# Patient Record
Sex: MALE | Race: WHITE | NOT HISPANIC OR LATINO | Employment: OTHER | URBAN - METROPOLITAN AREA
[De-identification: names, ages, dates, MRNs, and addresses within clinical notes are randomized per-mention and may not be internally consistent; named-entity substitution may affect disease eponyms.]

---

## 2017-12-18 ENCOUNTER — TRANSCRIBE ORDERS (OUTPATIENT)
Dept: ADMINISTRATIVE | Facility: HOSPITAL | Age: 37
End: 2017-12-18

## 2017-12-18 DIAGNOSIS — S06.0X9S: ICD-10-CM

## 2017-12-18 DIAGNOSIS — R40.3 PERSISTENT VEGETATIVE STATE (HCC): Primary | ICD-10-CM

## 2017-12-20 ENCOUNTER — HOSPITAL ENCOUNTER (OUTPATIENT)
Dept: CT IMAGING | Facility: HOSPITAL | Age: 37
Discharge: HOME | End: 2017-12-20
Attending: INTERNAL MEDICINE | Admitting: INTERNAL MEDICINE

## 2017-12-20 DIAGNOSIS — R40.3 PERSISTENT VEGETATIVE STATE (HCC): ICD-10-CM

## 2017-12-20 DIAGNOSIS — S06.0X9S: ICD-10-CM

## 2017-12-20 PROCEDURE — 70450 CT HEAD/BRAIN W/O DYE: CPT

## 2018-02-26 ENCOUNTER — OFFICE (OUTPATIENT)
Dept: URBAN - METROPOLITAN AREA CLINIC 71 | Facility: CLINIC | Age: 38
End: 2018-02-26

## 2018-02-26 VITALS — DIASTOLIC BLOOD PRESSURE: 80 MMHG | HEART RATE: 88 BPM | SYSTOLIC BLOOD PRESSURE: 118 MMHG

## 2018-02-26 DIAGNOSIS — K94.20 GASTROSTOMY COMPLICATION, UNSPECIFIED: ICD-10-CM

## 2018-02-26 PROCEDURE — 43760: CPT

## 2018-08-06 ENCOUNTER — APPOINTMENT (OUTPATIENT)
Dept: GENERAL RADIOLOGY | Facility: HOSPITAL | Age: 38
End: 2018-08-06

## 2018-08-06 ENCOUNTER — HOSPITAL ENCOUNTER (INPATIENT)
Facility: HOSPITAL | Age: 38
LOS: 4 days | Discharge: NURSING, REHAB OR LONG-TERM CARE | End: 2018-08-10
Attending: EMERGENCY MEDICINE | Admitting: HOSPITALIST

## 2018-08-06 DIAGNOSIS — J69.0 ASPIRATION PNEUMONITIS (HCC): ICD-10-CM

## 2018-08-06 DIAGNOSIS — R09.02 HYPOXIA: ICD-10-CM

## 2018-08-06 DIAGNOSIS — N39.0 ACUTE UTI: ICD-10-CM

## 2018-08-06 DIAGNOSIS — R56.9 SEIZURES (HCC): ICD-10-CM

## 2018-08-06 DIAGNOSIS — A41.9 SEPSIS, DUE TO UNSPECIFIED ORGANISM: Primary | ICD-10-CM

## 2018-08-06 LAB
ALBUMIN SERPL-MCNC: 4.6 G/DL (ref 3.5–5.2)
ALBUMIN/GLOB SERPL: 1.5 G/DL
ALP SERPL-CCNC: 112 U/L (ref 39–117)
ALT SERPL W P-5'-P-CCNC: 51 U/L (ref 1–41)
AMORPH URATE CRY URNS QL MICRO: ABNORMAL /HPF
ANION GAP SERPL CALCULATED.3IONS-SCNC: 14.5 MMOL/L
ANION GAP SERPL CALCULATED.3IONS-SCNC: 20.5 MMOL/L
ARTERIAL PATENCY WRIST A: POSITIVE
AST SERPL-CCNC: 34 U/L (ref 1–40)
ATMOSPHERIC PRESS: 754 MMHG
BACTERIA UR QL AUTO: ABNORMAL /HPF
BASE EXCESS BLDA CALC-SCNC: 0.3 MMOL/L (ref 0–2)
BASOPHILS # BLD AUTO: 0.04 10*3/MM3 (ref 0–0.2)
BASOPHILS # BLD AUTO: 0.06 10*3/MM3 (ref 0–0.2)
BASOPHILS NFR BLD AUTO: 0.5 % (ref 0–1.5)
BASOPHILS NFR BLD AUTO: 0.6 % (ref 0–1.5)
BDY SITE: NORMAL
BILIRUB SERPL-MCNC: 0.5 MG/DL (ref 0.1–1.2)
BILIRUB UR QL STRIP: NEGATIVE
BUN BLD-MCNC: 14 MG/DL (ref 6–20)
BUN BLD-MCNC: 19 MG/DL (ref 6–20)
BUN/CREAT SERPL: 24.6 (ref 7–25)
BUN/CREAT SERPL: 27.1 (ref 7–25)
CALCIUM SPEC-SCNC: 8.9 MG/DL (ref 8.6–10.5)
CALCIUM SPEC-SCNC: 9.8 MG/DL (ref 8.6–10.5)
CHLORIDE SERPL-SCNC: 106 MMOL/L (ref 98–107)
CHLORIDE SERPL-SCNC: 99 MMOL/L (ref 98–107)
CK SERPL-CCNC: 159 U/L (ref 20–200)
CLARITY UR: ABNORMAL
CO2 SERPL-SCNC: 22.5 MMOL/L (ref 22–29)
CO2 SERPL-SCNC: 24.5 MMOL/L (ref 22–29)
COLOR UR: YELLOW
CREAT BLD-MCNC: 0.57 MG/DL (ref 0.76–1.27)
CREAT BLD-MCNC: 0.7 MG/DL (ref 0.76–1.27)
D-LACTATE SERPL-SCNC: 3 MMOL/L (ref 0.5–2)
D-LACTATE SERPL-SCNC: 6.2 MMOL/L (ref 0.5–2)
DEPRECATED RDW RBC AUTO: 51.5 FL (ref 37–54)
DEPRECATED RDW RBC AUTO: 52.2 FL (ref 37–54)
EOSINOPHIL # BLD AUTO: 0.11 10*3/MM3 (ref 0–0.7)
EOSINOPHIL # BLD AUTO: 0.12 10*3/MM3 (ref 0–0.7)
EOSINOPHIL NFR BLD AUTO: 1.2 % (ref 0.3–6.2)
EOSINOPHIL NFR BLD AUTO: 1.5 % (ref 0.3–6.2)
ERYTHROCYTE [DISTWIDTH] IN BLOOD BY AUTOMATED COUNT: 14.1 % (ref 11.5–14.5)
ERYTHROCYTE [DISTWIDTH] IN BLOOD BY AUTOMATED COUNT: 14.3 % (ref 11.5–14.5)
GAS FLOW AIRWAY: 3 LPM
GFR SERPL CREATININE-BSD FRML MDRD: 126 ML/MIN/1.73
GFR SERPL CREATININE-BSD FRML MDRD: >150 ML/MIN/1.73
GLOBULIN UR ELPH-MCNC: 3.1 GM/DL
GLUCOSE BLD-MCNC: 127 MG/DL (ref 65–99)
GLUCOSE BLD-MCNC: 94 MG/DL (ref 65–99)
GLUCOSE BLDC GLUCOMTR-MCNC: 101 MG/DL (ref 70–130)
GLUCOSE BLDC GLUCOMTR-MCNC: 103 MG/DL (ref 70–130)
GLUCOSE BLDC GLUCOMTR-MCNC: 91 MG/DL (ref 70–130)
GLUCOSE UR STRIP-MCNC: NEGATIVE MG/DL
HCO3 BLDA-SCNC: 26.2 MMOL/L (ref 22–28)
HCT VFR BLD AUTO: 50.2 % (ref 40.4–52.2)
HCT VFR BLD AUTO: 53.8 % (ref 40.4–52.2)
HGB BLD-MCNC: 15.5 G/DL (ref 13.7–17.6)
HGB BLD-MCNC: 17 G/DL (ref 13.7–17.6)
HGB UR QL STRIP.AUTO: ABNORMAL
HOLD SPECIMEN: NORMAL
HYALINE CASTS UR QL AUTO: ABNORMAL /LPF
IMM GRANULOCYTES # BLD: 0.02 10*3/MM3 (ref 0–0.03)
IMM GRANULOCYTES # BLD: 0.03 10*3/MM3 (ref 0–0.03)
IMM GRANULOCYTES NFR BLD: 0.2 % (ref 0–0.5)
IMM GRANULOCYTES NFR BLD: 0.3 % (ref 0–0.5)
KETONES UR QL STRIP: NEGATIVE
LEUKOCYTE ESTERASE UR QL STRIP.AUTO: ABNORMAL
LYMPHOCYTES # BLD AUTO: 1.12 10*3/MM3 (ref 0.9–4.8)
LYMPHOCYTES # BLD AUTO: 2.16 10*3/MM3 (ref 0.9–4.8)
LYMPHOCYTES NFR BLD AUTO: 12 % (ref 19.6–45.3)
LYMPHOCYTES NFR BLD AUTO: 26.2 % (ref 19.6–45.3)
MCH RBC QN AUTO: 31.1 PG (ref 27–32.7)
MCH RBC QN AUTO: 31.7 PG (ref 27–32.7)
MCHC RBC AUTO-ENTMCNC: 30.9 G/DL (ref 32.6–36.4)
MCHC RBC AUTO-ENTMCNC: 31.6 G/DL (ref 32.6–36.4)
MCV RBC AUTO: 100.4 FL (ref 79.8–96.2)
MCV RBC AUTO: 100.6 FL (ref 79.8–96.2)
MODALITY: NORMAL
MONOCYTES # BLD AUTO: 0.89 10*3/MM3 (ref 0.2–1.2)
MONOCYTES # BLD AUTO: 0.91 10*3/MM3 (ref 0.2–1.2)
MONOCYTES NFR BLD AUTO: 11.1 % (ref 5–12)
MONOCYTES NFR BLD AUTO: 9.6 % (ref 5–12)
NEUTROPHILS # BLD AUTO: 4.98 10*3/MM3 (ref 1.9–8.1)
NEUTROPHILS # BLD AUTO: 7.1 10*3/MM3 (ref 1.9–8.1)
NEUTROPHILS NFR BLD AUTO: 60.5 % (ref 42.7–76)
NEUTROPHILS NFR BLD AUTO: 76.3 % (ref 42.7–76)
NITRITE UR QL STRIP: NEGATIVE
PCO2 BLDA: 45 MM HG (ref 35–45)
PH BLDA: 7.37 PH UNITS (ref 7.35–7.45)
PH UR STRIP.AUTO: <=5 [PH] (ref 5–8)
PLATELET # BLD AUTO: 180 10*3/MM3 (ref 140–500)
PLATELET # BLD AUTO: 214 10*3/MM3 (ref 140–500)
PMV BLD AUTO: 11 FL (ref 6–12)
PMV BLD AUTO: 11.7 FL (ref 6–12)
PO2 BLDA: 86.3 MM HG (ref 80–100)
POTASSIUM BLD-SCNC: 4.2 MMOL/L (ref 3.5–5.2)
POTASSIUM BLD-SCNC: 4.4 MMOL/L (ref 3.5–5.2)
PROCALCITONIN SERPL-MCNC: 0.06 NG/ML (ref 0.1–0.25)
PROT SERPL-MCNC: 7.7 G/DL (ref 6–8.5)
PROT UR QL STRIP: ABNORMAL
RBC # BLD AUTO: 4.99 10*6/MM3 (ref 4.6–6)
RBC # BLD AUTO: 5.36 10*6/MM3 (ref 4.6–6)
RBC # UR: ABNORMAL /HPF
REF LAB TEST METHOD: ABNORMAL
SAO2 % BLDCOA: 96.2 % (ref 92–99)
SODIUM BLD-SCNC: 142 MMOL/L (ref 136–145)
SODIUM BLD-SCNC: 145 MMOL/L (ref 136–145)
SP GR UR STRIP: 1.01 (ref 1–1.03)
SQUAMOUS #/AREA URNS HPF: ABNORMAL /HPF
TOTAL RATE: 18 BREATHS/MINUTE
TROPONIN T SERPL-MCNC: 0.02 NG/ML (ref 0–0.03)
UROBILINOGEN UR QL STRIP: ABNORMAL
WBC NRBC COR # BLD: 8.23 10*3/MM3 (ref 4.5–10.7)
WBC NRBC COR # BLD: 9.31 10*3/MM3 (ref 4.5–10.7)
WBC UR QL AUTO: ABNORMAL /HPF

## 2018-08-06 PROCEDURE — 93010 ELECTROCARDIOGRAM REPORT: CPT | Performed by: INTERNAL MEDICINE

## 2018-08-06 PROCEDURE — 25010000002 MORPHINE PER 10 MG: Performed by: EMERGENCY MEDICINE

## 2018-08-06 PROCEDURE — 25010000002 VANCOMYCIN 10 G RECONSTITUTED SOLUTION: Performed by: HOSPITALIST

## 2018-08-06 PROCEDURE — 82550 ASSAY OF CK (CPK): CPT | Performed by: EMERGENCY MEDICINE

## 2018-08-06 PROCEDURE — 85025 COMPLETE CBC W/AUTO DIFF WBC: CPT | Performed by: EMERGENCY MEDICINE

## 2018-08-06 PROCEDURE — 99285 EMERGENCY DEPT VISIT HI MDM: CPT

## 2018-08-06 PROCEDURE — 25010000003 LEVETIRACETAM IN NACL 0.75% 1000 MG/100ML SOLUTION: Performed by: EMERGENCY MEDICINE

## 2018-08-06 PROCEDURE — 71045 X-RAY EXAM CHEST 1 VIEW: CPT

## 2018-08-06 PROCEDURE — 87040 BLOOD CULTURE FOR BACTERIA: CPT | Performed by: EMERGENCY MEDICINE

## 2018-08-06 PROCEDURE — 94799 UNLISTED PULMONARY SVC/PX: CPT

## 2018-08-06 PROCEDURE — 25010000002 PIPERACILLIN SOD-TAZOBACTAM PER 1 G: Performed by: EMERGENCY MEDICINE

## 2018-08-06 PROCEDURE — 25010000002 VANCOMYCIN 10 G RECONSTITUTED SOLUTION: Performed by: EMERGENCY MEDICINE

## 2018-08-06 PROCEDURE — 80053 COMPREHEN METABOLIC PANEL: CPT | Performed by: EMERGENCY MEDICINE

## 2018-08-06 PROCEDURE — 36600 WITHDRAWAL OF ARTERIAL BLOOD: CPT

## 2018-08-06 PROCEDURE — 74018 RADEX ABDOMEN 1 VIEW: CPT

## 2018-08-06 PROCEDURE — 84484 ASSAY OF TROPONIN QUANT: CPT | Performed by: EMERGENCY MEDICINE

## 2018-08-06 PROCEDURE — 25010000002 PIPERACILLIN SOD-TAZOBACTAM PER 1 G: Performed by: HOSPITALIST

## 2018-08-06 PROCEDURE — 82962 GLUCOSE BLOOD TEST: CPT

## 2018-08-06 PROCEDURE — 81001 URINALYSIS AUTO W/SCOPE: CPT | Performed by: EMERGENCY MEDICINE

## 2018-08-06 PROCEDURE — 93005 ELECTROCARDIOGRAM TRACING: CPT | Performed by: EMERGENCY MEDICINE

## 2018-08-06 PROCEDURE — 82803 BLOOD GASES ANY COMBINATION: CPT

## 2018-08-06 PROCEDURE — 87086 URINE CULTURE/COLONY COUNT: CPT | Performed by: EMERGENCY MEDICINE

## 2018-08-06 PROCEDURE — 87077 CULTURE AEROBIC IDENTIFY: CPT | Performed by: EMERGENCY MEDICINE

## 2018-08-06 PROCEDURE — 83605 ASSAY OF LACTIC ACID: CPT | Performed by: EMERGENCY MEDICINE

## 2018-08-06 PROCEDURE — 87186 SC STD MICRODIL/AGAR DIL: CPT | Performed by: EMERGENCY MEDICINE

## 2018-08-06 PROCEDURE — 84145 PROCALCITONIN (PCT): CPT | Performed by: EMERGENCY MEDICINE

## 2018-08-06 PROCEDURE — 85025 COMPLETE CBC W/AUTO DIFF WBC: CPT | Performed by: HOSPITALIST

## 2018-08-06 PROCEDURE — 25010000002 LORAZEPAM PER 2 MG: Performed by: PSYCHIATRY & NEUROLOGY

## 2018-08-06 RX ORDER — SODIUM CHLORIDE 450 MG/100ML
75 INJECTION, SOLUTION INTRAVENOUS CONTINUOUS
Status: DISCONTINUED | OUTPATIENT
Start: 2018-08-06 | End: 2018-08-08

## 2018-08-06 RX ORDER — LEVETIRACETAM 100 MG/ML
1000 SOLUTION ORAL EVERY 12 HOURS SCHEDULED
Status: DISCONTINUED | OUTPATIENT
Start: 2018-08-06 | End: 2018-08-06

## 2018-08-06 RX ORDER — NITROGLYCERIN 0.4 MG/1
0.4 TABLET SUBLINGUAL
Status: DISCONTINUED | OUTPATIENT
Start: 2018-08-06 | End: 2018-08-10

## 2018-08-06 RX ORDER — ACETAMINOPHEN 160 MG/5ML
500 SOLUTION ORAL 3 TIMES DAILY PRN
Status: DISCONTINUED | OUTPATIENT
Start: 2018-08-06 | End: 2018-08-10

## 2018-08-06 RX ORDER — LEVETIRACETAM 100 MG/ML
1500 SOLUTION ORAL EVERY 12 HOURS SCHEDULED
Status: DISCONTINUED | OUTPATIENT
Start: 2018-08-06 | End: 2018-08-10 | Stop reason: HOSPADM

## 2018-08-06 RX ORDER — POLYVINYL ALCOHOL 14 MG/ML
1 SOLUTION/ DROPS OPHTHALMIC 4 TIMES DAILY
COMMUNITY
End: 2020-01-23 | Stop reason: HOSPADM

## 2018-08-06 RX ORDER — MORPHINE SULFATE 2 MG/ML
4 INJECTION, SOLUTION INTRAMUSCULAR; INTRAVENOUS ONCE
Status: DISCONTINUED | OUTPATIENT
Start: 2018-08-06 | End: 2018-08-06

## 2018-08-06 RX ORDER — POLYETHYLENE GLYCOL 3350 17 G/17G
17 POWDER, FOR SOLUTION ORAL DAILY PRN
Status: ON HOLD | COMMUNITY
End: 2020-01-20

## 2018-08-06 RX ORDER — ACETAMINOPHEN 160 MG/5ML
500 SOLUTION ORAL 3 TIMES DAILY PRN
COMMUNITY
End: 2018-08-10 | Stop reason: HOSPADM

## 2018-08-06 RX ORDER — DOCUSATE SODIUM 50 MG/5 ML
100 LIQUID (ML) ORAL DAILY
Status: DISCONTINUED | OUTPATIENT
Start: 2018-08-06 | End: 2018-08-10 | Stop reason: HOSPADM

## 2018-08-06 RX ORDER — HYDROCODONE BITARTRATE AND ACETAMINOPHEN 7.5; 325 MG/1; MG/1
2 TABLET ORAL EVERY 4 HOURS PRN
Status: DISCONTINUED | OUTPATIENT
Start: 2018-08-06 | End: 2018-08-10

## 2018-08-06 RX ORDER — HYDROCODONE BITARTRATE AND ACETAMINOPHEN 7.5; 325 MG/1; MG/1
1 TABLET ORAL EVERY 4 HOURS PRN
Status: DISCONTINUED | OUTPATIENT
Start: 2018-08-06 | End: 2018-08-10

## 2018-08-06 RX ORDER — FLUOCINOLONE ACETONIDE 0.1 MG/ML
1 SOLUTION TOPICAL 3 TIMES DAILY
COMMUNITY
End: 2018-08-10 | Stop reason: HOSPADM

## 2018-08-06 RX ORDER — LEVETIRACETAM 10 MG/ML
1000 INJECTION INTRAVASCULAR ONCE
Status: COMPLETED | OUTPATIENT
Start: 2018-08-06 | End: 2018-08-06

## 2018-08-06 RX ORDER — CETIRIZINE HYDROCHLORIDE 10 MG/1
10 TABLET ORAL DAILY
Status: DISCONTINUED | OUTPATIENT
Start: 2018-08-06 | End: 2018-08-07

## 2018-08-06 RX ORDER — FAMOTIDINE 20 MG/1
20 TABLET, FILM COATED ORAL 2 TIMES DAILY
Status: DISCONTINUED | OUTPATIENT
Start: 2018-08-06 | End: 2018-08-10 | Stop reason: HOSPADM

## 2018-08-06 RX ORDER — LORAZEPAM 2 MG/ML
1 INJECTION INTRAMUSCULAR ONCE
Status: COMPLETED | OUTPATIENT
Start: 2018-08-06 | End: 2018-08-06

## 2018-08-06 RX ORDER — CALAMINE, MENTHOL, WHITE PETROLATUM, ZINC OXIDE .5; .2; 69; 19.5 G/100G; G/100G; G/100G; G/100G
1 PASTE TOPICAL 2 TIMES DAILY
COMMUNITY
End: 2018-08-10 | Stop reason: HOSPADM

## 2018-08-06 RX ORDER — HYDROCODONE BITARTRATE AND ACETAMINOPHEN 7.5; 325 MG/1; MG/1
2 TABLET ORAL EVERY 4 HOURS PRN
COMMUNITY
End: 2018-08-10 | Stop reason: HOSPADM

## 2018-08-06 RX ORDER — BACLOFEN 10 MG/1
10 TABLET ORAL ONCE
Status: COMPLETED | OUTPATIENT
Start: 2018-08-06 | End: 2018-08-06

## 2018-08-06 RX ORDER — LEVETIRACETAM 10 MG/ML
1000 INJECTION INTRAVASCULAR ONCE
Status: DISCONTINUED | OUTPATIENT
Start: 2018-08-06 | End: 2018-08-06

## 2018-08-06 RX ORDER — HYDROCODONE BITARTRATE AND ACETAMINOPHEN 7.5; 325 MG/1; MG/1
1 TABLET ORAL EVERY 4 HOURS PRN
COMMUNITY
End: 2018-08-10 | Stop reason: HOSPADM

## 2018-08-06 RX ORDER — CHLORHEXIDINE GLUCONATE 0.12 MG/ML
15 RINSE ORAL EVERY 12 HOURS SCHEDULED
Status: DISCONTINUED | OUTPATIENT
Start: 2018-08-06 | End: 2018-08-10 | Stop reason: HOSPADM

## 2018-08-06 RX ORDER — BISACODYL 10 MG
10 SUPPOSITORY, RECTAL RECTAL DAILY PRN
Status: DISCONTINUED | OUTPATIENT
Start: 2018-08-06 | End: 2018-08-10

## 2018-08-06 RX ORDER — METRONIDAZOLE 7.5 MG/G
1 GEL TOPICAL DAILY
COMMUNITY
End: 2018-08-10 | Stop reason: HOSPADM

## 2018-08-06 RX ADMIN — SODIUM CHLORIDE 75 ML/HR: 4.5 INJECTION, SOLUTION INTRAVENOUS at 14:14

## 2018-08-06 RX ADMIN — VANCOMYCIN HYDROCHLORIDE 1250 MG: 10 INJECTION, POWDER, LYOPHILIZED, FOR SOLUTION INTRAVENOUS at 17:02

## 2018-08-06 RX ADMIN — VANCOMYCIN HYDROCHLORIDE 1500 MG: 10 INJECTION, POWDER, LYOPHILIZED, FOR SOLUTION INTRAVENOUS at 02:46

## 2018-08-06 RX ADMIN — TAZOBACTAM SODIUM AND PIPERACILLIN SODIUM 3.38 G: 375; 3 INJECTION, SOLUTION INTRAVENOUS at 08:17

## 2018-08-06 RX ADMIN — LORAZEPAM 1 MG: 2 INJECTION INTRAMUSCULAR; INTRAVENOUS at 15:55

## 2018-08-06 RX ADMIN — SODIUM CHLORIDE 1000 ML: 9 INJECTION, SOLUTION INTRAVENOUS at 00:51

## 2018-08-06 RX ADMIN — AMANTADINE HYDROCHLORIDE 100 MG: 50 SOLUTION ORAL at 14:12

## 2018-08-06 RX ADMIN — CHLORHEXIDINE GLUCONATE 15 ML: 1.2 RINSE ORAL at 21:34

## 2018-08-06 RX ADMIN — CETIRIZINE HYDROCHLORIDE 10 MG: 10 TABLET, FILM COATED ORAL at 11:55

## 2018-08-06 RX ADMIN — TAZOBACTAM SODIUM AND PIPERACILLIN SODIUM 3.38 G: 375; 3 INJECTION, SOLUTION INTRAVENOUS at 17:02

## 2018-08-06 RX ADMIN — HYDROCODONE BITARTRATE AND ACETAMINOPHEN 2 TABLET: 7.5; 325 TABLET ORAL at 14:12

## 2018-08-06 RX ADMIN — MUPIROCIN 1 APPLICATION: 20 OINTMENT TOPICAL at 09:02

## 2018-08-06 RX ADMIN — BACLOFEN 10 MG: 10 TABLET ORAL at 15:55

## 2018-08-06 RX ADMIN — DOCUSATE SODIUM 100 MG: 50 LIQUID ORAL at 14:12

## 2018-08-06 RX ADMIN — LEVETIRACETAM 1000 MG: 100 SOLUTION ORAL at 11:53

## 2018-08-06 RX ADMIN — FAMOTIDINE 20 MG: 20 TABLET, FILM COATED ORAL at 11:55

## 2018-08-06 RX ADMIN — LEVETIRACETAM 1500 MG: 100 SOLUTION ORAL at 21:00

## 2018-08-06 RX ADMIN — FAMOTIDINE 20 MG: 20 TABLET, FILM COATED ORAL at 21:00

## 2018-08-06 RX ADMIN — LEVETIRACETAM 1000 MG: 10 INJECTION INTRAVENOUS at 00:51

## 2018-08-06 RX ADMIN — TAZOBACTAM SODIUM AND PIPERACILLIN SODIUM 3.38 G: 375; 3 INJECTION, SOLUTION INTRAVENOUS at 23:59

## 2018-08-06 RX ADMIN — MORPHINE SULFATE 4 MG: 4 INJECTION INTRAVENOUS at 02:49

## 2018-08-06 RX ADMIN — SODIUM CHLORIDE 2415 ML: 9 INJECTION, SOLUTION INTRAVENOUS at 01:35

## 2018-08-06 RX ADMIN — CHLORHEXIDINE GLUCONATE 15 ML: 1.2 RINSE ORAL at 14:12

## 2018-08-06 NOTE — CONSULTS
Adult Nutrition  Assessment/PES    Patient Name:  Adrian Kuo  YOB: 1980  MRN: [id number]  Admit Date:  8/6/2018    Assessment Date:  8/6/2018    Comments:  Consult per nursing.  Patient with Gtube.  History of TBI.  Patient resides at NH.    Per patient's paper chart on unit, patient receives TFs of Diabetisource cyclically overnight and receives boluses during the day.  Recommend resuming TF regimen from NH.  Recommend boluses of Diabetisource 250 mL (1 can) at 11 am, 2 pm, and 5 pm with 240 ml flushes at each bolus.  Also recommend cyclic nocturnal TFs of Diabetisource at 65 ml/hr running for 12 hours from 8 pm - 8 am with 50 ml q hr flushes.    Recommend resuming TFs when okay'ed per MD.    Will make RN aware of goals and add orders if TFs are okay'ed to re-start.    RD to continue to follow.          Reason for Assessment     Row Name 08/06/18 1301          Reason for Assessment    Reason For Assessment nurse/nurse practitioner consult     Diagnosis neurologic conditions;infection/sepsis   TBI, quadriplegia, contracture, seizures; adm with sepsis     Identified At Risk by Screening Criteria tube feeding or parenteral nutrition             Nutrition/Diet History     Row Name 08/06/18 1302          Nutrition/Diet History    Typical Food/Fluid Intake patient with Gtube, gets Diabetisource via Gtube at 65 ml/hr x 12 hours 8pm-8am and boluses 1 can at 11 am, 2 pm, 5 pm             Anthropometrics     Row Name 08/06/18 1303          Admit Weight    Admit Weight 80.9 kg (178 lb 5.6 oz)        Body Mass Index (BMI)    BMI Assessment BMI 18.5-24.9: normal             Labs/Tests/Procedures/Meds     Row Name 08/06/18 1304          Labs/Procedures/Meds    Lab Results Reviewed reviewed, pertinent     Lab Results Comments Creat        Diagnostic Tests/Procedures    Diagnostic Test/Procedure Reviewed reviewed, pertinent        Medications    Pertinent Medications Reviewed reviewed, pertinent      Pertinent Medications Comments colace, pepcid, insulin, vanc, zosyn             Physical Findings     Row Name 08/06/18 1306          Physical Findings    Gastrointestinal feeding tube     Tubes gastrostomy tube     Skin --   B=10, intact             Estimated/Assessed Needs     Row Name 08/06/18 1306          Calculation Measurements    Weight Used For Calculations 80.7 kg (178 lb)        Estimated/Assessed Needs    Additional Documentation KCAL/KG (Group);Protein Requirements (Group);Fluid Requirements (Group)        KCAL/KG    14 Kcal/Kg (kcal) 1130.36     15 Kcal/Kg (kcal) 1211.1     18 Kcal/Kg (kcal) 1453.32     20 Kcal/Kg (kcal) 1614.8     25 Kcal/Kg (kcal) 2018.5     30 Kcal/Kg (kcal) 2422.2     35 Kcal/Kg (kcal) 2825.9     40 Kcal/Kg (kcal) 3229.6     45 Kcal/Kg (kcal) 3633.3     50 Kcal/Kg (kcal) 4037     kcal/kg (Specify) --   3220-8623        Armstrong-St. Jeor Equation    RMR (Armstrong-St. Jeor Equation) 1749.53        Protein Requirements    Est Protein Requirement Amount (gms/kg) 1.2 gm protein   81-97     Estimated Protein Requirements (gms/day) 96.89        Fluid Requirements    Estimated Fluid Requirements (mL/day) 2422     Estimated Fluid Requirement Method RDA Method     RDA Method (mL) 2422     Pablito-Sigifredo Method (over 20 kg) 3114.8             Nutrition Prescription Ordered     Row Name 08/06/18 1311          Nutrition Prescription PO    Current PO Diet NPO             Evaluation of Received Nutrient/Fluid Intake     Row Name 08/06/18 1306          Calculation Measurements    Weight Used For Calculations 80.7 kg (178 lb)             Evaluation of Prescribed Nutrient/Fluid Intake     Row Name 08/06/18 1306          Calculation Measurements    Weight Used For Calculations 80.7 kg (178 lb)           Problem/Interventions:        Problem 1     Row Name 08/06/18 1311          Nutrition Diagnoses Problem 1    Problem 1 Needs Alternate Route     Etiology (related to) Functional Diagnosis;Medical  Diagnosis     Neurological TBI     Functional Diagnosis Dysphagia     Signs/Symptoms (evidenced by) Report/Observation                     Intervention Goal     Row Name 08/06/18 1312          Intervention Goal    General Maintain nutrition;Reduce/improve symptoms;Disease management/therapy;Meet nutritional needs for age/condition;Nutrition support treatment     TF/PN Inititiate TF/PN;Establish TF tolerance;Tolerate TF at goal;Deliver (%) goal     Deliver % of Goal 100 %     Weight Maintain weight             Nutrition Intervention     Row Name 08/06/18 1313          Nutrition Intervention    RD/Tech Action Follow Tx progress;Care plan reviewd;Recommend/ordered     Recommended/Ordered EN             Nutrition Prescription     Row Name 08/06/18 1313          Nutrition Prescription EN    Enteral Prescription Enteral begin/change;Enteral to supply     Enteral Route Gastrostomy     Product Diabetisource     TF Delivery Method Cyclic;Bolus     TF Bolus Goal Volume (mL) 250 mL     TF Bolus Starting Volume (mL) 250 mL     TF Bolus Frequency 3 times a day   1 can at 11 am, 2 pm, 5 pm     Cyclic TF Goal Rate (mL/hr) 65 mL/hr     Cyclic TF Starting Rate (mL/hr) 40 mL/hr     Cyclic TF Cycle Over (hrs)  8 pm - 8 am     Water flush (mL)  50 mL     Water Flush Frequency Other (comment)   50 ml/hr x 12 hours overnight (8p-8a); 240 ml aat each bolus        EN to Supply    Kcal/Day 1836 Kcal/Day     Kcal/Kg 23 Kcal/Kg     Kcal/Kg Weight Method Actual weight     Protein (gm/day) 92 gm/day     Meet Estimated Kcal Need (%) 100 %     Meet Estimated Protein Need (%) 100 %     TF Free H2O (mL) 1248 mL     Total Free H2O (mL/day) 2568 mL/day             Education/Evaluation     Row Name 08/06/18 1316          Education    Education Education not appropriate at this time     Please explain Patient nonverbal        Monitor/Evaluation    Monitor Per protocol;I&O;Pertinent labs;TF delivery/tolerance;Weight;Skin status;Symptoms     Education  Follow-up Reinforce PRN         Electronically signed by:  Donita Ruiz RD  08/06/18 1:16 PM

## 2018-08-06 NOTE — CONSULTS
Encounter Date: 8/6/2018    CHIEF COMPLAINT: Seizures    Patient Active Problem List   Diagnosis   • Sepsis (CMS/HCC)       PRESENT ILLNESS:    Portions of this notes is copied from previous physician encounters, reviewed and edited appropriately.    Background:     Adrian Kuo is a 38 y.o. male with history of traumatic brain injury for last 14 years now admitted with multiple seizures.  Patient was supposed to get his baclofen pump refilled however cannot be done as he was sick.  He started to have seizures since yesterday.  Patient at baseline is lethargic and nonverbal.  Patient's mom is present by the bedside.      Review of systems   Cannot review systems because of altered mental status        Past Medical History:   Diagnosis Date   • Atopic dermatitis    • Constipation    • Contracture, unspecified hand    • H/O gastrostomy, has currently (CMS/HCC)    • Partial small bowel obstruction    • Persistent vegetative state (CMS/HCC)    • Quadriplegia (CMS/HCC)    • Seborrheic keratosis    • Seizures (CMS/HCC)    • Traumatic brain injury (CMS/HCC)        Past Surgical History:   Procedure Laterality Date   • BACLOFEN PUMP IMPLANTATION     • BRAIN SURGERY     • CHOLECYSTECTOMY         Current Facility-Administered Medications   Medication Dose Route Frequency Provider Last Rate Last Dose   • acetaminophen (TYLENOL) 160 MG/5ML solution 500 mg  500 mg Per G Tube TID PRN Marcus Clarke MD       • amantadine (SYMMETREL) solution 100 mg  100 mg Per G Tube Q12H Marcus Clarke MD   100 mg at 08/06/18 1412   • baclofen (LIORESAL) tablet 10 mg  10 mg Oral Once Tia Joyce MD       • bisacodyl (DULCOLAX) suppository 10 mg  10 mg Rectal Daily PRN Marcus Clarke MD       • cetirizine (zyrTEC) tablet 10 mg  10 mg Per G Tube Daily Marcus Clarke MD   10 mg at 08/06/18 1155   • chlorhexidine (PERIDEX) 0.12 % solution 15 mL  15 mL Mouth/Throat Q12H Marcus Clarke MD   15 mL at 08/06/18 1412   • docusate sodium (COLACE)  liquid 100 mg  100 mg Per G Tube Daily Marcus Clarke MD   100 mg at 08/06/18 1412   • famotidine (PEPCID) tablet 20 mg  20 mg Per G Tube BID Marcus Clarke MD   20 mg at 08/06/18 1155   • HYDROcodone-acetaminophen (NORCO) 7.5-325 MG per tablet 1 tablet  1 tablet Per G Tube Q4H PRN Marcus Clarke MD       • HYDROcodone-acetaminophen (NORCO) 7.5-325 MG per tablet 2 tablet  2 tablet Per G Tube Q4H PRN Marcus Clarke MD   2 tablet at 08/06/18 1412   • insulin aspart (novoLOG) injection 0-7 Units  0-7 Units Subcutaneous 4x Daily With Meals & Nightly Marcus Clarke MD       • insulin detemir (LEVEMIR) injection 20 Units  20 Units Subcutaneous QAM Marcus Clarke MD       • levETIRAcetam (KEPPRA) 100 MG/ML solution 1,000 mg  1,000 mg Per G Tube Q12H Mracus Clarke MD   1,000 mg at 08/06/18 1153   • LORazepam (ATIVAN) injection 1 mg  1 mg Intravenous Once Tia Joyce MD       • melatonin tablet 4.5 mg  4.5 mg Per G Tube Nightly PRN Marcus Clakre MD       • mupirocin (BACTROBAN) 2 % ointment   Topical Daily Marcus Clarke MD   1 application at 08/06/18 0902   • nitroglycerin (NITROSTAT) SL tablet 0.4 mg  0.4 mg Sublingual Q5 Min PRN Marcus Clarke MD       • Pharmacy to dose vancomycin   Does not apply BID Marcus Clarke MD       • piperacillin-tazobactam (ZOSYN) 3.375 g in iso-osmotic dextrose 50 ml (premix)  3.375 g Intravenous Q8H Marcus Clarke MD   3.375 g at 08/06/18 0817   • polyethylene glycol 3350 powder (packet)  17 g Per G Tube Daily PRN Marcus Clarke MD       • sodium chloride 0.45 % infusion  75 mL/hr Intravenous Continuous Marcus Clarke MD 75 mL/hr at 08/06/18 1414 75 mL/hr at 08/06/18 1414   • vancomycin 1250 mg/250 mL 0.9% NS IVPB ([facility])  15 mg/kg Intravenous Q12H Marcus Clarke MD           Allergies   Allergen Reactions   • Zofran [Ondansetron Hcl] Rash       Social History     Social History   • Marital status: Single     Spouse name: N/A   • Number of children: N/A   • Years of education: N/A     Occupational  History   • Not on file.     Social History Main Topics   • Smoking status: Former Smoker     Packs/day: 0.50     Types: Cigarettes     Start date: 1/1/1998     Quit date: 1/1/2004   • Smokeless tobacco: Not on file   • Alcohol use No   • Drug use: No   • Sexual activity: Not on file     Other Topics Concern   • Not on file     Social History Narrative   • No narrative on file       History reviewed. No pertinent family history.    No family status information on file.       No current facility-administered medications on file prior to encounter.      Current Outpatient Prescriptions on File Prior to Encounter   Medication Sig   • bisacodyl (BISACODYL LAXATIVE) 10 MG suppository Insert 10 mg into the rectum Daily As Needed.   • levETIRAcetam (KEPPRA) 100 MG/ML solution 1,000 mg by Per G Tube route 2 (Two) Times a Day.   • loratadine (CLARITIN) 5 MG/5ML syrup 10 mg by Per G Tube route Daily.   • melatonin 3 MG tablet 4.5 mg by Per G Tube route Every Night.   • psyllium (METAMUCIL) 58.6 % packet 1 packet by Per G Tube route Daily.   • raNITIdine (ZANTAC) 15 MG/ML syrup 150 mg by Per G Tube route 2 (Two) Times a Day.   • amantadine (SYMMETREL) 50 MG/5ML solution 100 mg by Per G Tube route Every 12 (Twelve) Hours.   • cefuroxime (CEFTIN) 250 MG/5ML suspension 2 teaspoons twice a day through G tube for 7 days   • chlorhexidine (PERIDEX) 0.12 % solution Apply 15 mL to the mouth or throat 2 (Two) Times a Day.   • docusate sodium (COLACE) 150 MG/15ML liquid 100 mg by Per G Tube route Daily.           PHYSICAL EXAMINATION:    Vitals: Patient Vitals for the past 4 hrs:   Temp Temp src Pulse Resp SpO2   08/06/18 1333 98.1 °F (36.7 °C) Oral 100 18 97 %     Temp:  [96.8 °F (36 °C)-98.3 °F (36.8 °C)] 98.1 °F (36.7 °C)  Heart Rate:  [] 100  Resp:  [14-20] 18  BP: (105-138)/(76-96) 131/87    General:  Mild distress.  HEENT: No pallor or icterus. Neck supple. No Carotid bruits.  Heart: S1 and S2 normal with regular rhythm.  No  murmur.       GENERAL NEUROLOGIC EXAM     Mental Status: Lethargic and does not follow commands and does not open eyes to pain or verbal stimulus .  Nonverbal which is his baseline.  Fund of knowledge memory and rest of the head cognitive function couldn't be examined as patient does not participate      Cranial nerves:  Pupils bilaterally equal, no gaze deviation.  Rest of the cranial be examined as patient does not participate    Motor:  increased tone bilaterally with seizure-like activity which is non-rhythmical   Cannot examine strength as patient does not participate      Sensation:  does not withdraw to pain     Reflexes: 2+ bilaterally symmetrical with down going toes.    Coordination:  cannot be examined  Gait:  cannot be examined as patient is bedbound      Labs:     Lab Results   Component Value Date    HGB 15.5 08/06/2018    HCT 50.2 08/06/2018    WBC 8.23 08/06/2018     08/06/2018     Lab Results   Component Value Date    BUN 14 08/06/2018    CALCIUM 8.9 08/06/2018     08/06/2018    K 4.2 08/06/2018     08/06/2018    CO2 24.5 08/06/2018     Lab Results   Component Value Date    ALT 51 (H) 08/06/2018    AST 34 08/06/2018    BILITOT 0.5 08/06/2018     Lab Results   Component Value Date    PHOS 2.9 01/14/2014    MG 2.1 01/14/2014     No components found for: POCGLUC  No components found for: A1C  Lab Results   Component Value Date    HDL 26 (L) 06/05/2015    LDL 73 06/05/2015     No components found for: B12  Lab Results   Component Value Date    TSH 2.60 06/05/2015       Lab Results (last 24 hours)     Procedure Component Value Units Date/Time    Blood Culture - Blood, [[id number]]  (Normal) Collected:  08/06/18 0155    Specimen:  Blood from Arm, Right Updated:  08/06/18 1415     Blood Culture No growth at less than 24 hours    Blood Culture - Blood, [[id number]]  (Normal) Collected:  08/06/18 0046    Specimen:  Blood from Arm, Left Updated:  08/06/18 1300     Blood Culture No growth at  less than 24 hours    POC Glucose Once [[id number]]  (Normal) Collected:  08/06/18 1129    Specimen:  Blood Updated:  08/06/18 1132     Glucose 101 mg/dL     Narrative:       Meter: [id number] : [id number] Malinda MORAES    Basic Metabolic Panel [[id number]]  (Abnormal) Collected:  08/06/18 0813    Specimen:  Blood Updated:  08/06/18 0905     Glucose 94 mg/dL      BUN 14 mg/dL      Creatinine 0.57 (L) mg/dL      Sodium 145 mmol/L      Potassium 4.2 mmol/L      Chloride 106 mmol/L      CO2 24.5 mmol/L      Calcium 8.9 mg/dL      eGFR Non African Amer >150 mL/min/1.73      BUN/Creatinine Ratio 24.6     Anion Gap 14.5 mmol/L     Narrative:       GFR Normal >60  Chronic Kidney Disease <60  Kidney Failure <15    CBC & Differential [[id number]] Collected:  08/06/18 0813    Specimen:  Blood Updated:  08/06/18 0839    Narrative:       The following orders were created for panel order CBC & Differential.  Procedure                               Abnormality         Status                     ---------                               -----------         ------                     CBC Auto Differential[[id number]]        Abnormal            Final result                 Please view results for these tests on the individual orders.    CBC Auto Differential [[id number]]  (Abnormal) Collected:  08/06/18 0813    Specimen:  Blood Updated:  08/06/18 0839     WBC 8.23 10*3/mm3      RBC 4.99 10*6/mm3      Hemoglobin 15.5 g/dL      Hematocrit 50.2 %      .6 (H) fL      MCH 31.1 pg      MCHC 30.9 (L) g/dL      RDW 14.3 %      RDW-SD 52.2 fl      MPV 11.0 fL      Platelets 180 10*3/mm3      Neutrophil % 60.5 %      Lymphocyte % 26.2 %      Monocyte % 11.1 %      Eosinophil % 1.5 %      Basophil % 0.5 %      Immature Grans % 0.2 %      Neutrophils, Absolute 4.98 10*3/mm3      Lymphocytes, Absolute 2.16 10*3/mm3      Monocytes, Absolute 0.91 10*3/mm3      Eosinophils, Absolute 0.12 10*3/mm3      Basophils, Absolute 0.04 10*3/mm3       Immature Grans, Absolute 0.02 10*3/mm3     POC Glucose Once [[id number]]  (Normal) Collected:  08/06/18 0651    Specimen:  Blood Updated:  08/06/18 0653     Glucose 91 mg/dL     Narrative:       Meter: [id number] : [id number] Jesus MORAES    Lactic Acid, Reflex [[id number]]  (Abnormal) Collected:  08/06/18 0430    Specimen:  Blood Updated:  08/06/18 0501     Lactate 3.0 (C) mmol/L     Lactic Acid, Reflex Timer (This will reflex a repeat order 3-3:15 hours after ordered.) [[id number]] Collected:  08/06/18 0046    Specimen:  Blood Updated:  08/06/18 0415     Extra Tube Hold for add-ons.     Comment: Auto resulted.       Urinalysis, Microscopic Only - Urine, Clean Catch [[id number]]  (Abnormal) Collected:  08/06/18 0159    Specimen:  Urine from Urine, Catheter Updated:  08/06/18 0230     RBC, UA 3-5 (A) /HPF      WBC, UA 31-50 (A) /HPF      Bacteria, UA 4+ (A) /HPF      Squamous Epithelial Cells, UA 0-2 /HPF      Hyaline Casts, UA 7-12 /LPF      Amorphous Crystals, UA Small/1+ /HPF      Methodology Manual Light Microscopy    Urine Culture - Urine, [[id number]] Collected:  08/06/18 0159    Specimen:  Urine from Urine, Catheter Updated:  08/06/18 0230    Urinalysis With Culture If Indicated - Urine, Catheter [[id number]]  (Abnormal) Collected:  08/06/18 0159    Specimen:  Urine from Urine, Catheter Updated:  08/06/18 0224     Color, UA Yellow     Appearance, UA Cloudy (A)     pH, UA <=5.0     Specific Gravity, UA 1.015     Glucose, UA Negative     Ketones, UA Negative     Bilirubin, UA Negative     Blood, UA Large (3+) (A)     Protein, UA 30 mg/dL (1+) (A)     Leuk Esterase, UA Large (3+) (A)     Nitrite, UA Negative     Urobilinogen, UA 1.0 E.U./dL    Procalcitonin [[id number]]  (Abnormal) Collected:  08/06/18 0046    Specimen:  Blood Updated:  08/06/18 0131     Procalcitonin 0.06 (L) ng/mL     Narrative:       As a Marker for Sepsis (Non-Neonates):   1. <0.5 ng/mL represents a low risk of severe sepsis and/or septic  "shock.  1. >2 ng/mL represents a high risk of severe sepsis and/or septic shock.    As a Marker for Lower Respiratory Tract Infections that require antibiotic therapy:  PCT on Admission     Antibiotic Therapy             6-12 Hrs later  > 0.5                Strongly Recommended            >0.25 - <0.5         Recommended  0.1 - 0.25           Discouraged                   Remeasure/reassess PCT  <0.1                 Strongly Discouraged          Remeasure/reassess PCT      As 28 day mortality risk marker: \"Change in Procalcitonin Result\" (> 80 % or <=80 %) if Day 0 (or Day 1) and Day 4 values are available. Refer to http://www.Acturispct-calculator.ResearchGate/   Change in PCT <=80 %   A decrease of PCT levels below or equal to 80 % defines a positive change in PCT test result representing a higher risk for 28-day all-cause mortality of patients diagnosed with severe sepsis or septic shock.  Change in PCT > 80 %   A decrease of PCT levels of more than 80 % defines a negative change in PCT result representing a lower risk for 28-day all-cause mortality of patients diagnosed with severe sepsis or septic shock.                Comprehensive Metabolic Panel [[id number]]  (Abnormal) Collected:  08/06/18 0046    Specimen:  Blood Updated:  08/06/18 0129     Glucose 127 (H) mg/dL      BUN 19 mg/dL      Creatinine 0.70 (L) mg/dL      Sodium 142 mmol/L      Potassium 4.4 mmol/L      Comment: Specimen hemolyzed.  Results may be affected.        Chloride 99 mmol/L      CO2 22.5 mmol/L      Calcium 9.8 mg/dL      Total Protein 7.7 g/dL      Albumin 4.60 g/dL      ALT (SGPT) 51 (H) U/L      Comment: Specimen hemolyzed.  Results may be affected.        AST (SGOT) 34 U/L      Comment: Specimen hemolyzed.  Results may be affected.        Alkaline Phosphatase 112 U/L      Total Bilirubin 0.5 mg/dL      eGFR Non African Amer 126 mL/min/1.73      Globulin 3.1 gm/dL      A/G Ratio 1.5 g/dL      BUN/Creatinine Ratio 27.1 (H)     Anion Gap 20.5 " mmol/L     Troponin [[id number]]  (Normal) Collected:  08/06/18 0046    Specimen:  Blood Updated:  08/06/18 0125     Troponin T 0.024 ng/mL     Narrative:       Troponin T Reference Ranges:  Less than 0.03 ng/mL:    Negative for AMI  0.03 to 0.09 ng/mL:      Indeterminant for AMI  Greater than 0.09 ng/mL: Positive for AMI    CK [[id number]]  (Normal) Collected:  08/06/18 0046    Specimen:  Blood Updated:  08/06/18 0125     Creatine Kinase 159 U/L     Lactic Acid, Plasma [[id number]]  (Abnormal) Collected:  08/06/18 0046    Specimen:  Blood Updated:  08/06/18 0111     Lactate 6.2 (C) mmol/L     CBC & Differential [[id number]] Collected:  08/06/18 0046    Specimen:  Blood Updated:  08/06/18 0106    Narrative:       The following orders were created for panel order CBC & Differential.  Procedure                               Abnormality         Status                     ---------                               -----------         ------                     CBC Auto Differential[[id number]]        Abnormal            Final result                 Please view results for these tests on the individual orders.    CBC Auto Differential [[id number]]  (Abnormal) Collected:  08/06/18 0046    Specimen:  Blood Updated:  08/06/18 0106     WBC 9.31 10*3/mm3      RBC 5.36 10*6/mm3      Hemoglobin 17.0 g/dL      Hematocrit 53.8 (H) %      .4 (H) fL      MCH 31.7 pg      MCHC 31.6 (L) g/dL      RDW 14.1 %      RDW-SD 51.5 fl      MPV 11.7 fL      Platelets 214 10*3/mm3      Neutrophil % 76.3 (H) %      Lymphocyte % 12.0 (L) %      Monocyte % 9.6 %      Eosinophil % 1.2 %      Basophil % 0.6 %      Immature Grans % 0.3 %      Neutrophils, Absolute 7.10 10*3/mm3      Lymphocytes, Absolute 1.12 10*3/mm3      Monocytes, Absolute 0.89 10*3/mm3      Eosinophils, Absolute 0.11 10*3/mm3      Basophils, Absolute 0.06 10*3/mm3      Immature Grans, Absolute 0.03 10*3/mm3     Blood Gas, Arterial [[id number]] Collected:  08/06/18 0048    Specimen:   Arterial Blood Updated:  08/06/18 0050     Site Arterial: right radial     Ash's Test Positive     pH, Arterial 7.372 pH units      pCO2, Arterial 45.0 mm Hg      pO2, Arterial 86.3 mm Hg      HCO3, Arterial 26.2 mmol/L      Base Excess, Arterial 0.3 mmol/L      O2 Saturation Calculated 96.2 %      Barometric Pressure for Blood Gas 754.0 mmHg      Modality Cannula     Flow Rate 3 lpm      Rate 18 Breaths/minute     Narrative:       sat 91 Meter: [id number] : [id number] Aroldo Quezada          .  Imaging Results (last 24 hours)     Procedure Component Value Units Date/Time    XR Abdomen KUB [[id number]] Collected:  08/06/18 0053     Updated:  08/06/18 0053    Narrative:       X-RAY ABDOMEN ONE VIEW KUB.     HISTORY:  Vomiting.     COMPARISON:  No prior studies for comparison.     FINDINGS:   Gastrostomy tube is in position, moderate distention of the stomach with  gas. Large amount of stool in the colon. No abnormal calcifications are  seen.     No free air in the abdomen.  shunt tubing is in position, unchanged.       Impression:       No acute findings in the abdomen.                 XR Chest 1 View [[id number]] Collected:  08/06/18 0048     Updated:  08/06/18 0048    Narrative:       X-RAY CHEST 1 VIEW.     HISTORY: Shortness of breath.     COMPARISON: 6/6/2015.     FINDINGS:  Cardiomediastinal silhouette is within normal limits.         There is no consolidation or effusion.  shunt tubing is unchanged.     Moderate gaseous distention of the stomach.          Impression:       No definite acute findings.                 For this problem, the following was ordered:  Orders Placed This Encounter   Procedures   • Blood Culture - Blood,   • Blood Culture - Blood,   • Urine Culture - Urine,   • XR Chest 1 View   • XR Abdomen KUB   • Comprehensive Metabolic Panel   • Urinalysis With Culture If Indicated - Urine, Catheter   • Blood Gas, Arterial   • Troponin   • Lactic Acid, Plasma   • Procalcitonin   •  CBC Auto Differential   • CK   • Blood Gas, Arterial   • Lactic Acid, Reflex Timer (This will reflex a repeat order 3-3:15 hours after ordered.)   • Urinalysis, Microscopic Only - Urine, Clean Catch   • Lactic Acid, Reflex   • Basic Metabolic Panel   • CBC Auto Differential   • Potassium   • Magnesium   • Troponin   • Digoxin Level   • Blood Gas, Arterial   • Vancomycin, Trough   • Comprehensive Metabolic Panel   • BNP   • TSH   • Lipid Panel   • Hemoglobin A1c   • NPO Diet   • Saline Lock   • Continuous Cardiac Monitoring   • Continuous Pulse Oximetry   • May Be Off Telemetry for Tests   • ACLS Protocol For Life Threatening Dysrhythmias (Unless Code Status Indicates Otherwise)   • Notify Provider if ACLS Protocol Activated   • Place Sequential Compression Device   • Fabian and Document Tube Depth (in cm)   • Elevate Head Of Bed 30-45 Degrees   • Administer Tube Feeding Via Feeding Tube Already in Place   • Weigh Patient Every Other Day   • Strict Intake & Output   • Oral Care   • Verify Tube Placement Initially & As Needed   • Write Hang Time on Enteral Feeding Bag   • Notify Provider - Abdominal Discomfort, Pain or Distention, No Bowel Movement in 3 Days   • Flush Feeding Tube With 30-50mL Water As Needed   • Residual Volume Assessment - Gastric Feedings   • Code Status and Medical Interventions:   • LIPPS (on-call MD unless specified)   • Inpatient Nutrition Consult   • Inpatient Infectious Diseases Consult   • Inpatient Neurology Consult   • Inpatient Consult to Nutrition Services   • Other (See Comment); Diabetisource; Tube Feeding Type: Cyclic / Intermittent; Feeding Start Time: 8:00 PM; Feeding End Time: 8:00 AM; Cyclic Feeding Start Rate (mL/hr): 45; To Goal Rate of (mL/hr): 65; Supplemental Bolus Feeding: Yes; Bolus Feedin...   • Oxygen Therapy- Nasal Cannula; Titrate for SPO2: 90%, equal to or greater than   • POC Glucose Once   • POC Glucose Once   • POC Glucose 4x Daily AC & at Bedtime   • ECG 12 Lead   •  ECG 12 Lead   • Inpatient Admission   • CBC & Differential   • CBC & Differential   • CBC & Differential       Problem List Items Addressed This Visit     Sepsis (CMS/HCC) - Primary      Other Visit Diagnoses     Seizures (CMS/HCC)        Hypoxia        Aspiration pneumonitis (CMS/HCC)        Acute UTI              ASSESSMENT/PLAN: This is a 38 y.o. male who has   Past Medical History:   Diagnosis Date   • Atopic dermatitis    • Constipation    • Contracture, unspecified hand    • H/O gastrostomy, has currently (CMS/[facility])    • Partial small bowel obstruction    • Persistent vegetative state (CMS/HCC)    • Quadriplegia (CMS/HCC)    • Seborrheic keratosis    • Seizures (CMS/[facility])    • Traumatic brain injury (CMS/[facility])     presents with Multiple seizures most likely secondary to baclofen withdrawal.    1.  Multiple seizures most likely secondary to baclofen withdrawal and traumatic brain injury:  - Increase Keppra to 1500 twice a day  - Baclofen replacement as soon as possible  - Ativan when necessary  - Seizure precautions    Will follow.    Thank you for allowing us to participate in the care of this patient.    Tia Joyce MD  08/06/18  3:20 PM

## 2018-08-06 NOTE — ED PROVIDER NOTES
EMERGENCY DEPARTMENT ENCOUNTER    CHIEF COMPLAINT  Chief Complaint: Seizures   History given by: EMS and mother  History limited by: Acuity of condition  Room Number: 19/19  PMD: Sav Kaur MD (Tony)      HPI:  Pt is a 38 y.o. male who presents complaining of a previous tramatic brain injury. EMS was called for tachycardia, per ems pt had seizures x5 in route to ED. Pt had a traumatic head injury that left him paraplegic.At 2357 Versed was given for seizures. Seizures have stopped. Pt is letharigic and is typically nonverbal. Per mom, last seen normal last night, and that he has redness around his G-tube.     Duration: since sometime tonight  Onset: sudden  Timing: intermittent   Intensity/Severity: severe  Progression: worsened  Previous Episodes: Pt had a traumatic head injury that left him paraplegic.  Treatment before arrival: Versed at 2357 for seizures.     PAST MEDICAL HISTORY  Active Ambulatory Problems     Diagnosis Date Noted   • No Active Ambulatory Problems     Resolved Ambulatory Problems     Diagnosis Date Noted   • No Resolved Ambulatory Problems     Past Medical History:   Diagnosis Date   • Atopic dermatitis    • Constipation    • Contracture, unspecified hand    • H/O gastrostomy, has currently (CMS/HCC)    • Partial small bowel obstruction    • Persistent vegetative state (CMS/HCC)    • Quadriplegia (CMS/HCC)    • Seborrheic keratosis    • Seizures (CMS/HCC)    • Traumatic brain injury (CMS/HCC)        PAST SURGICAL HISTORY  Past Surgical History:   Procedure Laterality Date   • BACLOFEN PUMP IMPLANTATION     • BRAIN SURGERY     • CHOLECYSTECTOMY         FAMILY HISTORY  History reviewed. No pertinent family history.    SOCIAL HISTORY  Social History     Social History   • Marital status: Single     Spouse name: N/A   • Number of children: N/A   • Years of education: N/A     Occupational History   • Not on file.     Social History Main Topics   • Smoking status: Unknown If Ever Smoked    • Smokeless tobacco: Not on file   • Alcohol use No   • Drug use: No   • Sexual activity: Not on file     Other Topics Concern   • Not on file     Social History Narrative   • No narrative on file       ALLERGIES  Zofran [ondansetron hcl]    REVIEW OF SYSTEMS  Review of Systems   Neurological: Positive for seizures.       PHYSICAL EXAM  ED Triage Vitals [08/06/18 0023]   Temp Heart Rate Resp BP SpO2   -- 110 14 105/81 91 %      Temp src Heart Rate Source Patient Position BP Location FiO2 (%)   -- Monitor Lying Right arm --       Physical Exam   Constitutional: He is oriented to person, place, and time. No distress.   lethargic but arousable     HENT:   Head: Normocephalic and atraumatic.   Old large craniotomy on the left side of scalp.    Eyes: Pupils are equal, round, and reactive to light. EOM are normal.   Dilated 4+ bilaterally    Neck: Normal range of motion. Neck supple.   Cardiovascular: Regular rhythm and normal heart sounds.  Tachycardia present.    Pulmonary/Chest: Effort normal. No respiratory distress. He has rhonchi (bilaterally).   Abdominal: Soft. There is no tenderness. There is no rebound and no guarding.   Excoriation around the G-tube site, ULQ. Hyperactive bowel sound.   Musculoskeletal: Normal range of motion. He exhibits no edema.   Neurological: He is alert and oriented to person, place, and time. He has normal sensation and normal strength.   Bilateral extremity contractures    Skin: Skin is warm and dry.   Psychiatric: Mood and affect normal.   Nursing note and vitals reviewed.      LAB RESULTS  Lab Results (last 24 hours)     Procedure Component Value Units Date/Time    CBC & Differential [[id number]] Collected:  08/06/18 0046    Specimen:  Blood Updated:  08/06/18 0106    Narrative:       The following orders were created for panel order CBC & Differential.  Procedure                               Abnormality         Status                     ---------                                -----------         ------                     CBC Auto Differential[[id number]]        Abnormal            Final result                 Please view results for these tests on the individual orders.    Comprehensive Metabolic Panel [[id number]]  (Abnormal) Collected:  08/06/18 0046    Specimen:  Blood Updated:  08/06/18 0129     Glucose 127 (H) mg/dL      BUN 19 mg/dL      Creatinine 0.70 (L) mg/dL      Sodium 142 mmol/L      Potassium 4.4 mmol/L      Comment: Specimen hemolyzed.  Results may be affected.        Chloride 99 mmol/L      CO2 22.5 mmol/L      Calcium 9.8 mg/dL      Total Protein 7.7 g/dL      Albumin 4.60 g/dL      ALT (SGPT) 51 (H) U/L      Comment: Specimen hemolyzed.  Results may be affected.        AST (SGOT) 34 U/L      Comment: Specimen hemolyzed.  Results may be affected.        Alkaline Phosphatase 112 U/L      Total Bilirubin 0.5 mg/dL      eGFR Non African Amer 126 mL/min/1.73      Globulin 3.1 gm/dL      A/G Ratio 1.5 g/dL      BUN/Creatinine Ratio 27.1 (H)     Anion Gap 20.5 mmol/L     Troponin [[id number]]  (Normal) Collected:  08/06/18 0046    Specimen:  Blood Updated:  08/06/18 0125     Troponin T 0.024 ng/mL     Narrative:       Troponin T Reference Ranges:  Less than 0.03 ng/mL:    Negative for AMI  0.03 to 0.09 ng/mL:      Indeterminant for AMI  Greater than 0.09 ng/mL: Positive for AMI    Blood Culture - Blood, [[id number]] Collected:  08/06/18 0046    Specimen:  Blood from Arm, Left Updated:  08/06/18 0053    Lactic Acid, Plasma [[id number]]  (Abnormal) Collected:  08/06/18 0046    Specimen:  Blood Updated:  08/06/18 0111     Lactate 6.2 (C) mmol/L     Procalcitonin [[id number]]  (Abnormal) Collected:  08/06/18 0046    Specimen:  Blood Updated:  08/06/18 0131     Procalcitonin 0.06 (L) ng/mL     Narrative:       As a Marker for Sepsis (Non-Neonates):   1. <0.5 ng/mL represents a low risk of severe sepsis and/or septic shock.  1. >2 ng/mL represents a high risk of severe sepsis and/or  "septic shock.    As a Marker for Lower Respiratory Tract Infections that require antibiotic therapy:  PCT on Admission     Antibiotic Therapy             6-12 Hrs later  > 0.5                Strongly Recommended            >0.25 - <0.5         Recommended  0.1 - 0.25           Discouraged                   Remeasure/reassess PCT  <0.1                 Strongly Discouraged          Remeasure/reassess PCT      As 28 day mortality risk marker: \"Change in Procalcitonin Result\" (> 80 % or <=80 %) if Day 0 (or Day 1) and Day 4 values are available. Refer to http://www.[email or web address]pct-calculator.com/   Change in PCT <=80 %   A decrease of PCT levels below or equal to 80 % defines a positive change in PCT test result representing a higher risk for 28-day all-cause mortality of patients diagnosed with severe sepsis or septic shock.  Change in PCT > 80 %   A decrease of PCT levels of more than 80 % defines a negative change in PCT result representing a lower risk for 28-day all-cause mortality of patients diagnosed with severe sepsis or septic shock.                CBC Auto Differential [[id number]]  (Abnormal) Collected:  08/06/18 0046    Specimen:  Blood Updated:  08/06/18 0106     WBC 9.31 10*3/mm3      RBC 5.36 10*6/mm3      Hemoglobin 17.0 g/dL      Hematocrit 53.8 (H) %      .4 (H) fL      MCH 31.7 pg      MCHC 31.6 (L) g/dL      RDW 14.1 %      RDW-SD 51.5 fl      MPV 11.7 fL      Platelets 214 10*3/mm3      Neutrophil % 76.3 (H) %      Lymphocyte % 12.0 (L) %      Monocyte % 9.6 %      Eosinophil % 1.2 %      Basophil % 0.6 %      Immature Grans % 0.3 %      Neutrophils, Absolute 7.10 10*3/mm3      Lymphocytes, Absolute 1.12 10*3/mm3      Monocytes, Absolute 0.89 10*3/mm3      Eosinophils, Absolute 0.11 10*3/mm3      Basophils, Absolute 0.06 10*3/mm3      Immature Grans, Absolute 0.03 10*3/mm3     CK [[id number]]  (Normal) Collected:  08/06/18 0046    Specimen:  Blood Updated:  08/06/18 0125     Creatine Kinase 159 U/L "     Lactic Acid, Reflex Timer (This will reflex a repeat order 3-3:15 hours after ordered.) [[id number]] Collected:  08/06/18 0046    Specimen:  Blood Updated:  08/06/18 0111    Blood Gas, Arterial [[id number]] Collected:  08/06/18 0048    Specimen:  Arterial Blood Updated:  08/06/18 0050     Site Arterial: right radial     Ash's Test Positive     pH, Arterial 7.372 pH units      pCO2, Arterial 45.0 mm Hg      pO2, Arterial 86.3 mm Hg      HCO3, Arterial 26.2 mmol/L      Base Excess, Arterial 0.3 mmol/L      O2 Saturation Calculated 96.2 %      Barometric Pressure for Blood Gas 754.0 mmHg      Modality Cannula     Flow Rate 3 lpm      Rate 18 Breaths/minute     Narrative:       sat 91 Meter: [id number] : [id number] Aroldo Quezada    Blood Culture - Blood, [[id number]] Collected:  08/06/18 0155    Specimen:  Blood from Arm, Right Updated:  08/06/18 0208    Urinalysis With Culture If Indicated - Urine, Catheter [[id number]]  (Abnormal) Collected:  08/06/18 0159    Specimen:  Urine from Urine, Catheter Updated:  08/06/18 0224     Color, UA Yellow     Appearance, UA Cloudy (A)     pH, UA <=5.0     Specific Gravity, UA 1.015     Glucose, UA Negative     Ketones, UA Negative     Bilirubin, UA Negative     Blood, UA Large (3+) (A)     Protein, UA 30 mg/dL (1+) (A)     Leuk Esterase, UA Large (3+) (A)     Nitrite, UA Negative     Urobilinogen, UA 1.0 E.U./dL    Urinalysis, Microscopic Only - Urine, Clean Catch [[id number]]  (Abnormal) Collected:  08/06/18 0159    Specimen:  Urine from Urine, Catheter Updated:  08/06/18 0230     RBC, UA 3-5 (A) /HPF      WBC, UA 31-50 (A) /HPF      Bacteria, UA 4+ (A) /HPF      Squamous Epithelial Cells, UA 0-2 /HPF      Hyaline Casts, UA 7-12 /LPF      Amorphous Crystals, UA Small/1+ /HPF      Methodology Manual Light Microscopy    Urine Culture - Urine, [[id number]] Collected:  08/06/18 0159    Specimen:  Urine from Urine, Catheter Updated:  08/06/18 0230          I ordered the above  labs and reviewed the results    RADIOLOGY  XR Abdomen KUB   Preliminary Result   No acute findings in the abdomen.                      XR Chest 1 View   Preliminary Result   No definite acute findings.                   I ordered the above noted radiological studies. Interpreted by radiologist. Reviewed by me in PACS.       PROCEDURES  Procedures  EKG    EKG time: 0047  Rhythm/Rate: sinus tach/ 105   No Acute Ischemia  Non-Specific ST-T changes    unchanged compared to prior on 2014    Interpreted Contemporaneously by me.  Independently viewed by me      PROGRESS AND CONSULTS      0200- Disussed the pt case with Dr. Clarke ([facility]) who agrees to admit to a tele bed    0236-Rechecked pt. Notified mother of lab results that showed UTI and some potential aspiration pneumonia. Pt is moaning in the room and is asking to give him something for pain. Discussed the plan to admit the pt to the hospital. Pt understands and agrees with the plan, all questions answered.    Pt given Keppra and abx and IVfluids.        MEDICAL DECISION MAKING  Results were reviewed/discussed with the patient and they were also made aware of online access. Pt also made aware that some labs, such as cultures, will not be resulted during ER visit and follow up with PMD is necessary.     MDM  Number of Diagnoses or Management Options  Acute UTI:   Aspiration pneumonitis (CMS/HCC):   Hypoxia:   Seizures (CMS/HCC):   Sepsis, due to unspecified organism (CMS/HCC):      Amount and/or Complexity of Data Reviewed  Clinical lab tests: ordered and reviewed (UA- WBC   Glucose-127  Troponin was negative    )  Tests in the radiology section of CPT®: ordered and reviewed (XR abd- nothing acute  CXR- Nothing acute)  Tests in the medicine section of CPT®: ordered and reviewed (See procedure notes for EKG)  Obtain history from someone other than the patient: yes (EMS and mother)  Review and summarize past medical records: yes  Discuss the patient with other  providers: yes (Dr. Clarke ([facility]))  Independent visualization of images, tracings, or specimens: yes           DIAGNOSIS  Final diagnoses:   Sepsis, due to unspecified organism (CMS/HCC)   Seizures (CMS/HCC)   Hypoxia   Aspiration pneumonitis (CMS/HCC)   Acute UTI       DISPOSITION  ADMISSION    Discussed treatment plan and reason for admission with pt/family and admitting physician.  Pt/family voiced understanding of the plan for admission for further testing/treatment as needed.         Latest Documented Vital Signs:  As of 2:37 AM  BP- 111/76 HR- 95 Temp- 96.8 °F (36 °C) (Tympanic) O2 sat- 95%    --  Documentation assistance provided by timo Otoole  for Dr. Freeman.  Information recorded by the scribe was done at my direction and has been verified and validated by me.          Lizandro Otoole  08/06/18 0252       Neville Freeman MD  08/07/18 0021

## 2018-08-06 NOTE — PROGRESS NOTES
Discharge Planning Assessment  [facility]     Patient Name: Adrian Kuo  MRN: [id number]  Today's Date: 8/6/2018    Admit Date: 8/6/2018          Discharge Needs Assessment     Row Name 08/06/18 6798       Living Environment    Lives With facility resident    Current Living Arrangements extended [facility]    Provides Primary Care For no one    Family Caregiver if Needed parent(s)    Family Caregiver Names mother, Lizzeth Kuo    Quality of Family Relationships helpful;involved;supportive    Able to Return to Prior Arrangements yes       Resource/Environmental Concerns    Resource/Environmental Concerns none       Transition Planning    Patient/Family Anticipates Transition to long term care facility    Patient/Family Anticipated Services at Transition skilled nursing;rehabilitation services    Transportation Anticipated health plan transportation       Discharge Needs Assessment    Concerns to be Addressed discharge planning    Equipment Currently Used at Home medication pump;lift device;hospital bed;wheelchair;feeding device    Outpatient/Agency/Support Group Needs skilled nursing facility    Discharge Facility/Level of Care Needs nursing facility, intermediate    Discharge Coordination/Progress [facility]            Discharge Plan     Row Name 08/06/18 6555       Plan    [facility]    Patient/Family in Agreement with Plan yes    Plan Comments IMM letter checked. CCP spoke with pt's mother (Lizzeth Kuo, [phone]) re: d/c planning. Facesheet verified. CCP role explained. Pt admitted from [facility] where he is a long-term care resident and mother states he will return via ambulance when medically stable. Per Krishan, pt is from  level bed with 14 day Medicaid bed hold and can return. CCP to follow to assist should additional d/c needs arise. Sabina Mckeon LCSW        Destination - Selection  Complete     Service Request Status Selected Specialties Address Phone Number Fax Number    ISABELLE RAGLAND Selected Intermediate Care Facility [address] [facility] [address] [phone]      Durable Medical Equipment     No service coordination in this encounter.      Dialysis/Infusion     No service coordination in this encounter.      Home Medical Care     No service coordination in this encounter.      Social Care     No service coordination in this encounter.                Demographic Summary     Row Name 08/06/18 1554       General Information    Admission Type inpatient    Arrived From subacute/long term acute care    Required Notices Provided Important Message from Medicare    Referral Source nursing;physician    Reason for Consult discharge planning    Preferred Language English            Functional Status     Row Name 08/06/18 1554       Functional Status    Usual Activity Tolerance poor    Current Activity Tolerance poor       Functional Status, IADL    Medications completely dependent    Meal Preparation completely dependent    Housekeeping completely dependent    Laundry completely dependent    Shopping completely dependent       Mental Status Summary    Recent Changes in Mental Status/Cognitive Functioning unable to assess            Psychosocial    No documentation.           Abuse/Neglect    No documentation.           Legal    No documentation.           Substance Abuse    No documentation.           Patient Forms    No documentation.         Ana Mckeon LCSW

## 2018-08-06 NOTE — DISCHARGE PLACEMENT REQUEST
"Donovan Kuo P (38 y.o. Male)     Date of Birth Social Security Number Address Home Phone MRN    1980  [id number]  [place] [id number] [phone] [id number]    [employer] Marital Status          Anabaptist Single       Admission Date Admission Type Admitting Provider Attending Provider Department, Room/Bed    8/6/18 Emergency Marcus Clarke MD Ahmed, Aftab, MD [address], [address]    Discharge Date Discharge Disposition Discharge Destination                       Attending Provider:  Marcus Clarke MD    Allergies:  Zofran [Ondansetron Hcl]    Isolation:  None   Infection:  None   Code Status:  No CPR    Ht:  180.3 cm (71\")   Wt:  80.9 kg (178 lb 5.6 oz)    Admission Cmt:  None   Principal Problem:  None                Active Insurance as of 8/6/2018     Primary Coverage     Payor Plan Insurance Group Employer/Plan Group    MEDICARE MEDICARE A & B      Payor Plan Address Payor Plan Phone Number Effective From Effective To    [address] [phone] 8/1/2007     [facility] [id number]       Subscriber Name Subscriber Birth Date Member ID       DONOVAN KUO P 1980 [id number]           Secondary Coverage     Payor Plan Insurance Group Employer/Plan Group    [place]      Payor Plan Address Payor Plan Phone Number Effective From Effective To    [address] [phone] 12/20/2016     [facility] [address]       Subscriber Name Subscriber Birth Date Member ID       DONOVAN KUO P 1980 [id number]                 Emergency Contacts      (Rel.) Home Phone Work Phone Mobile Phone    AyaanLizzeth (Mother) [phone] -- --    AyaanMark (Brother) [phone] -- --    UribeHarika Aunt (Relative) [phone] -- [phone]              "

## 2018-08-06 NOTE — PROGRESS NOTES
[facility] [place]    Patient has been admitted with UTI and possible PNA. Has implanted intrathecal baclofen system due to spasticity from traumatic brain injury. He was supposed to have the pump filled NLT 7/23/2018. Appointment was missed. Also, the pump battery is reaching end of life and the pump needs replacing this month. He has shown signs and symptoms of early baclofen withdrawal.    The skin over the pump, located over the LLQ of the abdominal wall, was cleaned, prepped and draped using sterile technique. Using a non-coring 22G Melvin needle, I accessed the fill port and was unable to withdraw any old medication. Telemetry indicated low volume alarm. The pump was then filled with 20ml PF Baclofen 3000mcg/ml. Infusion was not changed at 523.5mcg/day. New low reservoir alarm is 11/17/2018.

## 2018-08-06 NOTE — ED NOTES
"EMS call ahead on this pt due to seizures in route. Pt was given 5mg of versed. Pt was put on nonrebreather prior to arrival. Once pt arrived, pt was placed on 4L NC and oxygen was noted to be 91%. Pt does respond by flinching to the pain of being stuck for an IV, but is otherwise nonverbal. EMS states that the nursing facility stated that the pt had been \"shaking\" for most of her day shift.     Kel Sharma RN  08/06/18 0043    "

## 2018-08-06 NOTE — H&P
History and physical    Primary care physician      Chief complaint  High fever  Increased heart rate  Questionable seizure-like activity    History of present illness  38-year-old white male with history of traumatic brain injury seizure disorder quadriplegia with contractures status post G-tube placement and baclofen pump placement who is a nursing home resident was brought to the emergency room with fever and tachycardia and possible seizure-like activity.  Patient is nonverbal and unable to give detailed history most of the history obtained from the chart and nursing staff will record and mother who is her POA.    PAST MEDICAL HISTORY    • Atopic dermatitis     • Constipation     • Contracture, unspecified hand     • H/O gastrostomy, has currently (CMS/[facility])     • Partial small bowel obstruction     • Persistent vegetative state (CMS/[facility])     • Quadriplegia (CMS/HCC)     • Seborrheic keratosis     • Seizures (CMS/[facility])     • Traumatic brain injury (CMS/[facility])        PAST SURGICAL HISTORY  Surgical History         Past Surgical History:   Procedure Laterality Date   • BACLOFEN PUMP IMPLANTATION       • BRAIN SURGERY       • CHOLECYSTECTOMY             FAMILY HISTORY  History reviewed. No pertinent family history.     SOCIAL HISTORY  Social History   Social History            Social History   • Marital status: Single       Spouse name: N/A   • Number of children: N/A   • Years of education: N/A          Occupational History   • Not on file.           Social History Main Topics   • Smoking status: Unknown If Ever Smoked   • Smokeless tobacco: Not on file   • Alcohol use No   • Drug use: No   • Sexual activity: Not on file           Other Topics Concern   • Not on file          Social History Narrative   • No narrative on file         ALLERGIES  Zofran [ondansetron hcl]    Nursing home medications reviewed     REVIEW OF SYSTEMS  Review of Systems   Neurological: Positive for seizures.      PHYSICAL EXAM  Blood  "pressure 131/87, pulse 93, temperature 98.3 °F (36.8 °C), temperature source Oral, resp. rate 18, height 180.3 cm (71\"), weight 80.9 kg (178 lb 5.6 oz), SpO2 95 %.    Constitutional: He is oriented to person, place, and time. No distress.   lethargic but arousable     Head: Normocephalic and atraumatic.   Old large craniotomy on the left side of scalp.    Eyes: Pupils are equal, round, and reactive to light. EOM are normal.   Dilated 4+ bilaterally    Neck: Normal range of motion. Neck supple.   Cardiovascular: Regular rhythm and normal heart sounds.  Tachycardia present.    Pulmonary/Chest: Effort normal. No respiratory distress. He has rhonchi (bilaterally).   Abdominal: Soft. There is no tenderness. There is no rebound and no guarding.   Excoriation around the G-tube site, ULQ. Hyperactive bowel sound.   Musculoskeletal: Normal range of motion. He exhibits no edema.   Neurological: He is alert and oriented to person, place, and time. He has normal sensation and normal strength.   Bilateral extremity contractures    Skin: Skin is warm and dry.   Psychiatric: Mood and affect normal.     LAB RESULTS  Lab Results (last 24 hours)     Procedure Component Value Units Date/Time    POC Glucose Once [[id number]]  (Normal) Collected:  08/06/18 1129    Specimen:  Blood Updated:  08/06/18 1132     Glucose 101 mg/dL     Narrative:       Meter: [id number] : [id number] Malinda Phillip ALEISHA    Basic Metabolic Panel [[id number]]  (Abnormal) Collected:  08/06/18 0813    Specimen:  Blood Updated:  08/06/18 0905     Glucose 94 mg/dL      BUN 14 mg/dL      Creatinine 0.57 (L) mg/dL      Sodium 145 mmol/L      Potassium 4.2 mmol/L      Chloride 106 mmol/L      CO2 24.5 mmol/L      Calcium 8.9 mg/dL      eGFR Non African Amer >150 mL/min/1.73      BUN/Creatinine Ratio 24.6     Anion Gap 14.5 mmol/L     Narrative:       GFR Normal >60  Chronic Kidney Disease <60  Kidney Failure <15    CBC & Differential [[id number]] Collected:  08/06/18 0813 "    Specimen:  Blood Updated:  08/06/18 0839    Narrative:       The following orders were created for panel order CBC & Differential.  Procedure                               Abnormality         Status                     ---------                               -----------         ------                     CBC Auto Differential[[id number]]        Abnormal            Final result                 Please view results for these tests on the individual orders.    CBC Auto Differential [[id number]]  (Abnormal) Collected:  08/06/18 0813    Specimen:  Blood Updated:  08/06/18 0839     WBC 8.23 10*3/mm3      RBC 4.99 10*6/mm3      Hemoglobin 15.5 g/dL      Hematocrit 50.2 %      .6 (H) fL      MCH 31.1 pg      MCHC 30.9 (L) g/dL      RDW 14.3 %      RDW-SD 52.2 fl      MPV 11.0 fL      Platelets 180 10*3/mm3      Neutrophil % 60.5 %      Lymphocyte % 26.2 %      Monocyte % 11.1 %      Eosinophil % 1.5 %      Basophil % 0.5 %      Immature Grans % 0.2 %      Neutrophils, Absolute 4.98 10*3/mm3      Lymphocytes, Absolute 2.16 10*3/mm3      Monocytes, Absolute 0.91 10*3/mm3      Eosinophils, Absolute 0.12 10*3/mm3      Basophils, Absolute 0.04 10*3/mm3      Immature Grans, Absolute 0.02 10*3/mm3     POC Glucose Once [[id number]]  (Normal) Collected:  08/06/18 0651    Specimen:  Blood Updated:  08/06/18 0653     Glucose 91 mg/dL     Narrative:       Meter: [id number] : [id number] Jesus MORAES    Lactic Acid, Reflex [[id number]]  (Abnormal) Collected:  08/06/18 0430    Specimen:  Blood Updated:  08/06/18 0501     Lactate 3.0 (C) mmol/L     Lactic Acid, Reflex Timer (This will reflex a repeat order 3-3:15 hours after ordered.) [[id number]] Collected:  08/06/18 0046    Specimen:  Blood Updated:  08/06/18 0415     Extra Tube Hold for add-ons.     Comment: Auto resulted.       Urinalysis, Microscopic Only - Urine, Clean Catch [[id number]]  (Abnormal) Collected:  08/06/18 0159    Specimen:  Urine from Urine, Catheter  "Updated:  08/06/18 0230     RBC, UA 3-5 (A) /HPF      WBC, UA 31-50 (A) /HPF      Bacteria, UA 4+ (A) /HPF      Squamous Epithelial Cells, UA 0-2 /HPF      Hyaline Casts, UA 7-12 /LPF      Amorphous Crystals, UA Small/1+ /HPF      Methodology Manual Light Microscopy    Urine Culture - Urine, [[id number]] Collected:  08/06/18 0159    Specimen:  Urine from Urine, Catheter Updated:  08/06/18 0230    Urinalysis With Culture If Indicated - Urine, Catheter [[id number]]  (Abnormal) Collected:  08/06/18 0159    Specimen:  Urine from Urine, Catheter Updated:  08/06/18 0224     Color, UA Yellow     Appearance, UA Cloudy (A)     pH, UA <=5.0     Specific Gravity, UA 1.015     Glucose, UA Negative     Ketones, UA Negative     Bilirubin, UA Negative     Blood, UA Large (3+) (A)     Protein, UA 30 mg/dL (1+) (A)     Leuk Esterase, UA Large (3+) (A)     Nitrite, UA Negative     Urobilinogen, UA 1.0 E.U./dL    Blood Culture - Blood, [[id number]] Collected:  08/06/18 0155    Specimen:  Blood from Arm, Right Updated:  08/06/18 0208    Procalcitonin [[id number]]  (Abnormal) Collected:  08/06/18 0046    Specimen:  Blood Updated:  08/06/18 0131     Procalcitonin 0.06 (L) ng/mL     Narrative:       As a Marker for Sepsis (Non-Neonates):   1. <0.5 ng/mL represents a low risk of severe sepsis and/or septic shock.  1. >2 ng/mL represents a high risk of severe sepsis and/or septic shock.    As a Marker for Lower Respiratory Tract Infections that require antibiotic therapy:  PCT on Admission     Antibiotic Therapy             6-12 Hrs later  > 0.5                Strongly Recommended            >0.25 - <0.5         Recommended  0.1 - 0.25           Discouraged                   Remeasure/reassess PCT  <0.1                 Strongly Discouraged          Remeasure/reassess PCT      As 28 day mortality risk marker: \"Change in Procalcitonin Result\" (> 80 % or <=80 %) if Day 0 (or Day 1) and Day 4 values are available. Refer to " [email or web address]-pct-[email or web address]/   Change in PCT <=80 %   A decrease of PCT levels below or equal to 80 % defines a positive change in PCT test result representing a higher risk for 28-day all-cause mortality of patients diagnosed with severe sepsis or septic shock.  Change in PCT > 80 %   A decrease of PCT levels of more than 80 % defines a negative change in PCT result representing a lower risk for 28-day all-cause mortality of patients diagnosed with severe sepsis or septic shock.                Comprehensive Metabolic Panel [[id number]]  (Abnormal) Collected:  08/06/18 0046    Specimen:  Blood Updated:  08/06/18 0129     Glucose 127 (H) mg/dL      BUN 19 mg/dL      Creatinine 0.70 (L) mg/dL      Sodium 142 mmol/L      Potassium 4.4 mmol/L      Comment: Specimen hemolyzed.  Results may be affected.        Chloride 99 mmol/L      CO2 22.5 mmol/L      Calcium 9.8 mg/dL      Total Protein 7.7 g/dL      Albumin 4.60 g/dL      ALT (SGPT) 51 (H) U/L      Comment: Specimen hemolyzed.  Results may be affected.        AST (SGOT) 34 U/L      Comment: Specimen hemolyzed.  Results may be affected.        Alkaline Phosphatase 112 U/L      Total Bilirubin 0.5 mg/dL      eGFR Non African Amer 126 mL/min/1.73      Globulin 3.1 gm/dL      A/G Ratio 1.5 g/dL      BUN/Creatinine Ratio 27.1 (H)     Anion Gap 20.5 mmol/L     Troponin [[id number]]  (Normal) Collected:  08/06/18 0046    Specimen:  Blood Updated:  08/06/18 0125     Troponin T 0.024 ng/mL     Narrative:       Troponin T Reference Ranges:  Less than 0.03 ng/mL:    Negative for AMI  0.03 to 0.09 ng/mL:      Indeterminant for AMI  Greater than 0.09 ng/mL: Positive for AMI    CK [[id number]]  (Normal) Collected:  08/06/18 0046    Specimen:  Blood Updated:  08/06/18 0125     Creatine Kinase 159 U/L     Lactic Acid, Plasma [[id number]]  (Abnormal) Collected:  08/06/18 0046    Specimen:  Blood Updated:  08/06/18 0111     Lactate 6.2 (C) mmol/L     CBC & Differential  [[id number]] Collected:  08/06/18 0046    Specimen:  Blood Updated:  08/06/18 0106    Narrative:       The following orders were created for panel order CBC & Differential.  Procedure                               Abnormality         Status                     ---------                               -----------         ------                     CBC Auto Differential[[id number]]        Abnormal            Final result                 Please view results for these tests on the individual orders.    CBC Auto Differential [[id number]]  (Abnormal) Collected:  08/06/18 0046    Specimen:  Blood Updated:  08/06/18 0106     WBC 9.31 10*3/mm3      RBC 5.36 10*6/mm3      Hemoglobin 17.0 g/dL      Hematocrit 53.8 (H) %      .4 (H) fL      MCH 31.7 pg      MCHC 31.6 (L) g/dL      RDW 14.1 %      RDW-SD 51.5 fl      MPV 11.7 fL      Platelets 214 10*3/mm3      Neutrophil % 76.3 (H) %      Lymphocyte % 12.0 (L) %      Monocyte % 9.6 %      Eosinophil % 1.2 %      Basophil % 0.6 %      Immature Grans % 0.3 %      Neutrophils, Absolute 7.10 10*3/mm3      Lymphocytes, Absolute 1.12 10*3/mm3      Monocytes, Absolute 0.89 10*3/mm3      Eosinophils, Absolute 0.11 10*3/mm3      Basophils, Absolute 0.06 10*3/mm3      Immature Grans, Absolute 0.03 10*3/mm3     Blood Culture - Blood, [[id number]] Collected:  08/06/18 0046    Specimen:  Blood from Arm, Left Updated:  08/06/18 0053    Blood Gas, Arterial [[id number]] Collected:  08/06/18 0048    Specimen:  Arterial Blood Updated:  08/06/18 0050     Site Arterial: right radial     Ash's Test Positive     pH, Arterial 7.372 pH units      pCO2, Arterial 45.0 mm Hg      pO2, Arterial 86.3 mm Hg      HCO3, Arterial 26.2 mmol/L      Base Excess, Arterial 0.3 mmol/L      O2 Saturation Calculated 96.2 %      Barometric Pressure for Blood Gas 754.0 mmHg      Modality Cannula     Flow Rate 3 lpm      Rate 18 Breaths/minute     Narrative:       sat 91 Meter: [id number] : [id number] Kendrick  Pilar        Imaging Results (last 24 hours)     Procedure Component Value Units Date/Time    XR Abdomen KUB [[id number]] Collected:  08/06/18 0053     Updated:  08/06/18 0053    Narrative:       X-RAY ABDOMEN ONE VIEW KUB.     HISTORY:  Vomiting.     COMPARISON:  No prior studies for comparison.     FINDINGS:   Gastrostomy tube is in position, moderate distention of the stomach with  gas. Large amount of stool in the colon. No abnormal calcifications are  seen.     No free air in the abdomen.  shunt tubing is in position, unchanged.       Impression:       No acute findings in the abdomen.                 XR Chest 1 View [[id number]] Collected:  08/06/18 0048     Updated:  08/06/18 0048    Narrative:       X-RAY CHEST 1 VIEW.     HISTORY: Shortness of breath.     COMPARISON: 6/6/2015.     FINDINGS:  Cardiomediastinal silhouette is within normal limits.         There is no consolidation or effusion.  shunt tubing is unchanged.     Moderate gaseous distention of the stomach.          Impression:       No definite acute findings.               EKG  Rhythm/Rate: sinus tach/ 105   No Acute Ischemia  Non-Specific ST-T changes  unchanged compared to prior on 2014      Current Facility-Administered Medications:   •  acetaminophen (TYLENOL) 160 MG/5ML solution 500 mg, 500 mg, Per G Tube, TID PRN, Marcus Clarke MD  •  amantadine (SYMMETREL) solution 100 mg, 100 mg, Per G Tube, Q12H, Marcus Clarke MD  •  bisacodyl (DULCOLAX) suppository 10 mg, 10 mg, Rectal, Daily PRN, Marcus Clarke MD  •  cetirizine (zyrTEC) tablet 10 mg, 10 mg, Per G Tube, Daily, Marcus Clarke MD, 10 mg at 08/06/18 1155  •  chlorhexidine (PERIDEX) 0.12 % solution 15 mL, 15 mL, Mouth/Throat, Q12H, Marcus Clarke MD  •  docusate sodium (COLACE) liquid 100 mg, 100 mg, Per G Tube, Daily, Marcus Clarke MD  •  famotidine (PEPCID) tablet 20 mg, 20 mg, Per G Tube, BID, Marcus Clarke MD, 20 mg at 08/06/18 1155  •  HYDROcodone-acetaminophen (NORCO) 7.5-325 MG per  tablet 1 tablet, 1 tablet, Per G Tube, Q4H PRN, Michel Clarke MD  •  HYDROcodone-acetaminophen (NORCO) 7.5-325 MG per tablet 2 tablet, 2 tablet, Per G Tube, Q4H PRN, Michel Clarke MD  •  insulin aspart (novoLOG) injection 0-7 Units, 0-7 Units, Subcutaneous, 4x Daily With Meals & Nightly, Michel Clarke MD  •  insulin detemir (LEVEMIR) injection 20 Units, 20 Units, Subcutaneous, QAM, Michel Clarke MD  •  levETIRAcetam (KEPPRA) 100 MG/ML solution 1,000 mg, 1,000 mg, Per G Tube, Q12H, Michel Clarke MD, 1,000 mg at 08/06/18 1153  •  melatonin tablet 4.5 mg, 4.5 mg, Per G Tube, Nightly PRN, Michel Clarke MD  •  mupirocin (BACTROBAN) 2 % ointment, , Topical, Daily, Michel Clarke MD, 1 application at 08/06/18 0902  •  nitroglycerin (NITROSTAT) SL tablet 0.4 mg, 0.4 mg, Sublingual, Q5 Min PRN, Michel Clarke MD  •  Pharmacy to dose vancomycin, , Does not apply, BID, Michel Clarke MD  •  piperacillin-tazobactam (ZOSYN) 3.375 g in iso-osmotic dextrose 50 ml (premix), 3.375 g, Intravenous, Q8H, Michel Clarke MD, 3.375 g at 08/06/18 0817  •  polyethylene glycol 3350 powder (packet), 17 g, Per G Tube, Daily PRN, Michel Clarke MD  •  vancomycin 1250 mg/250 mL 0.9% NS IVPB ([facility]), 15 mg/kg, Intravenous, Q12H, Michel Clarke MD    ASSESSMENT  Sepsis  Recurrent seizure disorder  Acute hypoxia  Probable aspiration pneumonitis pneumonia  Acute UTI  Diabetes mellitus  Status post G-tube placement  Traumatic brain injury  Quadriplegic with contractures  DNR  Immobilization syndrome    PLAN  Admit  IV fluid  IV antibiotics after obtaining the cultures  Infectious disease consult  Adjust dose of Keppra  Continue nursing home medication  Neurology consult  Supportive care  Stress ulcer DVT prophylaxis  Change baclofen pump per pain management  Follow closely further recommendation chronic hospital course    MICHEL CLARKE MD

## 2018-08-06 NOTE — PROGRESS NOTES
"Vancomycin/Zosyn Pharmacy to Dose - Initial Consult  Day: 1  Per Dr. Clarke  Indication: sepsis  Vanc Goal trough: 15-20 mcg/ml   Current consult stop date: 18    Relevant clinical data and objective history reviewed:  38 y.o. male 180.3 cm (71\") 80.9 kg (178 lb 5.6 oz)    Antimicrobials:  Day 1 - Vancomycin: 1500 mg iv once in ED  Day 1 - Pip/tazo 3.375gm iv once in ED    Renal:    Results from last 7 days  Lab Units 18  0046   CREATININE mg/dL 0.70*     Estimated Creatinine Clearance: 163.7 mL/min (A) (by C-G formula based on SCr of 0.7 mg/dL (L)).  No intake or output data in the 24 hours ending 18 0735      Vitals:  Temp (24hrs), Av.8 °F (36 °C), Min:96.8 °F (36 °C), Max:96.8 °F (36 °C)    Lab Results   Component Value Date    WBC 9.31 2018     Microbiology/Imaging:  Blood Cx: pending  Urine Cx: pending    Plan:  1. Will start vancomycin 1250mg iv q12h today at 1800.  Will start pip/tazo 3.375gm iv q8h now.     2. Will monitor serum creatinine every 24 hours for the first 3 days then at least every 48 hours per dosing recommendations.     3. Encourage hydration as able per MD order to prevent toxic accumulation, and monitor for sign of toxicity such as increased SCr, decreased UOP and ringing in the ears.     4. Pharmacy will continue to follow daily while on vancomycin and adjust as needed.     Arleen Boone, Pharm.D., [facility]  2018 7:35 AM            "

## 2018-08-06 NOTE — PLAN OF CARE
Problem: Patient Care Overview  Goal: Plan of Care Review  Outcome: Ongoing (interventions implemented as appropriate)   08/06/18 0953   Coping/Psychosocial   Plan of Care Reviewed With patient;mother   Plan of Care Review   Progress no change   OTHER   Outcome Summary pt nonverbal, responds to pain. no distress noted, vss, mother at bedside. orders noted. will continue to monitor.       Problem: Fall Risk (Adult)  Goal: Identify Related Risk Factors and Signs and Symptoms  Outcome: Outcome(s) achieved Date Met: 08/06/18 08/06/18 0953   Fall Risk (Adult)   Related Risk Factors (Fall Risk) bladder function altered;confusion/agitation;culprit medication(s);depression/anxiety;fatigue/slow reaction;fear of falling;gait/mobility problems;history of falls;homeostatic imbalance;impaired vision;inadequate lighting;neuro disease/injury;objects hard to reach;polypharmacy;sensory deficits;sleep pattern alteration;slippery/uneven surfaces;environment unfamiliar   Signs and Symptoms (Fall Risk) presence of risk factors     Goal: Absence of Fall  Outcome: Ongoing (interventions implemented as appropriate)   08/06/18 0953   Fall Risk (Adult)   Absence of Fall making progress toward outcome       Problem: Skin Injury Risk (Adult)  Goal: Identify Related Risk Factors and Signs and Symptoms  Outcome: Outcome(s) achieved Date Met: 08/06/18 08/06/18 0953   Skin Injury Risk (Adult)   Related Risk Factors (Skin Injury Risk) body weight extremes;cognitive impairment;fluid intake inadequate;hospitalization prolonged;infection;mechanical forces;medication;mobility impaired;moisture;nutritional deficiencies;skeletal deformities;tissue perfusion altered     Goal: Skin Health and Integrity  Outcome: Ongoing (interventions implemented as appropriate)   08/06/18 0953   Skin Injury Risk (Adult)   Skin Health and Integrity making progress toward outcome       Problem: Sepsis/Septic Shock (Adult)  Goal: Signs and Symptoms of Listed Potential  Problems Will be Absent, Minimized or Managed (Sepsis/Septic Shock)   08/06/18 0916   Goal/Outcome Evaluation   Problems Assessed (Sepsis) all   Problems Present (Sepsis) glycemic control impaired;infection progression;situational response

## 2018-08-06 NOTE — PLAN OF CARE
Problem: Nutrition, Enteral (Adult)  Intervention: Monitor/Manage Nutrition Support   08/06/18 0005   Nutrition Interventions   Nutrition Support Management tube feeding to be initiated once okay'ed by MD; recommend NH regimen of Diabetisource boluses - 1 can (250 ml) at 11 am, 2 pm, and 5 pm with 240 ml flushes at each bolus, also with cyclic TFs from 8 pm- 8 am (12 hours) at 65 ml/hr with 50 ml q hr flushes

## 2018-08-07 LAB
ALBUMIN SERPL-MCNC: 3.4 G/DL (ref 3.5–5.2)
ALBUMIN/GLOB SERPL: 1.4 G/DL
ALP SERPL-CCNC: 102 U/L (ref 39–117)
ALT SERPL W P-5'-P-CCNC: 124 U/L (ref 1–41)
ANION GAP SERPL CALCULATED.3IONS-SCNC: 11.6 MMOL/L
AST SERPL-CCNC: 65 U/L (ref 1–40)
BASOPHILS # BLD AUTO: 0.05 10*3/MM3 (ref 0–0.2)
BASOPHILS NFR BLD AUTO: 0.9 % (ref 0–1.5)
BILIRUB SERPL-MCNC: 0.7 MG/DL (ref 0.1–1.2)
BUN BLD-MCNC: 14 MG/DL (ref 6–20)
BUN/CREAT SERPL: 24.6 (ref 7–25)
CALCIUM SPEC-SCNC: 8.3 MG/DL (ref 8.6–10.5)
CHLORIDE SERPL-SCNC: 103 MMOL/L (ref 98–107)
CHOLEST SERPL-MCNC: 131 MG/DL (ref 0–200)
CO2 SERPL-SCNC: 26.4 MMOL/L (ref 22–29)
CREAT BLD-MCNC: 0.57 MG/DL (ref 0.76–1.27)
DEPRECATED RDW RBC AUTO: 53.4 FL (ref 37–54)
EOSINOPHIL # BLD AUTO: 0.21 10*3/MM3 (ref 0–0.7)
EOSINOPHIL NFR BLD AUTO: 3.7 % (ref 0.3–6.2)
ERYTHROCYTE [DISTWIDTH] IN BLOOD BY AUTOMATED COUNT: 14.6 % (ref 11.5–14.5)
GFR SERPL CREATININE-BSD FRML MDRD: >150 ML/MIN/1.73
GLOBULIN UR ELPH-MCNC: 2.5 GM/DL
GLUCOSE BLD-MCNC: 138 MG/DL (ref 65–99)
GLUCOSE BLDC GLUCOMTR-MCNC: 108 MG/DL (ref 70–130)
GLUCOSE BLDC GLUCOMTR-MCNC: 128 MG/DL (ref 70–130)
GLUCOSE BLDC GLUCOMTR-MCNC: 140 MG/DL (ref 70–130)
GLUCOSE BLDC GLUCOMTR-MCNC: 148 MG/DL (ref 70–130)
HBA1C MFR BLD: 5.3 % (ref 4.8–5.6)
HCT VFR BLD AUTO: 45.1 % (ref 40.4–52.2)
HDLC SERPL-MCNC: 26 MG/DL (ref 40–60)
HGB BLD-MCNC: 14.1 G/DL (ref 13.7–17.6)
IMM GRANULOCYTES # BLD: 0.02 10*3/MM3 (ref 0–0.03)
IMM GRANULOCYTES NFR BLD: 0.3 % (ref 0–0.5)
LDLC SERPL CALC-MCNC: 59 MG/DL (ref 0–100)
LDLC/HDLC SERPL: 2.26 {RATIO}
LYMPHOCYTES # BLD AUTO: 2.21 10*3/MM3 (ref 0.9–4.8)
LYMPHOCYTES NFR BLD AUTO: 38.6 % (ref 19.6–45.3)
MCH RBC QN AUTO: 31.4 PG (ref 27–32.7)
MCHC RBC AUTO-ENTMCNC: 31.3 G/DL (ref 32.6–36.4)
MCV RBC AUTO: 100.4 FL (ref 79.8–96.2)
MONOCYTES # BLD AUTO: 0.58 10*3/MM3 (ref 0.2–1.2)
MONOCYTES NFR BLD AUTO: 10.1 % (ref 5–12)
NEUTROPHILS # BLD AUTO: 2.67 10*3/MM3 (ref 1.9–8.1)
NEUTROPHILS NFR BLD AUTO: 46.7 % (ref 42.7–76)
NT-PROBNP SERPL-MCNC: 248.8 PG/ML (ref 5–450)
PLATELET # BLD AUTO: 195 10*3/MM3 (ref 140–500)
PMV BLD AUTO: 11.4 FL (ref 6–12)
POTASSIUM BLD-SCNC: 3.3 MMOL/L (ref 3.5–5.2)
PROT SERPL-MCNC: 5.9 G/DL (ref 6–8.5)
RBC # BLD AUTO: 4.49 10*6/MM3 (ref 4.6–6)
SODIUM BLD-SCNC: 141 MMOL/L (ref 136–145)
TRIGL SERPL-MCNC: 231 MG/DL (ref 0–150)
TSH SERPL DL<=0.05 MIU/L-ACNC: 2 MIU/ML (ref 0.27–4.2)
VLDLC SERPL-MCNC: 46.2 MG/DL (ref 5–40)
WBC NRBC COR # BLD: 5.72 10*3/MM3 (ref 4.5–10.7)

## 2018-08-07 PROCEDURE — 25010000002 VANCOMYCIN 10 G RECONSTITUTED SOLUTION: Performed by: HOSPITALIST

## 2018-08-07 PROCEDURE — 80053 COMPREHEN METABOLIC PANEL: CPT | Performed by: HOSPITALIST

## 2018-08-07 PROCEDURE — 84443 ASSAY THYROID STIM HORMONE: CPT | Performed by: HOSPITALIST

## 2018-08-07 PROCEDURE — 82962 GLUCOSE BLOOD TEST: CPT

## 2018-08-07 PROCEDURE — 83880 ASSAY OF NATRIURETIC PEPTIDE: CPT | Performed by: HOSPITALIST

## 2018-08-07 PROCEDURE — 80061 LIPID PANEL: CPT | Performed by: HOSPITALIST

## 2018-08-07 PROCEDURE — 63710000001 INSULIN DETEMIR PER 5 UNITS: Performed by: HOSPITALIST

## 2018-08-07 PROCEDURE — 85025 COMPLETE CBC W/AUTO DIFF WBC: CPT | Performed by: HOSPITALIST

## 2018-08-07 PROCEDURE — 25010000002 PIPERACILLIN SOD-TAZOBACTAM PER 1 G: Performed by: HOSPITALIST

## 2018-08-07 PROCEDURE — 94762 N-INVAS EAR/PLS OXIMTRY CONT: CPT

## 2018-08-07 PROCEDURE — 99232 SBSQ HOSP IP/OBS MODERATE 35: CPT | Performed by: NURSE PRACTITIONER

## 2018-08-07 PROCEDURE — 83036 HEMOGLOBIN GLYCOSYLATED A1C: CPT | Performed by: HOSPITALIST

## 2018-08-07 RX ADMIN — FAMOTIDINE 20 MG: 20 TABLET, FILM COATED ORAL at 21:20

## 2018-08-07 RX ADMIN — LEVETIRACETAM 1500 MG: 100 SOLUTION ORAL at 21:20

## 2018-08-07 RX ADMIN — MUPIROCIN: 20 OINTMENT TOPICAL at 08:41

## 2018-08-07 RX ADMIN — VANCOMYCIN HYDROCHLORIDE 1250 MG: 10 INJECTION, POWDER, LYOPHILIZED, FOR SOLUTION INTRAVENOUS at 06:40

## 2018-08-07 RX ADMIN — AMANTADINE HYDROCHLORIDE 100 MG: 50 SOLUTION ORAL at 14:09

## 2018-08-07 RX ADMIN — FAMOTIDINE 20 MG: 20 TABLET, FILM COATED ORAL at 08:42

## 2018-08-07 RX ADMIN — CHLORHEXIDINE GLUCONATE 15 ML: 1.2 RINSE ORAL at 21:29

## 2018-08-07 RX ADMIN — VANCOMYCIN HYDROCHLORIDE 1250 MG: 10 INJECTION, POWDER, LYOPHILIZED, FOR SOLUTION INTRAVENOUS at 21:20

## 2018-08-07 RX ADMIN — LEVETIRACETAM 1500 MG: 100 SOLUTION ORAL at 08:42

## 2018-08-07 RX ADMIN — Medication 20 UNITS: at 06:39

## 2018-08-07 RX ADMIN — SODIUM CHLORIDE 75 ML/HR: 4.5 INJECTION, SOLUTION INTRAVENOUS at 06:40

## 2018-08-07 RX ADMIN — TAZOBACTAM SODIUM AND PIPERACILLIN SODIUM 3.38 G: 375; 3 INJECTION, SOLUTION INTRAVENOUS at 17:19

## 2018-08-07 RX ADMIN — CETIRIZINE HYDROCHLORIDE 10 MG: 10 TABLET, FILM COATED ORAL at 08:42

## 2018-08-07 RX ADMIN — TAZOBACTAM SODIUM AND PIPERACILLIN SODIUM 3.38 G: 375; 3 INJECTION, SOLUTION INTRAVENOUS at 09:53

## 2018-08-07 RX ADMIN — DOCUSATE SODIUM 100 MG: 50 LIQUID ORAL at 08:42

## 2018-08-07 RX ADMIN — CHLORHEXIDINE GLUCONATE 15 ML: 1.2 RINSE ORAL at 08:42

## 2018-08-07 NOTE — PROGRESS NOTES
"Daily progress note    Chief complaint  Doing better  No more seizures    History of present illness  38-year-old white male with history of traumatic brain injury seizure disorder quadriplegia with contractures status post G-tube placement and baclofen pump placement who is a nursing home resident was brought to the emergency room with fever and tachycardia and possible seizure-like activity.  Patient is nonverbal and unable to give detailed history most of the history obtained from the chart and nursing staff will record and mother who is her POA.     REVIEW OF SYSTEMS  Review of Systems   Neurological: Positive for seizures.      PHYSICAL EXAM  Blood pressure 109/68, pulse 89, temperature 98.8 °F (37.1 °C), temperature source Oral, resp. rate 16, height 180.3 cm (71\"), weight 80.9 kg (178 lb 5.6 oz), SpO2 94 %.    Constitutional: He is oriented to person, place, and time. No distress.   lethargic but arousable     Head: Normocephalic and atraumatic.   Old large craniotomy on the left side of scalp.    Eyes: Pupils are equal, round, and reactive to light. EOM are normal.   Dilated 4+ bilaterally    Neck: Normal range of motion. Neck supple.   Cardiovascular: Regular rhythm and normal heart sounds.  Tachycardia present.    Pulmonary/Chest: Effort normal. No respiratory distress. He has rhonchi (bilaterally).   Abdominal: Soft. There is no tenderness. There is no rebound and no guarding.   Excoriation around the G-tube site, ULQ. Hyperactive bowel sound.   Musculoskeletal: Normal range of motion. He exhibits no edema.   Neurological: He is alert and oriented to person, place, and time. He has normal sensation and normal strength.   Bilateral extremity contractures    Skin: Skin is warm and dry.   Psychiatric: Mood and affect normal.     LAB RESULTS  Lab Results (last 24 hours)     Procedure Component Value Units Date/Time    POC Glucose Once [[id number]]  (Normal) Collected:  08/07/18 1135    Specimen:  Blood " Updated:  08/07/18 1138     Glucose 108 mg/dL     Narrative:       Meter: [id number] : [id number] Eric MORAES    Urine Culture - Urine, [[id number]]  (Abnormal) Collected:  08/06/18 0159    Specimen:  Urine from Urine, Catheter Updated:  08/07/18 1017     Urine Culture >100,000 CFU/mL Gram Positive Cocci (A)    POC Glucose Once [[id number]]  (Abnormal) Collected:  08/07/18 0623    Specimen:  Blood Updated:  08/07/18 0632     Glucose 140 (H) mg/dL     Narrative:       Meter: [id number] : [id number] Nnamdi MORAES    BNP [[id number]]  (Normal) Collected:  08/07/18 0434    Specimen:  Blood Updated:  08/07/18 0608     proBNP 248.8 pg/mL     Narrative:       Among patients with dyspnea, NT-proBNP is highly sensitive for the detection of acute congestive heart failure. In addition NT-proBNP of <300 pg/ml effectively rules out acute congestive heart failure with 99% negative predictive value.    TSH [[id number]]  (Normal) Collected:  08/07/18 0434    Specimen:  Blood Updated:  08/07/18 0608     TSH 2.000 mIU/mL     Comprehensive Metabolic Panel [[id number]]  (Abnormal) Collected:  08/07/18 0434    Specimen:  Blood Updated:  08/07/18 0559     Glucose 138 (H) mg/dL      BUN 14 mg/dL      Creatinine 0.57 (L) mg/dL      Sodium 141 mmol/L      Potassium 3.3 (L) mmol/L      Chloride 103 mmol/L      CO2 26.4 mmol/L      Calcium 8.3 (L) mg/dL      Total Protein 5.9 (L) g/dL      Albumin 3.40 (L) g/dL      ALT (SGPT) 124 (H) U/L      AST (SGOT) 65 (H) U/L      Alkaline Phosphatase 102 U/L      Total Bilirubin 0.7 mg/dL      eGFR Non African Amer >150 mL/min/1.73      Globulin 2.5 gm/dL      A/G Ratio 1.4 g/dL      BUN/Creatinine Ratio 24.6     Anion Gap 11.6 mmol/L     Lipid Panel [[id number]]  (Abnormal) Collected:  08/07/18 0434    Specimen:  Blood Updated:  08/07/18 0558     Total Cholesterol 131 mg/dL      Triglycerides 231 (H) mg/dL      HDL Cholesterol 26 (L) mg/dL      LDL Cholesterol  59 mg/dL      VLDL  Cholesterol 46.2 (H) mg/dL      LDL/HDL Ratio 2.26    Narrative:       Cholesterol Reference Ranges  (U.S[place] Department of Health and Human Services ATP III Classifications)    Desirable          <200 mg/dL  Borderline High    200-239 mg/dL  High Risk          >240 mg/dL      Triglyceride Reference Ranges  (U.S. Department of Health and Human Services ATP III Classifications)    Normal           <150 mg/dL  Borderline High  150-199 mg/dL  High             200-499 mg/dL  Very High        >500 mg/dL    HDL Reference Ranges  (U.S. Department of Health and Human Services ATP III Classifcations)    Low     <40 mg/dl (major risk factor for CHD)  High    >60 mg/dl ('negative' risk factor for CHD)        LDL Reference Ranges  (U.S. Department of Health and Human Services ATP III Classifcations)    Optimal          <100 mg/dL  Near Optimal     100-129 mg/dL  Borderline High  130-159 mg/dL  High             160-189 mg/dL  Very High        >189 mg/dL    CBC & Differential [[id number]] Collected:  08/07/18 0434    Specimen:  Blood Updated:  08/07/18 0552    Narrative:       The following orders were created for panel order CBC & Differential.  Procedure                               Abnormality         Status                     ---------                               -----------         ------                     CBC Auto Differential[[id number]]        Abnormal            Final result                 Please view results for these tests on the individual orders.    CBC Auto Differential [[id number]]  (Abnormal) Collected:  08/07/18 0434    Specimen:  Blood Updated:  08/07/18 0552     WBC 5.72 10*3/mm3      RBC 4.49 (L) 10*6/mm3      Hemoglobin 14.1 g/dL      Hematocrit 45.1 %      .4 (H) fL      MCH 31.4 pg      MCHC 31.3 (L) g/dL      RDW 14.6 (H) %      RDW-SD 53.4 fl      MPV 11.4 fL      Platelets 195 10*3/mm3      Neutrophil % 46.7 %      Lymphocyte % 38.6 %      Monocyte % 10.1 %      Eosinophil % 3.7 %      Basophil %  0.9 %      Immature Grans % 0.3 %      Neutrophils, Absolute 2.67 10*3/mm3      Lymphocytes, Absolute 2.21 10*3/mm3      Monocytes, Absolute 0.58 10*3/mm3      Eosinophils, Absolute 0.21 10*3/mm3      Basophils, Absolute 0.05 10*3/mm3      Immature Grans, Absolute 0.02 10*3/mm3     Hemoglobin A1c [[id number]]  (Normal) Collected:  08/07/18 0434    Specimen:  Blood Updated:  08/07/18 0542     Hemoglobin A1C 5.30 %     Narrative:       Hemoglobin A1C Ranges:    Increased Risk for Diabetes  5.7% to 6.4%  Diabetes                     >= 6.5%  Diabetic Goal                < 7.0%    Blood Culture - Blood, [[id number]]  (Normal) Collected:  08/06/18 0155    Specimen:  Blood from Arm, Right Updated:  08/07/18 0208     Blood Culture No growth at 24 hours    Blood Culture - Blood, [[id number]]  (Normal) Collected:  08/06/18 0046    Specimen:  Blood from Arm, Left Updated:  08/07/18 0100     Blood Culture No growth at 24 hours    POC Glucose Once [[id number]]  (Normal) Collected:  08/06/18 1634    Specimen:  Blood Updated:  08/06/18 1639     Glucose 103 mg/dL     Narrative:       Meter: [id number] : [id number] Malinda MORAES        Imaging Results (last 24 hours)     Procedure Component Value Units Date/Time    XR Abdomen KUB [[id number]] Collected:  08/06/18 0053     Updated:  08/07/18 0117    Narrative:       X-RAY ABDOMEN ONE VIEW KUB.     HISTORY:  Vomiting.     COMPARISON:  No prior studies for comparison.     FINDINGS:   Gastrostomy tube is in position, moderate distention of the stomach with  gas. Large amount of stool in the colon. No abnormal calcifications are  seen.     No free air in the abdomen.  shunt tubing is in position, unchanged.       Impression:       No acute findings in the abdomen.         This report was finalized on 8/7/2018 1:14 AM by Dr. Anushka Fuentes M.D.       XR Chest 1 View [[id number]] Collected:  08/06/18 0048     Updated:  08/07/18 0117    Narrative:       X-RAY CHEST 1 VIEW.     HISTORY:  Shortness of breath.     COMPARISON: 6/6/2015.     FINDINGS:  Cardiomediastinal silhouette is within normal limits.         There is no consolidation or effusion.  shunt tubing is unchanged.     Moderate gaseous distention of the stomach.          Impression:       No definite acute findings.         This report was finalized on 8/7/2018 1:14 AM by Dr. Anushka Fuentes M.D.           EKG  Rhythm/Rate: sinus tach/ 105   No Acute Ischemia  Non-Specific ST-T changes  unchanged compared to prior on 2014      Current Facility-Administered Medications:   •  acetaminophen (TYLENOL) 160 MG/5ML solution 500 mg, 500 mg, Per G Tube, TID PRN, Marcus Clarke MD  •  amantadine (SYMMETREL) solution 100 mg, 100 mg, Per G Tube, Q12H, Marcus Clarke MD, 100 mg at 08/07/18 1409  •  bisacodyl (DULCOLAX) suppository 10 mg, 10 mg, Rectal, Daily PRN, Marcus Clarke MD  •  cetirizine (zyrTEC) tablet 10 mg, 10 mg, Per G Tube, Daily, Marcus Clarke MD, 10 mg at 08/07/18 0842  •  chlorhexidine (PERIDEX) 0.12 % solution 15 mL, 15 mL, Mouth/Throat, Q12H, Marcus Clarke MD, 15 mL at 08/07/18 0842  •  docusate sodium (COLACE) liquid 100 mg, 100 mg, Per G Tube, Daily, Marcus Clarke MD, 100 mg at 08/07/18 0842  •  famotidine (PEPCID) tablet 20 mg, 20 mg, Per G Tube, BID, Marcus Clarke MD, 20 mg at 08/07/18 0842  •  HYDROcodone-acetaminophen (NORCO) 7.5-325 MG per tablet 1 tablet, 1 tablet, Per G Tube, Q4H PRN, Marcus Clarke MD  •  HYDROcodone-acetaminophen (NORCO) 7.5-325 MG per tablet 2 tablet, 2 tablet, Per G Tube, Q4H PRN, Marcus Clarke MD, 2 tablet at 08/06/18 1412  •  insulin aspart (novoLOG) injection 0-7 Units, 0-7 Units, Subcutaneous, 4x Daily With Meals & Nightly, Marcus Clarke MD  •  insulin detemir (LEVEMIR) injection 20 Units, 20 Units, Subcutaneous, QAYASHIRA, Marcus Clarke MD, 20 Units at 08/07/18 0639  •  levETIRAcetam (KEPPRA) 100 MG/ML solution 1,500 mg, 1,500 mg, Per G Tube, Q12H, Tia Joyce MD, 1,500 mg at 08/07/18 0842  •   melatonin tablet 4.5 mg, 4.5 mg, Per G Tube, Nightly PRN, Michel Clarke MD  •  mupirocin (BACTROBAN) 2 % ointment, , Topical, Daily, Michel Clarke MD  •  nitroglycerin (NITROSTAT) SL tablet 0.4 mg, 0.4 mg, Sublingual, Q5 Min PRN, Michel Clarke MD  •  Pharmacy to dose vancomycin, , Does not apply, BID, Michel Clarke MD  •  piperacillin-tazobactam (ZOSYN) 3.375 g in iso-osmotic dextrose 50 ml (premix), 3.375 g, Intravenous, Q8H, Michel Clarke MD, 3.375 g at 08/07/18 0953  •  polyethylene glycol 3350 powder (packet), 17 g, Per G Tube, Daily PRN, Michel Clarke MD  •  sodium chloride 0.45 % infusion, 75 mL/hr, Intravenous, Continuous, Michel Clarke MD, Last Rate: 75 mL/hr at 08/07/18 0640, 75 mL/hr at 08/07/18 0640  •  vancomycin 1250 mg/250 mL 0.9% NS IVPB ([facility]), 15 mg/kg, Intravenous, Q12H, Michel Clarke MD, 1,250 mg at 08/07/18 0640    ASSESSMENT  Sepsis  Recurrent seizure disorder  Acute hypoxia  Probable aspiration pneumonitis pneumonia  Acute UTI  Diabetes mellitus  Status post G-tube placement  Traumatic brain injury  Quadriplegic with contractures  DNR  Immobilization syndrome    PLAN  CPM  IV fluid  IV antibiotics   Infectious disease consult appreciated  Kepphenrietta  Continue nursing home medication  Neurology consult appreciated  Supportive care  Stress ulcer DVT prophylaxis  Follow closely further recommendation chronic hospital course    MICHEL CLARKE MD

## 2018-08-07 NOTE — PROGRESS NOTES
"Progress Note      PATIENT Adrian Kuo    1980   MRN [id number]   ADMIT DATE 2018   LENGTH OF STAY 1 days   ATTENDING Marcus Clarke MD       CHIEF COMPLAINT: Rapid Heart Rate and Seizures      DIAGNOSIS: Aspiration pneumonitis (CMS/HCC) [J69.0]  Seizures (CMS/HCC) [R56.9]  Hypoxia [R09.02]  Acute UTI [N39.0]  Sepsis, due to unspecified organism (CMS/HCC) [A41.9]    HISTORY OF PRESENT ILLNESS: No events overnight. No reported seizure-like activity reported by family and/or nursing. Patient unable to verbalize complaints. Baclofen pump refilled yesterday.  FLACC 0       PHYSICAL EXAMINATION:  GENERAL: Reveals a man who appears older than his stated age, in no acute distress.  VITAL SIGNS: BP 91/51 (BP Location: Right arm, Patient Position: Lying)   Pulse 82   Temp 98.5 °F (36.9 °C) (Oral)   Resp 18   Ht 180.3 cm (71\")   Wt 80.9 kg (178 lb 5.6 oz)   SpO2 95%   BMI 24.88 kg/m²   NECK: Carotids are equal without bruits.   HEART: Regular rate and rhythm   EXTREMITIES: Generalized edema. Pulses are intact. There is no rash. There is no hepatosplenomegaly.   NEUROLOGIC: He is Lethargic; does not follow commands and is non-vernal at baseline (per family). Puiples 4mm brisk; no deviation.  No w/d to painful stimuli. Toes are downgoing.      DIAGNOSTIC DATA:   Lab Results   Component Value Date    WBC 5.72 2018    HGB 14.1 2018    HCT 45.1 2018    .4 (H) 2018     2018     Lab Results   Component Value Date    GLUCOSE 138 (H) 2018    BUN 14 2018    CREATININE 0.57 (L) 2018    EGFRIFNONA >150 2018    BCR 24.6 2018    K 3.3 (L) 2018    CO2 26.4 2018    CALCIUM 8.3 (L) 2018    ALBUMIN 3.40 (L) 2018    AST 65 (H) 2018     (H) 2018     Lab Results   Component Value Date    TSH 2.000 2018     No results found for: [id number]  Lab Results   Component Value Date    HGBA1C 5.30 2018 "         Reviewed CXR and Abd. Series; negative acute.     IMPRESSION AND PLAN: This is a 37 yo male who is 14 years s/p TBI who our service was ask to see d/t concern for seizure. Seizure-like activity thought to be secondary withdrawal to Baclofen. Since admission Baclofen pump has been refilled and Keppra dosing increased. No seizure-like activity noted since before mentioned adjustments. We recommend keeping Keppra dosing at 1500mg BID for now. Follow-up w/ neurology and/or PCP in 1 month for dosing titration. No further neurology workup indicated. We will sign off and see again upon request.      Case reviewed with Dr. Joyce and he agrees with plan above.     Beata Foster, JOSHUA  8/7/2018  11:03 AM

## 2018-08-07 NOTE — CONSULTS
CONSULT NOTE    Infectious Diseases - Mayelin Osborn MD  [facility]       Patient Identification:  Name: Adrian Kuo  Age: 38 y.o.  Sex: male  :  1980  MRN: [id number]             Date of Consultation: 2018      Primary Care Physician: Sav Kaur MD (Tony)                               Requesting Physician: Dr. Clarke  Reason for Consultation: Sepsis    Impression: 38-year-old male with traumatic brain injury and associated sequelae including dysphagia with G-tube feeding, contractures and immobility seizures and recurrent urinary tract infection admitted on 2018 when he was sent from nursing home for recurrent seizures.  Workup in the emergency room revealed elevated lactic acid level abnormal urinalysis and normal white blood cell count.  Patient was started on vancomycin and Zosyn for sepsis with seizure precautions and management of seizures.  Patient's sepsis is showing improving trend in terms of resolving lactic acidosis which is down from 6.2-3.  This presentation is consistent with:  1-recurrent breakthrough seizures likely due to  2-underlying urinary tract infection  3-lactic acidosis in the situation could be due to multiple seizure episodes or underlying overwhelming sepsis or combination.  4-traumatic brain injury with persistent vegetative state  5-dysphagia on chronic G-tube feeding with risk for aspiration  6-immobility and seizure disorder.      Recommendations/Discussions: At this juncture I agree with your care plan consisting of IV antibiotics for the next 48 hours until the culture data is available.  Follow his hemodynamics and other parameters of sepsis.  His leukocytosis may not be a good parameter to follow as lack of it could be due to the effect of antiseizure medication rather than lack of infection.  Keep an eye on G-tube site.  Further management as his condition evolves.  Thank you Dr. Glover for letting me be the part of your patient  care please see above impression and recommendation.  We'll follow this patient with you.        History of Present Illness:   38-year-old male with traumatic brain injury and associated sequelae including dysphagia with G-tube feeding, contractures and immobility seizures and recurrent urinary tract infection admitted on 8/6/2018 when he was sent from nursing home for recurrent seizures.  Workup in the emergency room revealed elevated lactic acid level abnormal urinalysis and normal white blood cell count.  Patient was started on vancomycin and Zosyn for sepsis with seizure precautions and management of seizures.  Patient's sepsis is showing improving trend in terms of resolving lactic acidosis which is down from 6.2-3.       Past Medical History:  Past Medical History:   Diagnosis Date   • Atopic dermatitis    • Constipation    • Contracture, unspecified hand    • H/O gastrostomy, has currently (CMS/[facility])    • Partial small bowel obstruction    • Persistent vegetative state (CMS/[facility])    • Quadriplegia (CMS/[facility])    • Seborrheic keratosis    • Seizures (CMS/[facility])    • Traumatic brain injury (CMS/[facility])      Past Surgical History:  Past Surgical History:   Procedure Laterality Date   • BACLOFEN PUMP IMPLANTATION     • BRAIN SURGERY     • CHOLECYSTECTOMY        Home Meds:  Prescriptions Prior to Admission   Medication Sig Dispense Refill Last Dose   • acetaminophen (TYLENOL) 160 MG/5ML solution 500 mg by Per G Tube route 3 (Three) Times a Day As Needed for Mild Pain .      • bisacodyl (BISACODYL LAXATIVE) 10 MG suppository Insert 10 mg into the rectum Daily As Needed.      • coal tar (NEUTROGENA T-GEL) 0.5 % shampoo Apply 1 application topically to the appropriate area as directed 2 (Two) Times a Week. Monday and Thursday      • fluocinolone (SYNALAR) 0.01 % external solution Apply 1 application topically to the appropriate area as directed 3 (Three) Times a Day. To scalp      • HYDROcodone-acetaminophen (NORCO) 7.5-325  MG per tablet 1 tablet by Per G Tube route Every 4 (Four) Hours As Needed for Moderate Pain .      • HYDROcodone-acetaminophen (NORCO) 7.5-325 MG per tablet 2 tablets by Per G Tube route Every 4 (Four) Hours As Needed for Moderate Pain .      • Infant Care Products (JOHNSONS BABY SHAMPOO EX) Apply 1 application topically 2 (Two) Times a Day. To wash eyes      • Insulin Glargine (BASAGLAR KWIKPEN SC) Inject 20 Units under the skin into the appropriate area as directed Daily.      • levETIRAcetam (KEPPRA) 100 MG/ML solution 1,000 mg by Per G Tube route 2 (Two) Times a Day.      • loratadine (CLARITIN) 5 MG/5ML syrup 10 mg by Per G Tube route Daily.      • melatonin 3 MG tablet 4.5 mg by Per G Tube route Every Night.      • metroNIDAZOLE (METROGEL) 0.75 % gel Apply 1 application topically to the appropriate area as directed Daily. To face      • mupirocin (BACTROBAN) 2 % ointment Apply 1 application topically to the appropriate area as directed Daily. Clean around G-tube, apply oint, apply split 4x4 guaze.      • Pollen Extracts (PROSTAT PO) 30 mL by Enteral route 2 (Two) Times a Day.      • polyethylene glycol (MIRALAX) packet 17 g Daily As Needed. Give via GT      • polyvinyl alcohol (LIQUIFILM) 1.4 % ophthalmic solution Administer 1 drop to both eyes 4 (Four) Times a Day.      • psyllium (METAMUCIL) 58.6 % packet 1 packet by Per G Tube route Daily.      • raNITIdine (ZANTAC) 15 MG/ML syrup 150 mg by Per G Tube route 2 (Two) Times a Day.      • Skin Protectants, Misc. (CALAZIME SKIN PROTECTANT) paste Apply 1 each topically 2 (Two) Times a Day. To affected areas      • Sodium Phosphates (ENEMA DISPOSABLE RE) Insert 1 Bag into the rectum Daily As Needed. Soap suds enema      • amantadine (SYMMETREL) 50 MG/5ML solution 100 mg by Per G Tube route Every 12 (Twelve) Hours.      • cefuroxime (CEFTIN) 250 MG/5ML suspension 2 teaspoons twice a day through G tube for 7 days 140 mL 0    • chlorhexidine (PERIDEX) 0.12 % solution  Apply 15 mL to the mouth or throat 2 (Two) Times a Day.      • docusate sodium (COLACE) 150 MG/15ML liquid 100 mg by Per G Tube route Daily.        Current Meds:     Current Facility-Administered Medications:   •  acetaminophen (TYLENOL) 160 MG/5ML solution 500 mg, 500 mg, Per G Tube, TID PRN, Marcus Clarke MD  •  amantadine (SYMMETREL) solution 100 mg, 100 mg, Per G Tube, Q12H, Marcus Clarke MD, 100 mg at 08/06/18 1412  •  bisacodyl (DULCOLAX) suppository 10 mg, 10 mg, Rectal, Daily PRN, Marcus Clarke MD  •  cetirizine (zyrTEC) tablet 10 mg, 10 mg, Per G Tube, Daily, Marcus Clarke MD, 10 mg at 08/06/18 1155  •  chlorhexidine (PERIDEX) 0.12 % solution 15 mL, 15 mL, Mouth/Throat, Q12H, Marcus Clarke MD, 15 mL at 08/06/18 1412  •  docusate sodium (COLACE) liquid 100 mg, 100 mg, Per G Tube, Daily, Marcus Clarke MD, 100 mg at 08/06/18 1412  •  famotidine (PEPCID) tablet 20 mg, 20 mg, Per G Tube, BID, Marcus Clarke MD, 20 mg at 08/06/18 2100  •  HYDROcodone-acetaminophen (NORCO) 7.5-325 MG per tablet 1 tablet, 1 tablet, Per G Tube, Q4H PRN, Marcus Clarke MD  •  HYDROcodone-acetaminophen (NORCO) 7.5-325 MG per tablet 2 tablet, 2 tablet, Per G Tube, Q4H PRN, Marcus Clarke MD, 2 tablet at 08/06/18 1412  •  insulin aspart (novoLOG) injection 0-7 Units, 0-7 Units, Subcutaneous, 4x Daily With Meals & Nightly, Marcus Clarke MD  •  insulin detemir (LEVEMIR) injection 20 Units, 20 Units, Subcutaneous, QA, Marcus Clarke MD  •  levETIRAcetam (KEPPRA) 100 MG/ML solution 1,500 mg, 1,500 mg, Per G Tube, Q12H, Tia Joyce MD, 1,500 mg at 08/06/18 2100  •  melatonin tablet 4.5 mg, 4.5 mg, Per G Tube, Nightly PRN, Marcus Clarke MD  •  mupirocin (BACTROBAN) 2 % ointment, , Topical, Daily, Marcus Clarke MD, 1 application at 08/06/18 0902  •  nitroglycerin (NITROSTAT) SL tablet 0.4 mg, 0.4 mg, Sublingual, Q5 Min PRN, Marcus Clarke MD  •  Pharmacy to dose vancomycin, , Does not apply, BID, Marcus Clarke MD  •   "piperacillin-tazobactam (ZOSYN) 3.375 g in iso-osmotic dextrose 50 ml (premix), 3.375 g, Intravenous, Q8H, Marcus Clarke MD, 3.375 g at 08/06/18 1702  •  polyethylene glycol 3350 powder (packet), 17 g, Per G Tube, Daily PRN, Marcus Clarke MD  •  sodium chloride 0.45 % infusion, 75 mL/hr, Intravenous, Continuous, Marcus Clarke MD, Last Rate: 75 mL/hr at 08/06/18 1414, 75 mL/hr at 08/06/18 1414  •  vancomycin 1250 mg/250 mL 0.9% NS IVPB ([facility]), 15 mg/kg, Intravenous, Q12H, Marcus Clarke MD, 1,250 mg at 08/06/18 1702  Allergies:  Allergies   Allergen Reactions   • Zofran [Ondansetron Hcl] Rash     Social History:   Social History   Substance Use Topics   • Smoking status: Former Smoker     Packs/day: 0.50     Types: Cigarettes     Start date: 1/1/1998     Quit date: 1/1/2004   • Smokeless tobacco: Not on file   • Alcohol use No      Family History:  History reviewed. No pertinent family history.       Review of Systems  See history of present illness and past medical history. Could not be obtained from the patient.      Vitals:   BP 91/58 (BP Location: Right arm, Patient Position: Lying)   Pulse 88   Temp 98 °F (36.7 °C) (Oral)   Resp 18   Ht 180.3 cm (71\")   Wt 80.9 kg (178 lb 5.6 oz)   SpO2 93%   BMI 24.88 kg/m²   I/O:   Intake/Output Summary (Last 24 hours) at 08/06/18 2147  Last data filed at 08/06/18 2039   Gross per 24 hour   Intake              540 ml   Output                0 ml   Net              540 ml     Exam:  General Appearance:    Not interactive contracted does not appear to be in distress.     Head:    Normocephalic, without obvious abnormality, atraumatic   Eyes:    PERRL, conjunctiva/corneas clear, EOM's intact, both eyes   Ears:    Normal external ear canals, both ears   Nose:   Nares normal, septum midline, mucosa normal, no drainage    or sinus tenderness   Throat:   Lips, tongue, gums normal; oral mucosa pink and moist   Neck:   Supple, symmetrical, trachea midline, no adenopathy;     " thyroid:  no enlargement/tenderness/nodules; no carotid    bruit or JVD   Back:     Symmetric, no curvature, ROM normal, no CVA tenderness   Lungs:     Clear to auscultation bilaterally, respirations unlabored   Chest Wall:    No tenderness or deformity    Heart:    Regular rate and rhythm, S1 and S2 normal, no murmur, rub   or gallop   Abdomen:     Soft, PEG in place non-tender, bowel sounds active all four quadrants,     no masses, no hepatomegaly, no splenomegaly   Extremities:   Shows extremities noted    Pulses:   Pulses palpable in all extremities; symmetric all extremities   Skin:   Skin color normal, Skin is warm and dry,  no rashes or palpable lesions   Neurologic:   Quadriplegic with traumatic brain injury and no interactions and contractures       Data Review:    I reviewed the patient's new clinical results.    Results from last 7 days  Lab Units 08/06/18  0813 08/06/18  0046   WBC 10*3/mm3 8.23 9.31   HEMOGLOBIN g/dL 15.5 17.0   PLATELETS 10*3/mm3 180 214       Results from last 7 days  Lab Units 08/06/18  0813 08/06/18  0046   SODIUM mmol/L 145 142   POTASSIUM mmol/L 4.2 4.4   CHLORIDE mmol/L 106 99   CO2 mmol/L 24.5 22.5   BUN mg/dL 14 19   CREATININE mg/dL 0.57* 0.70*   CALCIUM mg/dL 8.9 9.8   GLUCOSE mg/dL 94 127*     Microbiology Results (last 10 days)     Procedure Component Value - Date/Time    Blood Culture - Blood, [[id number]]  (Normal) Collected:  08/06/18 0155    Lab Status:  Preliminary result Specimen:  Blood from Arm, Right Updated:  08/06/18 1415     Blood Culture No growth at less than 24 hours    Blood Culture - Blood, [[id number]]  (Normal) Collected:  08/06/18 0046    Lab Status:  Preliminary result Specimen:  Blood from Arm, Left Updated:  08/06/18 1543     Blood Culture No growth at less than 24 hours        Xr Chest 1 View    Result Date: 8/6/2018  No definite acute findings.         Xr Abdomen Kub    Result Date: 8/6/2018  No acute findings in the abdomen.                Assessment:  Active Hospital Problems    Diagnosis Date Noted   • Sepsis (CMS/[facility]) [A41.9] 08/06/2018      Resolved Hospital Problems    Diagnosis Date Noted Date Resolved   No resolved problems to display.         Plan:  See above  Mayelin Taylor MD   8/6/2018  9:47 PM    Much of this encounter note is an electronic transcription/translation of spoken language to printed text. The electronic translation of spoken language may permit erroneous, or at times, nonsensical words or phrases to be inadvertently transcribed; Although I have reviewed the note for such errors, some may still exist

## 2018-08-07 NOTE — PROGRESS NOTES
"  Infectious Diseases Progress Note    Mayelin Taylor MD     [facility]  Los: 1 day  Patient Identification:  Name: Adrian Kuo  Age: 38 y.o.  Sex: male  :  1980  MRN: [id number]         Primary Care Physician: Sav Kaur MD (Tony)            Subjective: Much calm and comfortable.  Doesn't interact.  Interval History: See consultation note    Objective:    Scheduled Meds:  amantadine 100 mg Per G Tube Q12H   cetirizine 10 mg Per G Tube Daily   chlorhexidine 15 mL Mouth/Throat Q12H   docusate sodium 100 mg Per G Tube Daily   famotidine 20 mg Per G Tube BID   insulin aspart 0-7 Units Subcutaneous 4x Daily With Meals & Nightly   insulin detemir 20 Units Subcutaneous QAM   levETIRAcetam 1,500 mg Per G Tube Q12H   mupirocin  Topical Daily   Pharmacy to dose vancomycin  Does not apply BID   piperacillin-tazobactam 3.375 g Intravenous Q8H   vancomycin 15 mg/kg Intravenous Q12H     Continuous Infusions:  sodium chloride 75 mL/hr Last Rate: 75 mL/hr (18 0640)       Vital signs in last 24 hours:  Temp:  [98 °F (36.7 °C)-98.9 °F (37.2 °C)] 98.5 °F (36.9 °C)  Heart Rate:  [] 82  Resp:  [16-18] 18  BP: (86-92)/(51-58) 91/51    Intake/Output:    Intake/Output Summary (Last 24 hours) at 18 1000  Last data filed at 18   Gross per 24 hour   Intake              540 ml   Output                0 ml   Net              540 ml       Exam:  BP 91/51 (BP Location: Right arm, Patient Position: Lying)   Pulse 82   Temp 98.5 °F (36.9 °C) (Oral)   Resp 18   Ht 180.3 cm (71\")   Wt 80.9 kg (178 lb 5.6 oz)   SpO2 95%   BMI 24.88 kg/m²     General Appearance:    Skin comfortably does not appear to be in any acute distress doesn't interact or drainage.                          Head:    Normocephalic, without obvious abnormality, atraumatic                           Eyes:    PERRL, conjunctiva/corneas clear, EOM's intact, both eyes                         Throat:   Lips, " tongue, gums normal; oral mucosa pink and moist                           Neck:   Supple, symmetrical, trachea midline, no JVD                         Lungs:    Clear to auscultation bilaterally, respirations unlabored                 Chest Wall:    No tenderness or deformity                          Heart:    Regular rate and rhythm, S1 and S2 normal, no murmur,                  Abdomen:     Soft, G-tube site is mild excoriation just above fungal infection.  non-tender, bowel sounds active, no masses,                 Extremities:   Extremities normal, atraumatic, no cyanosis or edema                        Pulses:   Pulses palpable in all extremities                            Skin:   Skin is warm and dry,  no rashes or palpable lesions                       Data Review:    I reviewed the patient's new clinical results.    Results from last 7 days  Lab Units 08/07/18  0434 08/06/18  0813 08/06/18  0046   WBC 10*3/mm3 5.72 8.23 9.31   HEMOGLOBIN g/dL 14.1 15.5 17.0   PLATELETS 10*3/mm3 195 180 214       Results from last 7 days  Lab Units 08/07/18  0434 08/06/18  0813 08/06/18  0046   SODIUM mmol/L 141 145 142   POTASSIUM mmol/L 3.3* 4.2 4.4   CHLORIDE mmol/L 103 106 99   CO2 mmol/L 26.4 24.5 22.5   BUN mg/dL 14 14 19   CREATININE mg/dL 0.57* 0.57* 0.70*   CALCIUM mg/dL 8.3* 8.9 9.8   GLUCOSE mg/dL 138* 94 127*     Xr Chest 1 View    Result Date: 8/7/2018  No definite acute findings.     This report was finalized on 8/7/2018 1:14 AM by Dr. Anushka Fuentes M.D.      Xr Abdomen Kub    Result Date: 8/7/2018  No acute findings in the abdomen.     This report was finalized on 8/7/2018 1:14 AM by Dr. Anushka Fuentes M.D.          Assessment:  Active Problems:    Sepsis (CMS/HCC)  Urinary tract infection  possible aspiration pneumonia  Traumatic brain injury with quadriplegia with associated dysphagia and neurogenic bladder  Immobility.  Plan:  Continue vancomycin and Zosyn while following up on the culture results.  Patient   has overall improved.    Mayelin Taylor MD  8/7/2018  10:00 AM    Much of this encounter note is an electronic transcription/translation of spoken language to printed text. The electronic translation of spoken language may permit erroneous, or at times, nonsensical words or phrases to be inadvertently transcribed; Although I have reviewed the note for such errors, some may still exist

## 2018-08-07 NOTE — PLAN OF CARE
Problem: Patient Care Overview  Goal: Plan of Care Review  Outcome: Ongoing (interventions implemented as appropriate)   08/07/18 0238   Coping/Psychosocial   Plan of Care Reviewed With patient;father   Plan of Care Review   Progress no change   OTHER   Outcome Summary No signs of distress present. Non-verbal. BP low post Baclofen pump refill. No distress noted. Turned every 2 hours. Will continue to monitor closely       Problem: Fall Risk (Adult)  Goal: Absence of Fall  Outcome: Ongoing (interventions implemented as appropriate)      Problem: Skin Injury Risk (Adult)  Goal: Skin Health and Integrity  Outcome: Ongoing (interventions implemented as appropriate)      Problem: Sepsis/Septic Shock (Adult)  Goal: Signs and Symptoms of Listed Potential Problems Will be Absent, Minimized or Managed (Sepsis/Septic Shock)  Outcome: Ongoing (interventions implemented as appropriate)      Problem: Nutrition, Enteral (Adult)  Goal: Signs and Symptoms of Listed Potential Problems Will be Absent, Minimized or Managed (Nutrition, Enteral)  Outcome: Ongoing (interventions implemented as appropriate)

## 2018-08-08 LAB
ANION GAP SERPL CALCULATED.3IONS-SCNC: 10 MMOL/L
BASOPHILS # BLD AUTO: 0.04 10*3/MM3 (ref 0–0.2)
BASOPHILS NFR BLD AUTO: 0.9 % (ref 0–1.5)
BUN BLD-MCNC: 10 MG/DL (ref 6–20)
BUN/CREAT SERPL: 23.8 (ref 7–25)
CALCIUM SPEC-SCNC: 8.9 MG/DL (ref 8.6–10.5)
CHLORIDE SERPL-SCNC: 110 MMOL/L (ref 98–107)
CO2 SERPL-SCNC: 29 MMOL/L (ref 22–29)
CREAT BLD-MCNC: 0.42 MG/DL (ref 0.76–1.27)
DEPRECATED RDW RBC AUTO: 53.6 FL (ref 37–54)
EOSINOPHIL # BLD AUTO: 0.22 10*3/MM3 (ref 0–0.7)
EOSINOPHIL NFR BLD AUTO: 5 % (ref 0.3–6.2)
ERYTHROCYTE [DISTWIDTH] IN BLOOD BY AUTOMATED COUNT: 14.5 % (ref 11.5–14.5)
GFR SERPL CREATININE-BSD FRML MDRD: >150 ML/MIN/1.73
GLUCOSE BLD-MCNC: 127 MG/DL (ref 65–99)
GLUCOSE BLDC GLUCOMTR-MCNC: 109 MG/DL (ref 70–130)
GLUCOSE BLDC GLUCOMTR-MCNC: 120 MG/DL (ref 70–130)
GLUCOSE BLDC GLUCOMTR-MCNC: 122 MG/DL (ref 70–130)
GLUCOSE BLDC GLUCOMTR-MCNC: 134 MG/DL (ref 70–130)
HCT VFR BLD AUTO: 45.8 % (ref 40.4–52.2)
HGB BLD-MCNC: 14.2 G/DL (ref 13.7–17.6)
IMM GRANULOCYTES # BLD: 0.01 10*3/MM3 (ref 0–0.03)
IMM GRANULOCYTES NFR BLD: 0.2 % (ref 0–0.5)
LYMPHOCYTES # BLD AUTO: 1.43 10*3/MM3 (ref 0.9–4.8)
LYMPHOCYTES NFR BLD AUTO: 32.4 % (ref 19.6–45.3)
MCH RBC QN AUTO: 31.3 PG (ref 27–32.7)
MCHC RBC AUTO-ENTMCNC: 31 G/DL (ref 32.6–36.4)
MCV RBC AUTO: 101.1 FL (ref 79.8–96.2)
MONOCYTES # BLD AUTO: 0.53 10*3/MM3 (ref 0.2–1.2)
MONOCYTES NFR BLD AUTO: 12 % (ref 5–12)
NEUTROPHILS # BLD AUTO: 2.2 10*3/MM3 (ref 1.9–8.1)
NEUTROPHILS NFR BLD AUTO: 49.7 % (ref 42.7–76)
PLATELET # BLD AUTO: 173 10*3/MM3 (ref 140–500)
PMV BLD AUTO: 11.7 FL (ref 6–12)
POTASSIUM BLD-SCNC: 3.6 MMOL/L (ref 3.5–5.2)
RBC # BLD AUTO: 4.53 10*6/MM3 (ref 4.6–6)
SODIUM BLD-SCNC: 149 MMOL/L (ref 136–145)
VANCOMYCIN TROUGH SERPL-MCNC: 15 MCG/ML (ref 5–20)
WBC NRBC COR # BLD: 4.42 10*3/MM3 (ref 4.5–10.7)

## 2018-08-08 PROCEDURE — 80048 BASIC METABOLIC PNL TOTAL CA: CPT | Performed by: HOSPITALIST

## 2018-08-08 PROCEDURE — 63710000001 INSULIN DETEMIR PER 5 UNITS: Performed by: HOSPITALIST

## 2018-08-08 PROCEDURE — 80202 ASSAY OF VANCOMYCIN: CPT | Performed by: HOSPITALIST

## 2018-08-08 PROCEDURE — 85025 COMPLETE CBC W/AUTO DIFF WBC: CPT | Performed by: HOSPITALIST

## 2018-08-08 PROCEDURE — 25010000002 VANCOMYCIN 10 G RECONSTITUTED SOLUTION: Performed by: HOSPITALIST

## 2018-08-08 PROCEDURE — 82962 GLUCOSE BLOOD TEST: CPT

## 2018-08-08 PROCEDURE — 25010000002 PIPERACILLIN SOD-TAZOBACTAM PER 1 G: Performed by: HOSPITALIST

## 2018-08-08 RX ORDER — DEXTROSE MONOHYDRATE 50 MG/ML
75 INJECTION, SOLUTION INTRAVENOUS CONTINUOUS
Status: DISCONTINUED | OUTPATIENT
Start: 2018-08-08 | End: 2018-08-09

## 2018-08-08 RX ADMIN — LEVETIRACETAM 1500 MG: 100 SOLUTION ORAL at 21:20

## 2018-08-08 RX ADMIN — TAZOBACTAM SODIUM AND PIPERACILLIN SODIUM 3.38 G: 375; 3 INJECTION, SOLUTION INTRAVENOUS at 19:17

## 2018-08-08 RX ADMIN — CHLORHEXIDINE GLUCONATE 15 ML: 1.2 RINSE ORAL at 21:20

## 2018-08-08 RX ADMIN — AMANTADINE HYDROCHLORIDE 100 MG: 50 SOLUTION ORAL at 15:38

## 2018-08-08 RX ADMIN — VANCOMYCIN HYDROCHLORIDE 1250 MG: 10 INJECTION, POWDER, LYOPHILIZED, FOR SOLUTION INTRAVENOUS at 11:01

## 2018-08-08 RX ADMIN — SODIUM CHLORIDE 75 ML/HR: 4.5 INJECTION, SOLUTION INTRAVENOUS at 12:17

## 2018-08-08 RX ADMIN — TAZOBACTAM SODIUM AND PIPERACILLIN SODIUM 3.38 G: 375; 3 INJECTION, SOLUTION INTRAVENOUS at 11:11

## 2018-08-08 RX ADMIN — Medication 20 UNITS: at 09:03

## 2018-08-08 RX ADMIN — LEVETIRACETAM 1500 MG: 100 SOLUTION ORAL at 08:35

## 2018-08-08 RX ADMIN — TAZOBACTAM SODIUM AND PIPERACILLIN SODIUM 3.38 G: 375; 3 INJECTION, SOLUTION INTRAVENOUS at 23:51

## 2018-08-08 RX ADMIN — VANCOMYCIN HYDROCHLORIDE 1250 MG: 10 INJECTION, POWDER, LYOPHILIZED, FOR SOLUTION INTRAVENOUS at 21:19

## 2018-08-08 RX ADMIN — FAMOTIDINE 20 MG: 20 TABLET, FILM COATED ORAL at 21:20

## 2018-08-08 RX ADMIN — TAZOBACTAM SODIUM AND PIPERACILLIN SODIUM 3.38 G: 375; 3 INJECTION, SOLUTION INTRAVENOUS at 02:30

## 2018-08-08 RX ADMIN — AMANTADINE HYDROCHLORIDE 100 MG: 50 SOLUTION ORAL at 02:30

## 2018-08-08 RX ADMIN — FAMOTIDINE 20 MG: 20 TABLET, FILM COATED ORAL at 08:35

## 2018-08-08 RX ADMIN — DOCUSATE SODIUM 100 MG: 50 LIQUID ORAL at 08:35

## 2018-08-08 RX ADMIN — DEXTROSE MONOHYDRATE 75 ML/HR: 50 INJECTION, SOLUTION INTRAVENOUS at 17:33

## 2018-08-08 RX ADMIN — MUPIROCIN: 20 OINTMENT TOPICAL at 08:35

## 2018-08-08 NOTE — PROGRESS NOTES
"  Infectious Diseases Progress Note    Mayelin Taylor MD     [facility]  Los: 2 days  Patient Identification:  Name: Ardian Kuo  Age: 38 y.o.  Sex: male  :  1980  MRN: [id number]         Primary Care Physician: Sav Kaur MD (Tony)            Subjective: Appears to be not as agitated.  Comfortable.  Interval History: See consultation note    Objective:    Scheduled Meds:    amantadine 100 mg Per G Tube Q12H   chlorhexidine 15 mL Mouth/Throat Q12H   docusate sodium 100 mg Per G Tube Daily   famotidine 20 mg Per G Tube BID   insulin aspart 0-7 Units Subcutaneous 4x Daily With Meals & Nightly   insulin detemir 20 Units Subcutaneous QAM   levETIRAcetam 1,500 mg Per G Tube Q12H   mupirocin  Topical Daily   Pharmacy to dose vancomycin  Does not apply BID   piperacillin-tazobactam 3.375 g Intravenous Q8H   vancomycin 15 mg/kg Intravenous Q12H     Continuous Infusions:    sodium chloride 75 mL/hr Last Rate: 75 mL/hr (18 0640)       Vital signs in last 24 hours:  Temp:  [97.7 °F (36.5 °C)-98.8 °F (37.1 °C)] 97.7 °F (36.5 °C)  Heart Rate:  [73-89] 73  Resp:  [16-18] 18  BP: (101-112)/(63-73) 101/63    Intake/Output:    Intake/Output Summary (Last 24 hours) at 18 0757  Last data filed at 18 1415   Gross per 24 hour   Intake              980 ml   Output                0 ml   Net              980 ml       Exam:  /63 (BP Location: Right arm, Patient Position: Lying)   Pulse 73   Temp 97.7 °F (36.5 °C) (Oral)   Resp 18   Ht 180.3 cm (71\")   Wt 80.9 kg (178 lb 5.6 oz)   SpO2 94%   BMI 24.88 kg/m²     General Appearance:    Does not engage.  Appears comfortable                          Head:    Normocephalic, without obvious abnormality, atraumatic                           Eyes:    PERRL, conjunctiva/corneas clear, EOM's intact, both eyes                         Throat:   Lips, tongue, gums normal; oral mucosa pink and moist                           Neck:   " Supple, symmetrical, trachea midline, no JVD                         Lungs:    Clear to auscultation bilaterally, respirations unlabored                 Chest Wall:    No tenderness or deformity                          Heart:    Regular rate and rhythm, S1 and S2 normal, no murmur,                  Abdomen:     Soft, G-tube site is mild excoriation just above fungal infection.  non-tender, bowel sounds active, no masses,                 Extremities:   Extremities normal, atraumatic, no cyanosis or edema                        Pulses:   Pulses palpable in all extremities                            Skin:   Skin is warm and dry,  no rashes or palpable lesions                       Data Review:    I reviewed the patient's new clinical results.    Results from last 7 days  Lab Units 08/08/18 0558 08/07/18  0434 08/06/18  0813 08/06/18  0046   WBC 10*3/mm3 4.42* 5.72 8.23 9.31   HEMOGLOBIN g/dL 14.2 14.1 15.5 17.0   PLATELETS 10*3/mm3 173 195 180 214       Results from last 7 days  Lab Units 08/08/18 0558 08/07/18  0434 08/06/18 0813 08/06/18  0046   SODIUM mmol/L 149* 141 145 142   POTASSIUM mmol/L 3.6 3.3* 4.2 4.4   CHLORIDE mmol/L 110* 103 106 99   CO2 mmol/L 29.0 26.4 24.5 22.5   BUN mg/dL 10 14 14 19   CREATININE mg/dL 0.42* 0.57* 0.57* 0.70*   CALCIUM mg/dL 8.9 8.3* 8.9 9.8   GLUCOSE mg/dL 127* 138* 94 127*     Xr Chest 1 View    Result Date: 8/7/2018  No definite acute findings.     This report was finalized on 8/7/2018 1:14 AM by Dr. Anushka Fuentes M.D.      Xr Abdomen Kub    Result Date: 8/7/2018  No acute findings in the abdomen.     This report was finalized on 8/7/2018 1:14 AM by Dr. Anushka Fuentes M.D.      .    Assessment:  Active Problems:    Sepsis (CMS/HCC)  Urinary tract infection  possible aspiration pneumonia  Traumatic brain injury with quadriplegia with associated dysphagia and neurogenic bladder  Immobility.  Plan:  Continue vancomycin and Zosyn while following up on the culture results.   Patient  has overall improved.    Mayelin Taylor MD  8/8/2018  7:57 AM    Much of this encounter note is an electronic transcription/translation of spoken language to printed text. The electronic translation of spoken language may permit erroneous, or at times, nonsensical words or phrases to be inadvertently transcribed; Although I have reviewed the note for such errors, some may still exist

## 2018-08-08 NOTE — PLAN OF CARE
Problem: Patient Care Overview  Goal: Plan of Care Review  Outcome: Ongoing (interventions implemented as appropriate)   08/08/18 0401   Coping/Psychosocial   Plan of Care Reviewed With patient;family   Plan of Care Review   Progress no change   OTHER   Outcome Summary Pt appears comfortable. Non verbal. Turned Q2. Incontinent. Tube feeds maintained. IV antibiotics continued.       Problem: Fall Risk (Adult)  Goal: Absence of Fall  Outcome: Ongoing (interventions implemented as appropriate)      Problem: Skin Injury Risk (Adult)  Goal: Skin Health and Integrity  Outcome: Ongoing (interventions implemented as appropriate)      Problem: Sepsis/Septic Shock (Adult)  Goal: Signs and Symptoms of Listed Potential Problems Will be Absent, Minimized or Managed (Sepsis/Septic Shock)  Outcome: Ongoing (interventions implemented as appropriate)      Problem: Nutrition, Enteral (Adult)  Goal: Signs and Symptoms of Listed Potential Problems Will be Absent, Minimized or Managed (Nutrition, Enteral)  Outcome: Ongoing (interventions implemented as appropriate)

## 2018-08-08 NOTE — PROGRESS NOTES
"Pharmacokinetic Consult - Vancomycin Dosing (Follow-up Note)    Adrian Kuo is on day 3 pharmacy to dose vancomycin for sepsis.  Pharmacy dosing vancomycin per Dr Clarke's request.   Goal trough: 15-20 mg/L   Duration: 7 days    Relevant clinical data and objective history reviewed:  38 y.o. male 180.3 cm (71\") 80.9 kg (178 lb 5.6 oz)    Past Medical History:   Diagnosis Date   • Atopic dermatitis    • Constipation    • Contracture, unspecified hand    • H/O gastrostomy, has currently (CMS/[facility])    • Partial small bowel obstruction    • Persistent vegetative state (CMS/[facility])    • Quadriplegia (CMS/[facility])    • Seborrheic keratosis    • Seizures (CMS/[facility])    • Traumatic brain injury (CMS/[facility])      Creatinine   Date Value Ref Range Status   08/08/2018 0.42 (L) 0.76 - 1.27 mg/dL Final   08/07/2018 0.57 (L) 0.76 - 1.27 mg/dL Final   08/06/2018 0.57 (L) 0.76 - 1.27 mg/dL Final     BUN   Date Value Ref Range Status   08/08/2018 10 6 - 20 mg/dL Final     Estimated Creatinine Clearance: 272.9 mL/min (A) (by C-G formula based on SCr of 0.42 mg/dL (L)).    Lab Results   Component Value Date    WBC 4.42 (L) 08/08/2018     Temp Readings from Last 3 Encounters:   08/08/18 97.7 °F (36.5 °C) (Oral)   12/20/16 99.1 °F (37.3 °C) (Rectal)     Baseline culture/source/susceptibility: blood and urine pending.     Anti-Infectives     Ordered     Dose/Rate Route Frequency Start Stop    08/06/18 0744  vancomycin 1250 mg/250 mL 0.9% NS IVPB ([facility])     Ordering Provider:  Marcus Clarke MD    15 mg/kg × 80.9 kg Intravenous Every 12 Hours 08/06/18 1800 08/10/18 0559    08/06/18 0722  Pharmacy to dose vancomycin     Ordering Provider:  Marcus Clarke MD     Does not apply 2 Times Daily 08/06/18 0900 08/13/18 0859    08/06/18 0744  piperacillin-tazobactam (ZOSYN) 3.375 g in iso-osmotic dextrose 50 ml (premix)     Ordering Provider:  Marcus Clarke MD    3.375 g  over 4 Hours Intravenous Every 8 Hours 08/06/18 0800 08/13/18 0829    08/06/18 0112  " vancomycin 1500 mg/500 mL 0.9% NS IVPB ([facility])     Ordering Provider:  Neville Freeman MD    20 mg/kg × 80.5 kg Intravenous Once 08/06/18 0114 08/06/18 0246         Lab Results   Component Value Date    [facility] 15.00 08/08/2018       Assessment/Plan  Dose given prior to level was given a little late so expect actual trough to be slightly lower. Also expect additional accumulation so will continue current dose. Will monitor serum creatinine at least every 48 hours per dosing recommendations. Vancomycin level in several days. Pharmacy will continue to follow daily while on vancomycin and adjust as needed.     [facility]

## 2018-08-08 NOTE — PLAN OF CARE
Problem: Patient Care Overview  Goal: Plan of Care Review  Outcome: Ongoing (interventions implemented as appropriate)   08/08/18 0401 08/08/18 1404 08/08/18 1726   Coping/Psychosocial   Plan of Care Reviewed With --  patient --    Plan of Care Review   Progress no change --  --    OTHER   Outcome Summary --  --  CTM. No change     Goal: Individualization and Mutuality  Outcome: Ongoing (interventions implemented as appropriate)    Goal: Discharge Needs Assessment  Outcome: Ongoing (interventions implemented as appropriate)    Goal: Interprofessional Rounds/Family Conf  Outcome: Ongoing (interventions implemented as appropriate)      Problem: Fall Risk (Adult)  Goal: Absence of Fall  Outcome: Ongoing (interventions implemented as appropriate)      Problem: Skin Injury Risk (Adult)  Goal: Skin Health and Integrity  Outcome: Ongoing (interventions implemented as appropriate)      Problem: Sepsis/Septic Shock (Adult)  Goal: Signs and Symptoms of Listed Potential Problems Will be Absent, Minimized or Managed (Sepsis/Septic Shock)  Outcome: Ongoing (interventions implemented as appropriate)      Problem: Nutrition, Enteral (Adult)  Goal: Signs and Symptoms of Listed Potential Problems Will be Absent, Minimized or Managed (Nutrition, Enteral)  Outcome: Ongoing (interventions implemented as appropriate)

## 2018-08-08 NOTE — PROGRESS NOTES
"Daily progress note    Chief complaint  Doing better  No more seizures    History of present illness  38-year-old white male with history of traumatic brain injury seizure disorder quadriplegia with contractures status post G-tube placement and baclofen pump placement who is a nursing home resident was brought to the emergency room with fever and tachycardia and possible seizure-like activity.  Patient is nonverbal and unable to give detailed history most of the history obtained from the chart and nursing staff will record and mother who is her POA.     REVIEW OF SYSTEMS  Review of Systems   Neurological: Positive for seizures.      PHYSICAL EXAM  Blood pressure 109/71, pulse 85, temperature 98 °F (36.7 °C), temperature source Oral, resp. rate 16, height 180.3 cm (71\"), weight 80.9 kg (178 lb 5.6 oz), SpO2 93 %.    Constitutional: He is oriented to person, place, and time. No distress.   lethargic but arousable     Head: Normocephalic and atraumatic.   Old large craniotomy on the left side of scalp.    Eyes: Pupils are equal, round, and reactive to light. EOM are normal.   Dilated 4+ bilaterally    Neck: Normal range of motion. Neck supple.   Cardiovascular: Regular rhythm and normal heart sounds.  Tachycardia present.    Pulmonary/Chest: Effort normal. No respiratory distress. He has rhonchi (bilaterally).   Abdominal: Soft. There is no tenderness. There is no rebound and no guarding.   Excoriation around the G-tube site, ULQ. Hyperactive bowel sound.   Musculoskeletal: Normal range of motion. He exhibits no edema.   Neurological: He is alert and oriented to person, place, and time. He has normal sensation and normal strength.   Bilateral extremity contractures    Skin: Skin is warm and dry.   Psychiatric: Mood and affect normal.     LAB RESULTS  Lab Results (last 24 hours)     Procedure Component Value Units Date/Time    POC Glucose Once [[id number]]  (Normal) Collected:  08/08/18 1059    Specimen:  Blood Updated:  " 08/08/18 1107     Glucose 109 mg/dL     Narrative:       Meter: [id number] : [id number] Malinda MORAES    Urine Culture - Urine, [[id number]]  (Abnormal) Collected:  08/06/18 0159    Specimen:  Urine from Urine, Catheter Updated:  08/08/18 0838     Urine Culture >100,000 CFU/mL Enterococcus species (A)      >100,000 CFU/mL Gram Positive Cocci (A)    CBC & Differential [[id number]] Collected:  08/08/18 0558    Specimen:  Blood Updated:  08/08/18 0720    Narrative:       The following orders were created for panel order CBC & Differential.  Procedure                               Abnormality         Status                     ---------                               -----------         ------                     CBC Auto Differential[[id number]]        Abnormal            Final result                 Please view results for these tests on the individual orders.    CBC Auto Differential [[id number]]  (Abnormal) Collected:  08/08/18 0558    Specimen:  Blood Updated:  08/08/18 0720     WBC 4.42 (L) 10*3/mm3      RBC 4.53 (L) 10*6/mm3      Hemoglobin 14.2 g/dL      Hematocrit 45.8 %      .1 (H) fL      MCH 31.3 pg      MCHC 31.0 (L) g/dL      RDW 14.5 %      RDW-SD 53.6 fl      MPV 11.7 fL      Platelets 173 10*3/mm3      Neutrophil % 49.7 %      Lymphocyte % 32.4 %      Monocyte % 12.0 %      Eosinophil % 5.0 %      Basophil % 0.9 %      Immature Grans % 0.2 %      Neutrophils, Absolute 2.20 10*3/mm3      Lymphocytes, Absolute 1.43 10*3/mm3      Monocytes, Absolute 0.53 10*3/mm3      Eosinophils, Absolute 0.22 10*3/mm3      Basophils, Absolute 0.04 10*3/mm3      Immature Grans, Absolute 0.01 10*3/mm3     Vancomycin, Trough [[id number]]  (Normal) Collected:  08/08/18 0531    Specimen:  Blood Updated:  08/08/18 0719     Vancomycin Trough 15.00 mcg/mL     Basic Metabolic Panel [[id number]]  (Abnormal) Collected:  08/08/18 0558    Specimen:  Blood Updated:  08/08/18 0717     Glucose 127 (H) mg/dL      BUN 10 mg/dL       Creatinine 0.42 (L) mg/dL      Sodium 149 (H) mmol/L      Potassium 3.6 mmol/L      Chloride 110 (H) mmol/L      CO2 29.0 mmol/L      Calcium 8.9 mg/dL      eGFR Non African Amer >150 mL/min/1.73      BUN/Creatinine Ratio 23.8     Anion Gap 10.0 mmol/L     Narrative:       GFR Normal >60  Chronic Kidney Disease <60  Kidney Failure <15    POC Glucose Once [[id number]]  (Normal) Collected:  08/08/18 0603    Specimen:  Blood Updated:  08/08/18 0606     Glucose 120 mg/dL     Narrative:       RN Notified R and V Meter: [id number] : [id number] Eugene MORAES    Blood Culture - Blood, [[id number]]  (Normal) Collected:  08/06/18 0155    Specimen:  Blood from Arm, Right Updated:  08/08/18 0214     Blood Culture No growth at 2 days    Blood Culture - Blood, [[id number]]  (Normal) Collected:  08/06/18 0046    Specimen:  Blood from Arm, Left Updated:  08/08/18 0101     Blood Culture No growth at 2 days    POC Glucose Once [[id number]]  (Normal) Collected:  08/07/18 2151    Specimen:  Blood Updated:  08/07/18 2157     Glucose 128 mg/dL     Narrative:       Meter: [id number] : [id number] Sebas MORAES    POC Glucose Once [[id number]]  (Abnormal) Collected:  08/07/18 1616    Specimen:  Blood Updated:  08/07/18 1618     Glucose 148 (H) mg/dL     Narrative:       Meter: [id number] : [id number] Malinda MORAES        Imaging Results (last 24 hours)     ** No results found for the last 24 hours. **        EKG  Rhythm/Rate: sinus tach/ 105   No Acute Ischemia  Non-Specific ST-T changes  unchanged compared to prior on 2014      Current Facility-Administered Medications:   •  acetaminophen (TYLENOL) 160 MG/5ML solution 500 mg, 500 mg, Per G Tube, TID PRN, Marcus Clarke MD  •  amantadine (SYMMETREL) solution 100 mg, 100 mg, Per G Tube, Q12H, Marcus Clarke MD, 100 mg at 08/08/18 0230  •  bisacodyl (DULCOLAX) suppository 10 mg, 10 mg, Rectal, Daily PRN, Marcus Clarke MD  •  chlorhexidine (PERIDEX) 0.12 % solution 15 mL, 15  mL, Mouth/Throat, Q12H, Marcus Clarke MD, 15 mL at 08/07/18 2129  •  docusate sodium (COLACE) liquid 100 mg, 100 mg, Per G Tube, Daily, Marcus Clarke MD, 100 mg at 08/08/18 0835  •  famotidine (PEPCID) tablet 20 mg, 20 mg, Per G Tube, BID, Marcus Clarke MD, 20 mg at 08/08/18 0835  •  HYDROcodone-acetaminophen (NORCO) 7.5-325 MG per tablet 1 tablet, 1 tablet, Per G Tube, Q4H PRN, Marcus Clarke MD  •  HYDROcodone-acetaminophen (NORCO) 7.5-325 MG per tablet 2 tablet, 2 tablet, Per G Tube, Q4H PRN, Marcus Clarke MD, 2 tablet at 08/06/18 1412  •  insulin aspart (novoLOG) injection 0-7 Units, 0-7 Units, Subcutaneous, 4x Daily With Meals & Nightly, Marcus Clarke MD  •  insulin detemir (LEVEMIR) injection 20 Units, 20 Units, Subcutaneous, QAM, Marcus Clarke MD, 20 Units at 08/08/18 0903  •  levETIRAcetam (KEPPRA) 100 MG/ML solution 1,500 mg, 1,500 mg, Per G Tube, Q12H, Tia Joyce MD, 1,500 mg at 08/08/18 0835  •  melatonin tablet 4.5 mg, 4.5 mg, Per G Tube, Nightly PRN, Marcus Clarke MD  •  mupirocin (BACTROBAN) 2 % ointment, , Topical, Daily, Marcus Clarke MD  •  nitroglycerin (NITROSTAT) SL tablet 0.4 mg, 0.4 mg, Sublingual, Q5 Min PRN, Marcus Clarke MD  •  Pharmacy to dose vancomycin, , Does not apply, BID, Marcus Clarke MD  •  piperacillin-tazobactam (ZOSYN) 3.375 g in iso-osmotic dextrose 50 ml (premix), 3.375 g, Intravenous, Q8H, Marcus Clarke MD, 3.375 g at 08/08/18 1111  •  polyethylene glycol 3350 powder (packet), 17 g, Per G Tube, Daily PRN, Marcus Clarke MD  •  sodium chloride 0.45 % infusion, 75 mL/hr, Intravenous, Continuous, Marcus Clarke MD, Last Rate: 75 mL/hr at 08/08/18 1217, 75 mL/hr at 08/08/18 1217  •  vancomycin 1250 mg/250 mL 0.9% NS IVPB ([facility]), 15 mg/kg, Intravenous, Q12H, Marcus Clarke MD, 1,250 mg at 08/08/18 1101    ASSESSMENT  Sepsis  Polymicrobial UTI with enterococcus and GPC  Recurrent seizure disorder  Acute hypoxia  Probable aspiration pneumonitis pneumonia  Diabetes  mellitus  Status post G-tube placement  Traumatic brain injury  Quadriplegic with contractures  DNR  Immobilization syndrome    PLAN  CPM  IV fluid  IV antibiotics PER ID  Keppra PO  Continue nursing home medication  Neurology consult appreciated  Supportive care  Stress ulcer DVT prophylaxis  Follow closely further recommendation chronic hospital course    MICHEL KURTZ MD

## 2018-08-09 LAB
ANION GAP SERPL CALCULATED.3IONS-SCNC: 12.3 MMOL/L
BACTERIA SPEC AEROBE CULT: ABNORMAL
BACTERIA SPEC AEROBE CULT: ABNORMAL
BASOPHILS # BLD AUTO: 0.03 10*3/MM3 (ref 0–0.2)
BASOPHILS NFR BLD AUTO: 0.6 % (ref 0–1.5)
BUN BLD-MCNC: 10 MG/DL (ref 6–20)
BUN/CREAT SERPL: 20.4 (ref 7–25)
CALCIUM SPEC-SCNC: 9.1 MG/DL (ref 8.6–10.5)
CHLORIDE SERPL-SCNC: 107 MMOL/L (ref 98–107)
CO2 SERPL-SCNC: 26.7 MMOL/L (ref 22–29)
CREAT BLD-MCNC: 0.49 MG/DL (ref 0.76–1.27)
DEPRECATED RDW RBC AUTO: 53.1 FL (ref 37–54)
EOSINOPHIL # BLD AUTO: 0.21 10*3/MM3 (ref 0–0.7)
EOSINOPHIL NFR BLD AUTO: 4.4 % (ref 0.3–6.2)
ERYTHROCYTE [DISTWIDTH] IN BLOOD BY AUTOMATED COUNT: 14.4 % (ref 11.5–14.5)
GFR SERPL CREATININE-BSD FRML MDRD: >150 ML/MIN/1.73
GLUCOSE BLD-MCNC: 147 MG/DL (ref 65–99)
GLUCOSE BLDC GLUCOMTR-MCNC: 102 MG/DL (ref 70–130)
GLUCOSE BLDC GLUCOMTR-MCNC: 121 MG/DL (ref 70–130)
GLUCOSE BLDC GLUCOMTR-MCNC: 133 MG/DL (ref 70–130)
GLUCOSE BLDC GLUCOMTR-MCNC: 145 MG/DL (ref 70–130)
HCT VFR BLD AUTO: 46.6 % (ref 40.4–52.2)
HGB BLD-MCNC: 14.6 G/DL (ref 13.7–17.6)
IMM GRANULOCYTES # BLD: 0.02 10*3/MM3 (ref 0–0.03)
IMM GRANULOCYTES NFR BLD: 0.4 % (ref 0–0.5)
LYMPHOCYTES # BLD AUTO: 1.67 10*3/MM3 (ref 0.9–4.8)
LYMPHOCYTES NFR BLD AUTO: 35 % (ref 19.6–45.3)
MCH RBC QN AUTO: 31.7 PG (ref 27–32.7)
MCHC RBC AUTO-ENTMCNC: 31.3 G/DL (ref 32.6–36.4)
MCV RBC AUTO: 101.1 FL (ref 79.8–96.2)
MONOCYTES # BLD AUTO: 0.37 10*3/MM3 (ref 0.2–1.2)
MONOCYTES NFR BLD AUTO: 7.8 % (ref 5–12)
NEUTROPHILS # BLD AUTO: 2.49 10*3/MM3 (ref 1.9–8.1)
NEUTROPHILS NFR BLD AUTO: 52.2 % (ref 42.7–76)
PLATELET # BLD AUTO: 186 10*3/MM3 (ref 140–500)
PMV BLD AUTO: 11.6 FL (ref 6–12)
POTASSIUM BLD-SCNC: 3.6 MMOL/L (ref 3.5–5.2)
RBC # BLD AUTO: 4.61 10*6/MM3 (ref 4.6–6)
SODIUM BLD-SCNC: 146 MMOL/L (ref 136–145)
WBC NRBC COR # BLD: 4.77 10*3/MM3 (ref 4.5–10.7)

## 2018-08-09 PROCEDURE — 85025 COMPLETE CBC W/AUTO DIFF WBC: CPT | Performed by: HOSPITALIST

## 2018-08-09 PROCEDURE — 63710000001 INSULIN DETEMIR PER 5 UNITS: Performed by: HOSPITALIST

## 2018-08-09 PROCEDURE — 25010000003 AMPICILLIN-SULBACTAM PER 1.5 G: Performed by: INTERNAL MEDICINE

## 2018-08-09 PROCEDURE — 82962 GLUCOSE BLOOD TEST: CPT

## 2018-08-09 PROCEDURE — 25010000002 VANCOMYCIN 10 G RECONSTITUTED SOLUTION: Performed by: HOSPITALIST

## 2018-08-09 PROCEDURE — 80048 BASIC METABOLIC PNL TOTAL CA: CPT | Performed by: HOSPITALIST

## 2018-08-09 PROCEDURE — 25010000002 PIPERACILLIN SOD-TAZOBACTAM PER 1 G: Performed by: HOSPITALIST

## 2018-08-09 RX ADMIN — LEVETIRACETAM 1500 MG: 100 SOLUTION ORAL at 20:19

## 2018-08-09 RX ADMIN — FAMOTIDINE 20 MG: 20 TABLET, FILM COATED ORAL at 20:18

## 2018-08-09 RX ADMIN — AMPICILLIN SODIUM AND SULBACTAM SODIUM 3 G: 2; 1 INJECTION, POWDER, FOR SOLUTION INTRAMUSCULAR; INTRAVENOUS at 17:38

## 2018-08-09 RX ADMIN — AMANTADINE HYDROCHLORIDE 100 MG: 50 SOLUTION ORAL at 14:41

## 2018-08-09 RX ADMIN — TAZOBACTAM SODIUM AND PIPERACILLIN SODIUM 3.38 G: 375; 3 INJECTION, SOLUTION INTRAVENOUS at 08:41

## 2018-08-09 RX ADMIN — AMANTADINE HYDROCHLORIDE 100 MG: 50 SOLUTION ORAL at 04:28

## 2018-08-09 RX ADMIN — FAMOTIDINE 20 MG: 20 TABLET, FILM COATED ORAL at 08:42

## 2018-08-09 RX ADMIN — Medication 20 UNITS: at 06:03

## 2018-08-09 RX ADMIN — LEVETIRACETAM 1500 MG: 100 SOLUTION ORAL at 08:41

## 2018-08-09 RX ADMIN — VANCOMYCIN HYDROCHLORIDE 1250 MG: 10 INJECTION, POWDER, LYOPHILIZED, FOR SOLUTION INTRAVENOUS at 06:03

## 2018-08-09 RX ADMIN — MUPIROCIN: 20 OINTMENT TOPICAL at 08:42

## 2018-08-09 RX ADMIN — DOCUSATE SODIUM 100 MG: 50 LIQUID ORAL at 08:41

## 2018-08-09 RX ADMIN — CHLORHEXIDINE GLUCONATE 15 ML: 1.2 RINSE ORAL at 20:18

## 2018-08-09 RX ADMIN — Medication: at 20:19

## 2018-08-09 RX ADMIN — HYDROCODONE BITARTRATE AND ACETAMINOPHEN 2 TABLET: 7.5; 325 TABLET ORAL at 20:18

## 2018-08-09 NOTE — PLAN OF CARE
Problem: Patient Care Overview  Goal: Plan of Care Review  Outcome: Ongoing (interventions implemented as appropriate)   08/08/18 0401 08/09/18 1441 08/09/18 1715   Coping/Psychosocial   Plan of Care Reviewed With --  patient;mother --    Plan of Care Review   Progress no change --  --    OTHER   Outcome Summary --  --  Changed abx. No change. q2h turn. TF-tolerating. CTM     Goal: Individualization and Mutuality  Outcome: Ongoing (interventions implemented as appropriate)    Goal: Discharge Needs Assessment  Outcome: Ongoing (interventions implemented as appropriate)    Goal: Interprofessional Rounds/Family Conf  Outcome: Ongoing (interventions implemented as appropriate)      Problem: Fall Risk (Adult)  Goal: Absence of Fall  Outcome: Ongoing (interventions implemented as appropriate)      Problem: Skin Injury Risk (Adult)  Goal: Skin Health and Integrity  Outcome: Ongoing (interventions implemented as appropriate)      Problem: Sepsis/Septic Shock (Adult)  Goal: Signs and Symptoms of Listed Potential Problems Will be Absent, Minimized or Managed (Sepsis/Septic Shock)  Outcome: Ongoing (interventions implemented as appropriate)      Problem: Nutrition, Enteral (Adult)  Goal: Signs and Symptoms of Listed Potential Problems Will be Absent, Minimized or Managed (Nutrition, Enteral)  Outcome: Ongoing (interventions implemented as appropriate)

## 2018-08-09 NOTE — PLAN OF CARE
Problem: Fall Risk (Adult)  Goal: Absence of Fall  Outcome: Ongoing (interventions implemented as appropriate)      Problem: Sepsis/Septic Shock (Adult)  Goal: Signs and Symptoms of Listed Potential Problems Will be Absent, Minimized or Managed (Sepsis/Septic Shock)  Outcome: Ongoing (interventions implemented as appropriate)      Problem: Nutrition, Enteral (Adult)  Goal: Signs and Symptoms of Listed Potential Problems Will be Absent, Minimized or Managed (Nutrition, Enteral)  Outcome: Ongoing (interventions implemented as appropriate)

## 2018-08-09 NOTE — PROGRESS NOTES
"Daily progress note    Chief complaint  Doing better  No more seizures    History of present illness  38-year-old white male with history of traumatic brain injury seizure disorder quadriplegia with contractures status post G-tube placement and baclofen pump placement who is a nursing home resident was brought to the emergency room with fever and tachycardia and possible seizure-like activity.  Patient is nonverbal and unable to give detailed history most of the history obtained from the chart and nursing staff will record and mother who is her POA.     REVIEW OF SYSTEMS  Review of Systems   Neurological: Positive for seizures.      PHYSICAL EXAM  Blood pressure 101/75, pulse 71, temperature 98 °F (36.7 °C), temperature source Oral, resp. rate 20, height 180.3 cm (71\"), weight 80 kg (176 lb 6.4 oz), SpO2 93 %.    Constitutional: He is oriented to person, place, and time. No distress.   lethargic but arousable     Head: Normocephalic and atraumatic.   Old large craniotomy on the left side of scalp.    Eyes: Pupils are equal, round, and reactive to light. EOM are normal.   Dilated 4+ bilaterally    Neck: Normal range of motion. Neck supple.   Cardiovascular: Regular rhythm and normal heart sounds.  Tachycardia present.    Pulmonary/Chest: Effort normal. No respiratory distress. He has rhonchi (bilaterally).   Abdominal: Soft. There is no tenderness. There is no rebound and no guarding.   Excoriation around the G-tube site, ULQ. Hyperactive bowel sound.   Musculoskeletal: Normal range of motion. He exhibits no edema.   Neurological: He is alert and oriented to person, place, and time. He has normal sensation and normal strength.   Bilateral extremity contractures    Skin: Skin is warm and dry.   Psychiatric: Mood and affect normal.     LAB RESULTS  Lab Results (last 24 hours)     Procedure Component Value Units Date/Time    Urine Culture - Urine, [[id number]]  (Abnormal)  (Susceptibility) Collected:  08/06/18 0159    " Specimen:  Urine from Urine, Catheter Updated:  08/09/18 1156     Urine Culture >100,000 CFU/mL Enterococcus faecalis (A)      >100,000 CFU/mL Aerococcus urinae (A)     Comment: Aerococcus urinae are usually susceptible to Penicillin G, Vancomycin, and Tetracycline and Resistant to Trimethoprim sulfamethoxazole and Gentamicin.         Susceptibility      Enterococcus faecalis     VENANCIO     Ampicillin <=2 ug/ml Susceptible     Ciprofloxacin >=8 ug/ml Resistant     Gentamicin High Level Synergy SYN-R  Resistant     Levofloxacin >=8 ug/ml Resistant     Nitrofurantoin <=16 ug/ml Susceptible     Tetracycline >=16 ug/ml Resistant     Vancomycin 1 ug/ml Susceptible                    POC Glucose Once [[id number]]  (Normal) Collected:  08/09/18 1054    Specimen:  Blood Updated:  08/09/18 1056     Glucose 102 mg/dL     Narrative:       Meter: [id number] : [id number] Deep Julian CNA    POC Glucose Once [[id number]]  (Abnormal) Collected:  08/09/18 0631    Specimen:  Blood Updated:  08/09/18 0634     Glucose 145 (H) mg/dL     Narrative:       Meter: [id number] : [id number] Jesus MORAES    CBC & Differential [[id number]] Collected:  08/09/18 0437    Specimen:  Blood Updated:  08/09/18 0610    Narrative:       The following orders were created for panel order CBC & Differential.  Procedure                               Abnormality         Status                     ---------                               -----------         ------                     CBC Auto Differential[[id number]]        Abnormal            Final result                 Please view results for these tests on the individual orders.    CBC Auto Differential [[id number]]  (Abnormal) Collected:  08/09/18 0437    Specimen:  Blood Updated:  08/09/18 0610     WBC 4.77 10*3/mm3      RBC 4.61 10*6/mm3      Hemoglobin 14.6 g/dL      Hematocrit 46.6 %      .1 (H) fL      MCH 31.7 pg      MCHC 31.3 (L) g/dL      RDW 14.4 %      RDW-SD 53.1 fl      MPV  11.6 fL      Platelets 186 10*3/mm3      Neutrophil % 52.2 %      Lymphocyte % 35.0 %      Monocyte % 7.8 %      Eosinophil % 4.4 %      Basophil % 0.6 %      Immature Grans % 0.4 %      Neutrophils, Absolute 2.49 10*3/mm3      Lymphocytes, Absolute 1.67 10*3/mm3      Monocytes, Absolute 0.37 10*3/mm3      Eosinophils, Absolute 0.21 10*3/mm3      Basophils, Absolute 0.03 10*3/mm3      Immature Grans, Absolute 0.02 10*3/mm3     Basic Metabolic Panel [[id number]]  (Abnormal) Collected:  08/09/18 0437    Specimen:  Blood Updated:  08/09/18 0609     Glucose 147 (H) mg/dL      BUN 10 mg/dL      Creatinine 0.49 (L) mg/dL      Sodium 146 (H) mmol/L      Potassium 3.6 mmol/L      Chloride 107 mmol/L      CO2 26.7 mmol/L      Calcium 9.1 mg/dL      eGFR Non African Amer >150 mL/min/1.73      BUN/Creatinine Ratio 20.4     Anion Gap 12.3 mmol/L     Narrative:       GFR Normal >60  Chronic Kidney Disease <60  Kidney Failure <15    Blood Culture - Blood, [[id number]]  (Normal) Collected:  08/06/18 0155    Specimen:  Blood from Arm, Right Updated:  08/09/18 0216     Blood Culture No growth at 3 days    Blood Culture - Blood, [[id number]]  (Normal) Collected:  08/06/18 0046    Specimen:  Blood from Arm, Left Updated:  08/09/18 0101     Blood Culture No growth at 3 days    POC Glucose Once [[id number]]  (Normal) Collected:  08/08/18 2041    Specimen:  Blood Updated:  08/08/18 2058     Glucose 122 mg/dL     Narrative:       Meter: [id number] : [id number] Jesus MORAES    POC Glucose Once [[id number]]  (Abnormal) Collected:  08/08/18 1613    Specimen:  Blood Updated:  08/08/18 1614     Glucose 134 (H) mg/dL     Narrative:       Meter: [id number] : [id number] Jose MORAES        Imaging Results (last 24 hours)     ** No results found for the last 24 hours. **        EKG  Rhythm/Rate: sinus tach/ 105   No Acute Ischemia  Non-Specific ST-T changes  unchanged compared to prior on 2014      Current Facility-Administered  Medications:   •  acetaminophen (TYLENOL) 160 MG/5ML solution 500 mg, 500 mg, Per G Tube, TID PRN, Marcus Clarke MD  •  amantadine (SYMMETREL) solution 100 mg, 100 mg, Per G Tube, Q12H, Marcus Clarke MD, 100 mg at 08/09/18 0428  •  bisacodyl (DULCOLAX) suppository 10 mg, 10 mg, Rectal, Daily PRN, Marcus Clarke MD  •  chlorhexidine (PERIDEX) 0.12 % solution 15 mL, 15 mL, Mouth/Throat, Q12H, Marcus Clarke MD, Stopped at 08/09/18 1113  •  dextrose (D5W) 5 % infusion, 75 mL/hr, Intravenous, Continuous, Marcus Clarke MD, Last Rate: 75 mL/hr at 08/08/18 1733, 75 mL/hr at 08/08/18 1733  •  docusate sodium (COLACE) liquid 100 mg, 100 mg, Per G Tube, Daily, Marcus Clarke MD, 100 mg at 08/09/18 0841  •  famotidine (PEPCID) tablet 20 mg, 20 mg, Per G Tube, BID, Marcus Clarke MD, 20 mg at 08/09/18 0842  •  HYDROcodone-acetaminophen (NORCO) 7.5-325 MG per tablet 1 tablet, 1 tablet, Per G Tube, Q4H PRN, Marcus Clarke MD  •  HYDROcodone-acetaminophen (NORCO) 7.5-325 MG per tablet 2 tablet, 2 tablet, Per G Tube, Q4H PRN, Marcus Clarke MD, 2 tablet at 08/06/18 1412  •  insulin aspart (novoLOG) injection 0-7 Units, 0-7 Units, Subcutaneous, 4x Daily With Meals & Nightly, Marcus Clarke MD  •  insulin detemir (LEVEMIR) injection 20 Units, 20 Units, Subcutaneous, QAM, Marcus Clarke MD, 20 Units at 08/09/18 0603  •  levETIRAcetam (KEPPRA) 100 MG/ML solution 1,500 mg, 1,500 mg, Per G Tube, Q12H, Tia Joyce MD, 1,500 mg at 08/09/18 0841  •  melatonin tablet 4.5 mg, 4.5 mg, Per G Tube, Nightly PRN, Marcus Clarke MD  •  mupirocin (BACTROBAN) 2 % ointment, , Topical, Daily, Marcus Clarke MD  •  nitroglycerin (NITROSTAT) SL tablet 0.4 mg, 0.4 mg, Sublingual, Q5 Min PRN, Marcus Clarke MD  •  Pharmacy to dose vancomycin, , Does not apply, BID, Marcus Clarke MD  •  piperacillin-tazobactam (ZOSYN) 3.375 g in iso-osmotic dextrose 50 ml (premix), 3.375 g, Intravenous, Q8H, Marcus Clarke MD, 3.375 g at 08/09/18 0841  •  polyethylene glycol  3350 powder (packet), 17 g, Per G Tube, Daily PRN, Michel Clarke MD  •  vancomycin 1250 mg/250 mL 0.9% NS IVPB ([facility]), 15 mg/kg, Intravenous, Q12H, Michel Clarke MD, 1,250 mg at 08/09/18 0603    ASSESSMENT  Sepsis  Polymicrobial UTI with enterococcus and Aerococcus  Recurrent seizure disorder  Acute hypoxia  Probable aspiration pneumonitis pneumonia  Diabetes mellitus  Status post G-tube placement  Traumatic brain injury  Quadriplegic with contractures  DNR  Immobilization syndrome    PLAN  CPM  Antibiotics PER ID  Keppra PO  Continue nursing home medication  Supportive care  Stress ulcer DVT prophylaxis  Discharge in a.m. on oral antibiotics If more stable    MICHEL CLARKE MD

## 2018-08-09 NOTE — PROGRESS NOTES
Adult Nutrition  Assessment/PES    Patient Name:  Adrian Kuo  YOB: 1980  MRN: [id number]  Admit Date:  8/6/2018    Assessment Date:  8/9/2018    Comments:  Follow up.  Patient continues on same TF regimen as at NH.  RN reports patient is tolerating TFs well with no residuals.    RD to continue to follow.          Reason for Assessment     Row Name 08/09/18 1428          Reason for Assessment    Reason For Assessment follow-up protocol;TF/PN               Anthropometrics     Row Name 08/09/18 1428          Admit Weight    Admit Weight --   176# 8/9        Body Mass Index (BMI)    BMI Assessment BMI 18.5-24.9: normal             Labs/Tests/Procedures/Meds     Row Name 08/09/18 1428          Labs/Procedures/Meds    Lab Results Reviewed reviewed, pertinent     Lab Results Comments Na+, Creat        Diagnostic Tests/Procedures    Diagnostic Test/Procedure Reviewed reviewed, pertinent        Medications    Pertinent Medications Reviewed reviewed, pertinent     Pertinent Medications Comments colace, pepcid, insulin, vanc, zosyn             Physical Findings     Row Name 08/09/18 1430          Physical Findings    Gastrointestinal feeding tube     Tubes gastrostomy tube     Skin --   B=12, bruised               Nutrition Prescription Ordered     Row Name 08/09/18 1430          Nutrition Prescription PO    Current PO Diet NPO        Nutrition Prescription EN    Enteral Route Gastrostomy     Product Diabetisource     TF Delivery Method Bolus;Cyclic     TF Bolus Goal Volume (mL) 250 mL     TF Bolus Current Volume (mL) 250 mL     TF Bolus Frequency 3 times a day     TF Bolus Cycle Over 60 minutes     Cyclic TF Goal Rate (mL/hr) 65 mL/hr     Cyclic TF Current Rate (mL/hr) 65 mL/hr   at nighttime only     Cyclic TF Cycle Over (hrs)  8 pm-8 am     Water flush (mL)  50 mL     Water Flush Frequency Per hour   during nocturnal feeds; 240 ml flush at each bolus             Evaluation of Received Nutrient/Fluid  Intake     Row Name 08/09/18 1431          Nutrient/Fluid Evaluation    Additional Documentation Calories Evaluation (Group);Protein Evaluation (Group)        Calories Evaluation    Enteral Calories (kcal) 1836     % of Kcal Needs 100        Protein Evaluation    Enteral Protein (gm) 92     % of Protein Needs 100        Intake Assessment    Energy/Calorie Requirement Assessment meeting needs     Protein Requirement Assessment meeting needs        Fluid Intake Evaluation    Enteral (Free Water) Fluid (mL) 2568             Problem/Interventions:                  Intervention Goal     Row Name 08/09/18 1434          Intervention Goal    General Nutrition support treatment;Maintain nutrition;Disease management/therapy;Reduce/improve symptoms     TF/PN Maintain TF/PN     Weight Maintain weight             Nutrition Intervention     Row Name 08/09/18 1434          Nutrition Intervention    RD/Tech Action Follow Tx progress;Care plan reviewd     Recommended/Ordered EN             Nutrition Prescription     Row Name 08/09/18 1434          Nutrition Prescription EN    Enteral Prescription Continue same protocol             Education/Evaluation     Row Name 08/09/18 1434          Monitor/Evaluation    Monitor Per protocol;I&O;Pertinent labs;TF delivery/tolerance;Weight;Skin status;Symptoms         Electronically signed by:  Donita Ruiz RD  08/09/18 2:35 PM

## 2018-08-09 NOTE — PROGRESS NOTES
"  Infectious Diseases Progress Note    Mayelin Taylor MD     [facility]  Los: 3 days  Patient Identification:  Name: Adrian Kuo  Age: 38 y.o.  Sex: male  :  1980  MRN: [id number]         Primary Care Physician: Sav Kaur MD (Tony)            Subjective: Comfortable and does not appear to be in any acute distress nonverbal and unengaged.  Interval History: See consultation note    Objective:    Scheduled Meds:    amantadine 100 mg Per G Tube Q12H   chlorhexidine 15 mL Mouth/Throat Q12H   docusate sodium 100 mg Per G Tube Daily   famotidine 20 mg Per G Tube BID   insulin aspart 0-7 Units Subcutaneous 4x Daily With Meals & Nightly   insulin detemir 20 Units Subcutaneous QAM   levETIRAcetam 1,500 mg Per G Tube Q12H   mupirocin  Topical Daily   Pharmacy to dose vancomycin  Does not apply BID   piperacillin-tazobactam 3.375 g Intravenous Q8H   vancomycin 15 mg/kg Intravenous Q12H     Continuous Infusions:       Vital signs in last 24 hours:  Temp:  [98 °F (36.7 °C)-98.8 °F (37.1 °C)] 98 °F (36.7 °C)  Heart Rate:  [71-76] 71  Resp:  [16-20] 20  BP: ()/(66-75) 101/75    Intake/Output:    Intake/Output Summary (Last 24 hours) at 18 1457  Last data filed at 18 1430   Gross per 24 hour   Intake             5728 ml   Output                0 ml   Net             5728 ml       Exam:  /75 (BP Location: Right arm, Patient Position: Lying)   Pulse 71   Temp 98 °F (36.7 °C) (Oral)   Resp 20   Ht 180.3 cm (71\")   Wt 80 kg (176 lb 6.4 oz)   SpO2 93%   BMI 24.60 kg/m²     General Appearance:    Does not engage.  Appears comfortable                          Head:    Normocephalic, without obvious abnormality, atraumatic                           Eyes:    PERRL, conjunctiva/corneas clear, EOM's intact, both eyes                         Throat:   Lips, tongue, gums normal; oral mucosa pink and moist                           Neck:   Supple, symmetrical, trachea " midline, no JVD                         Lungs:    Clear to auscultation bilaterally, respirations unlabored                 Chest Wall:    No tenderness or deformity                          Heart:    Regular rate and rhythm, S1 and S2 normal, no murmur,                  Abdomen:     Soft, G-tube site is mild excoriation just above fungal infection.  non-tender, bowel sounds active, no masses,                 Extremities:   Extremities normal, atraumatic, no cyanosis or edema                        Pulses:   Pulses palpable in all extremities                            Skin:   Skin is warm and dry,  no rashes or palpable lesions                       Data Review:    I reviewed the patient's new clinical results.    Results from last 7 days  Lab Units 08/09/18  0437 08/08/18  0558 08/07/18  0434 08/06/18  0813 08/06/18  0046   WBC 10*3/mm3 4.77 4.42* 5.72 8.23 9.31   HEMOGLOBIN g/dL 14.6 14.2 14.1 15.5 17.0   PLATELETS 10*3/mm3 186 173 195 180 214       Results from last 7 days  Lab Units 08/09/18 0437 08/08/18 0558 08/07/18  0434 08/06/18  0813 08/06/18  0046   SODIUM mmol/L 146* 149* 141 145 142   POTASSIUM mmol/L 3.6 3.6 3.3* 4.2 4.4   CHLORIDE mmol/L 107 110* 103 106 99   CO2 mmol/L 26.7 29.0 26.4 24.5 22.5   BUN mg/dL 10 10 14 14 19   CREATININE mg/dL 0.49* 0.42* 0.57* 0.57* 0.70*   CALCIUM mg/dL 9.1 8.9 8.3* 8.9 9.8   GLUCOSE mg/dL 147* 127* 138* 94 127*     Xr Chest 1 View    Result Date: 8/7/2018  No definite acute findings.     This report was finalized on 8/7/2018 1:14 AM by Dr. Anushka Fuentes M.D.      Xr Abdomen Kub    Result Date: 8/7/2018  No acute findings in the abdomen.     This report was finalized on 8/7/2018 1:14 AM by Dr. Anushka Fuentes M.D.      Microbiology Results (last 10 days)     Procedure Component Value - Date/Time    Urine Culture - Urine, [[id number]]  (Abnormal)  (Susceptibility) Collected:  08/06/18 0159    Lab Status:  Final result Specimen:  Urine from Urine, Catheter Updated:   08/09/18 1156     Urine Culture >100,000 CFU/mL Enterococcus faecalis (A)      >100,000 CFU/mL Aerococcus urinae (A)     Comment: Aerococcus urinae are usually susceptible to Penicillin G, Vancomycin, and Tetracycline and Resistant to Trimethoprim sulfamethoxazole and Gentamicin.         Susceptibility      Enterococcus faecalis     VENANCIO     Ampicillin <=2 ug/ml Susceptible     Ciprofloxacin >=8 ug/ml Resistant     Gentamicin High Level Synergy SYN-R  Resistant     Levofloxacin >=8 ug/ml Resistant     Nitrofurantoin <=16 ug/ml Susceptible     Tetracycline >=16 ug/ml Resistant     Vancomycin 1 ug/ml Susceptible                    Blood Culture - Blood, [[id number]]  (Normal) Collected:  08/06/18 0155    Lab Status:  Preliminary result Specimen:  Blood from Arm, Right Updated:  08/09/18 0216     Blood Culture No growth at 3 days    Blood Culture - Blood, [[id number]]  (Normal) Collected:  08/06/18 0046    Lab Status:  Preliminary result Specimen:  Blood from Arm, Left Updated:  08/09/18 0101     Blood Culture No growth at 3 days            Assessment:  Active Problems:    Sepsis (CMS/HCC)  Urinary tract infection  possible aspiration pneumonia  Traumatic brain injury with quadriplegia with associated dysphagia and neurogenic bladder  Immobility.  Plan:  Simplify antibiotics to Unasyn and then that he be discharged can be changed to Augmentin for total of 10 days.    Mayelin Taylor MD  8/9/2018  2:57 PM    Much of this encounter note is an electronic transcription/translation of spoken language to printed text. The electronic translation of spoken language may permit erroneous, or at times, nonsensical words or phrases to be inadvertently transcribed; Although I have reviewed the note for such errors, some may still exist

## 2018-08-10 VITALS
BODY MASS INDEX: 24.09 KG/M2 | OXYGEN SATURATION: 92 % | DIASTOLIC BLOOD PRESSURE: 89 MMHG | HEIGHT: 71 IN | TEMPERATURE: 97.7 F | SYSTOLIC BLOOD PRESSURE: 128 MMHG | HEART RATE: 70 BPM | RESPIRATION RATE: 18 BRPM | WEIGHT: 172.1 LBS

## 2018-08-10 LAB
GLUCOSE BLDC GLUCOMTR-MCNC: 123 MG/DL (ref 70–130)
GLUCOSE BLDC GLUCOMTR-MCNC: 95 MG/DL (ref 70–130)

## 2018-08-10 PROCEDURE — 63710000001 INSULIN DETEMIR PER 5 UNITS: Performed by: HOSPITALIST

## 2018-08-10 PROCEDURE — 82962 GLUCOSE BLOOD TEST: CPT

## 2018-08-10 PROCEDURE — 25010000003 AMPICILLIN-SULBACTAM PER 1.5 G: Performed by: INTERNAL MEDICINE

## 2018-08-10 RX ORDER — LEVETIRACETAM 100 MG/ML
1500 SOLUTION ORAL EVERY 12 HOURS SCHEDULED
Qty: 900 ML | Refills: 0 | Status: SHIPPED | OUTPATIENT
Start: 2018-08-10 | End: 2018-09-10 | Stop reason: HOSPADM

## 2018-08-10 RX ORDER — AMOXICILLIN AND CLAVULANATE POTASSIUM 875; 125 MG/1; MG/1
1 TABLET, FILM COATED ORAL EVERY 12 HOURS SCHEDULED
Qty: 20 TABLET | Refills: 0 | Status: SHIPPED | OUTPATIENT
Start: 2018-08-10 | End: 2018-08-20

## 2018-08-10 RX ADMIN — FAMOTIDINE 20 MG: 20 TABLET, FILM COATED ORAL at 08:30

## 2018-08-10 RX ADMIN — CHLORHEXIDINE GLUCONATE 15 ML: 1.2 RINSE ORAL at 08:31

## 2018-08-10 RX ADMIN — HYDROCODONE BITARTRATE AND ACETAMINOPHEN 2 TABLET: 7.5; 325 TABLET ORAL at 02:04

## 2018-08-10 RX ADMIN — AMPICILLIN SODIUM AND SULBACTAM SODIUM 3 G: 2; 1 INJECTION, POWDER, FOR SOLUTION INTRAMUSCULAR; INTRAVENOUS at 08:30

## 2018-08-10 RX ADMIN — AMPICILLIN SODIUM AND SULBACTAM SODIUM 3 G: 2; 1 INJECTION, POWDER, FOR SOLUTION INTRAMUSCULAR; INTRAVENOUS at 00:07

## 2018-08-10 RX ADMIN — AMANTADINE HYDROCHLORIDE 100 MG: 50 SOLUTION ORAL at 13:53

## 2018-08-10 RX ADMIN — AMANTADINE HYDROCHLORIDE 100 MG: 50 SOLUTION ORAL at 02:03

## 2018-08-10 RX ADMIN — DOCUSATE SODIUM 100 MG: 50 LIQUID ORAL at 08:31

## 2018-08-10 RX ADMIN — LEVETIRACETAM 1500 MG: 100 SOLUTION ORAL at 08:30

## 2018-08-10 RX ADMIN — MUPIROCIN: 20 OINTMENT TOPICAL at 08:32

## 2018-08-10 RX ADMIN — Medication 20 UNITS: at 08:31

## 2018-08-10 NOTE — DISCHARGE PLACEMENT REQUEST
"Donovan Kuo P (38 y.o. Male)     Date of Birth Social Security Number Address Home Phone MRN    1980  [address]  [place] [id number] [phone] [id number]    Judaism Marital Status          Yazidi Single       Admission Date Admission Type Admitting Provider Attending Provider Department, Room/Bed    8/6/18 Emergency Marcus Clrake MD Ahmed, Aftab, MD [address], 503/1    Discharge Date Discharge Disposition Discharge Destination         Skilled Nursing Facility (DC - External)              Attending Provider:  Marcus Clarke MD    Allergies:  Zofran [Ondansetron Hcl]    Isolation:  None   Infection:  None   Code Status:  No CPR    Ht:  180.3 cm (71\")   Wt:  78.1 kg (172 lb 1.6 oz)    Admission Cmt:  None   Principal Problem:  None                Active Insurance as of 8/6/2018     Primary Coverage     Payor Plan Insurance Group Employer/Plan Group    MEDICARE MEDICARE A & B      Payor Plan Address Payor Plan Phone Number Effective From Effective To    [address] [phone] 8/1/2007     [facility] [id number]       Subscriber Name Subscriber Birth Date Member ID       DONOVAN KUO P 1980 [id number]           Secondary Coverage     Payor Plan Insurance Group Employer/Plan Group    [place]      Payor Plan Address Payor Plan Phone Number Effective From Effective To    [address] [phone] 12/20/2016     [facility] [id number]       Subscriber Name Subscriber Birth Date Member ID       DONOVAN KUO P 1980 [id number]                 Emergency Contacts      (Rel.) Home Phone Work Phone Mobile Phone    AyaanLizzeth (Mother) [phone] -- --    AyaanMark (Brother) [phone] -- --    Harika Uribe Aunt (Relative) [phone] -- [phone]                 Discharge Summary      Marcus Clarke MD at 8/10/2018 12:40 PM        Discharge summary    Date of admission 8/6/2018  Date of discharge " 8/10/2018    Final diagnosis  Sepsis  Polymicrobial UTI with enterococcus and Aerococcus  Recurrent seizure disorder  Acute hypoxia  Probable aspiration pneumonitis pneumonia  Diabetes mellitus  Status post G-tube placement  Traumatic brain injury  Quadriplegic with contractures  DNR  Immobilization syndrome    Discharge medications    Current Facility-Administered Medications:   •  amantadine (SYMMETREL) solution 100 mg, 100 mg, Per G Tube, Q12H, Marcus Clarke MD, 100 mg at 08/10/18 0203  •  ampicillin-sulbactam 3 g/100 mL 0.9% NS (MBP), 3 g, Intravenous, Q8H, BrandonMayelin MD, 3 g at 08/10/18 0830  •  chlorhexidine (PERIDEX) 0.12 % solution 15 mL, 15 mL, Mouth/Throat, Q12H, Marcus Clarke MD, 15 mL at 08/10/18 0831  •  docusate sodium (COLACE) liquid 100 mg, 100 mg, Per G Tube, Daily, Marcus Clarke MD, 100 mg at 08/10/18 0831  •  famotidine (PEPCID) tablet 20 mg, 20 mg, Per G Tube, BID, Marcus Clarke MD, 20 mg at 08/10/18 0830  •  insulin detemir (LEVEMIR) injection 20 Units, 20 Units, Subcutaneous, [facility], Marcus Clarke MD, 20 Units at 08/10/18 0831  •  levETIRAcetam (KEPPRA) 100 MG/ML solution 1,500 mg, 1,500 mg, Per G Tube, Q12H, Tia Joyce MD, 1,500 mg at 08/10/18 0830  •  mupirocin (BACTROBAN) 2 % ointment, , Topical, Daily, Marcus Clarke MD     Consult obtained  Neurology  Infectious disease  Nutrition    Procedures  None    Hospital course  38-year-old white male with history of traumatic brain injury quadriplegic nonverbal nursing home resident admitted through emergency room with high fever seizure-like activity.  Patient admitted his seizure control by increasing the dose of Keppra and his workup revealed that he is septic polymicrobial UTI secondary to enterococcus and aerococcus.  Patient also have aspiration pneumonia.  Once his seizure control his tube feedings restarted and during daytime he has bolus feeding and nighttime continuous feeding.  Patient remained on IV antibiotics and changed to  oral for 10 more days at the time of discharge.  I have discuss with his mother and she is in agreement with discharge back to nursing home    Discharge diet tube feeding bolus in daytime and kidneys and nighttime    Activity per PT OT    Medication as above    Further care per accepting physician at nursing home    MICHEL KURTZ MD      Electronically signed by Michel Kurtz MD at 8/10/2018 12:45 PM       Discharge Order     Start     Ordered    08/10/18 1240  Discharge patient  Once     Expected Discharge Date:  08/10/18    Discharge Disposition:  Skilled Nursing Facility (DC - External)    Physician of Record for Attribution - Please select from Treatment Team:  MICHEL KURTZ [[id number]]    Review needed by CMO to determine Physician of Record:  No       Question Answer Comment   Physician of Record for Attribution - Please select from Treatment Team MICHEL KURTZ    Review needed by CMO to determine Physician of Record No        08/10/18 1240

## 2018-08-10 NOTE — PROGRESS NOTES
"  Infectious Diseases Progress Note    Mayelin Taylor MD     [facility]  [place]: 4 days  Patient Identification:  Name: Adrian Kuo  Age: 38 y.o.  Sex: male  :  1980  MRN: [id number]         Primary Care Physician: Sav Kaur MD (Tony)            Subjective: Comfortable does not appear to be restless toxic or ill.  Per family he is back to his baseline.  Interval History: See consultation note    Objective:    Scheduled Meds:    amantadine 100 mg Per G Tube Q12H   ampicillin-sulbactam 3 g Intravenous Q8H   chlorhexidine 15 mL Mouth/Throat Q12H   docusate sodium 100 mg Per G Tube Daily   famotidine 20 mg Per G Tube BID   insulin detemir 20 Units Subcutaneous QAM   levETIRAcetam 1,500 mg Per G Tube Q12H   mupirocin  Topical Daily     Continuous Infusions:       Vital signs in last 24 hours:  Temp:  [97.7 °F (36.5 °C)-98.5 °F (36.9 °C)] 97.7 °F (36.5 °C)  Heart Rate:  [65-74] 70  Resp:  [18-20] 18  BP: (101-128)/(75-89) 128/89    Intake/Output:    Intake/Output Summary (Last 24 hours) at 08/10/18 1250  Last data filed at 08/10/18 0847   Gross per 24 hour   Intake             2797 ml   Output                0 ml   Net             2797 ml       Exam:  /89 (BP Location: Right arm, Patient Position: Lying)   Pulse 70   Temp 97.7 °F (36.5 °C) (Oral)   Resp 18   Ht 180.3 cm (71\")   Wt 78.1 kg (172 lb 1.6 oz)   SpO2 92%   BMI 24.00 kg/m²     General Appearance:    Does not engage.  Appears comfortable                          Head:    Normocephalic, without obvious abnormality, atraumatic                           Eyes:    PERRL, conjunctiva/corneas clear, EOM's intact, both eyes                         Throat:   Lips, tongue, gums normal; oral mucosa pink and moist                           Neck:   Supple, symmetrical, trachea midline, no JVD                         Lungs:    Clear to auscultation bilaterally, respirations unlabored                 Chest Wall:    No " tenderness or deformity                          Heart:    Regular rate and rhythm, S1 and S2 normal, no murmur,                  Abdomen:     Soft, G-tube site is mild excoriation just above fungal infection.  non-tender, bowel sounds active, no masses,                 Extremities:   Extremities normal, atraumatic, no cyanosis or edema                        Pulses:   Pulses palpable in all extremities                            Skin:   Skin is warm and dry,  no rashes or palpable lesions                       Data Review:    I reviewed the patient's new clinical results.    Results from last 7 days  Lab Units 08/09/18 0437 08/08/18  0558 08/07/18  0434 08/06/18  0813 08/06/18  0046   WBC 10*3/mm3 4.77 4.42* 5.72 8.23 9.31   HEMOGLOBIN g/dL 14.6 14.2 14.1 15.5 17.0   PLATELETS 10*3/mm3 186 173 195 180 214       Results from last 7 days  Lab Units 08/09/18 0437 08/08/18 0558 08/07/18  0434 08/06/18 0813 08/06/18  0046   SODIUM mmol/L 146* 149* 141 145 142   POTASSIUM mmol/L 3.6 3.6 3.3* 4.2 4.4   CHLORIDE mmol/L 107 110* 103 106 99   CO2 mmol/L 26.7 29.0 26.4 24.5 22.5   BUN mg/dL 10 10 14 14 19   CREATININE mg/dL 0.49* 0.42* 0.57* 0.57* 0.70*   CALCIUM mg/dL 9.1 8.9 8.3* 8.9 9.8   GLUCOSE mg/dL 147* 127* 138* 94 127*     Xr Chest 1 View    Result Date: 8/7/2018  No definite acute findings.     This report was finalized on 8/7/2018 1:14 AM by Dr. Anushka Fuentes M.D.      Xr Abdomen Kub    Result Date: 8/7/2018  No acute findings in the abdomen.     This report was finalized on 8/7/2018 1:14 AM by Dr. Anushka Fuentes M.D.      Microbiology Results (last 10 days)     Procedure Component Value - Date/Time    Urine Culture - Urine, [[id number]]  (Abnormal)  (Susceptibility) Collected:  08/06/18 0159    Lab Status:  Final result Specimen:  Urine from Urine, Catheter Updated:  08/09/18 1156     Urine Culture >100,000 CFU/mL Enterococcus faecalis (A)      >100,000 CFU/mL Aerococcus urinae (A)     Comment: Aerococcus  urinae are usually susceptible to Penicillin G, Vancomycin, and Tetracycline and Resistant to Trimethoprim sulfamethoxazole and Gentamicin.         Susceptibility      Enterococcus faecalis     VENANCIO     Ampicillin <=2 ug/ml Susceptible     Ciprofloxacin >=8 ug/ml Resistant     Gentamicin High Level Synergy SYN-R  Resistant     Levofloxacin >=8 ug/ml Resistant     Nitrofurantoin <=16 ug/ml Susceptible     Tetracycline >=16 ug/ml Resistant     Vancomycin 1 ug/ml Susceptible                    Blood Culture - Blood, [[id number]]  (Normal) Collected:  08/06/18 0155    Lab Status:  Preliminary result Specimen:  Blood from Arm, Right Updated:  08/10/18 0215     Blood Culture No growth at 4 days    Blood Culture - Blood, [[id number]]  (Normal) Collected:  08/06/18 0046    Lab Status:  Preliminary result Specimen:  Blood from Arm, Left Updated:  08/10/18 0100     Blood Culture No growth at 4 days            Assessment:  Active Problems:    Sepsis (CMS/HCC)  Urinary tract infection  possible aspiration pneumonia  Traumatic brain injury with quadriplegia with associated dysphagia and neurogenic bladder  Immobility.  Plan:  Simplify antibiotics to Unasyn and then that he be discharged can be changed to Augmentin for total of 10 days.    Mayelin Taylor MD  8/10/2018  12:50 PM    Much of this encounter note is an electronic transcription/translation of spoken language to printed text. The electronic translation of spoken language may permit erroneous, or at times, nonsensical words or phrases to be inadvertently transcribed; Although I have reviewed the note for such errors, some may still exist

## 2018-08-10 NOTE — DISCHARGE SUMMARY
Discharge summary    Date of admission 8/6/2018  Date of discharge 8/10/2018    Final diagnosis  Sepsis  Polymicrobial UTI with enterococcus and Aerococcus  Recurrent seizure disorder  Acute hypoxia  Probable aspiration pneumonitis pneumonia  Diabetes mellitus  Status post G-tube placement  Traumatic brain injury  Quadriplegic with contractures  DNR  Immobilization syndrome    Discharge medications    Current Facility-Administered Medications:   •  amantadine (SYMMETREL) solution 100 mg, 100 mg, Per G Tube, Q12H, Marcus Clarke MD, 100 mg at 08/10/18 0203  •  ampicillin-sulbactam 3 g/100 mL 0.9% NS (MBP), 3 g, Intravenous, Q8H, BrandonMayelin MD, 3 g at 08/10/18 0830  •  chlorhexidine (PERIDEX) 0.12 % solution 15 mL, 15 mL, Mouth/Throat, Q12H, Marcus Clarke MD, 15 mL at 08/10/18 0831  •  docusate sodium (COLACE) liquid 100 mg, 100 mg, Per G Tube, Daily, Marcus Clarke MD, 100 mg at 08/10/18 0831  •  famotidine (PEPCID) tablet 20 mg, 20 mg, Per G Tube, BID, Marcus Clarke MD, 20 mg at 08/10/18 0830  •  insulin detemir (LEVEMIR) injection 20 Units, 20 Units, Subcutaneous, QA, Marcus Clarke MD, 20 Units at 08/10/18 0831  •  levETIRAcetam (KEPPRA) 100 MG/ML solution 1,500 mg, 1,500 mg, Per G Tube, Q12H, Tia Joyce MD, 1,500 mg at 08/10/18 0830  •  mupirocin (BACTROBAN) 2 % ointment, , Topical, Daily, Marcus Clarke MD     Consult obtained  Neurology  Infectious disease  Nutrition    Procedures  None    Hospital course  38-year-old white male with history of traumatic brain injury quadriplegic nonverbal nursing home resident admitted through emergency room with high fever seizure-like activity.  Patient admitted his seizure control by increasing the dose of Keppra and his workup revealed that he is septic polymicrobial UTI secondary to enterococcus and aerococcus.  Patient also have aspiration pneumonia.  Once his seizure control his tube feedings restarted and during daytime he has bolus feeding and nighttime  continuous feeding.  Patient remained on IV antibiotics and changed to oral for 10 more days at the time of discharge.  I have discuss with his mother and she is in agreement with discharge back to nursing home    Discharge diet tube feeding bolus in daytime and kidneys and nighttime    Activity per PT OT    Medication as above    Further care per accepting physician at nursing home    MICHEL KURTZ MD

## 2018-08-10 NOTE — PLAN OF CARE
Problem: Patient Care Overview  Goal: Plan of Care Review  Outcome: Ongoing (interventions implemented as appropriate)   08/10/18 0029   Coping/Psychosocial   Plan of Care Reviewed With patient   Plan of Care Review   Progress improving   OTHER   Outcome Summary Pt with occasional non-verbal signs of discomfort; PRN analgesic given. Pt with reddened circumfriential area around peg tube with somewhat puss appearing condition; cleansed with 50% H202 and allowed to remain open to air somewhat afterward. Pt recieving IV antibiotics but could be switched to PO; see ID note.       Problem: Fall Risk (Adult)  Goal: Absence of Fall   08/10/18 0029   Fall Risk (Adult)   Absence of Fall achieves outcome       Problem: Skin Injury Risk (Adult)  Goal: Skin Health and Integrity  Outcome: Ongoing (interventions implemented as appropriate)   08/10/18 0029   Skin Injury Risk (Adult)   Skin Health and Integrity achieves outcome       Problem: Sepsis/Septic Shock (Adult)  Goal: Signs and Symptoms of Listed Potential Problems Will be Absent, Minimized or Managed (Sepsis/Septic Shock)  Outcome: Ongoing (interventions implemented as appropriate)   08/10/18 0029   Goal/Outcome Evaluation   Problems Assessed (Sepsis) all   Problems Present (Sepsis) none       Problem: Nutrition, Enteral (Adult)  Goal: Signs and Symptoms of Listed Potential Problems Will be Absent, Minimized or Managed (Nutrition, Enteral)  Outcome: Ongoing (interventions implemented as appropriate)   08/10/18 0029   Goal/Outcome Evaluation   Problems Assessed (Enteral Nutrition) all   Problems Present (Enteral Nutrition) none

## 2018-08-10 NOTE — PROGRESS NOTES
"Daily progress note    Chief complaint  Doing better  No more seizures    History of present illness  38-year-old white male with history of traumatic brain injury seizure disorder quadriplegia with contractures status post G-tube placement and baclofen pump placement who is a nursing home resident was brought to the emergency room with fever and tachycardia and possible seizure-like activity.  Patient is nonverbal and unable to give detailed history most of the history obtained from the chart and nursing staff will record and mother who is her POA.     REVIEW OF SYSTEMS  Review of Systems   Neurological: Positive for seizures.      PHYSICAL EXAM  Blood pressure 128/89, pulse 70, temperature 97.7 °F (36.5 °C), temperature source Oral, resp. rate 18, height 180.3 cm (71\"), weight 78.1 kg (172 lb 1.6 oz), SpO2 92 %.    Constitutional: He is oriented to person, place, and time. No distress.   lethargic but arousable     Head: Normocephalic and atraumatic.   Old large craniotomy on the left side of scalp.    Eyes: Pupils are equal, round, and reactive to light. EOM are normal.   Dilated 4+ bilaterally    Neck: Normal range of motion. Neck supple.   Cardiovascular: Regular rhythm and normal heart sounds.  Tachycardia present.    Pulmonary/Chest: Effort normal. No respiratory distress. He has rhonchi (bilaterally).   Abdominal: Soft. There is no tenderness. There is no rebound and no guarding.   Excoriation around the G-tube site, ULQ. Hyperactive bowel sound.   Musculoskeletal: Normal range of motion. He exhibits no edema.   Neurological: He is alert and oriented to person, place, and time. He has normal sensation and normal strength.   Bilateral extremity contractures    Skin: Skin is warm and dry.   Psychiatric: Mood and affect normal.     LAB RESULTS  Lab Results (last 24 hours)     Procedure Component Value Units Date/Time    POC Glucose Once [[id number]]  (Normal) Collected:  08/10/18 1106    Specimen:  Blood " Updated:  08/10/18 1107     Glucose 95 mg/dL     Narrative:       Meter: [id number] : [id number] Deep Julian CNA    POC Glucose Once [[id number]]  (Normal) Collected:  08/10/18 0621    Specimen:  Blood Updated:  08/10/18 0622     Glucose 123 mg/dL     Narrative:       Meter: [id number] : [id number] Troy Tomlin NA    Blood Culture - Blood, [[id number]]  (Normal) Collected:  08/06/18 0155    Specimen:  Blood from Arm, Right Updated:  08/10/18 0215     Blood Culture No growth at 4 days    Blood Culture - Blood, [[id number]]  (Normal) Collected:  08/06/18 0046    Specimen:  Blood from Arm, Left Updated:  08/10/18 0100     Blood Culture No growth at 4 days    POC Glucose Once [[id number]]  (Normal) Collected:  08/09/18 2114    Specimen:  Blood Updated:  08/09/18 2116     Glucose 121 mg/dL     Narrative:       Meter: [id number] : [id number] Troy Lombardiish NA    POC Glucose Once [[id number]]  (Abnormal) Collected:  08/09/18 1626    Specimen:  Blood Updated:  08/09/18 1628     Glucose 133 (H) mg/dL     Narrative:       Meter: [id number] : [id number] Deep Julian CNA        Imaging Results (last 24 hours)     ** No results found for the last 24 hours. **        EKG  Rhythm/Rate: sinus tach/ 105   No Acute Ischemia  Non-Specific ST-T changes  unchanged compared to prior on 2014      Current Facility-Administered Medications:   •  acetaminophen (TYLENOL) 160 MG/5ML solution 500 mg, 500 mg, Per G Tube, TID PRN, Marcus Clarke MD  •  amantadine (SYMMETREL) solution 100 mg, 100 mg, Per G Tube, Q12H, Marcus Clarke MD, 100 mg at 08/10/18 0203  •  ampicillin-sulbactam 3 g/100 mL 0.9% NS (MBP), 3 g, Intravenous, Q8H, Mayelin Taylor MD, 3 g at 08/10/18 0830  •  bisacodyl (DULCOLAX) suppository 10 mg, 10 mg, Rectal, Daily PRN, Marcus Clarke MD  •  chlorhexidine (PERIDEX) 0.12 % solution 15 mL, 15 mL, Mouth/Throat, Q12H, Marcus Clarke MD, 15 mL at 08/10/18 0831  •  docusate sodium (COLACE) liquid 100 mg, 100  mg, Per G Tube, Daily, Michel Clarke MD, 100 mg at 08/10/18 0831  •  famotidine (PEPCID) tablet 20 mg, 20 mg, Per G Tube, BID, Michel Clarke MD, 20 mg at 08/10/18 0830  •  HYDROcodone-acetaminophen (NORCO) 7.5-325 MG per tablet 1 tablet, 1 tablet, Per G Tube, Q4H PRN, Michel Clarke MD  •  HYDROcodone-acetaminophen (NORCO) 7.5-325 MG per tablet 2 tablet, 2 tablet, Per G Tube, Q4H PRN, Michel Clarke MD, 2 tablet at 08/10/18 0204  •  insulin aspart (novoLOG) injection 0-7 Units, 0-7 Units, Subcutaneous, 4x Daily With Meals & Nightly, Michel Clarke MD  •  insulin detemir (LEVEMIR) injection 20 Units, 20 Units, Subcutaneous, QAM, Michel Clarke MD, 20 Units at 08/10/18 0831  •  levETIRAcetam (KEPPRA) 100 MG/ML solution 1,500 mg, 1,500 mg, Per G Tube, Q12H, Tia Joyce MD, 1,500 mg at 08/10/18 0830  •  melatonin tablet 4.5 mg, 4.5 mg, Per G Tube, Nightly PRN, Michel Clarke MD  •  mupirocin (BACTROBAN) 2 % ointment, , Topical, Daily, Michel Clarke MD  •  nitroglycerin (NITROSTAT) SL tablet 0.4 mg, 0.4 mg, Sublingual, Q5 Min PRN, Michel Clarke MD  •  Pharmacy to dose vancomycin, , Does not apply, BID, Michel Clarke MD  •  polyethylene glycol 3350 powder (packet), 17 g, Per G Tube, Daily PRN, Michel Clarke MD    ASSESSMENT  Sepsis  Polymicrobial UTI with enterococcus and Aerococcus  Recurrent seizure disorder  Acute hypoxia  Probable aspiration pneumonitis pneumonia  Diabetes mellitus  Status post G-tube placement  Traumatic brain injury  Quadriplegic with contractures  DNR  Immobilization syndrome    PLAN  Discharge back to nursing home on oral antibiotics for 10 days  Discharge summary dictated    MICHEL CLARKE MD

## 2018-08-11 LAB
BACTERIA SPEC AEROBE CULT: NORMAL
BACTERIA SPEC AEROBE CULT: NORMAL

## 2018-08-13 NOTE — PROGRESS NOTES
Case Management Discharge Note    Final Note: [facility]; Confirmed with Krishan saavedra arrived IC level of care. Silke RN/CCP    Destination - Selection Complete     Service Request Status Selected Specialties Address Phone Number Fax Number    [facility] [address] Selected Intermediate Care Facility [address] [facility] [place] [address] [phone]      Durable Medical Equipment     No service has been selected for the patient.      Dialysis/Infusion     No service has been selected for the patient.      Home Medical Care     No service has been selected for the patient.      Social Care     No service has been selected for the patient.             Final Discharge Disposition Code: 03 - skilled nursing facility (SNF), 04 - intermediate care facility

## 2018-08-27 ENCOUNTER — APPOINTMENT (OUTPATIENT)
Dept: PREADMISSION TESTING | Facility: HOSPITAL | Age: 38
End: 2018-08-27

## 2018-09-10 ENCOUNTER — ANESTHESIA EVENT (OUTPATIENT)
Dept: PERIOP | Facility: HOSPITAL | Age: 38
End: 2018-09-10

## 2018-09-10 ENCOUNTER — HOSPITAL ENCOUNTER (OUTPATIENT)
Facility: HOSPITAL | Age: 38
Setting detail: HOSPITAL OUTPATIENT SURGERY
Discharge: NURSING, REHAB OR LONG-TERM CARE | End: 2018-09-10
Attending: PAIN MEDICINE | Admitting: PAIN MEDICINE

## 2018-09-10 ENCOUNTER — ANESTHESIA (OUTPATIENT)
Dept: PERIOP | Facility: HOSPITAL | Age: 38
End: 2018-09-10

## 2018-09-10 VITALS
BODY MASS INDEX: 23.8 KG/M2 | WEIGHT: 170 LBS | HEIGHT: 71 IN | TEMPERATURE: 97.9 F | HEART RATE: 66 BPM | SYSTOLIC BLOOD PRESSURE: 112 MMHG | OXYGEN SATURATION: 97 % | RESPIRATION RATE: 16 BRPM | DIASTOLIC BLOOD PRESSURE: 80 MMHG

## 2018-09-10 PROBLEM — Z46.2 END OF BATTERY LIFE OF INTRATHECAL INFUSION PUMP: Status: ACTIVE | Noted: 2018-09-10

## 2018-09-10 LAB — GLUCOSE BLDC GLUCOMTR-MCNC: 115 MG/DL (ref 70–130)

## 2018-09-10 PROCEDURE — 25010000002 PROPOFOL 10 MG/ML EMULSION: Performed by: NURSE ANESTHETIST, CERTIFIED REGISTERED

## 2018-09-10 PROCEDURE — 25010000002 VANCOMYCIN PER 500 MG: Performed by: PAIN MEDICINE

## 2018-09-10 PROCEDURE — C1772 INFUSION PUMP, PROGRAMMABLE: HCPCS | Performed by: PAIN MEDICINE

## 2018-09-10 PROCEDURE — 82962 GLUCOSE BLOOD TEST: CPT

## 2018-09-10 PROCEDURE — 25010000002 FENTANYL CITRATE (PF) 100 MCG/2ML SOLUTION: Performed by: NURSE ANESTHETIST, CERTIFIED REGISTERED

## 2018-09-10 DEVICE — PUMP NEURO PROGRAM SYNCHROMED2 FLTR 20ML: Type: IMPLANTABLE DEVICE | Site: ABDOMEN | Status: FUNCTIONAL

## 2018-09-10 RX ORDER — ALBUTEROL SULFATE 2.5 MG/3ML
2.5 SOLUTION RESPIRATORY (INHALATION) ONCE AS NEEDED
Status: DISCONTINUED | OUTPATIENT
Start: 2018-09-10 | End: 2018-09-11 | Stop reason: HOSPADM

## 2018-09-10 RX ORDER — FLUMAZENIL 0.1 MG/ML
0.2 INJECTION INTRAVENOUS AS NEEDED
Status: DISCONTINUED | OUTPATIENT
Start: 2018-09-10 | End: 2018-09-11 | Stop reason: HOSPADM

## 2018-09-10 RX ORDER — LIDOCAINE HYDROCHLORIDE 10 MG/ML
0.5 INJECTION, SOLUTION EPIDURAL; INFILTRATION; INTRACAUDAL; PERINEURAL ONCE AS NEEDED
Status: DISCONTINUED | OUTPATIENT
Start: 2018-09-10 | End: 2018-09-10 | Stop reason: HOSPADM

## 2018-09-10 RX ORDER — PROMETHAZINE HYDROCHLORIDE 25 MG/ML
12.5 INJECTION, SOLUTION INTRAMUSCULAR; INTRAVENOUS ONCE AS NEEDED
Status: DISCONTINUED | OUTPATIENT
Start: 2018-09-10 | End: 2018-09-11 | Stop reason: HOSPADM

## 2018-09-10 RX ORDER — EPHEDRINE SULFATE 50 MG/ML
INJECTION, SOLUTION INTRAVENOUS AS NEEDED
Status: DISCONTINUED | OUTPATIENT
Start: 2018-09-10 | End: 2018-09-10 | Stop reason: SURG

## 2018-09-10 RX ORDER — BUPIVACAINE HYDROCHLORIDE AND EPINEPHRINE 5; 5 MG/ML; UG/ML
INJECTION, SOLUTION PERINEURAL AS NEEDED
Status: DISCONTINUED | OUTPATIENT
Start: 2018-09-10 | End: 2018-09-10 | Stop reason: HOSPADM

## 2018-09-10 RX ORDER — FAMOTIDINE 10 MG/ML
20 INJECTION, SOLUTION INTRAVENOUS ONCE
Status: COMPLETED | OUTPATIENT
Start: 2018-09-10 | End: 2018-09-10

## 2018-09-10 RX ORDER — SODIUM CHLORIDE, SODIUM LACTATE, POTASSIUM CHLORIDE, CALCIUM CHLORIDE 600; 310; 30; 20 MG/100ML; MG/100ML; MG/100ML; MG/100ML
100 INJECTION, SOLUTION INTRAVENOUS CONTINUOUS
Status: DISCONTINUED | OUTPATIENT
Start: 2018-09-10 | End: 2018-09-11 | Stop reason: HOSPADM

## 2018-09-10 RX ORDER — EPHEDRINE SULFATE 50 MG/ML
5 INJECTION, SOLUTION INTRAVENOUS ONCE AS NEEDED
Status: DISCONTINUED | OUTPATIENT
Start: 2018-09-10 | End: 2018-09-11 | Stop reason: HOSPADM

## 2018-09-10 RX ORDER — FAMOTIDINE 20 MG/1
20 TABLET, FILM COATED ORAL 2 TIMES DAILY
COMMUNITY
End: 2020-01-23 | Stop reason: HOSPADM

## 2018-09-10 RX ORDER — FENTANYL CITRATE 50 UG/ML
50 INJECTION, SOLUTION INTRAMUSCULAR; INTRAVENOUS
Status: DISCONTINUED | OUTPATIENT
Start: 2018-09-10 | End: 2018-09-10 | Stop reason: HOSPADM

## 2018-09-10 RX ORDER — MIDAZOLAM HYDROCHLORIDE 1 MG/ML
2 INJECTION INTRAMUSCULAR; INTRAVENOUS
Status: DISCONTINUED | OUTPATIENT
Start: 2018-09-10 | End: 2018-09-10 | Stop reason: HOSPADM

## 2018-09-10 RX ORDER — FENTANYL CITRATE 50 UG/ML
50 INJECTION, SOLUTION INTRAMUSCULAR; INTRAVENOUS
Status: DISCONTINUED | OUTPATIENT
Start: 2018-09-10 | End: 2018-09-11 | Stop reason: HOSPADM

## 2018-09-10 RX ORDER — SODIUM CHLORIDE 0.9 % (FLUSH) 0.9 %
1-10 SYRINGE (ML) INJECTION AS NEEDED
Status: DISCONTINUED | OUTPATIENT
Start: 2018-09-10 | End: 2018-09-10 | Stop reason: HOSPADM

## 2018-09-10 RX ORDER — HYDROCODONE BITARTRATE AND ACETAMINOPHEN 5; 325 MG/1; MG/1
1 TABLET ORAL ONCE AS NEEDED
Status: DISCONTINUED | OUTPATIENT
Start: 2018-09-10 | End: 2018-09-11 | Stop reason: HOSPADM

## 2018-09-10 RX ORDER — NALOXONE HCL 0.4 MG/ML
0.2 VIAL (ML) INJECTION AS NEEDED
Status: DISCONTINUED | OUTPATIENT
Start: 2018-09-10 | End: 2018-09-11 | Stop reason: HOSPADM

## 2018-09-10 RX ORDER — ROCURONIUM BROMIDE 10 MG/ML
INJECTION, SOLUTION INTRAVENOUS AS NEEDED
Status: DISCONTINUED | OUTPATIENT
Start: 2018-09-10 | End: 2018-09-10 | Stop reason: SURG

## 2018-09-10 RX ORDER — SODIUM CHLORIDE, SODIUM LACTATE, POTASSIUM CHLORIDE, CALCIUM CHLORIDE 600; 310; 30; 20 MG/100ML; MG/100ML; MG/100ML; MG/100ML
9 INJECTION, SOLUTION INTRAVENOUS CONTINUOUS
Status: DISCONTINUED | OUTPATIENT
Start: 2018-09-10 | End: 2018-09-11 | Stop reason: HOSPADM

## 2018-09-10 RX ORDER — LIDOCAINE HYDROCHLORIDE 20 MG/ML
INJECTION, SOLUTION INFILTRATION; PERINEURAL AS NEEDED
Status: DISCONTINUED | OUTPATIENT
Start: 2018-09-10 | End: 2018-09-10 | Stop reason: SURG

## 2018-09-10 RX ORDER — LABETALOL HYDROCHLORIDE 5 MG/ML
5 INJECTION, SOLUTION INTRAVENOUS
Status: DISCONTINUED | OUTPATIENT
Start: 2018-09-10 | End: 2018-09-11 | Stop reason: HOSPADM

## 2018-09-10 RX ORDER — MIDAZOLAM HYDROCHLORIDE 1 MG/ML
1 INJECTION INTRAMUSCULAR; INTRAVENOUS
Status: DISCONTINUED | OUTPATIENT
Start: 2018-09-10 | End: 2018-09-11 | Stop reason: HOSPADM

## 2018-09-10 RX ORDER — ACETAMINOPHEN 160 MG/5ML
500 SUSPENSION ORAL EVERY 8 HOURS PRN
COMMUNITY
End: 2020-01-23 | Stop reason: HOSPADM

## 2018-09-10 RX ORDER — HYDROCODONE BITARTRATE AND ACETAMINOPHEN 5; 325 MG/1; MG/1
1-2 TABLET ORAL EVERY 4 HOURS PRN
Qty: 10 TABLET | Refills: 0 | Status: ON HOLD | OUTPATIENT
Start: 2018-09-10 | End: 2020-01-20

## 2018-09-10 RX ORDER — BISACODYL 10 MG
10 SUPPOSITORY, RECTAL RECTAL NIGHTLY
COMMUNITY
End: 2020-01-23 | Stop reason: HOSPADM

## 2018-09-10 RX ORDER — LORAZEPAM 2 MG/ML
1 INJECTION INTRAMUSCULAR
Status: DISCONTINUED | OUTPATIENT
Start: 2018-09-10 | End: 2018-09-11 | Stop reason: HOSPADM

## 2018-09-10 RX ORDER — MIDAZOLAM HYDROCHLORIDE 1 MG/ML
1 INJECTION INTRAMUSCULAR; INTRAVENOUS
Status: DISCONTINUED | OUTPATIENT
Start: 2018-09-10 | End: 2018-09-10 | Stop reason: HOSPADM

## 2018-09-10 RX ORDER — CLINDAMYCIN HYDROCHLORIDE 300 MG/1
300 CAPSULE ORAL 3 TIMES DAILY
Qty: 15 CAPSULE | Refills: 0 | Status: SHIPPED | OUTPATIENT
Start: 2018-09-10 | End: 2018-09-15

## 2018-09-10 RX ORDER — FLUOCINOLONE ACETONIDE 0.1 MG/ML
1 SOLUTION TOPICAL DAILY
COMMUNITY
End: 2020-01-23 | Stop reason: HOSPADM

## 2018-09-10 RX ORDER — FENTANYL CITRATE 50 UG/ML
INJECTION, SOLUTION INTRAMUSCULAR; INTRAVENOUS AS NEEDED
Status: DISCONTINUED | OUTPATIENT
Start: 2018-09-10 | End: 2018-09-10 | Stop reason: SURG

## 2018-09-10 RX ORDER — PROMETHAZINE HYDROCHLORIDE 25 MG/1
25 TABLET ORAL ONCE AS NEEDED
Status: DISCONTINUED | OUTPATIENT
Start: 2018-09-10 | End: 2018-09-11 | Stop reason: HOSPADM

## 2018-09-10 RX ORDER — PROMETHAZINE HYDROCHLORIDE 25 MG/1
25 SUPPOSITORY RECTAL ONCE AS NEEDED
Status: DISCONTINUED | OUTPATIENT
Start: 2018-09-10 | End: 2018-09-11 | Stop reason: HOSPADM

## 2018-09-10 RX ORDER — DIPHENHYDRAMINE HYDROCHLORIDE 50 MG/ML
12.5 INJECTION INTRAMUSCULAR; INTRAVENOUS
Status: DISCONTINUED | OUTPATIENT
Start: 2018-09-10 | End: 2018-09-11 | Stop reason: HOSPADM

## 2018-09-10 RX ORDER — CLINDAMYCIN PHOSPHATE 600 MG/50ML
600 INJECTION INTRAVENOUS
Status: COMPLETED | OUTPATIENT
Start: 2018-09-10 | End: 2018-09-10

## 2018-09-10 RX ORDER — PROPOFOL 10 MG/ML
VIAL (ML) INTRAVENOUS AS NEEDED
Status: DISCONTINUED | OUTPATIENT
Start: 2018-09-10 | End: 2018-09-10 | Stop reason: SURG

## 2018-09-10 RX ADMIN — SUGAMMADEX 300 MG: 100 INJECTION, SOLUTION INTRAVENOUS at 19:02

## 2018-09-10 RX ADMIN — FENTANYL CITRATE 100 MCG: 50 INJECTION INTRAMUSCULAR; INTRAVENOUS at 18:03

## 2018-09-10 RX ADMIN — ROCURONIUM BROMIDE 40 MG: 10 INJECTION INTRAVENOUS at 18:03

## 2018-09-10 RX ADMIN — FAMOTIDINE 20 MG: 10 INJECTION INTRAVENOUS at 16:11

## 2018-09-10 RX ADMIN — CLINDAMYCIN PHOSPHATE 600 MG: 12 INJECTION, SOLUTION INTRAVENOUS at 17:50

## 2018-09-10 RX ADMIN — EPHEDRINE SULFATE 5 MG: 50 INJECTION INTRAMUSCULAR; INTRAVENOUS; SUBCUTANEOUS at 18:53

## 2018-09-10 RX ADMIN — SODIUM CHLORIDE, POTASSIUM CHLORIDE, SODIUM LACTATE AND CALCIUM CHLORIDE 9 ML/HR: 600; 310; 30; 20 INJECTION, SOLUTION INTRAVENOUS at 16:11

## 2018-09-10 RX ADMIN — HYDROCODONE BITARTRATE AND ACETAMINOPHEN 1 TABLET: 5; 325 TABLET ORAL at 20:37

## 2018-09-10 RX ADMIN — PROPOFOL 200 MG: 10 INJECTION, EMULSION INTRAVENOUS at 18:03

## 2018-09-10 RX ADMIN — LIDOCAINE HYDROCHLORIDE 60 MG: 20 INJECTION, SOLUTION INFILTRATION; PERINEURAL at 18:03

## 2018-09-10 RX ADMIN — SODIUM CHLORIDE, POTASSIUM CHLORIDE, SODIUM LACTATE AND CALCIUM CHLORIDE: 600; 310; 30; 20 INJECTION, SOLUTION INTRAVENOUS at 18:52
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
## 2018-09-10 NOTE — PERIOPERATIVE NURSING NOTE
Mother - Lizzeth Kuo here at Pt. bedside and answered health history questions.  Positioned Pt on [place] mat and turned to left side with heels off loaded.

## 2018-09-10 NOTE — PERIOPERATIVE NURSING NOTE
Pt's medications and NPO status clarified with Alonso Major RN at [facility] ([phone]).

## 2018-09-10 NOTE — OP NOTE
Operative report    Pre-op diagnosis-end of battery life of implanted intrathecal infusion pump    Postoperative diagnosis-same    Procedure-replacement of Medtronic SynchroMed II infusion pump    Gen. endotracheal anesthesia supplemented with 5 cc 0.5% bupivacaine epinephrine    Blood loss-minimal    Competitions-none    Statement of medical necessity-patient is a 38-year-old gentleman with a history of traumatic brain injury and spasticity who has an implanted intrathecal baclofen delivery system to include pump and spinal catheter.  The battery of the implanted pump has reached end of life and he is here for pump replacement.  The procedure was explained in detail to his mother, who accompanies him, and she wishes to proceed.  Risks include, but are not limited to:  1.  Postoperative infection  2.  Postoperative bleeding    The patient was taken to the operating room and placed the supine position on the operating room table.  After induction of anesthesia, the overlying the operative site was trimmed without difficulty.  The skin and then was cleaned prepped and draped overlying the pump site where the pump is located over the left lower quadrant the abdominal wall.  I anesthetized the scar with local anesthetic and opened the scar with a #10 blade scalpel and dissected down until identified the pump which was then easily removed from the pocket.  I then removed the spinal catheter from the side discharge port of the pump and passed the old pump off the field.  I had return of CSF from the exposed catheter tip.  The new Medtronic SynchroMed II pump, model #58628-13, was repaired the back table.  The sterile storage water removed.  I then removed a total of 13 99 cc of baclofen 3000 µg per mL from the old pump.  A 3 mL rinse was done with the new pump.  We then placed 10.9 cc of the baclofen mixture into the new pump refill port.  And the new pump was then passed on the field.  I then connected the intrathecal  catheter the side discharge port.  I secured this in place with a 0 Ethibond tie.  I then aspirated the side port of the pump and was easily able to aspirate CSF.  I then placed the pump into the pocket with the side port at the 12 o'clock position and excess catheter coiled behind the pump.  I then placed vancomycin powder into the incision.  I close the incision with interrupted 2-0 Vicryl pop offs.  Skin was closed with a running 3-0 stratafix suture using a subcuticular closure.  Dermabond and island dressing were placed over the incision.  He was then awakened, extubated and taken to recovery in stable condition.  Pump was programmed to deliver a single bolus to the catheter tip and then resume its infusion of 522.7 µg per day of baclofen.  New low reservoir alarm date is 10/23/2018.
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
## 2018-09-10 NOTE — ANESTHESIA PROCEDURE NOTES
Airway  Urgency: elective    Airway not difficult    General Information and Staff    Patient location during procedure: OR  Anesthesiologist: BRENDA HONG  CRNA: ALEKSANDRA SANTOS    Indications and Patient Condition  Indications for airway management: airway protection    Preoxygenated: yes  MILS not maintained throughout  Mask difficulty assessment: 1 - vent by mask    Final Airway Details  Final airway type: endotracheal airway      Successful airway: ETT  Cuffed: yes   Successful intubation technique: direct laryngoscopy and video laryngoscopy  Facilitating devices/methods: intubating stylet  Endotracheal tube insertion site: oral  Blade: Vance  Blade size: 3  ETT size: 8.0 mm  Cormack-Lehane Classification: grade I - full view of glottis  Placement verified by: chest auscultation   Cuff volume (mL): 9  Measured from: lips  ETT to lips (cm): 23  Number of attempts at approach: 1    Additional Comments  PreO2 100% face mask, IV induction, easy mask, DVL x2 using CMAC, cords noted, eschmann stylet used, tube through, cuff up, EBBSH, +etCO2, = chest movement, tube secured in place, atraumatic, teeth and lips intact as preop.

## 2018-09-11 NOTE — ANESTHESIA POSTPROCEDURE EVALUATION
"Patient: Adrian Kuo    Procedure Summary     Date:  09/10/18 Room / Location:  [facility] OR 06 / [facility] MAIN OR    Anesthesia Start:  1750 Anesthesia Stop:  1918    Procedure:  PAIN PUMP REPLACEMENT (N/A ) Diagnosis:      Surgeon:  Kee John MD Provider:  Jose Enrique Huber MD    Anesthesia Type:  general ASA Status:  4          Anesthesia Type: general  Last vitals  BP   107/81 (09/10/18 2030)   Temp   36.5 °C (97.7 °F) (09/10/18 1911)   Pulse   76 (09/10/18 2030)   Resp   16 (09/10/18 2030)     SpO2   96 % (09/10/18 2030)     Post Anesthesia Care and Evaluation    Patient location during evaluation: bedside  Patient participation: complete - patient cannot participate  Level of consciousness: awake  Pain management: adequate  Airway patency: patent  Anesthetic complications: No anesthetic complications    Cardiovascular status: acceptable  Respiratory status: acceptable  Hydration status: acceptable    Comments: */81   Pulse 76   Temp 36.5 °C (97.7 °F) (Oral)   Resp 16   Ht 180.3 cm (71\")   Wt 77.1 kg (170 lb)   SpO2 96%   BMI 23.71 kg/m²         "

## 2018-09-11 NOTE — NURSING NOTE
Calls made to [facility] and [facility] per direction of Rusty RN ccp. [facility] able to transport pt at midnight. [facility] not available until 6am 9/11/18.

## 2018-12-29 ENCOUNTER — LAB REQUISITION (OUTPATIENT)
Dept: LAB | Facility: HOSPITAL | Age: 38
End: 2018-12-29

## 2018-12-29 DIAGNOSIS — Z00.00 ROUTINE GENERAL MEDICAL EXAMINATION AT A HEALTH CARE FACILITY: ICD-10-CM

## 2018-12-29 LAB
BACTERIA UR QL AUTO: ABNORMAL /HPF
BILIRUB UR QL STRIP: NEGATIVE
CLARITY UR: CLEAR
COLOR UR: YELLOW
GLUCOSE UR STRIP-MCNC: NEGATIVE MG/DL
HGB UR QL STRIP.AUTO: ABNORMAL
HYALINE CASTS UR QL AUTO: ABNORMAL /LPF
KETONES UR QL STRIP: NEGATIVE
LEUKOCYTE ESTERASE UR QL STRIP.AUTO: ABNORMAL
NITRITE UR QL STRIP: NEGATIVE
PH UR STRIP.AUTO: 6 [PH] (ref 5–8)
PROT UR QL STRIP: NEGATIVE
RBC # UR: ABNORMAL /HPF
REF LAB TEST METHOD: ABNORMAL
SP GR UR STRIP: 1.02 (ref 1–1.03)
SQUAMOUS #/AREA URNS HPF: ABNORMAL /HPF
UROBILINOGEN UR QL STRIP: ABNORMAL
WBC UR QL AUTO: ABNORMAL /HPF

## 2018-12-29 PROCEDURE — 81001 URINALYSIS AUTO W/SCOPE: CPT

## 2019-11-24 ENCOUNTER — HOSPITAL ENCOUNTER (EMERGENCY)
Facility: HOSPITAL | Age: 39
Discharge: HOME | End: 2019-11-24
Attending: EMERGENCY MEDICINE | Admitting: EMERGENCY MEDICINE

## 2019-11-24 ENCOUNTER — APPOINTMENT (OUTPATIENT)
Dept: GENERAL RADIOLOGY | Facility: HOSPITAL | Age: 39
End: 2019-11-24

## 2019-11-24 VITALS
TEMPERATURE: 97.8 F | WEIGHT: 180 LBS | DIASTOLIC BLOOD PRESSURE: 90 MMHG | SYSTOLIC BLOOD PRESSURE: 134 MMHG | HEART RATE: 82 BPM | BODY MASS INDEX: 25.2 KG/M2 | RESPIRATION RATE: 18 BRPM | HEIGHT: 71 IN | OXYGEN SATURATION: 96 %

## 2019-11-24 DIAGNOSIS — T85.528A DISLODGED GASTROSTOMY TUBE: Primary | ICD-10-CM

## 2019-11-24 PROCEDURE — 99283 EMERGENCY DEPT VISIT LOW MDM: CPT

## 2019-11-24 PROCEDURE — 74018 RADEX ABDOMEN 1 VIEW: CPT

## 2019-12-30 ENCOUNTER — HOSPITAL ENCOUNTER (EMERGENCY)
Facility: HOSPITAL | Age: 39
Discharge: HOME | End: 2019-12-30
Attending: EMERGENCY MEDICINE | Admitting: EMERGENCY MEDICINE

## 2019-12-30 VITALS
TEMPERATURE: 98.8 F | SYSTOLIC BLOOD PRESSURE: 128 MMHG | BODY MASS INDEX: 25.2 KG/M2 | HEART RATE: 72 BPM | HEIGHT: 71 IN | RESPIRATION RATE: 18 BRPM | OXYGEN SATURATION: 98 % | DIASTOLIC BLOOD PRESSURE: 92 MMHG | WEIGHT: 180 LBS

## 2019-12-30 DIAGNOSIS — K94.20 COMPLICATION OF GASTROSTOMY TUBE (HCC): Primary | ICD-10-CM

## 2019-12-30 PROCEDURE — 99283 EMERGENCY DEPT VISIT LOW MDM: CPT

## 2020-01-14 ENCOUNTER — HOSPITAL ENCOUNTER (INPATIENT)
Facility: HOSPITAL | Age: 40
LOS: 8 days | Discharge: NURSING, REHAB OR LONG-TERM CARE | End: 2020-01-23
Attending: EMERGENCY MEDICINE | Admitting: HOSPITALIST

## 2020-01-14 DIAGNOSIS — K56.609 SMALL BOWEL OBSTRUCTION (HCC): Primary | ICD-10-CM

## 2020-01-14 DIAGNOSIS — R11.2 NON-INTRACTABLE VOMITING WITH NAUSEA, UNSPECIFIED VOMITING TYPE: ICD-10-CM

## 2020-01-14 LAB
BASOPHILS # BLD AUTO: 0.08 10*3/MM3 (ref 0–0.2)
BASOPHILS NFR BLD AUTO: 0.4 % (ref 0–1.5)
DEPRECATED RDW RBC AUTO: 43.7 FL (ref 37–54)
EOSINOPHIL # BLD AUTO: 0.02 10*3/MM3 (ref 0–0.4)
EOSINOPHIL NFR BLD AUTO: 0.1 % (ref 0.3–6.2)
ERYTHROCYTE [DISTWIDTH] IN BLOOD BY AUTOMATED COUNT: 13.2 % (ref 12.3–15.4)
HCT VFR BLD AUTO: 55.6 % (ref 37.5–51)
HGB BLD-MCNC: 18.8 G/DL (ref 13–17.7)
IMM GRANULOCYTES # BLD AUTO: 0.08 10*3/MM3 (ref 0–0.05)
IMM GRANULOCYTES NFR BLD AUTO: 0.4 % (ref 0–0.5)
LYMPHOCYTES # BLD AUTO: 1.01 10*3/MM3 (ref 0.7–3.1)
LYMPHOCYTES NFR BLD AUTO: 5.7 % (ref 19.6–45.3)
MCH RBC QN AUTO: 30.5 PG (ref 26.6–33)
MCHC RBC AUTO-ENTMCNC: 33.8 G/DL (ref 31.5–35.7)
MCV RBC AUTO: 90.1 FL (ref 79–97)
MONOCYTES # BLD AUTO: 1.15 10*3/MM3 (ref 0.1–0.9)
MONOCYTES NFR BLD AUTO: 6.5 % (ref 5–12)
NEUTROPHILS # BLD AUTO: 15.47 10*3/MM3 (ref 1.7–7)
NEUTROPHILS NFR BLD AUTO: 86.9 % (ref 42.7–76)
NRBC BLD AUTO-RTO: 0 /100 WBC (ref 0–0.2)
PLATELET # BLD AUTO: 300 10*3/MM3 (ref 140–450)
PMV BLD AUTO: 10.8 FL (ref 6–12)
RBC # BLD AUTO: 6.17 10*6/MM3 (ref 4.14–5.8)
WBC NRBC COR # BLD: 17.81 10*3/MM3 (ref 3.4–10.8)

## 2020-01-14 PROCEDURE — P9612 CATHETERIZE FOR URINE SPEC: HCPCS

## 2020-01-14 PROCEDURE — 0D9670Z DRAINAGE OF STOMACH WITH DRAINAGE DEVICE, VIA NATURAL OR ARTIFICIAL OPENING: ICD-10-PCS | Performed by: HOSPITALIST

## 2020-01-14 PROCEDURE — 83690 ASSAY OF LIPASE: CPT | Performed by: EMERGENCY MEDICINE

## 2020-01-14 PROCEDURE — 80053 COMPREHEN METABOLIC PANEL: CPT | Performed by: EMERGENCY MEDICINE

## 2020-01-14 PROCEDURE — 81001 URINALYSIS AUTO W/SCOPE: CPT | Performed by: EMERGENCY MEDICINE

## 2020-01-14 PROCEDURE — 99284 EMERGENCY DEPT VISIT MOD MDM: CPT

## 2020-01-14 PROCEDURE — 85025 COMPLETE CBC W/AUTO DIFF WBC: CPT | Performed by: EMERGENCY MEDICINE

## 2020-01-14 RX ORDER — SODIUM CHLORIDE 0.9 % (FLUSH) 0.9 %
10 SYRINGE (ML) INJECTION AS NEEDED
Status: DISCONTINUED | OUTPATIENT
Start: 2020-01-14 | End: 2020-01-23 | Stop reason: HOSPADM

## 2020-01-15 ENCOUNTER — APPOINTMENT (OUTPATIENT)
Dept: CT IMAGING | Facility: HOSPITAL | Age: 40
End: 2020-01-15

## 2020-01-15 PROBLEM — K56.609 SMALL BOWEL OBSTRUCTION (HCC): Status: ACTIVE | Noted: 2020-01-15

## 2020-01-15 LAB
ALBUMIN SERPL-MCNC: 5.2 G/DL (ref 3.5–5.2)
ALBUMIN/GLOB SERPL: 1.1 G/DL
ALP SERPL-CCNC: 143 U/L (ref 39–117)
ALT SERPL W P-5'-P-CCNC: 75 U/L (ref 1–41)
ANION GAP SERPL CALCULATED.3IONS-SCNC: 14 MMOL/L (ref 5–15)
ANION GAP SERPL CALCULATED.3IONS-SCNC: 15.2 MMOL/L (ref 5–15)
AST SERPL-CCNC: 31 U/L (ref 1–40)
BACTERIA UR QL AUTO: ABNORMAL /HPF
BILIRUB SERPL-MCNC: 0.8 MG/DL (ref 0.2–1.2)
BILIRUB UR QL STRIP: NEGATIVE
BUN BLD-MCNC: 27 MG/DL (ref 6–20)
BUN BLD-MCNC: 33 MG/DL (ref 6–20)
BUN/CREAT SERPL: 33.3 (ref 7–25)
BUN/CREAT SERPL: 39.8 (ref 7–25)
CALCIUM SPEC-SCNC: 10.9 MG/DL (ref 8.6–10.5)
CALCIUM SPEC-SCNC: 9.3 MG/DL (ref 8.6–10.5)
CHLORIDE SERPL-SCNC: 102 MMOL/L (ref 98–107)
CHLORIDE SERPL-SCNC: 98 MMOL/L (ref 98–107)
CLARITY UR: CLEAR
CO2 SERPL-SCNC: 25.8 MMOL/L (ref 22–29)
CO2 SERPL-SCNC: 29 MMOL/L (ref 22–29)
COLOR UR: YELLOW
CREAT BLD-MCNC: 0.81 MG/DL (ref 0.76–1.27)
CREAT BLD-MCNC: 0.83 MG/DL (ref 0.76–1.27)
DEPRECATED RDW RBC AUTO: 43.8 FL (ref 37–54)
ERYTHROCYTE [DISTWIDTH] IN BLOOD BY AUTOMATED COUNT: 13.2 % (ref 12.3–15.4)
GFR SERPL CREATININE-BSD FRML MDRD: 103 ML/MIN/1.73
GFR SERPL CREATININE-BSD FRML MDRD: 106 ML/MIN/1.73
GLOBULIN UR ELPH-MCNC: 4.6 GM/DL
GLUCOSE BLD-MCNC: 130 MG/DL (ref 65–99)
GLUCOSE BLD-MCNC: 179 MG/DL (ref 65–99)
GLUCOSE UR STRIP-MCNC: NEGATIVE MG/DL
HCT VFR BLD AUTO: 50.7 % (ref 37.5–51)
HGB BLD-MCNC: 17.2 G/DL (ref 13–17.7)
HGB UR QL STRIP.AUTO: ABNORMAL
HYALINE CASTS UR QL AUTO: ABNORMAL /LPF
KETONES UR QL STRIP: NEGATIVE
LEUKOCYTE ESTERASE UR QL STRIP.AUTO: ABNORMAL
LIPASE SERPL-CCNC: 12 U/L (ref 13–60)
MCH RBC QN AUTO: 30.6 PG (ref 26.6–33)
MCHC RBC AUTO-ENTMCNC: 33.9 G/DL (ref 31.5–35.7)
MCV RBC AUTO: 90.1 FL (ref 79–97)
NITRITE UR QL STRIP: NEGATIVE
PH UR STRIP.AUTO: 6 [PH] (ref 5–8)
PLATELET # BLD AUTO: 264 10*3/MM3 (ref 140–450)
PMV BLD AUTO: 10.6 FL (ref 6–12)
POTASSIUM BLD-SCNC: 4.2 MMOL/L (ref 3.5–5.2)
POTASSIUM BLD-SCNC: 4.5 MMOL/L (ref 3.5–5.2)
PROT SERPL-MCNC: 9.8 G/DL (ref 6–8.5)
PROT UR QL STRIP: ABNORMAL
RBC # BLD AUTO: 5.63 10*6/MM3 (ref 4.14–5.8)
RBC # UR: ABNORMAL /HPF
REF LAB TEST METHOD: ABNORMAL
SODIUM BLD-SCNC: 141 MMOL/L (ref 136–145)
SODIUM BLD-SCNC: 143 MMOL/L (ref 136–145)
SP GR UR STRIP: 1.02 (ref 1–1.03)
SQUAMOUS #/AREA URNS HPF: ABNORMAL /HPF
UROBILINOGEN UR QL STRIP: ABNORMAL
WBC NRBC COR # BLD: 12.2 10*3/MM3 (ref 3.4–10.8)
WBC UR QL AUTO: ABNORMAL /HPF

## 2020-01-15 PROCEDURE — 25010000002 HYDROMORPHONE PER 4 MG: Performed by: HOSPITALIST

## 2020-01-15 PROCEDURE — 80048 BASIC METABOLIC PNL TOTAL CA: CPT | Performed by: HOSPITALIST

## 2020-01-15 PROCEDURE — 25010000002 METOCLOPRAMIDE PER 10 MG: Performed by: EMERGENCY MEDICINE

## 2020-01-15 PROCEDURE — 25010000002 IOPAMIDOL 61 % SOLUTION: Performed by: EMERGENCY MEDICINE

## 2020-01-15 PROCEDURE — 99221 1ST HOSP IP/OBS SF/LOW 40: CPT | Performed by: SURGERY

## 2020-01-15 PROCEDURE — 25010000002 MORPHINE PER 10 MG: Performed by: EMERGENCY MEDICINE

## 2020-01-15 PROCEDURE — 25010000002 METOCLOPRAMIDE PER 10 MG: Performed by: HOSPITALIST

## 2020-01-15 PROCEDURE — 25010000002 CEFTRIAXONE PER 250 MG: Performed by: HOSPITALIST

## 2020-01-15 PROCEDURE — 74177 CT ABD & PELVIS W/CONTRAST: CPT

## 2020-01-15 PROCEDURE — 85027 COMPLETE CBC AUTOMATED: CPT | Performed by: HOSPITALIST

## 2020-01-15 RX ORDER — PANTOPRAZOLE SODIUM 40 MG/10ML
40 INJECTION, POWDER, LYOPHILIZED, FOR SOLUTION INTRAVENOUS EVERY 12 HOURS SCHEDULED
Status: DISCONTINUED | OUTPATIENT
Start: 2020-01-15 | End: 2020-01-18

## 2020-01-15 RX ORDER — CEFTRIAXONE SODIUM 1 G/50ML
1 INJECTION, SOLUTION INTRAVENOUS EVERY 24 HOURS
Status: DISCONTINUED | OUTPATIENT
Start: 2020-01-15 | End: 2020-01-16

## 2020-01-15 RX ORDER — MORPHINE SULFATE 4 MG/ML
4 INJECTION, SOLUTION INTRAMUSCULAR; INTRAVENOUS ONCE
Status: DISCONTINUED | OUTPATIENT
Start: 2020-01-15 | End: 2020-01-15

## 2020-01-15 RX ORDER — HYDROMORPHONE HYDROCHLORIDE 1 MG/ML
0.5 INJECTION, SOLUTION INTRAMUSCULAR; INTRAVENOUS; SUBCUTANEOUS EVERY 4 HOURS PRN
Status: DISCONTINUED | OUTPATIENT
Start: 2020-01-15 | End: 2020-01-20

## 2020-01-15 RX ORDER — METOCLOPRAMIDE HYDROCHLORIDE 5 MG/ML
10 INJECTION INTRAMUSCULAR; INTRAVENOUS
Status: DISCONTINUED | OUTPATIENT
Start: 2020-01-15 | End: 2020-01-18

## 2020-01-15 RX ORDER — PROMETHAZINE HYDROCHLORIDE 25 MG/ML
12.5 INJECTION, SOLUTION INTRAMUSCULAR; INTRAVENOUS EVERY 4 HOURS PRN
Status: DISCONTINUED | OUTPATIENT
Start: 2020-01-15 | End: 2020-01-23

## 2020-01-15 RX ORDER — METOCLOPRAMIDE HYDROCHLORIDE 5 MG/ML
10 INJECTION INTRAMUSCULAR; INTRAVENOUS ONCE
Status: DISCONTINUED | OUTPATIENT
Start: 2020-01-15 | End: 2020-01-15

## 2020-01-15 RX ORDER — METOCLOPRAMIDE HYDROCHLORIDE 5 MG/ML
10 INJECTION INTRAMUSCULAR; INTRAVENOUS ONCE
Status: COMPLETED | OUTPATIENT
Start: 2020-01-15 | End: 2020-01-15

## 2020-01-15 RX ORDER — MORPHINE SULFATE 2 MG/ML
4 INJECTION, SOLUTION INTRAMUSCULAR; INTRAVENOUS ONCE
Status: COMPLETED | OUTPATIENT
Start: 2020-01-15 | End: 2020-01-15

## 2020-01-15 RX ORDER — SODIUM CHLORIDE 9 MG/ML
125 INJECTION, SOLUTION INTRAVENOUS CONTINUOUS
Status: DISCONTINUED | OUTPATIENT
Start: 2020-01-15 | End: 2020-01-16

## 2020-01-15 RX ADMIN — IOPAMIDOL 85 ML: 612 INJECTION, SOLUTION INTRAVENOUS at 00:17

## 2020-01-15 RX ADMIN — SODIUM CHLORIDE 125 ML/HR: 9 INJECTION, SOLUTION INTRAVENOUS at 21:10

## 2020-01-15 RX ADMIN — SODIUM CHLORIDE 125 ML/HR: 9 INJECTION, SOLUTION INTRAVENOUS at 12:32

## 2020-01-15 RX ADMIN — HYDROMORPHONE HYDROCHLORIDE 0.5 MG: 1 INJECTION, SOLUTION INTRAMUSCULAR; INTRAVENOUS; SUBCUTANEOUS at 16:18

## 2020-01-15 RX ADMIN — PANTOPRAZOLE SODIUM 40 MG: 40 INJECTION, POWDER, FOR SOLUTION INTRAVENOUS at 10:36

## 2020-01-15 RX ADMIN — SODIUM CHLORIDE 1000 ML: 9 INJECTION, SOLUTION INTRAVENOUS at 00:35

## 2020-01-15 RX ADMIN — PANTOPRAZOLE SODIUM 40 MG: 40 INJECTION, POWDER, FOR SOLUTION INTRAVENOUS at 21:10

## 2020-01-15 RX ADMIN — SODIUM CHLORIDE 125 ML/HR: 9 INJECTION, SOLUTION INTRAVENOUS at 04:28

## 2020-01-15 RX ADMIN — METOCLOPRAMIDE 10 MG: 5 INJECTION, SOLUTION INTRAMUSCULAR; INTRAVENOUS at 21:10

## 2020-01-15 RX ADMIN — METOCLOPRAMIDE 10 MG: 5 INJECTION, SOLUTION INTRAMUSCULAR; INTRAVENOUS at 00:39

## 2020-01-15 RX ADMIN — SODIUM CHLORIDE, PRESERVATIVE FREE 10 ML: 5 INJECTION INTRAVENOUS at 00:40

## 2020-01-15 RX ADMIN — AMANTADINE HYDROCHLORIDE 100 MG: 50 SOLUTION ORAL at 18:09

## 2020-01-15 RX ADMIN — HYDROMORPHONE HYDROCHLORIDE 0.5 MG: 1 INJECTION, SOLUTION INTRAMUSCULAR; INTRAVENOUS; SUBCUTANEOUS at 10:39

## 2020-01-15 RX ADMIN — MORPHINE SULFATE 4 MG: 2 INJECTION, SOLUTION INTRAMUSCULAR; INTRAVENOUS at 01:48

## 2020-01-15 RX ADMIN — CEFTRIAXONE SODIUM 1 G: 1 INJECTION, SOLUTION INTRAVENOUS at 18:09

## 2020-01-15 RX ADMIN — METOCLOPRAMIDE 10 MG: 5 INJECTION, SOLUTION INTRAMUSCULAR; INTRAVENOUS at 18:12

## 2020-01-15 NOTE — PLAN OF CARE
"  Problem: Patient Care Overview  Goal: Plan of Care Review  Outcome: Ongoing (interventions implemented as appropriate)  Flowsheets (Taken 1/15/2020 2602)  Progress: declining  Plan of Care Reviewed With: patient; parent  Outcome Summary: Patient is quadriplegic, low airloss mattress added today, NG tube to low wall suction, contracted, PEG ULQ clamped, NS @ 125, waffle boots, non-verbal, strait cath today for acute retention algoritham initiated MD notified, Continue to monitor and continue current plan of care.     Problem: Patient Care Overview  Goal: Individualization and Mutuality  Outcome: Ongoing (interventions implemented as appropriate)  Flowsheets (Taken 1/15/2020 0259 by Salina Major RN)  Patient Specific Goals (Include Timeframe): \"to be comfortable\"  How to Address Anxieties/Fears: none identified, mother denies  Patient Specific Interventions: update mother with POC and education as needed  What Information Would Help Us Give You More Personalized Care?: none identified, mother denies  How Would You and/or Your Support Person Like to Participate in Your Care?: frequent updates and education as needed  What Anxieties, Fears, Concerns, or Questions Do You Have About Your Care?: none identified, mother denies  Patient Specific Preferences: none identified, mother denies     "
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
## 2020-01-15 NOTE — ED NOTES
"/  Nursing report ED to floor  Adrian Kuo  39 y.o.  male    HPI (triage note):   Chief Complaint   Patient presents with   • Vomiting       Admitting doctor:   Marcus Clarke MD    Admitting diagnosis:   The primary encounter diagnosis was Small bowel obstruction (CMS/HCC). A diagnosis of Non-intractable vomiting with nausea, unspecified vomiting type was also pertinent to this visit.    Code status:   Current Code Status     Date Active Code Status Order ID Comments User Context       Prior          Allergies:   Zofran [ondansetron hcl]    Weight:       01/14/20 2322   Weight: 74.1 kg (163 lb 4.8 oz)       Most recent vitals:   Vitals:    01/14/20 2322 01/14/20 2340 01/15/20 0033 01/15/20 0040   BP:  (!) 143/103 135/99 135/98   Pulse:       Resp:       Temp:       TempSrc:       SpO2:       Weight: 74.1 kg (163 lb 4.8 oz)      Height: 180.3 cm (71\")          Active LDAs/IV Access:   Lines, Drains & Airways    Active LDAs     Name:   Placement date:   Placement time:   Site:   Days:    Peripheral IV 01/14/20 2315 Right Forearm   01/14/20 2315    Forearm   less than 1    Gastrostomy/Enterostomy Gastrostomy LUQ   --    --    LUQ       Pump Device   08/06/18    0400    --   526                Labs (abnormal labs have a star):   Labs Reviewed   COMPREHENSIVE METABOLIC PANEL - Abnormal; Notable for the following components:       Result Value    Glucose 179 (*)     BUN 33 (*)     Calcium 10.9 (*)     Total Protein 9.8 (*)     ALT (SGPT) 75 (*)     Alkaline Phosphatase 143 (*)     BUN/Creatinine Ratio 39.8 (*)     All other components within normal limits    Narrative:     GFR Normal >60  Chronic Kidney Disease <60  Kidney Failure <15     LIPASE - Abnormal; Notable for the following components:    Lipase 12 (*)     All other components within normal limits   URINALYSIS W/ MICROSCOPIC IF INDICATED (NO CULTURE) - Abnormal; Notable for the following components:    Blood, UA Large (3+) (*)     Protein,  mg/dL " (2+) (*)     Leuk Esterase, UA Moderate (2+) (*)     All other components within normal limits   CBC WITH AUTO DIFFERENTIAL - Abnormal; Notable for the following components:    WBC 17.81 (*)     RBC 6.17 (*)     Hemoglobin 18.8 (*)     Hematocrit 55.6 (*)     Neutrophil % 86.9 (*)     Lymphocyte % 5.7 (*)     Eosinophil % 0.1 (*)     Neutrophils, Absolute 15.47 (*)     Monocytes, Absolute 1.15 (*)     Immature Grans, Absolute 0.08 (*)     All other components within normal limits   URINALYSIS, MICROSCOPIC ONLY - Abnormal; Notable for the following components:    RBC, UA 6-12 (*)     WBC, UA 31-50 (*)     Bacteria, UA 1+ (*)     All other components within normal limits   CBC AND DIFFERENTIAL    Narrative:     The following orders were created for panel order CBC & Differential.  Procedure                               Abnormality         Status                     ---------                               -----------         ------                     CBC Auto Differential[[id number]]        Abnormal            Final result                 Please view results for these tests on the individual orders.       EKG:   No orders to display       Meds given in ED:   Medications   sodium chloride 0.9 % flush 10 mL (10 mL Intravenous Given 1/15/20 0040)   iopamidol (ISOVUE-300) 61 % injection 100 mL (85 mL Intravenous Given by Other 1/15/20 0017)   sodium chloride 0.9 % bolus 1,000 mL (1,000 mL Intravenous New Bag 1/15/20 0035)   metoclopramide (REGLAN) injection 10 mg (10 mg Intravenous Given 1/15/20 0039)       Imaging results:  Ct Abdomen Pelvis With Contrast    Result Date: 1/15/2020  IMPRESSION : 1. Renal findings as discussed including multiple mid ureteral calculi, up to 11 mm with severe but chronic appearing right collecting system dilatation. 2. Gastric and small bowel distention without clear transition point on this exam without oral contrast. Obstruction or partial obstruction is suspected           Ambulatory  status:   - nonambulatory    Social issues:   Social History     Socioeconomic History   • Marital status: Single     Spouse name: Not on file   • Number of children: Not on file   • Years of education: Not on file   • Highest education level: Not on file   Tobacco Use   • Smoking status: Former Smoker     Packs/day: 0.50     Types: Cigarettes     Start date: 1998     Last attempt to quit: 2004     Years since quittin.0   • Smokeless tobacco: Never Used   Substance and Sexual Activity   • Alcohol use: No   • Drug use: No   • Sexual activity: Iwona Thomas RN  01/15/20 0133
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
## 2020-01-15 NOTE — ED TRIAGE NOTES
To ER via EMS from [facility]  Pt has hx of persistent vegetative state due to TBI with g-tube in place.  Pt started with emesis this am.  Tube feeds have been stopped since approx 1400 this afternoon with persistent vomiting of yellow emesis per nursing facility staff.  Pt has been afebrile with clear lunch sounds.  Hx of aspiration pneumonia.

## 2020-01-15 NOTE — PLAN OF CARE
Problem: Nutrition, Enteral (Adult)  Goal: Signs and Symptoms of Listed Potential Problems Will be Absent, Minimized or Managed (Nutrition, Enteral)  Outcome: Ongoing (interventions implemented as appropriate)  Flowsheets (Taken 1/15/2020 4341)  Problems Assessed (Enteral Nutrition): all  Problems Present (Enteral Nutrition): other (see comments)  Note:   TF held due to SBO
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
## 2020-01-15 NOTE — ED NOTES
Pt is moaning, appears uncomfortable. Order received for pain medication per Dr. Rodrigez.     Iwona Scott, RN  01/15/20 0142
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
## 2020-01-15 NOTE — DISCHARGE PLACEMENT REQUEST
"Donovan Kuo P (39 y.o. Male)     Date of Birth Social Security Number Address Home Phone MRN    1980  [address]  [place] [address] [phone] [id number]    Worship Marital Status          Caodaism Single       Admission Date Admission Type Admitting Provider Attending Provider Department, Room/Bed    1/14/20 Emergency Marcus Clarke MD Ahmed, Aftab, MD [address], P498/1    Discharge Date Discharge Disposition Discharge Destination                       Attending Provider:  Marcus Clarke MD    Allergies:  Zofran [Ondansetron Hcl]    Isolation:  None   Infection:  None   Code Status:  No CPR    Ht:  180.1 cm (70.91\")   Wt:  78.3 kg (172 lb 9.9 oz)    Admission Cmt:  None   Principal Problem:  None                Active Insurance as of 1/14/2020     Primary Coverage     Payor Plan Insurance Group Employer/Plan Group    MEDICARE MEDICARE A & B      Payor Plan Address Payor Plan Phone Number Payor Plan Fax Number Effective Dates    [address] [phone]  8/1/2007 - None Entered    [facility] [address]       Subscriber Name Subscriber Birth Date Member ID       DONOVAN KUO P 1980 [id number]           Secondary Coverage     Payor Plan Insurance Group Employer/Plan Group    [place]      Payor Plan Address Payor Plan Phone Number Payor Plan Fax Number Effective Dates    [address] [phone]  12/20/2016 - None Entered    [place] [address]       Subscriber Name Subscriber Birth Date Member ID       DONOVAN KUO P 1980 [id number]                 Emergency Contacts      (Rel.) Home Phone Work Phone Mobile Phone    AyaanLizzeth (Mother) [phone] -- --    AyaanMark (Brother) [phone] -- --    Harika Uribe Aunt (Relative) [phone] -- [phone]              "

## 2020-01-16 ENCOUNTER — APPOINTMENT (OUTPATIENT)
Dept: GENERAL RADIOLOGY | Facility: HOSPITAL | Age: 40
End: 2020-01-16

## 2020-01-16 LAB
ALBUMIN SERPL-MCNC: 3.3 G/DL (ref 3.5–5.2)
ALBUMIN/GLOB SERPL: 1 G/DL
ALP SERPL-CCNC: 89 U/L (ref 39–117)
ALT SERPL W P-5'-P-CCNC: 40 U/L (ref 1–41)
ANION GAP SERPL CALCULATED.3IONS-SCNC: 13.4 MMOL/L (ref 5–15)
AST SERPL-CCNC: 19 U/L (ref 1–40)
BASOPHILS # BLD AUTO: 0.05 10*3/MM3 (ref 0–0.2)
BASOPHILS NFR BLD AUTO: 0.7 % (ref 0–1.5)
BILIRUB SERPL-MCNC: 0.4 MG/DL (ref 0.2–1.2)
BUN BLD-MCNC: 22 MG/DL (ref 6–20)
BUN/CREAT SERPL: 32.8 (ref 7–25)
CALCIUM SPEC-SCNC: 8.1 MG/DL (ref 8.6–10.5)
CHLORIDE SERPL-SCNC: 112 MMOL/L (ref 98–107)
CHOLEST SERPL-MCNC: 106 MG/DL (ref 0–200)
CO2 SERPL-SCNC: 24.6 MMOL/L (ref 22–29)
CREAT BLD-MCNC: 0.67 MG/DL (ref 0.76–1.27)
DEPRECATED RDW RBC AUTO: 45.4 FL (ref 37–54)
EOSINOPHIL # BLD AUTO: 0.12 10*3/MM3 (ref 0–0.4)
EOSINOPHIL NFR BLD AUTO: 1.7 % (ref 0.3–6.2)
ERYTHROCYTE [DISTWIDTH] IN BLOOD BY AUTOMATED COUNT: 13.3 % (ref 12.3–15.4)
GFR SERPL CREATININE-BSD FRML MDRD: 132 ML/MIN/1.73
GLOBULIN UR ELPH-MCNC: 3.3 GM/DL
GLUCOSE BLD-MCNC: 103 MG/DL (ref 65–99)
HBA1C MFR BLD: 5.5 % (ref 4.8–5.6)
HCT VFR BLD AUTO: 41 % (ref 37.5–51)
HDLC SERPL-MCNC: 29 MG/DL (ref 40–60)
HGB BLD-MCNC: 13.6 G/DL (ref 13–17.7)
IMM GRANULOCYTES # BLD AUTO: 0.04 10*3/MM3 (ref 0–0.05)
IMM GRANULOCYTES NFR BLD AUTO: 0.6 % (ref 0–0.5)
LDLC SERPL CALC-MCNC: 46 MG/DL (ref 0–100)
LDLC/HDLC SERPL: 1.58 {RATIO}
LYMPHOCYTES # BLD AUTO: 1.61 10*3/MM3 (ref 0.7–3.1)
LYMPHOCYTES NFR BLD AUTO: 22.7 % (ref 19.6–45.3)
MCH RBC QN AUTO: 30.9 PG (ref 26.6–33)
MCHC RBC AUTO-ENTMCNC: 33.2 G/DL (ref 31.5–35.7)
MCV RBC AUTO: 93.2 FL (ref 79–97)
MONOCYTES # BLD AUTO: 0.75 10*3/MM3 (ref 0.1–0.9)
MONOCYTES NFR BLD AUTO: 10.6 % (ref 5–12)
NEUTROPHILS # BLD AUTO: 4.53 10*3/MM3 (ref 1.7–7)
NEUTROPHILS NFR BLD AUTO: 63.7 % (ref 42.7–76)
NRBC BLD AUTO-RTO: 0 /100 WBC (ref 0–0.2)
NT-PROBNP SERPL-MCNC: 115.1 PG/ML (ref 5–450)
PLATELET # BLD AUTO: 211 10*3/MM3 (ref 140–450)
PMV BLD AUTO: 10.8 FL (ref 6–12)
POTASSIUM BLD-SCNC: 3.5 MMOL/L (ref 3.5–5.2)
PROT SERPL-MCNC: 6.6 G/DL (ref 6–8.5)
RBC # BLD AUTO: 4.4 10*6/MM3 (ref 4.14–5.8)
SODIUM BLD-SCNC: 150 MMOL/L (ref 136–145)
TRIGL SERPL-MCNC: 156 MG/DL (ref 0–150)
TSH SERPL DL<=0.05 MIU/L-ACNC: 3.95 UIU/ML (ref 0.27–4.2)
VLDLC SERPL-MCNC: 31.2 MG/DL (ref 5–40)
WBC NRBC COR # BLD: 7.1 10*3/MM3 (ref 3.4–10.8)

## 2020-01-16 PROCEDURE — 25810000003 SODIUM CHLORIDE 0.9 % WITH KCL 20 MEQ 20-0.9 MEQ/L-% SOLUTION: Performed by: HOSPITALIST

## 2020-01-16 PROCEDURE — 74018 RADEX ABDOMEN 1 VIEW: CPT

## 2020-01-16 PROCEDURE — 80061 LIPID PANEL: CPT | Performed by: HOSPITALIST

## 2020-01-16 PROCEDURE — 80053 COMPREHEN METABOLIC PANEL: CPT | Performed by: HOSPITALIST

## 2020-01-16 PROCEDURE — 74250 X-RAY XM SM INT 1CNTRST STD: CPT

## 2020-01-16 PROCEDURE — 25010000002 CEFTRIAXONE PER 250 MG: Performed by: HOSPITALIST

## 2020-01-16 PROCEDURE — 84443 ASSAY THYROID STIM HORMONE: CPT | Performed by: HOSPITALIST

## 2020-01-16 PROCEDURE — 99232 SBSQ HOSP IP/OBS MODERATE 35: CPT | Performed by: SURGERY

## 2020-01-16 PROCEDURE — 83880 ASSAY OF NATRIURETIC PEPTIDE: CPT | Performed by: HOSPITALIST

## 2020-01-16 PROCEDURE — 85025 COMPLETE CBC W/AUTO DIFF WBC: CPT | Performed by: HOSPITALIST

## 2020-01-16 PROCEDURE — 0 DIATRIZOATE MEGLUMINE & SODIUM PER 1 ML: Performed by: HOSPITALIST

## 2020-01-16 PROCEDURE — 25010000002 METOCLOPRAMIDE PER 10 MG: Performed by: HOSPITALIST

## 2020-01-16 PROCEDURE — 83036 HEMOGLOBIN GLYCOSYLATED A1C: CPT | Performed by: HOSPITALIST

## 2020-01-16 RX ORDER — SODIUM CHLORIDE AND POTASSIUM CHLORIDE 150; 900 MG/100ML; MG/100ML
75 INJECTION, SOLUTION INTRAVENOUS CONTINUOUS
Status: DISCONTINUED | OUTPATIENT
Start: 2020-01-16 | End: 2020-01-17

## 2020-01-16 RX ORDER — CEFTRIAXONE SODIUM 1 G/50ML
1 INJECTION, SOLUTION INTRAVENOUS EVERY 24 HOURS
Status: DISCONTINUED | OUTPATIENT
Start: 2020-01-16 | End: 2020-01-19

## 2020-01-16 RX ADMIN — AMANTADINE HYDROCHLORIDE 100 MG: 50 SOLUTION ORAL at 03:23

## 2020-01-16 RX ADMIN — DIATRIZOATE MEGLUMINE AND DIATRIZOATE SODIUM 240 ML: 660; 100 LIQUID ORAL; RECTAL at 13:25

## 2020-01-16 RX ADMIN — SODIUM CHLORIDE 125 ML/HR: 9 INJECTION, SOLUTION INTRAVENOUS at 05:13

## 2020-01-16 RX ADMIN — METOCLOPRAMIDE 10 MG: 5 INJECTION, SOLUTION INTRAMUSCULAR; INTRAVENOUS at 21:47

## 2020-01-16 RX ADMIN — PANTOPRAZOLE SODIUM 40 MG: 40 INJECTION, POWDER, FOR SOLUTION INTRAVENOUS at 21:47

## 2020-01-16 RX ADMIN — CEFTRIAXONE SODIUM 1 G: 1 INJECTION, SOLUTION INTRAVENOUS at 18:43

## 2020-01-16 RX ADMIN — METOCLOPRAMIDE 10 MG: 5 INJECTION, SOLUTION INTRAMUSCULAR; INTRAVENOUS at 06:34

## 2020-01-16 RX ADMIN — PANTOPRAZOLE SODIUM 40 MG: 40 INJECTION, POWDER, FOR SOLUTION INTRAVENOUS at 09:49

## 2020-01-16 RX ADMIN — METOCLOPRAMIDE 10 MG: 5 INJECTION, SOLUTION INTRAMUSCULAR; INTRAVENOUS at 18:44

## 2020-01-16 RX ADMIN — POTASSIUM CHLORIDE AND SODIUM CHLORIDE 75 ML/HR: 900; 150 INJECTION, SOLUTION INTRAVENOUS at 18:43
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
## 2020-01-16 NOTE — PLAN OF CARE
Problem: Patient Care Overview  Goal: Plan of Care Review  1/16/2020 0520 by Camryn Church, RN  Outcome: Ongoing (interventions implemented as appropriate)  Flowsheets (Taken 1/16/2020 0520)  Progress: no change  Plan of Care Reviewed With: patient; mother  Outcome Summary: pt slept well tonight. no signs of pain, NG tube to low wall suction, contracted. q2 turns. Gtube in place CDI. NS @125mL/hr. brief. no need for straight cath tonight. KUB sometime today. will continue to monitor and treat per MD     Problem: Patient Care Overview  Goal: Individualization and Mutuality  1/16/2020 0520 by Camryn Church, RN  Outcome: Ongoing (interventions implemented as appropriate)  Flowsheets (Taken 1/16/2020 0520)  Patient Specific Goals (Include Timeframe): to be comfortable and go home  Patient Specific Interventions: PRN pain meds, scheduled meds through gtube, NPO NG tube, q2 turn  Patient Specific Preferences: Mother is Caregiver

## 2020-01-16 NOTE — PROGRESS NOTES
"General Surgery  Progress Note    CC: Follow-up partial SBO    S: He has not passed any flatus, but there was a small BM smear when bathing him today. His mom at the bedside states he has been comfortable with no signs of pain.    ROS: Unable to be obtained given nonverbal status    O:/66 Comment: repostioned on back  Pulse 93   Temp 98.8 °F (37.1 °C) (Oral)   Resp 16   Ht 180.1 cm (70.91\")   Wt 78.3 kg (172 lb 9.9 oz)   SpO2 91%   BMI 24.14 kg/m²     Intake & Output:  NGT: About 300 cc in canister, otherwise not recorded under I&O's    GENERAL: sleeping soundly, appears comfortable  HEENT: former trach scar at base of neck, normocephalic, atraumatic  CHEST: clear to auscultation, no wheezes, rales or rhonchi, symmetric air entry  CARDIAC: regular rate and rhythm    ABDOMEN: soft, nondistended, no facial grimace with abdominal palpation, Gtube in LUQ capped  EXTREMITIES: 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  SKIN: Warm and moist, no rashes    LABS  Results from last 7 days   Lab Units 01/16/20  0546 01/15/20  0632 01/14/20  2316   WBC 10*3/mm3 7.10 12.20* 17.81*   HEMOGLOBIN g/dL 13.6 17.2 18.8*   HEMATOCRIT % 41.0 50.7 55.6*   PLATELETS 10*3/mm3 211 264 300     Results from last 7 days   Lab Units 01/16/20  0546 01/15/20  0632 01/14/20  2316   SODIUM mmol/L 150* 143 141   POTASSIUM mmol/L 3.5 4.2 4.5   CHLORIDE mmol/L 112* 102 98   CO2 mmol/L 24.6 25.8 29.0   BUN mg/dL 22* 27* 33*   CREATININE mg/dL 0.67* 0.81 0.83   CALCIUM mg/dL 8.1* 9.3 10.9*   BILIRUBIN mg/dL 0.4  --  0.8   ALK PHOS U/L 89  --  143*   ALT (SGPT) U/L 40  --  75*   AST (SGOT) U/L 19  --  31   GLUCOSE mg/dL 103* 130* 179*             A/P: 39 y.o. male with partial small bowel obstruction    Given his TBI, it is difficult to determine any clinical improvement but he continues to have a benign abdomen. His KUB today looks improved in my opinion, with less gaseous distension of the small bowel and now some gas scattered in portions " of the colon. I have ordered a small bowel follow through to be done today to assess for possible persistent SBO and potentially have some therapeutic effect with the gastrograffin, which is known to sometimes alleviate partial obstructions. Continue NGT decompression and NPO.    Lula Shah MD  General and Endoscopic Surgery  [facility]    [address], [address]  [place], [place], [address]  P: [phone]  F: [phone]

## 2020-01-16 NOTE — PROGRESS NOTES
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  Patient evaluated in ER found to have recurrent small bowel obstruction admit for management.  Patient unable to give detailed history most of the history obtained from the mother chart old record and we know the patient from previous admissions.  Patient has NG tube placed and tube feeding stopped and IV fluids started.  And is DNR per his wishes.      REVIEW OF SYSTEMS  Unable to perform ROS: Patient nonverbal      PHYSICAL EXAM  Blood pressure 106/66, pulse 93, temperature 98.8 °F (37.1 °C), temperature source Oral, resp. rate 16, height 180.1 cm (70.91\"), weight 78.3 kg (172 lb 9.9 oz), SpO2 91 %.    Constitutional: No distress.   Head: Normocephalic and atraumatic.   Eyes: Conjunctivae are normal.   Neck: Normal range of motion.   Cardiovascular: Regular rhythm. Tachycardia present.   Pulmonary/Chest: Breath sounds normal. No respiratory distress.   Abdominal: Soft. He exhibits distension. Bowel sounds are hypoactive. There is no tenderness. There is no rebound and no guarding. G-tube in place   Musculoskeletal: He exhibits no edema or tenderness.   Neurological: Unobtainable.    Skin: No rash noted.     LAB RESULTS  Lab Results (last 24 hours)     Procedure Component Value Units Date/Time    Comprehensive Metabolic Panel [[id number]]  (Abnormal) Collected:  01/16/20 0546    Specimen:  Blood Updated:  01/16/20 0753     Glucose 103 mg/dL      BUN 22 mg/dL      Creatinine 0.67 mg/dL      Sodium 150 mmol/L      Potassium 3.5 mmol/L      Chloride 112 mmol/L      CO2 24.6 mmol/L      Calcium " 8.1 mg/dL      Total Protein 6.6 g/dL      Albumin 3.30 g/dL      ALT (SGPT) 40 U/L      AST (SGOT) 19 U/L      Alkaline Phosphatase 89 U/L      Total Bilirubin 0.4 mg/dL      eGFR Non African Amer 132 mL/min/1.73      Globulin 3.3 gm/dL      A/G Ratio 1.0 g/dL      BUN/Creatinine Ratio 32.8     Anion Gap 13.4 mmol/L     Narrative:       GFR Normal >60  Chronic Kidney Disease <60  Kidney Failure <15      BNP [[id number]]  (Normal) Collected:  01/16/20 0546    Specimen:  Blood Updated:  01/16/20 0749     proBNP 115.1 pg/mL     Narrative:       Among patients with dyspnea, NT-proBNP is highly sensitive for the detection of acute congestive heart failure. In addition NT-proBNP of <300 pg/ml effectively rules out acute congestive heart failure with 99% negative predictive value.    Results may be falsely decreased if patient taking Biotin.      TSH [[id number]]  (Normal) Collected:  01/16/20 0546    Specimen:  Blood Updated:  01/16/20 0749     TSH 3.950 uIU/mL     Lipid Panel [[id number]]  (Abnormal) Collected:  01/16/20 0546    Specimen:  Blood Updated:  01/16/20 0749     Total Cholesterol 106 mg/dL      Triglycerides 156 mg/dL      HDL Cholesterol 29 mg/dL      LDL Cholesterol  46 mg/dL      VLDL Cholesterol 31.2 mg/dL      LDL/HDL Ratio 1.58    Narrative:       Cholesterol Reference Ranges  (U.S. Department of Health and Human Services ATP III Classifications)    Desirable          <200 mg/dL  Borderline High    200-239 mg/dL  High Risk          >240 mg/dL      Triglyceride Reference Ranges  (U.S. Department of Health and Human Services ATP III Classifications)    Normal           <150 mg/dL  Borderline High  150-199 mg/dL  High             200-499 mg/dL  Very High        >500 mg/dL    HDL Reference Ranges  (U.S. Department of Health and Human Services ATP III Classifcations)    Low     <40 mg/dl (major risk factor for CHD)  High    >60 mg/dl ('negative' risk factor for CHD)        LDL Reference Ranges  (U.S. Department  of Health and Human Services ATP III Classifcations)    Optimal          <100 mg/dL  Near Optimal     100-129 mg/dL  Borderline High  130-159 mg/dL  High             160-189 mg/dL  Very High        >189 mg/dL    CBC & Differential [[id number]] Collected:  01/16/20 0546    Specimen:  Blood Updated:  01/16/20 0731    Narrative:       The following orders were created for panel order CBC & Differential.  Procedure                               Abnormality         Status                     ---------                               -----------         ------                     CBC Auto Differential[[id number]]        Abnormal            Final result                 Please view results for these tests on the individual orders.    CBC Auto Differential [[id number]]  (Abnormal) Collected:  01/16/20 0546    Specimen:  Blood Updated:  01/16/20 0731     WBC 7.10 10*3/mm3      RBC 4.40 10*6/mm3      Hemoglobin 13.6 g/dL      Hematocrit 41.0 %      MCV 93.2 fL      MCH 30.9 pg      MCHC 33.2 g/dL      RDW 13.3 %      RDW-SD 45.4 fl      MPV 10.8 fL      Platelets 211 10*3/mm3      Neutrophil % 63.7 %      Lymphocyte % 22.7 %      Monocyte % 10.6 %      Eosinophil % 1.7 %      Basophil % 0.7 %      Immature Grans % 0.6 %      Neutrophils, Absolute 4.53 10*3/mm3      Lymphocytes, Absolute 1.61 10*3/mm3      Monocytes, Absolute 0.75 10*3/mm3      Eosinophils, Absolute 0.12 10*3/mm3      Basophils, Absolute 0.05 10*3/mm3      Immature Grans, Absolute 0.04 10*3/mm3      nRBC 0.0 /100 WBC     Hemoglobin A1c [[id number]]  (Normal) Collected:  01/16/20 0546    Specimen:  Blood Updated:  01/16/20 0650     Hemoglobin A1C 5.50 %     Narrative:       Hemoglobin A1C Ranges:    Increased Risk for Diabetes  5.7% to 6.4%  Diabetes                     >= 6.5%  Diabetic Goal                < 7.0%        Imaging Results (Last 24 Hours)     Procedure Component Value Units Date/Time    FL Small Bowel Follow Through Single-Contrast [[id number]] Resulted:   01/16/20 1337     Updated:  01/16/20 1337    XR Abdomen KUB [[id number]] Collected:  01/16/20 0819     Updated:  01/16/20 0827    Narrative:       Abdominal radiographs     HISTORY:Small bowel obstruction     TECHNIQUE:  4 AP supine radiographs of the abdomen     COMPARISON:CT abdomen and pelvis 01/15/2020       Impression:       FINDINGS AND IMPRESSION:  Presumed shunt catheter projects over the lower chest, abdomen and  pelvis, as before. There is a left-sided spinal stimulator with lead  terminating at approximately T10-11. An orogastric tube courses in the  stomach with tip directed superiorly and pointing towards the  gastroesophageal junction. Repositioning is recommended to reduce the  risk of aspiration. Percutaneous gastrostomy tube is present. Severe  bilateral hip osteoporosis present.     Mild to moderately dilated small bowel loops are present and measure up  to 4 cm in diameter, as seen on CT. Findings remain concerning for  persistent small bowel obstruction.     This report was finalized on 1/16/2020 8:23 AM by Dr. Maurizio Girard M.D.             Current Facility-Administered Medications:   •  amantadine (SYMMETREL) solution 100 mg, 100 mg, Per G Tube, Q12H, Marcus Clarke MD, 100 mg at 01/16/20 0323  •  cefTRIAXone (ROCEPHIN) IVPB 1 g, 1 g, Intravenous, Q24H, Marcus Clarke MD, Last Rate: 100 mL/hr at 01/15/20 1809, 1 g at 01/15/20 1809  •  HYDROmorphone (DILAUDID) injection 0.5 mg, 0.5 mg, Intravenous, Q4H PRN, Marcus Clarke MD, 0.5 mg at 01/15/20 1618  •  metoclopramide (REGLAN) injection 10 mg, 10 mg, Intravenous, 4x Daily AC & at Bedtime, Marcus Clarke MD, 10 mg at 01/16/20 0634  •  pantoprazole (PROTONIX) injection 40 mg, 40 mg, Intravenous, Q12H, Marcus Clarke MD, 40 mg at 01/16/20 0949  •  promethazine (PHENERGAN) injection 12.5 mg, 12.5 mg, Intravenous, Q4H PRN, Marcus Clarke MD  •  [COMPLETED] Insert peripheral IV, , , Once **AND** sodium chloride 0.9 % flush 10 mL, 10 mL, Intravenous, PRN,  Diogenes Rodrigez MD, 10 mL at 01/15/20 0040  •  sodium chloride 0.9 % infusion, 125 mL/hr, Intravenous, Continuous, Michel Clarke MD, Last Rate: 125 mL/hr at 01/16/20 0513, 125 mL/hr at 01/16/20 0513     ASSESSMENT  Acute small bowel obstruction resolving  Acute UTI  Multiple renal and ureteral calculi  Traumatic brain injury  Quadriplegic with contractures  Diabetes mellitus  Status post G-tube placement  Seizure disorder  Immobilization syndrome    PLAN  CPM  NG tube  IV fluid  IV antibiotics  Bowel rest  Small bowel follow-through  Symptomatic treatment for pain nausea vomiting  General surgery consult appreciated  Hold all nursing home medications  Hold tube feeding  Stress ulcer DVT prophylaxis  Supportive care  Discussed with family  DNR  Follow closely further recommendation current hospital course    MICHEL CLARKE MD

## 2020-01-17 ENCOUNTER — APPOINTMENT (OUTPATIENT)
Dept: GENERAL RADIOLOGY | Facility: HOSPITAL | Age: 40
End: 2020-01-17

## 2020-01-17 LAB
ANION GAP SERPL CALCULATED.3IONS-SCNC: 13.1 MMOL/L (ref 5–15)
BASOPHILS # BLD AUTO: 0.06 10*3/MM3 (ref 0–0.2)
BASOPHILS NFR BLD AUTO: 1.2 % (ref 0–1.5)
BUN BLD-MCNC: 17 MG/DL (ref 6–20)
BUN/CREAT SERPL: 32.7 (ref 7–25)
CALCIUM SPEC-SCNC: 9.2 MG/DL (ref 8.6–10.5)
CHLORIDE SERPL-SCNC: 119 MMOL/L (ref 98–107)
CO2 SERPL-SCNC: 22.9 MMOL/L (ref 22–29)
CREAT BLD-MCNC: 0.52 MG/DL (ref 0.76–1.27)
DEPRECATED RDW RBC AUTO: 42.9 FL (ref 37–54)
EOSINOPHIL # BLD AUTO: 0.07 10*3/MM3 (ref 0–0.4)
EOSINOPHIL NFR BLD AUTO: 1.4 % (ref 0.3–6.2)
ERYTHROCYTE [DISTWIDTH] IN BLOOD BY AUTOMATED COUNT: 13.1 % (ref 12.3–15.4)
GFR SERPL CREATININE-BSD FRML MDRD: >150 ML/MIN/1.73
GLUCOSE BLD-MCNC: 94 MG/DL (ref 65–99)
HCT VFR BLD AUTO: 43.3 % (ref 37.5–51)
HGB BLD-MCNC: 14.5 G/DL (ref 13–17.7)
IMM GRANULOCYTES # BLD AUTO: 0.04 10*3/MM3 (ref 0–0.05)
IMM GRANULOCYTES NFR BLD AUTO: 0.8 % (ref 0–0.5)
LYMPHOCYTES # BLD AUTO: 0.98 10*3/MM3 (ref 0.7–3.1)
LYMPHOCYTES NFR BLD AUTO: 19.2 % (ref 19.6–45.3)
MCH RBC QN AUTO: 30.2 PG (ref 26.6–33)
MCHC RBC AUTO-ENTMCNC: 33.5 G/DL (ref 31.5–35.7)
MCV RBC AUTO: 90.2 FL (ref 79–97)
MONOCYTES # BLD AUTO: 0.56 10*3/MM3 (ref 0.1–0.9)
MONOCYTES NFR BLD AUTO: 11 % (ref 5–12)
NEUTROPHILS # BLD AUTO: 3.39 10*3/MM3 (ref 1.7–7)
NEUTROPHILS NFR BLD AUTO: 66.4 % (ref 42.7–76)
NRBC BLD AUTO-RTO: 0 /100 WBC (ref 0–0.2)
PLATELET # BLD AUTO: 221 10*3/MM3 (ref 140–450)
PMV BLD AUTO: 11 FL (ref 6–12)
POTASSIUM BLD-SCNC: 3.7 MMOL/L (ref 3.5–5.2)
RBC # BLD AUTO: 4.8 10*6/MM3 (ref 4.14–5.8)
SODIUM BLD-SCNC: 155 MMOL/L (ref 136–145)
WBC NRBC COR # BLD: 5.1 10*3/MM3 (ref 3.4–10.8)

## 2020-01-17 PROCEDURE — 74018 RADEX ABDOMEN 1 VIEW: CPT

## 2020-01-17 PROCEDURE — 25010000002 METOCLOPRAMIDE PER 10 MG: Performed by: HOSPITALIST

## 2020-01-17 PROCEDURE — 85025 COMPLETE CBC W/AUTO DIFF WBC: CPT | Performed by: HOSPITALIST

## 2020-01-17 PROCEDURE — 80048 BASIC METABOLIC PNL TOTAL CA: CPT | Performed by: HOSPITALIST

## 2020-01-17 PROCEDURE — 99232 SBSQ HOSP IP/OBS MODERATE 35: CPT | Performed by: SURGERY

## 2020-01-17 PROCEDURE — 25810000003 SODIUM CHLORIDE 0.9 % WITH KCL 20 MEQ 20-0.9 MEQ/L-% SOLUTION: Performed by: HOSPITALIST

## 2020-01-17 PROCEDURE — 25010000002 CEFTRIAXONE PER 250 MG: Performed by: HOSPITALIST

## 2020-01-17 RX ORDER — POLYETHYLENE GLYCOL 3350 17 G/17G
17 POWDER, FOR SOLUTION ORAL 2 TIMES DAILY
Status: DISCONTINUED | OUTPATIENT
Start: 2020-01-17 | End: 2020-01-20

## 2020-01-17 RX ORDER — DEXTROSE MONOHYDRATE 50 MG/ML
75 INJECTION, SOLUTION INTRAVENOUS CONTINUOUS
Status: DISCONTINUED | OUTPATIENT
Start: 2020-01-17 | End: 2020-01-19

## 2020-01-17 RX ORDER — BISACODYL 10 MG
10 SUPPOSITORY, RECTAL RECTAL DAILY
Status: DISCONTINUED | OUTPATIENT
Start: 2020-01-17 | End: 2020-01-20

## 2020-01-17 RX ADMIN — METOCLOPRAMIDE 10 MG: 5 INJECTION, SOLUTION INTRAMUSCULAR; INTRAVENOUS at 18:44

## 2020-01-17 RX ADMIN — AMANTADINE HYDROCHLORIDE 100 MG: 50 SOLUTION ORAL at 01:14

## 2020-01-17 RX ADMIN — CEFTRIAXONE SODIUM 1 G: 1 INJECTION, SOLUTION INTRAVENOUS at 18:44

## 2020-01-17 RX ADMIN — METOCLOPRAMIDE 10 MG: 5 INJECTION, SOLUTION INTRAMUSCULAR; INTRAVENOUS at 11:19

## 2020-01-17 RX ADMIN — POTASSIUM CHLORIDE AND SODIUM CHLORIDE 75 ML/HR: 900; 150 INJECTION, SOLUTION INTRAVENOUS at 10:22

## 2020-01-17 RX ADMIN — AMANTADINE HYDROCHLORIDE 100 MG: 50 SOLUTION ORAL at 18:44

## 2020-01-17 RX ADMIN — PANTOPRAZOLE SODIUM 40 MG: 40 INJECTION, POWDER, FOR SOLUTION INTRAVENOUS at 08:58

## 2020-01-17 RX ADMIN — METOCLOPRAMIDE 10 MG: 5 INJECTION, SOLUTION INTRAMUSCULAR; INTRAVENOUS at 06:46

## 2020-01-17 RX ADMIN — PANTOPRAZOLE SODIUM 40 MG: 40 INJECTION, POWDER, FOR SOLUTION INTRAVENOUS at 21:54

## 2020-01-17 RX ADMIN — BISACODYL 10 MG: 10 SUPPOSITORY RECTAL at 18:44

## 2020-01-17 RX ADMIN — DEXTROSE MONOHYDRATE 125 ML/HR: 50 INJECTION, SOLUTION INTRAVENOUS at 16:09

## 2020-01-17 NOTE — PLAN OF CARE
Problem: Patient Care Overview  Goal: Plan of Care Review  Outcome: Ongoing (interventions implemented as appropriate)     No signs of pain today. NG clamped for test today but output has decreased this a.m. Prior to being clamped and no episodes of emesis noted today. KUB completed this morning. SBFT done today and xrays still being taken at bedside. IVF given. Will cont. To monitor and follow current orders.

## 2020-01-17 NOTE — PROGRESS NOTES
Adult Nutrition  Assessment/PES    Patient Name:  Adrian Kuo  YOB: 1980  MRN: [id number]  Admit Date:  1/14/2020    Assessment Date:  1/17/2020    Comments:  D/w RN - pt remains NPO with NGT in place for decompression (though clamped since yesterday). KUB and SBFT done on 1/16. IVF @ 75 cc/hr - serum Na up to 155 today. No bowel sounds.     When able to resume TF via G-tube, recommend:  Fibersource HN @ goal rate 75 cc/hr x 22 hours per day (father stated he was on a continuous drip regimen but based on 22 hours to allow for ADLs or going out into community for dr appointments, etc). Water flushes of 45 cc/hr when IV fluids are stopped.     RD to continue to follow.     Addendum (14:15): Dr. Shah (general surgery) has ordered for trickle feeds to begin at 10 cc/hr thru gastrostomy tube, advance to goal as tolerated. When NG placed back to LWS today, only 50 cc bile evacuated. Abdomen soft, no n/v, thus NGT removed. RD following.     Reason for Assessment     Row Name 01/17/20 1100          Reason for Assessment    Reason For Assessment  follow-up protocol         Nutrition/Diet History     Row Name 01/17/20 1100          Nutrition/Diet History    Typical Food/Fluid Intake  Still NPO/no TF. No bowel sounds per RN. SBFT & KUB done yesterday. Reglan rx'd.            Labs/Tests/Procedures/Meds     Row Name 01/17/20 1101          Labs/Procedures/Meds    Lab Results Reviewed  reviewed     Lab Results Comments  Na 155, Cl, Cr, TF, Alb        Diagnostic Tests/Procedures    Diagnostic Test/Procedure Reviewed  reviewed     Diagnostic Test/Procedures Comments  SBFT: regional ileus vs pSBO        Medications    Pertinent Medications Reviewed  reviewed     Pertinent Medications Comments  abx. reglan, ppi, IVF 75         Physical Findings     Row Name 01/17/20 1103          Physical Findings    Overall Physical Appearance  tetraplegia (quadriplegia)     Gastrointestinal  feeding tube     Tubes   nasogastric tube;gastrostomy tube NGT to LWS     Skin  other (see comments) skin intact           Nutrition Prescription Ordered     Row Name 01/17/20 1103          Nutrition Prescription PO    Current PO Diet  NPO         Evaluation of Received Nutrient/Fluid Intake     Row Name 01/17/20 1103          Fluid Intake Evaluation    IV Fluid (mL)  1800           Problem/Interventions    Intervention Goal     Row Name 01/17/20 1105          Intervention Goal    General  Maintain nutrition;Nutrition support treatment;Reduce/improve symptoms;Meet nutritional needs for age/condition;Disease management/therapy     TF/PN  Inititiate TF/PN     Weight  No significant weight loss         Nutrition Intervention     Row Name 01/17/20 1105          Nutrition Intervention    RD/Tech Action  Follow Tx progress;Care plan reviewd;Recommend/ordered     Recommended/Ordered  EN         Nutrition Prescription     Row Name 01/17/20 1105          Nutrition Prescription EN    Enteral Prescription  Enteral begin/change;Enteral to supply     Enteral Route  Gastrostomy     Product  Fibersource HN     TF Delivery Method  Continuous     Continuous TF Goal Rate (mL/hr)  75 mL/hr x 22 hr/day -allow for clamping for ADL or if parents want to take him about the unit     Continuous TF Starting Rate (mL/hr)  25 mL/hr     Water flush (mL)   45 mL     Water Flush Frequency  Per hour     New EN Prescription Ordered?  No, recommended        EN to Supply    Kcal/Day  1980 Kcal/Day     Protein (gm/day)  89 gm/day     Meet Estimated Kcal Need (%)  100 %     Meet Estimated Protein Need (%)  100 %     TF Free H2O (mL)  1336 mL     Total Free H2O (mL/day)  2326 mL/day         Education/Evaluation     Row Name 01/17/20 1106          Monitor/Evaluation    Monitor  Per protocol;I&O;Symptoms;Pertinent labs;Other (comment) D/w [place]           Electronically signed by:  Racheal Wang RD  01/17/20 11:10 AM

## 2020-01-17 NOTE — PROGRESS NOTES
"General Surgery  Progress Note    CC: Follow-up partial SBO vs ileus    S: His small bowel follow-through yesterday showed passage of contrast into the colon by about 6.5 hours and on repeat x-ray today there is contrast all throughout the colon.  His NG tube has been clamped since his small bowel follow-through yesterday and there has been no nausea or vomiting.  I put the nasogastric tube back to low wall suction just now and was only able to evacuate 50 cc of bile.  His abdomen remains soft.  He has not had any real bowel function according to nursing staff and his father at the bedside.    ROS: Unable to be obtained given nonverbal status    O:/74 (BP Location: Left arm, Patient Position: Lying)   Pulse 93   Temp 97.7 °F (36.5 °C) (Oral)   Resp 18   Ht 180.1 cm (70.91\")   Wt 78.3 kg (172 lb 9.9 oz)   SpO2 93%   BMI 24.14 kg/m²     Intake & Output:  NGT: 50 cc output just now, otherwise clamped all night yesterday    GENERAL: sleeping soundly, appears comfortable  HEENT: former trach scar at base of neck, normocephalic, atraumatic  CHEST: clear to auscultation, no wheezes, rales or rhonchi, symmetric air entry  CARDIAC: regular rate and rhythm    ABDOMEN: soft, nondistended, no facial grimace with abdominal palpation, Gtube in LUQ capped  EXTREMITIES: 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  SKIN: Warm and moist, no rashes    LABS  Results from last 7 days   Lab Units 01/17/20  0536 01/16/20  0546 01/15/20  0632   WBC 10*3/mm3 5.10 7.10 12.20*   HEMOGLOBIN g/dL 14.5 13.6 17.2   HEMATOCRIT % 43.3 41.0 50.7   PLATELETS 10*3/mm3 221 211 264     Results from last 7 days   Lab Units 01/17/20  0536 01/16/20  0546 01/15/20  0632 01/14/20  2316   SODIUM mmol/L 155* 150* 143 141   POTASSIUM mmol/L 3.7 3.5 4.2 4.5   CHLORIDE mmol/L 119* 112* 102 98   CO2 mmol/L 22.9 24.6 25.8 29.0   BUN mg/dL 17 22* 27* 33*   CREATININE mg/dL 0.52* 0.67* 0.81 0.83   CALCIUM mg/dL 9.2 8.1* 9.3 10.9*   BILIRUBIN mg/dL  --  0.4 "  --  0.8   ALK PHOS U/L  --  89  --  143*   ALT (SGPT) U/L  --  40  --  75*   AST (SGOT) U/L  --  19  --  31   GLUCOSE mg/dL 94 103* 130* 179*             A/P: 39 y.o. male with partial small bowel obstruction    I reviewed the small bowel follow-through done yesterday as well as the KUB done today and it appears as though the contrast is progressing nicely into the colon.  There is still some persistent dilation of the proximal small bowel with gradual transition to normal-appearing small bowel distally, which is more consistent with a possible ileus.  This is common in patients with traumatic brain injuries and will require no surgery.  I removed his nasogastric tube given it has been clamped for nearly 24 hours with no nausea or vomiting.  I will start some trickle tube feeds through his gastrostomy tube and begin MiraLAX twice daily.     Lula Shah MD  General and Endoscopic Surgery  [facility]    [address], [address]  [place], [address]  P: [phone]  F: [phone]

## 2020-01-17 NOTE — PROGRESS NOTES
"Daily progress note    Chief complaint  Doing lbetter  No more nausea vomiting  Family at bedside    History of present illness  39-year-old white male who is well-known to our service with history of traumatic brain injury with quadriplegia with contractures and immobility syndrome who is a nursing home resident brought to the emergency room with nausea vomiting started yesterday evening.  Patient evaluated in ER found to have recurrent small bowel obstruction admit for management.  Patient unable to give detailed history most of the history obtained from the mother chart old record and we know the patient from previous admissions.  Patient has NG tube placed and tube feeding stopped and IV fluids started.  And is DNR per his wishes.      REVIEW OF SYSTEMS  Unable to perform ROS: Patient nonverbal      PHYSICAL EXAM  Blood pressure 121/74, pulse 93, temperature 97.7 °F (36.5 °C), temperature source Oral, resp. rate 18, height 180.1 cm (70.91\"), weight 78.3 kg (172 lb 9.9 oz), SpO2 93 %.    Constitutional: No distress.   Head: Normocephalic and atraumatic.   Eyes: Conjunctivae are normal.   Neck: Normal range of motion.   Cardiovascular: Regular rhythm. Tachycardia present.   Pulmonary/Chest: Breath sounds normal. No respiratory distress.   Abdominal: Soft. He exhibits distension. Bowel sounds are hypoactive. There is no tenderness. There is no rebound and no guarding. G-tube in place   Musculoskeletal: He exhibits no edema or tenderness.   Neurological: Unobtainable.    Skin: No rash noted.     LAB RESULTS  Lab Results (last 24 hours)     Procedure Component Value Units Date/Time    Basic Metabolic Panel [[id number]]  (Abnormal) Collected:  01/17/20 0536    Specimen:  Blood Updated:  01/17/20 0651     Glucose 94 mg/dL      BUN 17 mg/dL      Creatinine 0.52 mg/dL      Sodium 155 mmol/L      Potassium 3.7 mmol/L      Chloride 119 mmol/L      CO2 22.9 mmol/L      Calcium 9.2 mg/dL      eGFR Non  Amer >150 " mL/min/1.73      BUN/Creatinine Ratio 32.7     Anion Gap 13.1 mmol/L     Narrative:       GFR Normal >60  Chronic Kidney Disease <60  Kidney Failure <15      CBC & Differential [[id number]] Collected:  01/17/20 0536    Specimen:  Blood Updated:  01/17/20 0628    Narrative:       The following orders were created for panel order CBC & Differential.  Procedure                               Abnormality         Status                     ---------                               -----------         ------                     CBC Auto Differential[[id number]]        Abnormal            Final result                 Please view results for these tests on the individual orders.    CBC Auto Differential [[id number]]  (Abnormal) Collected:  01/17/20 0536    Specimen:  Blood Updated:  01/17/20 0628     WBC 5.10 10*3/mm3      RBC 4.80 10*6/mm3      Hemoglobin 14.5 g/dL      Hematocrit 43.3 %      MCV 90.2 fL      MCH 30.2 pg      MCHC 33.5 g/dL      RDW 13.1 %      RDW-SD 42.9 fl      MPV 11.0 fL      Platelets 221 10*3/mm3      Neutrophil % 66.4 %      Lymphocyte % 19.2 %      Monocyte % 11.0 %      Eosinophil % 1.4 %      Basophil % 1.2 %      Immature Grans % 0.8 %      Neutrophils, Absolute 3.39 10*3/mm3      Lymphocytes, Absolute 0.98 10*3/mm3      Monocytes, Absolute 0.56 10*3/mm3      Eosinophils, Absolute 0.07 10*3/mm3      Basophils, Absolute 0.06 10*3/mm3      Immature Grans, Absolute 0.04 10*3/mm3      nRBC 0.0 /100 WBC         Imaging Results (Last 24 Hours)     Procedure Component Value Units Date/Time    XR Abdomen KUB [[id number]] Collected:  01/17/20 0954     Updated:  01/17/20 1113    Narrative:       XR ABDOMEN KUB-     HISTORY: 39-year-old male with a partial small bowel obstruction.     FINDINGS: There is no significant change in the appearance of the  dilated small bowel and there is collapsed distal small bowel. There is  passage of oral contrast into the colon. There is no significant change  in the partial  small bowel obstruction pattern. An NG tube is noted  within the stomach.     This report was finalized on 1/17/2020 11:10 AM by Dr. Deborah Webb M.D.       FL Small Bowel Follow Through Single-Contrast [[id number]] Collected:  01/16/20 2036     Updated:  01/16/20 2051    Narrative:       FL SMALL BOWEL FOLLOW THROUGH SINGLE-CONTRAST-     INDICATIONS: Partial small bowel obstruction     TECHNIQUE: SMALL BOWEL FOLLOW-THROUGH. Fluoroscopy time: 0 seconds.     COMPARISON: Correlated with CT from 01/15/2020     FINDINGS:     A  image was obtained revealing a nonspecific bowel gas pattern,  with gas in small bowel and large bowel. Residual contrast material is  present in the urinary bladder. Previously demonstrated obstructive  right ureteral calculi are also apparent on this exam, as large as 1.1  cm.     Water-soluble contrast material, as requested, was instilled via  patient's NG tube and followed through the small bowel, with overhead  views obtained. Contrast material passed slowly through the small bowel,  reaching the distal ileum by 6.5 hours. The proximal to mid small bowel  is abnormally distended, whereas the distal small bowel shows a normal  caliber, without a discrete transition point noted. The overall  appearance is similar to the prior CT exam, and suggests regional ileus  versus partial small bowel obstruction, with continued follow-up  recommended.       Impression:       Findings compatible with regional ileus versus partial small bowel  obstruction.        Discussed by telephone with the patient's nurse, Sandy, at 2035,  01/16/2020.     This report was finalized on 1/16/2020 8:43 PM by Dr. Anatoly Boone M.D.             Current Facility-Administered Medications:   •  amantadine (SYMMETREL) solution 100 mg, 100 mg, Per G Tube, Q12H, Marcus Clarke MD, 100 mg at 01/17/20 0114  •  bisacodyl (DULCOLAX) suppository 10 mg, 10 mg, Rectal, Daily, Lula Shah MD  •  cefTRIAXone (ROCEPHIN)  IVPB 1 g, 1 g, Intravenous, Q24H, Michel Clarke MD, Last Rate: 100 mL/hr at 01/16/20 1843, 1 g at 01/16/20 1843  •  HYDROmorphone (DILAUDID) injection 0.5 mg, 0.5 mg, Intravenous, Q4H PRN, Michel Clarke MD, 0.5 mg at 01/15/20 1618  •  metoclopramide (REGLAN) injection 10 mg, 10 mg, Intravenous, 4x Daily AC & at Bedtime, Michel Clarke MD, 10 mg at 01/17/20 1119  •  pantoprazole (PROTONIX) injection 40 mg, 40 mg, Intravenous, Q12H, Michel Clarke MD, 40 mg at 01/17/20 0858  •  polyethylene glycol (MIRALAX) powder 17 g, 17 g, Per G Tube, BID, Lula Shah MD  •  promethazine (PHENERGAN) injection 12.5 mg, 12.5 mg, Intravenous, Q4H PRN, Michel Clarke MD  •  [COMPLETED] Insert peripheral IV, , , Once **AND** sodium chloride 0.9 % flush 10 mL, 10 mL, Intravenous, PRN, Diogenes Rodrigez MD, 10 mL at 01/15/20 0040     ASSESSMENT  Acute small bowel obstruction resolving  Acute UTI  Multiple renal and ureteral calculi  Traumatic brain injury  Quadriplegic with contractures  Diabetes mellitus  Status post G-tube placement  Seizure disorder  Immobilization syndrome    PLAN  CPM  Doing IV fluid to D5W  Discontinue NG tube  IV antibiotics  Tube feeding per general surgery  Symptomatic treatment for pain nausea vomiting  Stress ulcer DVT prophylaxis  Supportive care  Discussed with family  DNR  Follow closely further recommendation current hospital course    MICHEL CLARKE MD

## 2020-01-18 LAB
ANION GAP SERPL CALCULATED.3IONS-SCNC: 12.2 MMOL/L (ref 5–15)
BASOPHILS # BLD AUTO: 0.03 10*3/MM3 (ref 0–0.2)
BASOPHILS NFR BLD AUTO: 0.5 % (ref 0–1.5)
BUN BLD-MCNC: 14 MG/DL (ref 6–20)
BUN/CREAT SERPL: 26.4 (ref 7–25)
CALCIUM SPEC-SCNC: 8.5 MG/DL (ref 8.6–10.5)
CHLORIDE SERPL-SCNC: 111 MMOL/L (ref 98–107)
CO2 SERPL-SCNC: 22.8 MMOL/L (ref 22–29)
CREAT BLD-MCNC: 0.53 MG/DL (ref 0.76–1.27)
DEPRECATED RDW RBC AUTO: 45.2 FL (ref 37–54)
EOSINOPHIL # BLD AUTO: 0.15 10*3/MM3 (ref 0–0.4)
EOSINOPHIL NFR BLD AUTO: 2.7 % (ref 0.3–6.2)
ERYTHROCYTE [DISTWIDTH] IN BLOOD BY AUTOMATED COUNT: 13.3 % (ref 12.3–15.4)
GFR SERPL CREATININE-BSD FRML MDRD: >150 ML/MIN/1.73
GLUCOSE BLD-MCNC: 159 MG/DL (ref 65–99)
GLUCOSE BLDC GLUCOMTR-MCNC: 147 MG/DL (ref 70–130)
GLUCOSE BLDC GLUCOMTR-MCNC: 164 MG/DL (ref 70–130)
HCT VFR BLD AUTO: 41.5 % (ref 37.5–51)
HGB BLD-MCNC: 13.8 G/DL (ref 13–17.7)
IMM GRANULOCYTES # BLD AUTO: 0.03 10*3/MM3 (ref 0–0.05)
IMM GRANULOCYTES NFR BLD AUTO: 0.5 % (ref 0–0.5)
LYMPHOCYTES # BLD AUTO: 1.48 10*3/MM3 (ref 0.7–3.1)
LYMPHOCYTES NFR BLD AUTO: 26.2 % (ref 19.6–45.3)
MCH RBC QN AUTO: 31.2 PG (ref 26.6–33)
MCHC RBC AUTO-ENTMCNC: 33.3 G/DL (ref 31.5–35.7)
MCV RBC AUTO: 93.9 FL (ref 79–97)
MONOCYTES # BLD AUTO: 0.57 10*3/MM3 (ref 0.1–0.9)
MONOCYTES NFR BLD AUTO: 10.1 % (ref 5–12)
NEUTROPHILS # BLD AUTO: 3.39 10*3/MM3 (ref 1.7–7)
NEUTROPHILS NFR BLD AUTO: 60 % (ref 42.7–76)
NRBC BLD AUTO-RTO: 0.2 /100 WBC (ref 0–0.2)
PLATELET # BLD AUTO: 189 10*3/MM3 (ref 140–450)
PMV BLD AUTO: 11 FL (ref 6–12)
POTASSIUM BLD-SCNC: 3.1 MMOL/L (ref 3.5–5.2)
RBC # BLD AUTO: 4.42 10*6/MM3 (ref 4.14–5.8)
SODIUM BLD-SCNC: 146 MMOL/L (ref 136–145)
WBC NRBC COR # BLD: 5.65 10*3/MM3 (ref 3.4–10.8)

## 2020-01-18 PROCEDURE — 25010000002 METOCLOPRAMIDE PER 10 MG: Performed by: HOSPITALIST

## 2020-01-18 PROCEDURE — 85025 COMPLETE CBC W/AUTO DIFF WBC: CPT | Performed by: HOSPITALIST

## 2020-01-18 PROCEDURE — 99231 SBSQ HOSP IP/OBS SF/LOW 25: CPT | Performed by: SURGERY

## 2020-01-18 PROCEDURE — 80048 BASIC METABOLIC PNL TOTAL CA: CPT | Performed by: HOSPITALIST

## 2020-01-18 PROCEDURE — 82962 GLUCOSE BLOOD TEST: CPT

## 2020-01-18 PROCEDURE — 25010000002 CEFTRIAXONE PER 250 MG: Performed by: HOSPITALIST

## 2020-01-18 RX ORDER — DEXTROSE MONOHYDRATE 25 G/50ML
25 INJECTION, SOLUTION INTRAVENOUS
Status: DISCONTINUED | OUTPATIENT
Start: 2020-01-18 | End: 2020-01-19

## 2020-01-18 RX ORDER — POTASSIUM CHLORIDE 29.8 MG/ML
20 INJECTION INTRAVENOUS
Status: DISCONTINUED | OUTPATIENT
Start: 2020-01-18 | End: 2020-01-23

## 2020-01-18 RX ORDER — POTASSIUM CHLORIDE 750 MG/1
40 CAPSULE, EXTENDED RELEASE ORAL AS NEEDED
Status: DISCONTINUED | OUTPATIENT
Start: 2020-01-18 | End: 2020-01-23

## 2020-01-18 RX ORDER — NICOTINE POLACRILEX 4 MG
15 LOZENGE BUCCAL
Status: DISCONTINUED | OUTPATIENT
Start: 2020-01-18 | End: 2020-01-19

## 2020-01-18 RX ORDER — POTASSIUM CHLORIDE 1.5 G/1.77G
40 POWDER, FOR SOLUTION ORAL AS NEEDED
Status: DISCONTINUED | OUTPATIENT
Start: 2020-01-18 | End: 2020-01-23

## 2020-01-18 RX ORDER — PANTOPRAZOLE SODIUM 40 MG/1
40 TABLET, DELAYED RELEASE ORAL
Status: DISCONTINUED | OUTPATIENT
Start: 2020-01-19 | End: 2020-01-19

## 2020-01-18 RX ADMIN — POTASSIUM CHLORIDE 40 MEQ: 1.5 POWDER, FOR SOLUTION ORAL at 20:57

## 2020-01-18 RX ADMIN — POTASSIUM CHLORIDE 40 MEQ: 1.5 POWDER, FOR SOLUTION ORAL at 16:19

## 2020-01-18 RX ADMIN — DEXTROSE MONOHYDRATE 125 ML/HR: 50 INJECTION, SOLUTION INTRAVENOUS at 08:41

## 2020-01-18 RX ADMIN — DEXTROSE MONOHYDRATE 75 ML/HR: 50 INJECTION, SOLUTION INTRAVENOUS at 17:32

## 2020-01-18 RX ADMIN — DEXTROSE MONOHYDRATE 125 ML/HR: 50 INJECTION, SOLUTION INTRAVENOUS at 00:25

## 2020-01-18 RX ADMIN — AMANTADINE HYDROCHLORIDE 100 MG: 50 SOLUTION ORAL at 16:00

## 2020-01-18 RX ADMIN — METOCLOPRAMIDE 10 MG: 5 INJECTION, SOLUTION INTRAMUSCULAR; INTRAVENOUS at 06:40

## 2020-01-18 RX ADMIN — CEFTRIAXONE SODIUM 1 G: 1 INJECTION, SOLUTION INTRAVENOUS at 17:32

## 2020-01-18 RX ADMIN — METOCLOPRAMIDE 10 MG: 5 INJECTION, SOLUTION INTRAMUSCULAR; INTRAVENOUS at 13:52

## 2020-01-18 RX ADMIN — PANTOPRAZOLE SODIUM 40 MG: 40 INJECTION, POWDER, FOR SOLUTION INTRAVENOUS at 09:33

## 2020-01-18 NOTE — PROGRESS NOTES
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  Patient evaluated in ER found to have recurrent small bowel obstruction admit for management.  Patient unable to give detailed history most of the history obtained from the mother chart old record and we know the patient from previous admissions.  Patient has NG tube placed and tube feeding stopped and IV fluids started.  And is DNR per his wishes.      REVIEW OF SYSTEMS  Unable to perform ROS: Patient nonverbal      PHYSICAL EXAM  Blood pressure 93/58, pulse 55, temperature 98 °F (36.7 °C), temperature source Oral, resp. rate 16, height 180.1 cm (70.91\"), weight 78.3 kg (172 lb 9.9 oz), SpO2 94 %.    Constitutional: No distress.   Head: Normocephalic and atraumatic.   Eyes: Conjunctivae are normal.   Neck: Normal range of motion.   Cardiovascular: Regular rhythm. Tachycardia present.   Pulmonary/Chest: Breath sounds normal. No respiratory distress.   Abdominal: Soft. He exhibits distension. Bowel sounds are hypoactive. There is no tenderness. There is no rebound and no guarding. G-tube in place   Musculoskeletal: He exhibits no edema or tenderness.   Neurological: Unobtainable.    Skin: No rash noted.     LAB RESULTS  Lab Results (last 24 hours)     Procedure Component Value Units Date/Time    POC Glucose Once [[id number]]  (Abnormal) Collected:  01/18/20 1148    Specimen:  Blood Updated:  01/18/20 1202     Glucose 147 mg/dL     Basic Metabolic Panel [[id number]]  (Abnormal) Collected:  01/18/20 0650    Specimen:  Blood Updated:  01/18/20 0802     Glucose 159 mg/dL      BUN 14 mg/dL      Creatinine 0.53 mg/dL      Sodium 146 " mmol/L      Potassium 3.1 mmol/L      Chloride 111 mmol/L      CO2 22.8 mmol/L      Calcium 8.5 mg/dL      eGFR Non African Amer >150 mL/min/1.73      BUN/Creatinine Ratio 26.4     Anion Gap 12.2 mmol/L     Narrative:       GFR Normal >60  Chronic Kidney Disease <60  Kidney Failure <15      CBC & Differential [[id number]] Collected:  01/18/20 0650    Specimen:  Blood Updated:  01/18/20 0743    Narrative:       The following orders were created for panel order CBC & Differential.  Procedure                               Abnormality         Status                     ---------                               -----------         ------                     CBC Auto Differential[[id number]]        Normal              Final result                 Please view results for these tests on the individual orders.    CBC Auto Differential [[id number]]  (Normal) Collected:  01/18/20 0650    Specimen:  Blood Updated:  01/18/20 0743     WBC 5.65 10*3/mm3      RBC 4.42 10*6/mm3      Hemoglobin 13.8 g/dL      Hematocrit 41.5 %      MCV 93.9 fL      MCH 31.2 pg      MCHC 33.3 g/dL      RDW 13.3 %      RDW-SD 45.2 fl      MPV 11.0 fL      Platelets 189 10*3/mm3      Neutrophil % 60.0 %      Lymphocyte % 26.2 %      Monocyte % 10.1 %      Eosinophil % 2.7 %      Basophil % 0.5 %      Immature Grans % 0.5 %      Neutrophils, Absolute 3.39 10*3/mm3      Lymphocytes, Absolute 1.48 10*3/mm3      Monocytes, Absolute 0.57 10*3/mm3      Eosinophils, Absolute 0.15 10*3/mm3      Basophils, Absolute 0.03 10*3/mm3      Immature Grans, Absolute 0.03 10*3/mm3      nRBC 0.2 /100 WBC         Imaging Results (Last 24 Hours)     ** No results found for the last 24 hours. **          Current Facility-Administered Medications:   •  amantadine (SYMMETREL) solution 100 mg, 100 mg, Per G Tube, Q12H, Marcus Clarke MD, Stopped at 01/18/20 0200  •  bisacodyl (DULCOLAX) suppository 10 mg, 10 mg, Rectal, Daily, Lula Shah MD, 10 mg at 01/17/20 1844  •   cefTRIAXone (ROCEPHIN) IVPB 1 g, 1 g, Intravenous, Q24H, Marcus Clarke MD, Last Rate: 100 mL/hr at 01/17/20 1844, 1 g at 01/17/20 1844  •  dextrose (D50W) 25 g/ 50mL Intravenous Solution 25 g, 25 g, Intravenous, Q15 Min PRN, Marcus Clarke MD  •  dextrose (D5W) 5 % infusion, 125 mL/hr, Intravenous, Continuous, Marcus Clarke MD, Last Rate: 125 mL/hr at 01/18/20 0841, 125 mL/hr at 01/18/20 0841  •  dextrose (GLUTOSE) oral gel 15 g, 15 g, Oral, Q15 Min PRN, Marcus Clarke MD  •  glucagon (human recombinant) (GLUCAGEN DIAGNOSTIC) injection 1 mg, 1 mg, Subcutaneous, Q15 Min PRN, Marcus Clarke MD  •  HYDROmorphone (DILAUDID) injection 0.5 mg, 0.5 mg, Intravenous, Q4H PRN, Marcus Clarke MD, 0.5 mg at 01/15/20 1618  •  insulin lispro (humaLOG) injection 0-7 Units, 0-7 Units, Subcutaneous, 4x Daily With Meals & Nightly, Marcus Clarke MD  •  metoclopramide (REGLAN) injection 10 mg, 10 mg, Intravenous, 4x Daily AC & at Bedtime, Marcus Clarke MD, 10 mg at 01/18/20 1352  •  pantoprazole (PROTONIX) injection 40 mg, 40 mg, Intravenous, Q12H, Marcus Clarke MD, 40 mg at 01/18/20 0933  •  polyethylene glycol (MIRALAX) powder 17 g, 17 g, Per G Tube, BID, Lula Shah MD, Stopped at 01/17/20 2111  •  potassium chloride (MICRO-K) CR capsule 40 mEq, 40 mEq, Oral, PRN **OR** potassium chloride (KLOR-CON) packet 40 mEq, 40 mEq, Oral, PRN **OR** potassium chloride 20 mEq in 50 mL IVPB, 20 mEq, Intravenous, Q1H PRN, Marcus Clarke MD  •  promethazine (PHENERGAN) injection 12.5 mg, 12.5 mg, Intravenous, Q4H PRN, Marcus Clarke MD  •  [COMPLETED] Insert peripheral IV, , , Once **AND** sodium chloride 0.9 % flush 10 mL, 10 mL, Intravenous, PRN, Diogenes Rodrigez MD, 10 mL at 01/15/20 0040     ASSESSMENT  Acute small bowel obstruction resolving  Acute UTI  Multiple renal and ureteral calculi  Traumatic brain injury  Quadriplegic with contractures  Diabetes mellitus  Status post G-tube placement  Seizure disorder  Immobilization  syndrome    PLAN  CPM  IVF  Replace potassium  IV antibiotics  Tube feeding   Symptomatic treatment for pain nausea vomiting  Stress ulcer DVT prophylaxis  Supportive care  Discussed with family  DNR  Follow closely further recommendation current hospital course    MICHEL KURTZ MD

## 2020-01-18 NOTE — PLAN OF CARE
Problem: Patient Care Overview  Goal: Plan of Care Review  Outcome: Ongoing (interventions implemented as appropriate)  Flowsheets  Taken 1/18/2020 4616  Progress: no change  Taken 1/18/2020 9640  Plan of Care Reviewed With: patient;mother   Patient turned Q2 hours. Patient incont of bowel and bladder. Several loose stools today. Vomited small amount this evening, MD stopped TF until tomorrow morning. Patient does have good bowel sounds. IVF decreased per orders. Accuchecks started per mother's report of patient being diabetic, will follow protocol. Potassium replacement per orders. Will continue to monitor patient and treat according to MD orders.

## 2020-01-18 NOTE — PROGRESS NOTES
Chief Complaint:    Ileus    Subjective:    Nonverbal, but grimacing today  Emesis today    Objective:    Vitals:    01/17/20 1758 01/17/20 2304 01/18/20 0556 01/18/20 1510   BP: 129/85 112/72 93/58 100/59   BP Location: Left arm Right arm Left arm Left arm   Patient Position: Lying Lying Lying Lying   Pulse: 85 64 55 54   Resp: 16 16 16 16   Temp: 97.3 °F (36.3 °C) 97.6 °F (36.4 °C) 98 °F (36.7 °C) 96.8 °F (36 °C)   TempSrc: Oral Oral Oral Oral   SpO2: 95% 95% 94% 96%   Weight:       Height:           Lungs: Clear  Heart: Regular  Abdomen: distended, but soft. G tube site is okay. Bowel sounds are hypoactive.   Extremities: Warm    Labs reviewed.  WBC 5.65, Hgb 13.8, Plts 189  Creat 0.53    I reviewed the SBFT done 1/16/2020 and the KUB done yesterday.    Assessment:    Ileus  Probable UTI     Plan:    Hold tube feeds today. Try again tomorrow.

## 2020-01-18 NOTE — PLAN OF CARE
Problem: Patient Care Overview  Goal: Plan of Care Review  Outcome: Ongoing (interventions implemented as appropriate)  Flowsheets (Taken 1/18/2020 0603)  Progress: no change  Plan of Care Reviewed With: mother  Note:   Pt has been asymptomatic and appears to have rested comfortably. Mother at bsd. Pt repositioned Q2h, skin CDI. Tube feeds started at 0110 per MD order, diana well. Pt has had multiple large BM this shift. D5@125hr.  Continue to monitor and tx per POC and MD order.

## 2020-01-19 ENCOUNTER — APPOINTMENT (OUTPATIENT)
Dept: GENERAL RADIOLOGY | Facility: HOSPITAL | Age: 40
End: 2020-01-19

## 2020-01-19 LAB
ANION GAP SERPL CALCULATED.3IONS-SCNC: 15.8 MMOL/L (ref 5–15)
BASOPHILS # BLD AUTO: 0.03 10*3/MM3 (ref 0–0.2)
BASOPHILS NFR BLD AUTO: 0.3 % (ref 0–1.5)
BUN BLD-MCNC: 8 MG/DL (ref 6–20)
BUN/CREAT SERPL: 13.1 (ref 7–25)
CALCIUM SPEC-SCNC: 8.8 MG/DL (ref 8.6–10.5)
CHLORIDE SERPL-SCNC: 105 MMOL/L (ref 98–107)
CO2 SERPL-SCNC: 16.2 MMOL/L (ref 22–29)
CREAT BLD-MCNC: 0.61 MG/DL (ref 0.76–1.27)
DEPRECATED RDW RBC AUTO: 43.7 FL (ref 37–54)
EOSINOPHIL # BLD AUTO: 0.03 10*3/MM3 (ref 0–0.4)
EOSINOPHIL NFR BLD AUTO: 0.3 % (ref 0.3–6.2)
ERYTHROCYTE [DISTWIDTH] IN BLOOD BY AUTOMATED COUNT: 13.1 % (ref 12.3–15.4)
GFR SERPL CREATININE-BSD FRML MDRD: 147 ML/MIN/1.73
GLUCOSE BLD-MCNC: 147 MG/DL (ref 65–99)
GLUCOSE BLDC GLUCOMTR-MCNC: 122 MG/DL (ref 70–130)
GLUCOSE BLDC GLUCOMTR-MCNC: 134 MG/DL (ref 70–130)
GLUCOSE BLDC GLUCOMTR-MCNC: 76 MG/DL (ref 70–130)
GLUCOSE BLDC GLUCOMTR-MCNC: 90 MG/DL (ref 70–130)
HCT VFR BLD AUTO: 44.9 % (ref 37.5–51)
HGB BLD-MCNC: 15.2 G/DL (ref 13–17.7)
IMM GRANULOCYTES # BLD AUTO: 0.05 10*3/MM3 (ref 0–0.05)
IMM GRANULOCYTES NFR BLD AUTO: 0.5 % (ref 0–0.5)
LYMPHOCYTES # BLD AUTO: 0.83 10*3/MM3 (ref 0.7–3.1)
LYMPHOCYTES NFR BLD AUTO: 7.7 % (ref 19.6–45.3)
MCH RBC QN AUTO: 31 PG (ref 26.6–33)
MCHC RBC AUTO-ENTMCNC: 33.9 G/DL (ref 31.5–35.7)
MCV RBC AUTO: 91.4 FL (ref 79–97)
MONOCYTES # BLD AUTO: 0.62 10*3/MM3 (ref 0.1–0.9)
MONOCYTES NFR BLD AUTO: 5.8 % (ref 5–12)
NEUTROPHILS # BLD AUTO: 9.18 10*3/MM3 (ref 1.7–7)
NEUTROPHILS NFR BLD AUTO: 85.4 % (ref 42.7–76)
NRBC BLD AUTO-RTO: 0.1 /100 WBC (ref 0–0.2)
PLATELET # BLD AUTO: 187 10*3/MM3 (ref 140–450)
PMV BLD AUTO: 10.8 FL (ref 6–12)
POTASSIUM BLD-SCNC: 3.9 MMOL/L (ref 3.5–5.2)
RBC # BLD AUTO: 4.91 10*6/MM3 (ref 4.14–5.8)
SODIUM BLD-SCNC: 137 MMOL/L (ref 136–145)
WBC NRBC COR # BLD: 10.74 10*3/MM3 (ref 3.4–10.8)

## 2020-01-19 PROCEDURE — 85025 COMPLETE CBC W/AUTO DIFF WBC: CPT | Performed by: HOSPITALIST

## 2020-01-19 PROCEDURE — 82962 GLUCOSE BLOOD TEST: CPT

## 2020-01-19 PROCEDURE — 99232 SBSQ HOSP IP/OBS MODERATE 35: CPT | Performed by: SURGERY

## 2020-01-19 PROCEDURE — 71045 X-RAY EXAM CHEST 1 VIEW: CPT

## 2020-01-19 PROCEDURE — 25010000003 POTASSIUM CHLORIDE PER 2 MEQ: Performed by: HOSPITALIST

## 2020-01-19 PROCEDURE — 25810000003 SODIUM CHLORIDE 0.9 % WITH KCL 20 MEQ 20-0.9 MEQ/L-% SOLUTION: Performed by: HOSPITALIST

## 2020-01-19 PROCEDURE — 80048 BASIC METABOLIC PNL TOTAL CA: CPT | Performed by: HOSPITALIST

## 2020-01-19 PROCEDURE — 74018 RADEX ABDOMEN 1 VIEW: CPT

## 2020-01-19 PROCEDURE — 25010000002 HYDROMORPHONE PER 4 MG: Performed by: HOSPITALIST

## 2020-01-19 RX ORDER — SODIUM CHLORIDE AND POTASSIUM CHLORIDE 150; 900 MG/100ML; MG/100ML
75 INJECTION, SOLUTION INTRAVENOUS CONTINUOUS
Status: DISCONTINUED | OUTPATIENT
Start: 2020-01-19 | End: 2020-01-20

## 2020-01-19 RX ORDER — MAGNESIUM CARB/ALUMINUM HYDROX 105-160MG
30 TABLET,CHEWABLE ORAL DAILY
Status: DISCONTINUED | OUTPATIENT
Start: 2020-01-19 | End: 2020-01-22

## 2020-01-19 RX ORDER — IPRATROPIUM BROMIDE AND ALBUTEROL SULFATE 2.5; .5 MG/3ML; MG/3ML
3 SOLUTION RESPIRATORY (INHALATION) EVERY 4 HOURS PRN
Status: DISCONTINUED | OUTPATIENT
Start: 2020-01-19 | End: 2020-01-23

## 2020-01-19 RX ORDER — PANTOPRAZOLE SODIUM 40 MG/1
40 TABLET, DELAYED RELEASE ORAL
Status: DISCONTINUED | OUTPATIENT
Start: 2020-01-19 | End: 2020-01-20 | Stop reason: CLARIF

## 2020-01-19 RX ADMIN — POTASSIUM CHLORIDE 20 MEQ: 29.8 INJECTION, SOLUTION INTRAVENOUS at 01:16

## 2020-01-19 RX ADMIN — HYDROMORPHONE HYDROCHLORIDE 0.5 MG: 1 INJECTION, SOLUTION INTRAMUSCULAR; INTRAVENOUS; SUBCUTANEOUS at 01:16

## 2020-01-19 RX ADMIN — MINERAL OIL 30 ML: 1000 SOLUTION ORAL at 21:37

## 2020-01-19 RX ADMIN — DEXTROSE MONOHYDRATE 75 ML/HR: 50 INJECTION, SOLUTION INTRAVENOUS at 05:47

## 2020-01-19 RX ADMIN — HYDROMORPHONE HYDROCHLORIDE 0.5 MG: 1 INJECTION, SOLUTION INTRAMUSCULAR; INTRAVENOUS; SUBCUTANEOUS at 15:12

## 2020-01-19 RX ADMIN — POTASSIUM CHLORIDE AND SODIUM CHLORIDE 75 ML/HR: 900; 150 INJECTION, SOLUTION INTRAVENOUS at 17:45

## 2020-01-19 NOTE — PROGRESS NOTES
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  Patient evaluated in ER found to have recurrent small bowel obstruction admit for management.  Patient unable to give detailed history most of the history obtained from the mother chart old record and we know the patient from previous admissions.  Patient has NG tube placed and tube feeding stopped and IV fluids started.  And is DNR per his wishes.      REVIEW OF SYSTEMS  Unable to perform      PHYSICAL EXAM  Blood pressure 134/76, pulse 93, temperature 99.5 °F (37.5 °C), temperature source Oral, resp. rate 16, height 180.1 cm (70.91\"), weight 78.3 kg (172 lb 9.9 oz), SpO2 95 %.    Constitutional: No distress.   Head: Normocephalic and atraumatic.   Eyes: Conjunctivae are normal.   Neck: Normal range of motion.   Cardiovascular: Regular rhythm. Tachycardia present.   Pulmonary/Chest: Breath sounds normal. No respiratory distress.   Abdominal: Soft. He exhibits distension. Bowel sounds are hypoactive. There is no tenderness. There is no rebound and no guarding. G-tube in place   Musculoskeletal: He exhibits no edema or tenderness.   Neurological: Unobtainable.    Skin: No rash noted.     LAB RESULTS  Lab Results (last 24 hours)     Procedure Component Value Units Date/Time    Basic Metabolic Panel [[id number]]  (Abnormal) Collected:  01/19/20 0553    Specimen:  Blood Updated:  01/19/20 0651     Glucose 147 mg/dL      BUN 8 mg/dL      Creatinine 0.61 mg/dL      Sodium 137 mmol/L      Potassium 3.9 mmol/L      Chloride 105 mmol/L      CO2 16.2 mmol/L      Calcium 8.8 mg/dL      eGFR Non African Amer 147 mL/min/1.73   "     BUN/Creatinine Ratio 13.1     Anion Gap 15.8 mmol/L     Narrative:       GFR Normal >60  Chronic Kidney Disease <60  Kidney Failure <15      CBC & Differential [[id number]] Collected:  01/19/20 0553    Specimen:  Blood Updated:  01/19/20 0625    Narrative:       The following orders were created for panel order CBC & Differential.  Procedure                               Abnormality         Status                     ---------                               -----------         ------                     CBC Auto Differential[[id number]]        Abnormal            Final result                 Please view results for these tests on the individual orders.    CBC Auto Differential [[id number]]  (Abnormal) Collected:  01/19/20 0553    Specimen:  Blood Updated:  01/19/20 0625     WBC 10.74 10*3/mm3      RBC 4.91 10*6/mm3      Hemoglobin 15.2 g/dL      Hematocrit 44.9 %      MCV 91.4 fL      MCH 31.0 pg      MCHC 33.9 g/dL      RDW 13.1 %      RDW-SD 43.7 fl      MPV 10.8 fL      Platelets 187 10*3/mm3      Neutrophil % 85.4 %      Lymphocyte % 7.7 %      Monocyte % 5.8 %      Eosinophil % 0.3 %      Basophil % 0.3 %      Immature Grans % 0.5 %      Neutrophils, Absolute 9.18 10*3/mm3      Lymphocytes, Absolute 0.83 10*3/mm3      Monocytes, Absolute 0.62 10*3/mm3      Eosinophils, Absolute 0.03 10*3/mm3      Basophils, Absolute 0.03 10*3/mm3      Immature Grans, Absolute 0.05 10*3/mm3      nRBC 0.1 /100 WBC     POC Glucose Once [[id number]]  (Abnormal) Collected:  01/19/20 0602    Specimen:  Blood Updated:  01/19/20 0603     Glucose 134 mg/dL     POC Glucose Once [[id number]]  (Normal) Collected:  01/19/20 0003    Specimen:  Blood Updated:  01/19/20 0004     Glucose 122 mg/dL     POC Glucose Once [[id number]]  (Abnormal) Collected:  01/18/20 1749    Specimen:  Blood Updated:  01/18/20 1750     Glucose 164 mg/dL         Imaging Results (Last 24 Hours)     Procedure Component Value Units Date/Time    XR Abdomen KUB [[id number]]  Collected:  01/19/20 1331     Updated:  01/19/20 1336    Narrative:       XR ABDOMEN KUB-     INDICATIONS: Small bowel obstruction     TECHNIQUE: Supine views of the abdomen     COMPARISON: 01/17/2020     FINDINGS:      Previous abnormal distention of small bowel is decreased but not  resolved. Contrast material has passed into the small bowel. Residual  contrast material seen in the colon and rectum.  No supine evidence for  free intraperitoneal gas. Follow-up can be obtained as indications  persist.             Impression:          As described.     This report was finalized on 1/19/2020 1:33 PM by Dr. Anatoly Boone M.D.             Current Facility-Administered Medications:   •  amantadine (SYMMETREL) solution 100 mg, 100 mg, Per G Tube, Q12H, Marcus Clarke MD, 100 mg at 01/18/20 1600  •  bisacodyl (DULCOLAX) suppository 10 mg, 10 mg, Rectal, Daily, Lula Shah MD, 10 mg at 01/17/20 1844  •  cefTRIAXone (ROCEPHIN) IVPB 1 g, 1 g, Intravenous, Q24H, Marcus Clarke MD, Last Rate: 100 mL/hr at 01/18/20 1732, 1 g at 01/18/20 1732  •  dextrose (D50W) 25 g/ 50mL Intravenous Solution 25 g, 25 g, Intravenous, Q15 Min PRN, Marcus Clarke MD  •  dextrose (D5W) 5 % infusion, 75 mL/hr, Intravenous, Continuous, Marcus Clarke MD, Last Rate: 75 mL/hr at 01/19/20 0547, 75 mL/hr at 01/19/20 0547  •  dextrose (GLUTOSE) oral gel 15 g, 15 g, Oral, Q15 Min PRN, Marcus Clarke MD  •  glucagon (human recombinant) (GLUCAGEN DIAGNOSTIC) injection 1 mg, 1 mg, Subcutaneous, Q15 Min PRN, Marcus Clarke MD  •  HYDROmorphone (DILAUDID) injection 0.5 mg, 0.5 mg, Intravenous, Q4H PRN, Marcus Clarke MD, 0.5 mg at 01/19/20 0116  •  insulin lispro (humaLOG) injection 0-7 Units, 0-7 Units, Subcutaneous, 4x Daily With Meals & Nightly, Marcus Clarke MD  •  pantoprazole (PROTONIX) EC tablet 40 mg, 40 mg, Oral, Q AM, Marcus Clarke MD  •  polyethylene glycol (MIRALAX) powder 17 g, 17 g, Per G Tube, BID, Lula Shah MD, Stopped at  01/17/20 2111  •  potassium chloride (MICRO-K) CR capsule 40 mEq, 40 mEq, Oral, PRN **OR** potassium chloride (KLOR-CON) packet 40 mEq, 40 mEq, Oral, PRN, 40 mEq at 01/18/20 2057 **OR** potassium chloride 20 mEq in 50 mL IVPB, 20 mEq, Intravenous, Q1H PRN, Michel Clarke MD, Last Rate: 50 mL/hr at 01/19/20 0116, 20 mEq at 01/19/20 0116  •  promethazine (PHENERGAN) injection 12.5 mg, 12.5 mg, Intravenous, Q4H PRN, Michel Clarke MD  •  [COMPLETED] Insert peripheral IV, , , Once **AND** sodium chloride 0.9 % flush 10 mL, 10 mL, Intravenous, PRN, Diogenes Rodrigez MD, 10 mL at 01/15/20 0040     ASSESSMENT  Acute small bowel obstruction resolving  Acute UTI  Multiple renal and ureteral calculi  Traumatic brain injury  Quadriplegic with contractures  Diabetes mellitus  Status post G-tube placement  Seizure disorder  Immobilization syndrome    PLAN  CPM  IVF  Tube feeding at 10 cc/h  Symptomatic treatment for pain nausea vomiting  Stress ulcer DVT prophylaxis  Supportive care  Discussed with family  DNR  Follow closely further recommendation current hospital course    MICHEL CLARKE MD

## 2020-01-19 NOTE — PROGRESS NOTES
Chief Complaint:    Ileus    Subjective:    Appears comfortable today  Emesis today after taking meds per G-tube    Objective:    Vitals:    01/18/20 1745 01/18/20 2242 01/19/20 0604 01/19/20 1456   BP: 103/63 114/71 134/76 150/75   BP Location: Right arm Right arm Right arm Left arm   Patient Position: Lying Lying Lying Lying   Pulse: 55 55 93 107   Resp: 16 16 16 16   Temp: 97 °F (36.1 °C) 96.8 °F (36 °C) 99.5 °F (37.5 °C) 99.3 °F (37.4 °C)   TempSrc: Oral Oral Oral Oral   SpO2: 96% 94% 95% 94%   Weight:       Height:           Lungs: Occasional wheezing.  Heart: Regular  Abdomen: less distended, and soft. G tube site is okay. Bowel sounds are present.   Extremities: Warm    Labs reviewed.  WBC 10.74, Hgb 15.2, Plts 187  Creat 0.61    KUB today shows gas in the small bowel to a lesser extent than yesterday, but still abnormal.  There is barium in the distal colon.    Assessment:    Ileus  Probable UTI     Plan:    Hold tube feeding for now.  I am going to add mineral oil per G-tube.  Repeat KUB tomorrow.  CXR today.

## 2020-01-19 NOTE — PLAN OF CARE
Problem: Patient Care Overview  Goal: Plan of Care Review  Outcome: Ongoing (interventions implemented as appropriate)  Flowsheets (Taken 1/19/2020 2305)  Progress: no change  Plan of Care Reviewed With: mother  Note:   2100 admin potassium supplement via peg tube, shortly thereafter pt had mod amount of emesis with possible aspiration. Last dose of PRN K admin per IV, redraw scheduled with am labs. Scheduled meds per PEG held d/t emesis. Pt sx of pain/discomfort, PRN med admin and sx resolved, mother Lizzeth at bsd and agreeable. IVF infusing. Continue to monitor and tx per POC and MD orders.

## 2020-01-20 ENCOUNTER — APPOINTMENT (OUTPATIENT)
Dept: GENERAL RADIOLOGY | Facility: HOSPITAL | Age: 40
End: 2020-01-20

## 2020-01-20 LAB
ANION GAP SERPL CALCULATED.3IONS-SCNC: 15.2 MMOL/L (ref 5–15)
BASOPHILS # BLD AUTO: 0.04 10*3/MM3 (ref 0–0.2)
BASOPHILS NFR BLD AUTO: 0.4 % (ref 0–1.5)
BUN BLD-MCNC: 6 MG/DL (ref 6–20)
BUN/CREAT SERPL: 10 (ref 7–25)
CALCIUM SPEC-SCNC: 8.7 MG/DL (ref 8.6–10.5)
CHLORIDE SERPL-SCNC: 106 MMOL/L (ref 98–107)
CO2 SERPL-SCNC: 19.8 MMOL/L (ref 22–29)
CREAT BLD-MCNC: 0.6 MG/DL (ref 0.76–1.27)
DEPRECATED RDW RBC AUTO: 44 FL (ref 37–54)
EOSINOPHIL # BLD AUTO: 0.11 10*3/MM3 (ref 0–0.4)
EOSINOPHIL NFR BLD AUTO: 1.1 % (ref 0.3–6.2)
ERYTHROCYTE [DISTWIDTH] IN BLOOD BY AUTOMATED COUNT: 13.1 % (ref 12.3–15.4)
GFR SERPL CREATININE-BSD FRML MDRD: 150 ML/MIN/1.73
GLUCOSE BLD-MCNC: 79 MG/DL (ref 65–99)
GLUCOSE BLDC GLUCOMTR-MCNC: 102 MG/DL (ref 70–130)
GLUCOSE BLDC GLUCOMTR-MCNC: 133 MG/DL (ref 70–130)
GLUCOSE BLDC GLUCOMTR-MCNC: 69 MG/DL (ref 70–130)
GLUCOSE BLDC GLUCOMTR-MCNC: 79 MG/DL (ref 70–130)
GLUCOSE BLDC GLUCOMTR-MCNC: 83 MG/DL (ref 70–130)
GLUCOSE BLDC GLUCOMTR-MCNC: 89 MG/DL (ref 70–130)
HCT VFR BLD AUTO: 44.2 % (ref 37.5–51)
HGB BLD-MCNC: 15 G/DL (ref 13–17.7)
IMM GRANULOCYTES # BLD AUTO: 0.04 10*3/MM3 (ref 0–0.05)
IMM GRANULOCYTES NFR BLD AUTO: 0.4 % (ref 0–0.5)
LYMPHOCYTES # BLD AUTO: 1.42 10*3/MM3 (ref 0.7–3.1)
LYMPHOCYTES NFR BLD AUTO: 13.6 % (ref 19.6–45.3)
MCH RBC QN AUTO: 31 PG (ref 26.6–33)
MCHC RBC AUTO-ENTMCNC: 33.9 G/DL (ref 31.5–35.7)
MCV RBC AUTO: 91.3 FL (ref 79–97)
MONOCYTES # BLD AUTO: 0.8 10*3/MM3 (ref 0.1–0.9)
MONOCYTES NFR BLD AUTO: 7.6 % (ref 5–12)
NEUTROPHILS # BLD AUTO: 8.05 10*3/MM3 (ref 1.7–7)
NEUTROPHILS NFR BLD AUTO: 76.9 % (ref 42.7–76)
NRBC BLD AUTO-RTO: 0 /100 WBC (ref 0–0.2)
PLATELET # BLD AUTO: 203 10*3/MM3 (ref 140–450)
PMV BLD AUTO: 10.6 FL (ref 6–12)
POTASSIUM BLD-SCNC: 3.6 MMOL/L (ref 3.5–5.2)
RBC # BLD AUTO: 4.84 10*6/MM3 (ref 4.14–5.8)
SODIUM BLD-SCNC: 141 MMOL/L (ref 136–145)
WBC NRBC COR # BLD: 10.46 10*3/MM3 (ref 3.4–10.8)

## 2020-01-20 PROCEDURE — 85025 COMPLETE CBC W/AUTO DIFF WBC: CPT | Performed by: HOSPITALIST

## 2020-01-20 PROCEDURE — 99232 SBSQ HOSP IP/OBS MODERATE 35: CPT | Performed by: SURGERY

## 2020-01-20 PROCEDURE — 80048 BASIC METABOLIC PNL TOTAL CA: CPT | Performed by: HOSPITALIST

## 2020-01-20 PROCEDURE — 25010000003 LEVETIRACETAM IN NACL 0.54% 1500 MG/100ML SOLUTION: Performed by: HOSPITALIST

## 2020-01-20 PROCEDURE — 25810000003 SODIUM CHLORIDE 0.9 % WITH KCL 20 MEQ 20-0.9 MEQ/L-% SOLUTION: Performed by: HOSPITALIST

## 2020-01-20 PROCEDURE — 74018 RADEX ABDOMEN 1 VIEW: CPT

## 2020-01-20 PROCEDURE — 25010000002 HYDROMORPHONE PER 4 MG: Performed by: HOSPITALIST

## 2020-01-20 PROCEDURE — 82962 GLUCOSE BLOOD TEST: CPT

## 2020-01-20 RX ORDER — LANSOPRAZOLE
30 KIT
Status: DISCONTINUED | OUTPATIENT
Start: 2020-01-20 | End: 2020-01-23 | Stop reason: HOSPADM

## 2020-01-20 RX ORDER — LEVETIRACETAM 15 MG/ML
1500 INJECTION INTRAVASCULAR EVERY 12 HOURS SCHEDULED
Status: DISCONTINUED | OUTPATIENT
Start: 2020-01-20 | End: 2020-01-21

## 2020-01-20 RX ORDER — SENNA AND DOCUSATE SODIUM 50; 8.6 MG/1; MG/1
1 TABLET, FILM COATED ORAL 2 TIMES DAILY
COMMUNITY
End: 2020-01-23 | Stop reason: HOSPADM

## 2020-01-20 RX ORDER — HYDROMORPHONE HYDROCHLORIDE 1 MG/ML
0.5 INJECTION, SOLUTION INTRAMUSCULAR; INTRAVENOUS; SUBCUTANEOUS
Status: DISCONTINUED | OUTPATIENT
Start: 2020-01-20 | End: 2020-01-23

## 2020-01-20 RX ORDER — DEXTROSE MONOHYDRATE 25 G/50ML
25 INJECTION, SOLUTION INTRAVENOUS
Status: DISCONTINUED | OUTPATIENT
Start: 2020-01-20 | End: 2020-01-23

## 2020-01-20 RX ORDER — LEVETIRACETAM 100 MG/ML
1500 SOLUTION ORAL 2 TIMES DAILY
COMMUNITY

## 2020-01-20 RX ORDER — DEXTROSE AND SODIUM CHLORIDE 5; .9 G/100ML; G/100ML
75 INJECTION, SOLUTION INTRAVENOUS CONTINUOUS
Status: DISCONTINUED | OUTPATIENT
Start: 2020-01-20 | End: 2020-01-21

## 2020-01-20 RX ORDER — POLYETHYLENE GLYCOL 3350 17 G/17G
17 POWDER, FOR SOLUTION ORAL DAILY
Status: DISCONTINUED | OUTPATIENT
Start: 2020-01-21 | End: 2020-01-23 | Stop reason: HOSPADM

## 2020-01-20 RX ORDER — HYDROCODONE BITARTRATE AND ACETAMINOPHEN 7.5; 325 MG/1; MG/1
1 TABLET ORAL EVERY 4 HOURS PRN
Status: ON HOLD | COMMUNITY
End: 2020-01-23 | Stop reason: SDUPTHER

## 2020-01-20 RX ADMIN — DEXTROSE AND SODIUM CHLORIDE 75 ML/HR: 5; 900 INJECTION, SOLUTION INTRAVENOUS at 13:44

## 2020-01-20 RX ADMIN — LANSOPRAZOLE 30 MG: KIT at 18:51

## 2020-01-20 RX ADMIN — LANSOPRAZOLE 30 MG: KIT at 13:43

## 2020-01-20 RX ADMIN — AMANTADINE HYDROCHLORIDE 100 MG: 50 SOLUTION ORAL at 13:43

## 2020-01-20 RX ADMIN — HYDROMORPHONE HYDROCHLORIDE 0.5 MG: 1 INJECTION, SOLUTION INTRAMUSCULAR; INTRAVENOUS; SUBCUTANEOUS at 20:24

## 2020-01-20 RX ADMIN — MINERAL OIL 30 ML: 1000 SOLUTION ORAL at 09:40

## 2020-01-20 RX ADMIN — DEXTROSE MONOHYDRATE 25 ML: 25 INJECTION, SOLUTION INTRAVENOUS at 06:30

## 2020-01-20 RX ADMIN — POTASSIUM CHLORIDE AND SODIUM CHLORIDE 75 ML/HR: 900; 150 INJECTION, SOLUTION INTRAVENOUS at 09:39

## 2020-01-20 RX ADMIN — HYDROMORPHONE HYDROCHLORIDE 0.5 MG: 1 INJECTION, SOLUTION INTRAMUSCULAR; INTRAVENOUS; SUBCUTANEOUS at 01:01

## 2020-01-20 RX ADMIN — LEVETIRACETAM 1500 MG: 15 INJECTION INTRAVENOUS at 21:55

## 2020-01-20 RX ADMIN — HYDROMORPHONE HYDROCHLORIDE 0.5 MG: 1 INJECTION, SOLUTION INTRAMUSCULAR; INTRAVENOUS; SUBCUTANEOUS at 03:43

## 2020-01-20 RX ADMIN — LEVETIRACETAM 1500 MG: 15 INJECTION INTRAVENOUS at 09:40

## 2020-01-20 RX ADMIN — AMANTADINE HYDROCHLORIDE 100 MG: 50 SOLUTION ORAL at 02:45

## 2020-01-20 NOTE — PLAN OF CARE
Problem: Patient Care Overview  Goal: Plan of Care Review  Outcome: Ongoing (interventions implemented as appropriate)  Flowsheets  Taken 1/20/2020 0642  Progress: no change  Outcome Summary: pt was medicated 2x tonight for signs of pain with 0.5 mg of dilaudid, gtube leaking alittle bit. IV keppra started. blood sugar low gave 25 mg of d50w. increased to 133. pt bed padded for seizure precautions, will continue to monitor and treat per MD  Taken 1/19/2020 8890  Plan of Care Reviewed With: patient;mother     Problem: Patient Care Overview  Goal: Individualization and Mutuality  Outcome: Ongoing (interventions implemented as appropriate)  Flowsheets  Taken 1/16/2020 0520  Patient Specific Goals (Include Timeframe): to be comfortable and go home  Taken 1/20/2020 0642  Patient Specific Interventions: PRN pain meds, scheduled meds, Gtube, q2 turns

## 2020-01-20 NOTE — CONSULTS
Adult Nutrition  Assessment/PES    Patient Name:  Adrian Kuo  YOB: 1980  MRN: [id number]  Admit Date:  1/14/2020    Assessment Date:  1/20/2020    Comments:  TF consult    Ok'd to resume TF, again. Will start with trickle feeds of 10 cc/hr. Formula changed to Diabetisource AC as pt is diabetic. Goal rate = 75 cc/hr x 22 hr/day, but do not advance until okayed by surgeon. Water flushes 45 cc/hr when no IV fluids are running.     RD to monitor tolerance.     Reason for Assessment     Row Name 01/20/20 1456          Reason for Assessment    Reason For Assessment  physician consult;TF/PN         Nutrition/Diet History     Row Name 01/20/20 1450          Nutrition/Diet History    Typical Food/Fluid Intake  Had a large BM but also emesis on 1/18. TF had been restarted then held due to this. MD ordered to resume TF today. Will start at trickle again. Mother confirmed that pt is diabetic to RN over weekend. Will place him on Diabetisource AC.            Labs/Tests/Procedures/Meds     Row Name 01/20/20 1451          Labs/Procedures/Meds    Lab Results Reviewed  reviewed     Lab Results Comments  Glu         Diagnostic Tests/Procedures    Diagnostic Test/Procedure Reviewed  reviewed     Diagnostic Test/Procedures Comments  KUB - mild fecal impaction at the distal colon and rectum         Medications    Pertinent Medications Reviewed  reviewed         Physical Findings     Row Name 01/20/20 1455          Physical Findings    Overall Physical Appearance  tetraplegia (quadriplegia)     Gastrointestinal  feeding tube     Tubes  gastrostomy tube     Skin  other (see comments) skin intact           Nutrition Prescription Ordered     Row Name 01/20/20 1452          Nutrition Prescription PO    Current PO Diet  NPO         Evaluation of Received Nutrient/Fluid Intake     Row Name 01/20/20 1458          Fluid Intake Evaluation    IV Fluid (mL)  1800           Problem/Interventions:    Intervention Goal      Row Name 01/20/20 1500          Intervention Goal    General  Maintain nutrition;Nutrition support treatment     TF/PN  Inititiate TF/PN;Tolerate TF at goal         Nutrition Intervention     Row Name 01/20/20 1500          Nutrition Intervention    RD/Tech Action  Recommend/ordered;Follow Tx progress;Care plan reviewd     Recommended/Ordered  EN         Nutrition Prescription     Row Name 01/20/20 1501          Nutrition Prescription EN    Enteral Prescription  Enteral begin/change;Enteral to supply     Enteral Route  Gastrostomy     Product  Diabetisource     TF Delivery Method  Continuous     Continuous TF Goal Rate (mL/hr)  75 mL/hr x 22 hr     Water flush (mL)   45 mL when IV fluids dc     Water Flush Frequency  Per hour     New EN Prescription Ordered?  Yes except keep at 10 cc/hr (trickle) - adv per surgeon's orders only        EN to Supply    Kcal/Day  1980 Kcal/Day     Protein (gm/day)  99 gm/day     Meet Estimated Kcal Need (%)  101 %     Meet Estimated Protein Need (%)  126 %     TF Free H2O (mL)  1353 mL     Total Free H2O (mL/day)  2343 mL/day         Education/Evaluation     Row Name 01/20/20 1508          Monitor/Evaluation    Monitor  Per protocol           Electronically signed by:  Racheal Wang RD  01/20/20 3:08 PM

## 2020-01-20 NOTE — PROGRESS NOTES
"General Surgery  Progress Note    CC: Follow-up partial SBO vs ileus    S: Through the weekend, he was unable to tolerate tube feeds due to some nausea with vomiting.  He has had multiple bowel movements daily, with 5 bowel movements recorded yesterday and at least 2 so far today.  His mother is at the bedside and thinks that his vomiting through the weekend was due to potassium supplementation given through his gastrostomy tube.    ROS: Unable to be obtained given nonverbal status    O:/79 (BP Location: Right arm, Patient Position: Lying)   Pulse 97   Temp 98.6 °F (37 °C) (Oral)   Resp 16   Ht 180.1 cm (70.91\")   Wt 78.3 kg (172 lb 9.9 oz)   SpO2 95%   BMI 24.14 kg/m²     Intake & Output:  BM x5    GENERAL: sleeping soundly, appears comfortable  HEENT: former trach scar at base of neck, normocephalic, atraumatic  CHEST: clear to auscultation, no wheezes, rales or rhonchi, symmetric air entry  CARDIAC: regular rate and rhythm    ABDOMEN: soft, nondistended, no facial grimace with abdominal palpation, Gtube in LUQ capped  EXTREMITIES: 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  SKIN: Warm and moist, no rashes    LABS  Results from last 7 days   Lab Units 01/20/20  0504 01/19/20  0553 01/18/20  0650   WBC 10*3/mm3 10.46 10.74 5.65   HEMOGLOBIN g/dL 15.0 15.2 13.8   HEMATOCRIT % 44.2 44.9 41.5   PLATELETS 10*3/mm3 203 187 189     Results from last 7 days   Lab Units 01/20/20  0504 01/19/20  0553 01/18/20  0650  01/16/20  0546  01/14/20  2316   SODIUM mmol/L 141 137 146*   < > 150*   < > 141   POTASSIUM mmol/L 3.6 3.9 3.1*   < > 3.5   < > 4.5   CHLORIDE mmol/L 106 105 111*   < > 112*   < > 98   CO2 mmol/L 19.8* 16.2* 22.8   < > 24.6   < > 29.0   BUN mg/dL 6 8 14   < > 22*   < > 33*   CREATININE mg/dL 0.60* 0.61* 0.53*   < > 0.67*   < > 0.83   CALCIUM mg/dL 8.7 8.8 8.5*   < > 8.1*   < > 10.9*   BILIRUBIN mg/dL  --   --   --   --  0.4  --  0.8   ALK PHOS U/L  --   --   --   --  89  --  143*   ALT (SGPT) U/L  " --   --   --   --  40  --  75*   AST (SGOT) U/L  --   --   --   --  19  --  31   GLUCOSE mg/dL 79 147* 159*   < > 103*   < > 179*    < > = values in this interval not displayed.             A/P: 39 y.o. male with partial small bowel obstruction versus ileus    His abdominal films continue to look better and better each day, but he has been unable to tolerate initiation of tube feeds over the weekend.  I have ordered to start tube feeds at 10 cc/h today and discussed with his mother that if he fails to tolerate these trickle tube feeds and/or tube feed advancement I will need to perform a diagnostic laparoscopy with possible lysis of adhesions.    Lula Shah MD  General and Endoscopic Surgery  [facility]    [address], [address]  [place], [place], [address]  P: [phone]  F: [phone]

## 2020-01-20 NOTE — PROGRESS NOTES
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  Patient evaluated in ER found to have recurrent small bowel obstruction admit for management.  Patient unable to give detailed history most of the history obtained from the mother chart old record and we know the patient from previous admissions.  Patient has NG tube placed and tube feeding stopped and IV fluids started.  And is DNR per his wishes.      REVIEW OF SYSTEMS  Unable to perform      PHYSICAL EXAM  Blood pressure 120/79, pulse 97, temperature 98.6 °F (37 °C), temperature source Oral, resp. rate 16, height 180.1 cm (70.91\"), weight 78.3 kg (172 lb 9.9 oz), SpO2 95 %.    Constitutional: No distress.   Head: Normocephalic and atraumatic.   Eyes: Conjunctivae are normal.   Neck: Normal range of motion.   Cardiovascular: Regular rhythm. Tachycardia present.   Pulmonary/Chest: Breath sounds normal. No respiratory distress.   Abdominal: Soft. He exhibits distension. Bowel sounds are hypoactive. There is no tenderness. There is no rebound and no guarding. G-tube in place   Musculoskeletal: He exhibits no edema or tenderness.   Neurological: Unobtainable.    Skin: No rash noted.     LAB RESULTS  Lab Results (last 24 hours)     Procedure Component Value Units Date/Time    POC Glucose Once [[id number]]  (Normal) Collected:  01/20/20 1221    Specimen:  Blood Updated:  01/20/20 1229     Glucose 79 mg/dL     POC Glucose Once [[id number]]  (Abnormal) Collected:  01/20/20 0641    Specimen:  Blood Updated:  01/20/20 0642     Glucose 133 mg/dL     POC Glucose Once [[id number]]  (Abnormal) Collected:  01/20/20 0607    " Specimen:  Blood Updated:  01/20/20 0608     Glucose 69 mg/dL     Basic Metabolic Panel [[id number]]  (Abnormal) Collected:  01/20/20 0504    Specimen:  Blood Updated:  01/20/20 0551     Glucose 79 mg/dL      BUN 6 mg/dL      Creatinine 0.60 mg/dL      Sodium 141 mmol/L      Potassium 3.6 mmol/L      Chloride 106 mmol/L      CO2 19.8 mmol/L      Calcium 8.7 mg/dL      eGFR Non African Amer 150 mL/min/1.73      BUN/Creatinine Ratio 10.0     Anion Gap 15.2 mmol/L     Narrative:       GFR Normal >60  Chronic Kidney Disease <60  Kidney Failure <15      CBC & Differential [[id number]] Collected:  01/20/20 0504    Specimen:  Blood Updated:  01/20/20 0521    Narrative:       The following orders were created for panel order CBC & Differential.  Procedure                               Abnormality         Status                     ---------                               -----------         ------                     CBC Auto Differential[[id number]]        Abnormal            Final result                 Please view results for these tests on the individual orders.    CBC Auto Differential [[id number]]  (Abnormal) Collected:  01/20/20 0504    Specimen:  Blood Updated:  01/20/20 0521     WBC 10.46 10*3/mm3      RBC 4.84 10*6/mm3      Hemoglobin 15.0 g/dL      Hematocrit 44.2 %      MCV 91.3 fL      MCH 31.0 pg      MCHC 33.9 g/dL      RDW 13.1 %      RDW-SD 44.0 fl      MPV 10.6 fL      Platelets 203 10*3/mm3      Neutrophil % 76.9 %      Lymphocyte % 13.6 %      Monocyte % 7.6 %      Eosinophil % 1.1 %      Basophil % 0.4 %      Immature Grans % 0.4 %      Neutrophils, Absolute 8.05 10*3/mm3      Lymphocytes, Absolute 1.42 10*3/mm3      Monocytes, Absolute 0.80 10*3/mm3      Eosinophils, Absolute 0.11 10*3/mm3      Basophils, Absolute 0.04 10*3/mm3      Immature Grans, Absolute 0.04 10*3/mm3      nRBC 0.0 /100 WBC     POC Glucose Once [[id number]]  (Normal) Collected:  01/19/20 2044    Specimen:  Blood Updated:  01/19/20 2046      Glucose 76 mg/dL     POC Glucose Once [[id number]]  (Normal) Collected:  01/19/20 1754    Specimen:  Blood Updated:  01/19/20 1756     Glucose 90 mg/dL         Imaging Results (Last 24 Hours)     Procedure Component Value Units Date/Time    XR Abdomen KUB [[id number]] Collected:  01/20/20 0918     Updated:  01/20/20 0933    Narrative:       CLINICAL HISTORY: 39-year-old male, evaluation for a small bowel  obstruction.     AP SUPINE ABDOMEN TWO VIEWS DATED 01/20/2020     FINDINGS: When compared to the most recent abdominal radiograph dated  01/19/2020 and the small bowel follow-through exam of 01/16/2020, the  current radiographs demonstrate slight further decrease in magnitude of  gaseous dilatation of mid to left upper abdominal mesenteric small  bowel. Some air-filled small bowel up to 4.3 cm diameter is present on  the current exam. Since the abdominal radiographs of 01/19/2020 there  has been some additional clearing of barium from colon, but some dense  contrast material remains in the distal colon with traces of contrast  still present in the ascending and transverse colon as well. Surgical  clips in the right upper abdomen, a small calcification in the right  pelvic cavity compatible with phlebolith, left lower torso medication  pump device with tubing extending into the distribution of thoracolumbar  bony spinal canal, and tubular device from upper margin of upper  abdominal radiograph into the lower abdomen and pelvic cavity area  compatible with reported ventriculoperitoneal shunt tubing are again  demonstrated. There is still considerable stool mixed with contrast  material in the distended rectum compatible with partial fecal  impaction. Severe degenerative changes at the hips are again noted.  Gastrostomy tube with balloon tip superimposing gastric gas remains.     CONCLUSION: Slight further decrease in magnitude of dilatation of left  abdominal mid to upper mesenteric small bowel. Contrast material in  the  colon appears to be gradually progressing with some clearance of  contrast since the abdominal radiographs 1 day ago. The small bowel  follow-through report indicates this was water-soluble contrast. There  is, however, still some apparent formed stool mixed with contrast in the  rectum that is still of some concern due to the borderline to mild fecal  impaction at the distal colon and rectum demonstrated on the abdominal  radiographs of 01/16/2020.     This report was finalized on 1/20/2020 9:30 AM by Dr. Sundeep Han M.D.       XR Chest 1 View [[id number]] Collected:  01/19/20 1919     Updated:  01/19/20 1923    Narrative:       PORTABLE CHEST 01/19/2020 AT 4:50 PM     CLINICAL HISTORY: Wheezing.     Compared to the previous chest dated 08/06/2018.     As on the previous chest x-ray, the lungs are poorly inflated, but  appear free of infiltrates. There are no pleural effusions. The  cardiomediastinal silhouette is unremarkable.     IMPRESSIONS: Poor lung inflation. No acute process is identified.     This report was finalized on 1/19/2020 7:20 PM by Dr. Leon Naqvi M.D.       XR Abdomen KUB [[id number]] Collected:  01/19/20 1331     Updated:  01/19/20 1336    Narrative:       XR ABDOMEN KUB-     INDICATIONS: Small bowel obstruction     TECHNIQUE: Supine views of the abdomen     COMPARISON: 01/17/2020     FINDINGS:      Previous abnormal distention of small bowel is decreased but not  resolved. Contrast material has passed into the small bowel. Residual  contrast material seen in the colon and rectum.  No supine evidence for  free intraperitoneal gas. Follow-up can be obtained as indications  persist.             Impression:          As described.     This report was finalized on 1/19/2020 1:33 PM by Dr. Anatoly Boone M.D.             Current Facility-Administered Medications:   •  amantadine (SYMMETREL) solution 100 mg, 100 mg, Per G Tube, Q12H, Marcus Clarke MD, 100 mg at 01/20/20 0245  •  bisacodyl  (DULCOLAX) suppository 10 mg, 10 mg, Rectal, Daily, Lula Shah MD, 10 mg at 01/17/20 1844  •  dextrose (D50W) 25 g/ 50mL Intravenous Solution 25 mL, 25 mL, Intravenous, Q1H PRN, Marcus Clarke MD, 25 mL at 01/20/20 0630  •  HYDROmorphone (DILAUDID) injection 0.5 mg, 0.5 mg, Intravenous, Q2H PRN, Marcus Clarke MD, 0.5 mg at 01/20/20 0343  •  insulin lispro (humaLOG) injection 0-7 Units, 0-7 Units, Subcutaneous, 4x Daily With Meals & Nightly, Marcus Clarke MD  •  ipratropium-albuterol (DUO-NEB) nebulizer solution 3 mL, 3 mL, Nebulization, Q4H PRN, Marcus Clarke MD  •  lansoprazole (FIRST) oral suspension 30 mg, 30 mg, Per G Tube, BID AC, Marcus Clarke MD  •  levETIRAcetam in NaCl 0.54% (KEPPRA) IVPB 1,500 mg, 1,500 mg, Intravenous, Q12H, Marcus Clarke MD, Stopped at 01/20/20 0955  •  mineral oil liquid 30 mL, 30 mL, Oral, Daily, Dewayne Montoya MD, 30 mL at 01/20/20 0940  •  polyethylene glycol (MIRALAX) powder 17 g, 17 g, Per G Tube, BID, Lula Shah MD, Stopped at 01/17/20 2111  •  potassium chloride (MICRO-K) CR capsule 40 mEq, 40 mEq, Oral, PRN **OR** potassium chloride (KLOR-CON) packet 40 mEq, 40 mEq, Oral, PRN, 40 mEq at 01/18/20 2057 **OR** potassium chloride 20 mEq in 50 mL IVPB, 20 mEq, Intravenous, Q1H PRN, Marcus Clarke MD, Last Rate: 50 mL/hr at 01/19/20 0116, 20 mEq at 01/19/20 0116  •  promethazine (PHENERGAN) injection 12.5 mg, 12.5 mg, Intravenous, Q4H PRN, Marcus Clarke MD  •  [COMPLETED] Insert peripheral IV, , , Once **AND** sodium chloride 0.9 % flush 10 mL, 10 mL, Intravenous, PRN, Diogenes Rodrigez MD, 10 mL at 01/15/20 0040  •  sodium chloride 0.9 % with KCl 20 mEq/L infusion, 75 mL/hr, Intravenous, Continuous, Marcus Clarke MD, Last Rate: 75 mL/hr at 01/20/20 1000, 75 mL/hr at 01/20/20 1000     ASSESSMENT  Acute small bowel obstruction resolving  Acute UTI  Multiple renal and ureteral calculi  Traumatic brain injury  Quadriplegic with contractures  Diabetes  mellitus  Status post G-tube placement  Seizure disorder  Immobilization syndrome    PLAN  CPM  IVF  Resume tube feeding if okay with surgery  Symptomatic treatment for pain nausea vomiting  Stress ulcer DVT prophylaxis  Supportive care  Discussed with family  DNR  Follow closely further recommendation current hospital course    MICHEL KURTZ MD

## 2020-01-20 NOTE — PLAN OF CARE
Problem: Nutrition, Enteral (Adult)  Goal: Signs and Symptoms of Listed Potential Problems Will be Absent, Minimized or Managed (Nutrition, Enteral)  Outcome: Ongoing (interventions implemented as appropriate)  Flowsheets (Taken 1/20/2020 1517)  Problems Assessed (Enteral Nutrition): all  Note:   Plan to restart TF with trickle feeds @ 10 cc/hr, Diabetisource AC formula.

## 2020-01-20 NOTE — PLAN OF CARE
Problem: Patient Care Overview  Goal: Plan of Care Review  Outcome: Ongoing (interventions implemented as appropriate)  Medicated for pain prn. No episodes of emesis this shift. Turned q2. KUB and chest xray done today. Multiple stools. Pain management consulted for existing pump assessment per mother request. Will cont. To monitor and follow current orders.

## 2020-01-21 LAB
ANION GAP SERPL CALCULATED.3IONS-SCNC: 15.1 MMOL/L (ref 5–15)
BASOPHILS # BLD AUTO: 0.04 10*3/MM3 (ref 0–0.2)
BASOPHILS NFR BLD AUTO: 0.5 % (ref 0–1.5)
BUN BLD-MCNC: 6 MG/DL (ref 6–20)
BUN/CREAT SERPL: 11.3 (ref 7–25)
CALCIUM SPEC-SCNC: 8.6 MG/DL (ref 8.6–10.5)
CHLORIDE SERPL-SCNC: 104 MMOL/L (ref 98–107)
CO2 SERPL-SCNC: 21.9 MMOL/L (ref 22–29)
CREAT BLD-MCNC: 0.53 MG/DL (ref 0.76–1.27)
DEPRECATED RDW RBC AUTO: 42 FL (ref 37–54)
EOSINOPHIL # BLD AUTO: 0.18 10*3/MM3 (ref 0–0.4)
EOSINOPHIL NFR BLD AUTO: 2.3 % (ref 0.3–6.2)
ERYTHROCYTE [DISTWIDTH] IN BLOOD BY AUTOMATED COUNT: 12.9 % (ref 12.3–15.4)
GFR SERPL CREATININE-BSD FRML MDRD: >150 ML/MIN/1.73
GLUCOSE BLD-MCNC: 119 MG/DL (ref 65–99)
GLUCOSE BLDC GLUCOMTR-MCNC: 109 MG/DL (ref 70–130)
GLUCOSE BLDC GLUCOMTR-MCNC: 111 MG/DL (ref 70–130)
GLUCOSE BLDC GLUCOMTR-MCNC: 138 MG/DL (ref 70–130)
HCT VFR BLD AUTO: 43 % (ref 37.5–51)
HGB BLD-MCNC: 14.5 G/DL (ref 13–17.7)
IMM GRANULOCYTES # BLD AUTO: 0.06 10*3/MM3 (ref 0–0.05)
IMM GRANULOCYTES NFR BLD AUTO: 0.8 % (ref 0–0.5)
LYMPHOCYTES # BLD AUTO: 1.37 10*3/MM3 (ref 0.7–3.1)
LYMPHOCYTES NFR BLD AUTO: 17.2 % (ref 19.6–45.3)
MCH RBC QN AUTO: 30.2 PG (ref 26.6–33)
MCHC RBC AUTO-ENTMCNC: 33.7 G/DL (ref 31.5–35.7)
MCV RBC AUTO: 89.6 FL (ref 79–97)
MONOCYTES # BLD AUTO: 0.71 10*3/MM3 (ref 0.1–0.9)
MONOCYTES NFR BLD AUTO: 8.9 % (ref 5–12)
NEUTROPHILS # BLD AUTO: 5.62 10*3/MM3 (ref 1.7–7)
NEUTROPHILS NFR BLD AUTO: 70.3 % (ref 42.7–76)
NRBC BLD AUTO-RTO: 0 /100 WBC (ref 0–0.2)
PLATELET # BLD AUTO: 212 10*3/MM3 (ref 140–450)
PMV BLD AUTO: 10.3 FL (ref 6–12)
POTASSIUM BLD-SCNC: 3.6 MMOL/L (ref 3.5–5.2)
RBC # BLD AUTO: 4.8 10*6/MM3 (ref 4.14–5.8)
SODIUM BLD-SCNC: 141 MMOL/L (ref 136–145)
WBC NRBC COR # BLD: 7.98 10*3/MM3 (ref 3.4–10.8)

## 2020-01-21 PROCEDURE — 80048 BASIC METABOLIC PNL TOTAL CA: CPT | Performed by: HOSPITALIST

## 2020-01-21 PROCEDURE — 82962 GLUCOSE BLOOD TEST: CPT

## 2020-01-21 PROCEDURE — 85025 COMPLETE CBC W/AUTO DIFF WBC: CPT | Performed by: HOSPITALIST

## 2020-01-21 PROCEDURE — 25010000002 HYDROMORPHONE PER 4 MG: Performed by: HOSPITALIST

## 2020-01-21 PROCEDURE — 99232 SBSQ HOSP IP/OBS MODERATE 35: CPT | Performed by: SURGERY

## 2020-01-21 PROCEDURE — 25010000003 LEVETIRACETAM IN NACL 0.54% 1500 MG/100ML SOLUTION: Performed by: HOSPITALIST

## 2020-01-21 RX ORDER — LEVETIRACETAM 100 MG/ML
1500 SOLUTION ORAL 2 TIMES DAILY
Status: DISCONTINUED | OUTPATIENT
Start: 2020-01-21 | End: 2020-01-23 | Stop reason: HOSPADM

## 2020-01-21 RX ORDER — LEVETIRACETAM 500 MG/1
1500 TABLET ORAL 2 TIMES DAILY
Status: DISCONTINUED | OUTPATIENT
Start: 2020-01-21 | End: 2020-01-21

## 2020-01-21 RX ADMIN — POLYETHYLENE GLYCOL 3350 17 G: 17 POWDER, FOR SOLUTION ORAL at 11:19

## 2020-01-21 RX ADMIN — LEVETIRACETAM 1500 MG: 100 SOLUTION ORAL at 23:13

## 2020-01-21 RX ADMIN — LANSOPRAZOLE 30 MG: KIT at 06:31

## 2020-01-21 RX ADMIN — DEXTROSE AND SODIUM CHLORIDE 75 ML/HR: 5; 900 INJECTION, SOLUTION INTRAVENOUS at 06:33

## 2020-01-21 RX ADMIN — HYDROMORPHONE HYDROCHLORIDE 0.5 MG: 1 INJECTION, SOLUTION INTRAMUSCULAR; INTRAVENOUS; SUBCUTANEOUS at 03:59

## 2020-01-21 RX ADMIN — LANSOPRAZOLE 30 MG: KIT at 18:01

## 2020-01-21 RX ADMIN — AMANTADINE HYDROCHLORIDE 100 MG: 50 SOLUTION ORAL at 18:00

## 2020-01-21 RX ADMIN — MINERAL OIL 30 ML: 1000 SOLUTION ORAL at 11:19

## 2020-01-21 RX ADMIN — LEVETIRACETAM 1500 MG: 15 INJECTION INTRAVENOUS at 11:18

## 2020-01-21 RX ADMIN — AMANTADINE HYDROCHLORIDE 100 MG: 50 SOLUTION ORAL at 02:44

## 2020-01-21 NOTE — PLAN OF CARE
Problem: Patient Care Overview  Goal: Plan of Care Review  Outcome: Ongoing (interventions implemented as appropriate)  Flowsheets  Taken 1/21/2020 0323 by Stephenie Charles, RN  Progress: no change  Outcome Summary: VSS, tolerating tube feeds at 10ml/hr, IV dilaudid for pain if needed, turn q2hrs, NO CPR, will continue to monitor.  Taken 1/20/2020 2040 by Senait Goff, RN  Plan of Care Reviewed With: patient;family

## 2020-01-21 NOTE — PROGRESS NOTES
"General Surgery  Progress Note    CC: Follow-up partial SBO vs ileus    S: He had 3 bowel movements throughout the day yesterday and has rested comfortably through the night.  He has tolerated tube feeds at 10 cc/h with no nausea or vomiting.    ROS: Unable to be obtained given nonverbal status    O:/73 (BP Location: Right arm, Patient Position: Lying)   Pulse 99   Temp 98 °F (36.7 °C) (Oral)   Resp 16   Ht 180.1 cm (70.91\")   Wt 78.3 kg (172 lb 9.9 oz)   SpO2 95%   BMI 24.14 kg/m²     Intake & Output:  BM x3    GENERAL: sleeping soundly, appears comfortable  HEENT: former trach scar at base of neck, normocephalic, atraumatic  CHEST: clear to auscultation, no wheezes, rales or rhonchi, symmetric air entry  CARDIAC: regular rate and rhythm    ABDOMEN: soft, nondistended, no facial grimace with abdominal palpation, Gtube in LUQ capped  EXTREMITIES: 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  SKIN: Warm and moist, no rashes    LABS  Results from last 7 days   Lab Units 01/21/20  0438 01/20/20  0504 01/19/20  0553   WBC 10*3/mm3 7.98 10.46 10.74   HEMOGLOBIN g/dL 14.5 15.0 15.2   HEMATOCRIT % 43.0 44.2 44.9   PLATELETS 10*3/mm3 212 203 187     Results from last 7 days   Lab Units 01/21/20  0438 01/20/20  0504 01/19/20  0553  01/16/20  0546  01/14/20  2316   SODIUM mmol/L 141 141 137   < > 150*   < > 141   POTASSIUM mmol/L 3.6 3.6 3.9   < > 3.5   < > 4.5   CHLORIDE mmol/L 104 106 105   < > 112*   < > 98   CO2 mmol/L 21.9* 19.8* 16.2*   < > 24.6   < > 29.0   BUN mg/dL 6 6 8   < > 22*   < > 33*   CREATININE mg/dL 0.53* 0.60* 0.61*   < > 0.67*   < > 0.83   CALCIUM mg/dL 8.6 8.7 8.8   < > 8.1*   < > 10.9*   BILIRUBIN mg/dL  --   --   --   --  0.4  --  0.8   ALK PHOS U/L  --   --   --   --  89  --  143*   ALT (SGPT) U/L  --   --   --   --  40  --  75*   AST (SGOT) U/L  --   --   --   --  19  --  31   GLUCOSE mg/dL 119* 79 147*   < > 103*   < > 179*    < > = values in this interval not displayed.             A/P: " 39 y.o. male with partial small bowel obstruction versus ileus    Advance tube feeds to 20 cc/hr today.    Lula Shah MD  General and Endoscopic Surgery  [facility]    [address], [address]  [place], [place], [address]  P: [phone]  F: [phone]

## 2020-01-21 NOTE — PROGRESS NOTES
Adult Nutrition  Assessment/PES    Patient Name:  Adrian Kuo  YOB: 1980  MRN: [id number]  Admit Date:  1/14/2020    Assessment Date:  1/21/2020    Comments:  Tolerated trickle feeds of 10 cc/hr Diabetisource AC via G-tube. Surgeon advanced to 20 cc/hr today. Goal rate 75 cc/hr x 22 hr/day. IV fluids continue at 75 cc/hr. Following.     Reason for Assessment     Row Name 01/21/20 1033          Reason for Assessment    Reason For Assessment  follow-up protocol;TF/PN         Nutrition/Diet History     Row Name 01/21/20 1033          Nutrition/Diet History    Typical Food/Fluid Intake  NPO. Tolerated the initiation of TF @ 10 cc/hr. Surgeon advanced to 20 cc/hr today.            Labs/Tests/Procedures/Meds     Row Name 01/21/20 1034          Labs/Procedures/Meds    Lab Results Reviewed  reviewed        Diagnostic Tests/Procedures    Diagnostic Test/Procedure Reviewed  reviewed        Medications    Pertinent Medications Reviewed  reviewed     Pertinent Medications Comments  still on IVF @ 75 cc/hr (D5 NaCl)         Physical Findings     Row Name 01/21/20 1034          Physical Findings    Overall Physical Appearance  tetraplegia (quadriplegia)     Gastrointestinal  feeding tube;other (see comments) + BM x3      Tubes  gastrostomy tube     Skin  other (see comments) skin intact           Nutrition Prescription Ordered     Row Name 01/21/20 1034          Nutrition Prescription PO    Current PO Diet  NPO        Nutrition Prescription EN    Enteral Route  Gastrostomy     Product  Diabetisource AC (Glucerna 1.2)     TF Delivery Method  Continuous     Continuous TF Goal Rate (mL/hr)  75 mL/hr x 22 hr/day     Continuous TF Current Rate (mL/hr)  (S) 20 mL/hr do not advance per surgeon     Water flush (mL)   0 mL         Evaluation of Received Nutrient/Fluid Intake     Row Name 01/21/20 1035          Calories Evaluation    Enteral Calories (kcal)  576     % of Kcal Needs  29        Protein Evaluation     Enteral Protein (gm)  29     % of Protein Needs  37        Intake Assessment    Energy/Calorie Requirement Assessment  not meeting needs     Protein Requirement Assessment  not meeting needs     Fluid Requirement Assessment  meeting needs     Tolerance  tolerating        Fluid Intake Evaluation    Enteral (Free Water) Fluid (mL)  393     IV Fluid (mL)  1800        EN Evaluation    TF Changes  Rate increased     HOB  Greater than or equal to 30 degress           Problem/Interventions:      Intervention Goal     Row Name 01/21/20 1039          Intervention Goal    General  Maintain nutrition;Nutrition support treatment     TF/PN  Maintain TF/PN;Tolerate TF at goal     Weight  No significant weight loss         Nutrition Intervention     Row Name 01/21/20 1039          Nutrition Intervention    RD/Tech Action  Follow Tx progress;Care plan reviewd         Nutrition Prescription     Row Name 01/21/20 1039          Nutrition Prescription EN    Enteral Prescription  Continue same protocol         Education/Evaluation     Row Name 01/21/20 1040          Education    Education  Will Instruct as appropriate        Monitor/Evaluation    Monitor  Per protocol;I&O;Pertinent labs;TF delivery/tolerance;Skin status;Symptoms           Electronically signed by:  Racheal Wang RD  01/21/20 10:40 AM

## 2020-01-21 NOTE — PROGRESS NOTES
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  Patient evaluated in ER found to have recurrent small bowel obstruction admit for management.  Patient unable to give detailed history most of the history obtained from the mother chart old record and we know the patient from previous admissions.  Patient has NG tube placed and tube feeding stopped and IV fluids started.  And is DNR per his wishes.      REVIEW OF SYSTEMS  Unable to perform      PHYSICAL EXAM  Blood pressure 105/67, pulse 89, temperature 99.7 °F (37.6 °C), temperature source Oral, resp. rate 18, height 180.1 cm (70.91\"), weight 78.3 kg (172 lb 9.9 oz), SpO2 92 %.    Constitutional: No distress.   Head: Normocephalic and atraumatic.   Eyes: Conjunctivae are normal.   Neck: Normal range of motion.   Cardiovascular: Regular rhythm. Tachycardia present.   Pulmonary/Chest: Breath sounds normal. No respiratory distress.   Abdominal: Soft. He exhibits distension. Bowel sounds are hypoactive. There is no tenderness. There is no rebound and no guarding. G-tube in place   Musculoskeletal: He exhibits no edema or tenderness.   Neurological: Unobtainable.    Skin: No rash noted.     LAB RESULTS  Lab Results (last 24 hours)     Procedure Component Value Units Date/Time    POC Glucose Once [[id number]]  (Normal) Collected:  01/21/20 1118    Specimen:  Blood Updated:  01/21/20 1121     Glucose 111 mg/dL     POC Glucose Once [[id number]]  (Normal) Collected:  01/21/20 0601    Specimen:  Blood Updated:  01/21/20 0606     Glucose 109 mg/dL     Basic Metabolic Panel [[id number]]  (Abnormal) Collected:  " 01/21/20 0438    Specimen:  Blood Updated:  01/21/20 0526     Glucose 119 mg/dL      BUN 6 mg/dL      Creatinine 0.53 mg/dL      Sodium 141 mmol/L      Potassium 3.6 mmol/L      Chloride 104 mmol/L      CO2 21.9 mmol/L      Calcium 8.6 mg/dL      eGFR Non African Amer >150 mL/min/1.73      BUN/Creatinine Ratio 11.3     Anion Gap 15.1 mmol/L     Narrative:       GFR Normal >60  Chronic Kidney Disease <60  Kidney Failure <15      CBC & Differential [[id number]] Collected:  01/21/20 0438    Specimen:  Blood Updated:  01/21/20 0455    Narrative:       The following orders were created for panel order CBC & Differential.  Procedure                               Abnormality         Status                     ---------                               -----------         ------                     CBC Auto Differential[[id number]]        Abnormal            Final result                 Please view results for these tests on the individual orders.    CBC Auto Differential [[id number]]  (Abnormal) Collected:  01/21/20 0438    Specimen:  Blood Updated:  01/21/20 0455     WBC 7.98 10*3/mm3      RBC 4.80 10*6/mm3      Hemoglobin 14.5 g/dL      Hematocrit 43.0 %      MCV 89.6 fL      MCH 30.2 pg      MCHC 33.7 g/dL      RDW 12.9 %      RDW-SD 42.0 fl      MPV 10.3 fL      Platelets 212 10*3/mm3      Neutrophil % 70.3 %      Lymphocyte % 17.2 %      Monocyte % 8.9 %      Eosinophil % 2.3 %      Basophil % 0.5 %      Immature Grans % 0.8 %      Neutrophils, Absolute 5.62 10*3/mm3      Lymphocytes, Absolute 1.37 10*3/mm3      Monocytes, Absolute 0.71 10*3/mm3      Eosinophils, Absolute 0.18 10*3/mm3      Basophils, Absolute 0.04 10*3/mm3      Immature Grans, Absolute 0.06 10*3/mm3      nRBC 0.0 /100 WBC     POC Glucose Once [[id number]]  (Normal) Collected:  01/20/20 2036    Specimen:  Blood Updated:  01/20/20 2038     Glucose 89 mg/dL     POC Glucose Once [[id number]]  (Normal) Collected:  01/20/20 1820    Specimen:  Blood Updated:   01/20/20 1823     Glucose 102 mg/dL         Imaging Results (Last 24 Hours)     ** No results found for the last 24 hours. **          Current Facility-Administered Medications:   •  amantadine (SYMMETREL) solution 100 mg, 100 mg, Per G Tube, Q12H, Marcus Clarke MD, 100 mg at 01/21/20 0244  •  dextrose (D50W) 25 g/ 50mL Intravenous Solution 25 mL, 25 mL, Intravenous, Q1H PRN, Marcus Clarke MD, 25 mL at 01/20/20 0630  •  dextrose 5 % and sodium chloride 0.9 % infusion, 75 mL/hr, Intravenous, Continuous, Marcus Clarke MD, Last Rate: 75 mL/hr at 01/21/20 0633, 75 mL/hr at 01/21/20 0633  •  HYDROmorphone (DILAUDID) injection 0.5 mg, 0.5 mg, Intravenous, Q2H PRN, Marcus Clarke MD, 0.5 mg at 01/21/20 0359  •  insulin lispro (humaLOG) injection 0-7 Units, 0-7 Units, Subcutaneous, 4x Daily With Meals & Nightly, Marcus Clarke MD  •  ipratropium-albuterol (DUO-NEB) nebulizer solution 3 mL, 3 mL, Nebulization, Q4H PRN, Marcus Clarke MD  •  lansoprazole (FIRST) oral suspension 30 mg, 30 mg, Per G Tube, BID AC, Marcus Clarke MD, 30 mg at 01/21/20 0631  •  levETIRAcetam in NaCl 0.54% (KEPPRA) IVPB 1,500 mg, 1,500 mg, Intravenous, Q12H, Marcus Clarke MD, Last Rate: 0 mL/hr at 01/20/20 0955, 1,500 mg at 01/21/20 1118  •  mineral oil liquid 30 mL, 30 mL, Oral, Daily, Dewayne Montoya MD, 30 mL at 01/21/20 1119  •  polyethylene glycol (MIRALAX) powder 17 g, 17 g, Per G Tube, Daily, Lula Shah MD, 17 g at 01/21/20 1119  •  potassium chloride (MICRO-K) CR capsule 40 mEq, 40 mEq, Oral, PRN **OR** potassium chloride (KLOR-CON) packet 40 mEq, 40 mEq, Oral, PRN, 40 mEq at 01/18/20 2057 **OR** potassium chloride 20 mEq in 50 mL IVPB, 20 mEq, Intravenous, Q1H PRN, Marcus Clarke MD, Last Rate: 50 mL/hr at 01/19/20 0116, 20 mEq at 01/19/20 0116  •  promethazine (PHENERGAN) injection 12.5 mg, 12.5 mg, Intravenous, Q4H PRN, Marcus Clarke MD  •  [COMPLETED] Insert peripheral IV, , , Once **AND** sodium chloride 0.9 % flush 10 mL, 10  mL, Intravenous, PRN, Diogenes Rodrigez MD, 10 mL at 01/15/20 0040     ASSESSMENT  Acute small bowel obstruction resolving  Acute UTI  Multiple renal and ureteral calculi  Traumatic brain injury  Quadriplegic with contractures  Diabetes mellitus  Status post G-tube placement  Seizure disorder  Immobilization syndrome    PLAN  CPM  IVF  Advance tube feed as tolerated to goal  Symptomatic treatment for pain nausea vomiting  Stress ulcer DVT prophylaxis  Supportive care  Discussed with family  DNR  Discharge planning    MICHEL KURTZ MD

## 2020-01-21 NOTE — PROGRESS NOTES
[facility]    Patient had been admitted with SBO. I was called by his mother with this report and worried that his ITB pump was not working. I had [employer] rep stop by and do telemetry that showed the pump functioning normally with no signs of roller stall. Currently receiving Baclofen 3000mcg/ml at 576.4mcg/day. Refill needed NLT 2/6/2020.

## 2020-01-21 NOTE — PLAN OF CARE
Problem: Patient Care Overview  Goal: Plan of Care Review  Outcome: Ongoing (interventions implemented as appropriate)  Flowsheets (Taken 1/20/2020 2040)  Progress: no change  Plan of Care Reviewed With: patient; family  Outcome Summary: Pt. restarted on his Keppra today. Pt. started on tube feeds today.  Tolerating them.  Pt. had pain once with dilaudid given to him.  Pt. was retaining urine with 850ml out from a straight cath in-and-out once.  Will continue to monitor.

## 2020-01-22 LAB
GLUCOSE BLDC GLUCOMTR-MCNC: 102 MG/DL (ref 70–130)
GLUCOSE BLDC GLUCOMTR-MCNC: 104 MG/DL (ref 70–130)
GLUCOSE BLDC GLUCOMTR-MCNC: 114 MG/DL (ref 70–130)
GLUCOSE BLDC GLUCOMTR-MCNC: 120 MG/DL (ref 70–130)

## 2020-01-22 PROCEDURE — 99232 SBSQ HOSP IP/OBS MODERATE 35: CPT | Performed by: SURGERY

## 2020-01-22 PROCEDURE — 82962 GLUCOSE BLOOD TEST: CPT

## 2020-01-22 RX ADMIN — LEVETIRACETAM 1500 MG: 100 SOLUTION ORAL at 21:04

## 2020-01-22 RX ADMIN — LANSOPRAZOLE 30 MG: KIT at 06:32

## 2020-01-22 RX ADMIN — LANSOPRAZOLE 30 MG: KIT at 18:24

## 2020-01-22 RX ADMIN — AMANTADINE HYDROCHLORIDE 100 MG: 50 SOLUTION ORAL at 15:31

## 2020-01-22 RX ADMIN — POLYETHYLENE GLYCOL 3350 17 G: 17 POWDER, FOR SOLUTION ORAL at 11:15

## 2020-01-22 RX ADMIN — LEVETIRACETAM 1500 MG: 100 SOLUTION ORAL at 11:15

## 2020-01-22 RX ADMIN — AMANTADINE HYDROCHLORIDE 100 MG: 50 SOLUTION ORAL at 01:36

## 2020-01-22 NOTE — PROGRESS NOTES
Discharge Planning Assessment  [facility]     Patient Name: Adrian Kuo  MRN: [id number]  Today's Date: 1/22/2020    Admit Date: 1/14/2020    Discharge Needs Assessment    No documentation.       Discharge Plan     Row Name 01/22/20 1403       Plan    Plan Comments  [facility] (16 miles and $230.00) ref. #[id number]. Sent message to Yanira NIELSON that family or patient could not afford the cost of transportation back to the facility. GLENN Grigsby RN, CCP.     Row Name 01/22/20 1333       Plan    Plan Comments  Discharged plan is [facility] to [facility], Medicaid bed and transportation by [facility] by stretcher on 1/23/2020 @ 16:30. Spoke with [facility] and they can accept back tomorrow to his medicaid bed. Report [phone] and asked for the East Unit and Fax [phone]. The patient's room is 323[address]A. Spoke with the patient's father in room and he is agreeable to the discharge plan for tomorrow. GLENN grigsby RN, CCP.         Destination      Service Provider Request Status Selected Services Address Phone Number Fax Number    [facility] [address] [facility] [facility] [id number] [phone]      Durable Medical Equipment      Coordination has not been started for this encounter.      Dialysis/Infusion      Coordination has not been started for this encounter.      Home Medical Care      Coordination has not been started for this encounter.      Therapy      Coordination has not been started for this encounter.      Community Resources      Coordination has not been started for this encounter.          Demographic Summary    No documentation.       Functional Status    No documentation.       Psychosocial    No documentation.       Abuse/Neglect    No documentation.       Legal    No documentation.       Substance Abuse    No documentation.       Patient Forms    No documentation.           Malaika Grigsby RN

## 2020-01-22 NOTE — PROGRESS NOTES
Discharge Planning Assessment  [facility]     Patient Name: Adrian Kuo  MRN: [id number]  Today's Date: 1/22/2020    Admit Date: 1/14/2020    Discharge Needs Assessment    No documentation.       Discharge Plan     Row Name 01/22/20 1333       Plan    Plan Comments  Discharged plan is back to [facility], Medicaid bed and transportation by [facility] by stretcher on 1/23/2020 @ 16:30. Spoke with [facility] and they can accept back tomorrow to his medicaid bed. Report [phone] and asked for the East Unit and Fax [phone]. The patient's room is 323[address]A. Spoke with the patient's father in room and he is agreeable to the discharge plan for tomorrow. GLENN grigsby RN, CCP.         Destination      Service Provider Request Status Selected Services Address Phone Number Fax Number    [facility] [address] [facility] [id number] [phone]      Durable Medical Equipment      Coordination has not been started for this encounter.      Dialysis/Infusion      Coordination has not been started for this encounter.      Home Medical Care      Coordination has not been started for this encounter.      Therapy      Coordination has not been started for this encounter.      Community Resources      Coordination has not been started for this encounter.          Demographic Summary    No documentation.       Functional Status    No documentation.       Psychosocial    No documentation.       Abuse/Neglect    No documentation.       Legal    No documentation.       Substance Abuse    No documentation.       Patient Forms    No documentation.           Malaika Grigsby RN

## 2020-01-22 NOTE — PROGRESS NOTES
"General Surgery  Progress Note    CC: Follow-up partial SBO vs ileus    S: He tolerated tube feeds at 20 cc/hr for all of dayshift yesterday so I had advanced him last night to 30 cc/hr, which he has also tolerated well.  There has been no nausea or vomiting and he has been resting comfortably.  He has had 5 bowel movements in the last 24 hours.    ROS: Unable to be obtained given nonverbal status    O:/76 (BP Location: Left arm, Patient Position: Lying)   Pulse 89   Temp 97.3 °F (36.3 °C) (Oral)   Resp 16   Ht 180.1 cm (70.91\")   Wt 78.3 kg (172 lb 9.9 oz)   SpO2 93%   BMI 24.14 kg/m²     Intake & Output:  BM x5    GENERAL: sleeping soundly, appears comfortable  HEENT: former trach scar at base of neck, normocephalic, atraumatic  CHEST: clear to auscultation, no wheezes, rales or rhonchi, symmetric air entry  CARDIAC: regular rate and rhythm    ABDOMEN: soft, nondistended, no facial grimace with abdominal palpation, Gtube in LUQ  EXTREMITIES: 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  SKIN: Warm and moist, no rashes    LABS  Results from last 7 days   Lab Units 01/21/20  0438 01/20/20  0504 01/19/20  0553   WBC 10*3/mm3 7.98 10.46 10.74   HEMOGLOBIN g/dL 14.5 15.0 15.2   HEMATOCRIT % 43.0 44.2 44.9   PLATELETS 10*3/mm3 212 203 187     Results from last 7 days   Lab Units 01/21/20  0438 01/20/20  0504 01/19/20  0553  01/16/20  0546   SODIUM mmol/L 141 141 137   < > 150*   POTASSIUM mmol/L 3.6 3.6 3.9   < > 3.5   CHLORIDE mmol/L 104 106 105   < > 112*   CO2 mmol/L 21.9* 19.8* 16.2*   < > 24.6   BUN mg/dL 6 6 8   < > 22*   CREATININE mg/dL 0.53* 0.60* 0.61*   < > 0.67*   CALCIUM mg/dL 8.6 8.7 8.8   < > 8.1*   BILIRUBIN mg/dL  --   --   --   --  0.4   ALK PHOS U/L  --   --   --   --  89   ALT (SGPT) U/L  --   --   --   --  40   AST (SGOT) U/L  --   --   --   --  19   GLUCOSE mg/dL 119* 79 147*   < > 103*    < > = values in this interval not displayed.             A/P: 39 y.o. male with partial small bowel " obstruction versus ileus    Continue to advance tube feeds slowly as tolerated.  I have advanced him to 40 cc/hr this morning and I will plan to advance him to 50 cc/hr tonight around shift change.  Discontinue mineral oil given his frequent BMs.    Lula Shah MD  General and Endoscopic Surgery  [facility]    [address], [address]  [place], [place], [address]  P: [phone]  F: [phone]

## 2020-01-22 NOTE — PLAN OF CARE
Problem: Patient Care Overview  Goal: Plan of Care Review  Outcome: Ongoing (interventions implemented as appropriate)  Flowsheets (Taken 1/22/2020 1683)  Progress: no change  Plan of Care Reviewed With: father; mother  Outcome Summary: VSS, tube feeds @ 40 mL/hr and  tolerating. No pain meds needed. Providing Q2 turns, D/C tomorrow to NH. Mother at bedside, will cont to monitor

## 2020-01-22 NOTE — PLAN OF CARE
Problem: Patient Care Overview  Goal: Plan of Care Review  Flowsheets (Taken 1/22/2020 0600)  Progress: no change  Plan of Care Reviewed With: mother  Note:   TF advanced by MD to 30ml/hr, pt diana well. Pt has been asymptomatic and appears to have rested/slept comfortably through NOC. Mother at bsd. Continue to monitor and tx per POC and MD orders.

## 2020-01-22 NOTE — PLAN OF CARE
Problem: Patient Care Overview  Goal: Plan of Care Review  Outcome: Ongoing (interventions implemented as appropriate)  Flowsheets (Taken 1/21/2020 2038)  Progress: improving   No prn pain medication given today. Tolerating tube feed. Rate increased to 30cc this evening. Turning q2 hours. Family at bedside. Will cont. To monitor and follow current orders.

## 2020-01-22 NOTE — PROGRESS NOTES
Discharge Planning Assessment  [facility]     Patient Name: Adrian Kuo  MRN: [id number]  Today's Date: 1/22/2020    Admit Date: 1/14/2020    Discharge Needs Assessment    No documentation.       Discharge Plan     Row Name 01/22/20 1409       Plan    Plan Comments  Spoke with [facility] ambulance and they can provide the transportation on 1/23/2020 @ 15:00. Notified the family of the change in time. Called [facility] and cx the  for tomorrow. GLENN Doran RN, CCP.     Row Name 01/22/20 1401       Plan    Plan Comments  [facility] transport (16 miles and $230.00) ref. #[id number]. Sent message to Yanira NIELSON that family or patient could not afford the cost of transportation back to the facility. GLENN Doran RN, CCP.         Destination      Service Provider Request Status Selected Services Address Phone Number Fax Number    [facility] [address] [facility] [facility] [address] [phone]      Durable Medical Equipment      Coordination has not been started for this encounter.      Dialysis/Infusion      Coordination has not been started for this encounter.      Home Medical Care      Coordination has not been started for this encounter.      Therapy      Coordination has not been started for this encounter.      Community Resources      Coordination has not been started for this encounter.          Demographic Summary    No documentation.       Functional Status    No documentation.       Psychosocial    No documentation.       Abuse/Neglect    No documentation.       Legal    No documentation.       Substance Abuse    No documentation.       Patient Forms    No documentation.           Malaika Doran RN

## 2020-01-22 NOTE — PROGRESS NOTES
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  Patient evaluated in ER found to have recurrent small bowel obstruction admit for management.  Patient unable to give detailed history most of the history obtained from the mother chart old record and we know the patient from previous admissions.  Patient has NG tube placed and tube feeding stopped and IV fluids started.  And is DNR per his wishes.      REVIEW OF SYSTEMS  Unable to perform      PHYSICAL EXAM  Blood pressure 112/76, pulse 89, temperature 97.3 °F (36.3 °C), temperature source Oral, resp. rate 16, height 180.1 cm (70.91\"), weight 78.3 kg (172 lb 9.9 oz), SpO2 93 %.    Constitutional: No distress.   Head: Normocephalic and atraumatic.   Eyes: Conjunctivae are normal.   Neck: Normal range of motion.   Cardiovascular: Regular rhythm. Tachycardia present.   Pulmonary/Chest: Breath sounds normal. No respiratory distress.   Abdominal: Soft. He exhibits distension. Bowel sounds are hypoactive. There is no tenderness. There is no rebound and no guarding. G-tube in place   Musculoskeletal: He exhibits no edema or tenderness.   Neurological: Unobtainable.    Skin: No rash noted.     LAB RESULTS  Lab Results (last 24 hours)     Procedure Component Value Units Date/Time    POC Glucose Once [[id number]]  (Normal) Collected:  01/22/20 1223    Specimen:  Blood Updated:  01/22/20 1230     Glucose 104 mg/dL     POC Glucose Once [[id number]]  (Normal) Collected:  01/22/20 0626    Specimen:  Blood Updated:  01/22/20 0629     Glucose 120 mg/dL     POC Glucose Once [[id number]]  (Normal) Collected:  01/21/20 6529 "    Specimen:  Blood Updated:  01/22/20 0000     Glucose 102 mg/dL     POC Glucose Once [[id number]]  (Abnormal) Collected:  01/21/20 1606    Specimen:  Blood Updated:  01/21/20 1614     Glucose 138 mg/dL         Imaging Results (Last 24 Hours)     ** No results found for the last 24 hours. **          Current Facility-Administered Medications:   •  amantadine (SYMMETREL) solution 100 mg, 100 mg, Per G Tube, Q12H, Marcus Clarke MD, 100 mg at 01/22/20 0136  •  dextrose (D50W) 25 g/ 50mL Intravenous Solution 25 mL, 25 mL, Intravenous, Q1H PRN, Marcus Clarke MD, 25 mL at 01/20/20 0630  •  HYDROmorphone (DILAUDID) injection 0.5 mg, 0.5 mg, Intravenous, Q2H PRN, Marcus Clarke MD, 0.5 mg at 01/21/20 0359  •  insulin lispro (humaLOG) injection 0-7 Units, 0-7 Units, Subcutaneous, 4x Daily With Meals & Nightly, Marcus Clarke MD  •  ipratropium-albuterol (DUO-NEB) nebulizer solution 3 mL, 3 mL, Nebulization, Q4H PRN, Marcus Clarke MD  •  lansoprazole (FIRST) oral suspension 30 mg, 30 mg, Per G Tube, BID AC, Marcus Clarke MD, 30 mg at 01/22/20 0632  •  levETIRAcetam (KEPPRA) 100 MG/ML solution 1,500 mg, 1,500 mg, Per PEG Tube, BID, Marcus Clarke MD, 1,500 mg at 01/22/20 1115  •  polyethylene glycol (MIRALAX) powder 17 g, 17 g, Per G Tube, Daily, Lula Shah MD, 17 g at 01/22/20 1115  •  potassium chloride (MICRO-K) CR capsule 40 mEq, 40 mEq, Oral, PRN **OR** potassium chloride (KLOR-CON) packet 40 mEq, 40 mEq, Oral, PRN, 40 mEq at 01/18/20 2057 **OR** potassium chloride 20 mEq in 50 mL IVPB, 20 mEq, Intravenous, Q1H PRN, Marcus Clarke MD, Last Rate: 50 mL/hr at 01/19/20 0116, 20 mEq at 01/19/20 0116  •  promethazine (PHENERGAN) injection 12.5 mg, 12.5 mg, Intravenous, Q4H PRN, Marcus Clarke MD  •  [COMPLETED] Insert peripheral IV, , , Once **AND** sodium chloride 0.9 % flush 10 mL, 10 mL, Intravenous, PRN, Diogenes Rodrigez MD, 10 mL at 01/15/20 0040     ASSESSMENT  Acute small bowel obstruction resolving  Acute  UTI  Multiple renal and ureteral calculi  Traumatic brain injury  Quadriplegic with contractures  Diabetes mellitus  Status post G-tube placement  Seizure disorder  Immobilization syndrome    PLAN  CPM  Advance tube feed as tolerated to goal  Symptomatic treatment for pain nausea vomiting  Stress ulcer DVT prophylaxis  Supportive care  Discussed with family  DNR  Discharge planning    MICHEL KURTZ MD

## 2020-01-22 NOTE — PROGRESS NOTES
Discharge Planning Assessment  [facility]     Patient Name: Adrian Kuo  MRN: [id number]  Today's Date: 1/22/2020    Admit Date: 1/14/2020    Discharge Needs Assessment    No documentation.       Discharge Plan     Row Name 01/22/20 1333       Plan    Plan Comments  Discharged plan is back to [facility], Medicaid bed and transportation by [facility] by stretcher on 1/23/2020 @ 16:30. BArturo grigsby RN, CCP.         Destination      Service Provider Request Status Selected Services Address Phone Number Fax Number    [facility] Accepted N/A [address] [facility] [id number] [phone]      Durable Medical Equipment      Coordination has not been started for this encounter.      Dialysis/Infusion      Coordination has not been started for this encounter.      Home Medical Care      Coordination has not been started for this encounter.      Therapy      Coordination has not been started for this encounter.      Community Resources      Coordination has not been started for this encounter.          Demographic Summary    No documentation.       Functional Status    No documentation.       Psychosocial    No documentation.       Abuse/Neglect    No documentation.       Legal    No documentation.       Substance Abuse    No documentation.       Patient Forms    No documentation.           Malaika Grigsby RN

## 2020-01-23 VITALS
HEIGHT: 71 IN | BODY MASS INDEX: 24.17 KG/M2 | DIASTOLIC BLOOD PRESSURE: 71 MMHG | RESPIRATION RATE: 16 BRPM | OXYGEN SATURATION: 94 % | WEIGHT: 172.62 LBS | HEART RATE: 83 BPM | TEMPERATURE: 97.3 F | SYSTOLIC BLOOD PRESSURE: 112 MMHG

## 2020-01-23 LAB
GLUCOSE BLDC GLUCOMTR-MCNC: 113 MG/DL (ref 70–130)
GLUCOSE BLDC GLUCOMTR-MCNC: 120 MG/DL (ref 70–130)
GLUCOSE BLDC GLUCOMTR-MCNC: 125 MG/DL (ref 70–130)

## 2020-01-23 PROCEDURE — 82962 GLUCOSE BLOOD TEST: CPT

## 2020-01-23 PROCEDURE — 99232 SBSQ HOSP IP/OBS MODERATE 35: CPT | Performed by: SURGERY

## 2020-01-23 RX ORDER — HYDROCODONE BITARTRATE AND ACETAMINOPHEN 7.5; 325 MG/1; MG/1
1 TABLET ORAL EVERY 4 HOURS PRN
Qty: 10 TABLET | Refills: 0 | Status: SHIPPED | OUTPATIENT
Start: 2020-01-23 | End: 2020-01-26

## 2020-01-23 RX ORDER — LANSOPRAZOLE
30 KIT
Qty: 300 ML | Refills: 0 | Status: ON HOLD | OUTPATIENT
Start: 2020-01-23 | End: 2020-02-29

## 2020-01-23 RX ORDER — POLYETHYLENE GLYCOL 3350 17 G/17G
17 POWDER, FOR SOLUTION ORAL DAILY
Qty: 510 G | Refills: 0 | Status: ON HOLD | OUTPATIENT
Start: 2020-01-24 | End: 2020-02-29

## 2020-01-23 RX ADMIN — LANSOPRAZOLE 30 MG: KIT at 06:38

## 2020-01-23 RX ADMIN — LEVETIRACETAM 1500 MG: 100 SOLUTION ORAL at 09:32

## 2020-01-23 RX ADMIN — AMANTADINE HYDROCHLORIDE 100 MG: 50 SOLUTION ORAL at 02:27

## 2020-01-23 RX ADMIN — AMANTADINE HYDROCHLORIDE 100 MG: 50 SOLUTION ORAL at 15:17

## 2020-01-23 NOTE — PROGRESS NOTES
Case Management Discharge Note      Final Note: Discharged back [facility], Medicaid bed, by [facility] on 1/23/2020. GLENN Doran RN, CCP.     Provided post acute provider list?: Refused    Destination - Selection Complete      Service Provider Request Status Selected Services Address Phone Number Fax Number    [facility] [address] [facility] [address] [phone]      Durable Medical Equipment      No service has been selected for the patient.      Dialysis/Infusion      No service has been selected for the patient.      Home Medical Care      No service has been selected for the patient.      Therapy      No service has been selected for the patient.      Community Resources      No service has been selected for the patient.             Final Discharge Disposition Code: 04 - intermediate care facility

## 2020-01-23 NOTE — DISCHARGE SUMMARY
Discharge summary    Date of admission 1/14/2020  Date of discharge 1/23/2020    Final diagnosis  Acute small bowel obstruction resolved  Acute UTI  Multiple renal and ureteral calculi  Traumatic brain injury  Quadriplegic with contractures  Diabetes mellitus  Status post G-tube placement  Seizure disorder  Immobilization syndrome    Discharge medications    Current Facility-Administered Medications:   •  amantadine (SYMMETREL) solution 100 mg, 100 mg, Per G Tube, Q12H, Marcus Clarke MD, 100 mg at 01/23/20 0227  •  lansoprazole (FIRST) oral suspension 30 mg, 30 mg, Per G Tube, BID AC, Marcus Clarke MD, 30 mg at 01/23/20 0638  •  levETIRAcetam (KEPPRA) 100 MG/ML solution 1,500 mg, 1,500 mg, Per PEG Tube, BID, Marcus Clarke MD, 1,500 mg at 01/23/20 0932  •  polyethylene glycol (MIRALAX) powder 17 g, 17 g, Per G Tube, Daily, Lula Shah MD, 17 g at 01/22/20 1115  •  [COMPLETED] Insert peripheral IV, , , Once **AND** sodium chloride 0.9 % flush 10 mL, 10 mL, Intravenous, PRN, Diogenes Rodrigez MD, 10 mL at 01/15/20 0040     Consults obtained  General surgery  Nutrition    Procedures  None    Hospital course  39-year-old white male with history of traumatic brain injury with quadriplegia and contractures and immobility syndrome on tube feeding nursing home resident brought to the emergency room with nausea vomiting.  Patient work-up revealed acute small bowel obstruction with UTI admit for management.  Patient admitted treated with bowel rest NG tube IV fluid and empiric antibiotics.  Patient also followed by general surgery.  Patient did receive bowel stimulant and completed antibiotics for UTI.  Once her bowels return he started on tube feeding which is tolerating well and currently at 60 cc/h and the goal is 75 cc/h.  Patient clear with general surgery to be discharged back to nursing home.  Patient IV fluids discontinued and started on routine water flushes per nutrition recommendations.  Patient remained  DNR throughout hospital course.    Discharge diet Glucerna 1.2 at 75 cc/h    Activity patient is bedbound    Medication as above    Further care per accepting physician at nursing home    MICHEL KURTZ MD

## 2020-01-23 NOTE — PROGRESS NOTES
"Daily progress note    Chief complaint  Doing better  No new complaints  Family at bedside    History of present illness  39-year-old white male who is well-known to our service with history of traumatic brain injury with quadriplegia with contractures and immobility syndrome who is a nursing home resident brought to the emergency room with nausea vomiting started yesterday evening.  Patient evaluated in ER found to have recurrent small bowel obstruction admit for management.  Patient unable to give detailed history most of the history obtained from the mother chart old record and we know the patient from previous admissions.  Patient has NG tube placed and tube feeding stopped and IV fluids started.  And is DNR per his wishes.      REVIEW OF SYSTEMS  Unable to perform      PHYSICAL EXAM  Blood pressure 116/76, pulse 85, temperature 98.2 °F (36.8 °C), temperature source Oral, resp. rate 16, height 180.1 cm (70.91\"), weight 78.3 kg (172 lb 9.9 oz), SpO2 94 %.    Constitutional: No distress.   Head: Normocephalic and atraumatic.   Eyes: Conjunctivae are normal.   Neck: Normal range of motion.   Cardiovascular: Regular rhythm. Tachycardia present.   Pulmonary/Chest: Breath sounds normal. No respiratory distress.   Abdominal: Soft. He exhibits distension. Bowel sounds are hypoactive. There is no tenderness. There is no rebound and no guarding. G-tube in place   Musculoskeletal: He exhibits no edema or tenderness.   Neurological: Unobtainable.    Skin: No rash noted.     LAB RESULTS  Lab Results (last 24 hours)     Procedure Component Value Units Date/Time    POC Glucose Once [[id number]]  (Normal) Collected:  01/23/20 1154    Specimen:  Blood Updated:  01/23/20 1205     Glucose 125 mg/dL     POC Glucose Once [[id number]]  (Normal) Collected:  01/23/20 0615    Specimen:  Blood Updated:  01/23/20 0617     Glucose 120 mg/dL     POC Glucose Once [[id number]]  (Normal) Collected:  01/23/20 0005    Specimen:  Blood Updated:  " 01/23/20 0007     Glucose 113 mg/dL     POC Glucose Once [[id number]]  (Normal) Collected:  01/22/20 1805    Specimen:  Blood Updated:  01/22/20 1806     Glucose 114 mg/dL         Imaging Results (Last 24 Hours)     ** No results found for the last 24 hours. **          Current Facility-Administered Medications:   •  amantadine (SYMMETREL) solution 100 mg, 100 mg, Per G Tube, Q12H, Michel Clarke MD, 100 mg at 01/23/20 0227  •  lansoprazole (FIRST) oral suspension 30 mg, 30 mg, Per G Tube, BID AC, Michel Clarke MD, 30 mg at 01/23/20 0638  •  levETIRAcetam (KEPPRA) 100 MG/ML solution 1,500 mg, 1,500 mg, Per PEG Tube, BID, Michel Clarke MD, 1,500 mg at 01/23/20 0932  •  polyethylene glycol (MIRALAX) powder 17 g, 17 g, Per G Tube, Daily, Lula Shah MD, 17 g at 01/22/20 1115  •  [COMPLETED] Insert peripheral IV, , , Once **AND** sodium chloride 0.9 % flush 10 mL, 10 mL, Intravenous, PRN, Diogenes Rodrigez MD, 10 mL at 01/15/20 0040     ASSESSMENT  Acute small bowel obstruction resolved  Acute UTI  Multiple renal and ureteral calculi  Traumatic brain injury  Quadriplegic with contractures  Diabetes mellitus  Status post G-tube placement  Seizure disorder  Immobilization syndrome    PLAN  Discharge back to nursing home  Discharge summary dictated    MICHEL CLARKE MD

## 2020-01-23 NOTE — PROGRESS NOTES
Adult Nutrition  Assessment/PES    Patient Name:  Adrian Kuo  YOB: 1980  MRN: [id number]  Admit Date:  1/14/2020    Assessment Date:  1/23/2020    Comments:  TF rate continues to increase towards goal rate per surgeon's orders. IV fluids have been d/c'd. Added routine water flush of 45 cc/hr to orders. RN reports pt to dc back to [facility] later today.     Reason for Assessment     Row Name 01/23/20 1054          Reason for Assessment    Reason For Assessment  follow-up protocol;TF/PN         Nutrition/Diet History     Row Name 01/23/20 1054          Nutrition/Diet History    Typical Food/Fluid Intake  Pt has tolerated slow titration of TF towards goal rate. Currently at 50 cc/hr; per RN plan is to adv to goal today and transfer back to NH)           Labs/Tests/Procedures/Meds     Row Name 01/23/20 1054          Labs/Procedures/Meds    Lab Results Reviewed  reviewed     Lab Results Comments  glu stable        Diagnostic Tests/Procedures    Diagnostic Test/Procedure Reviewed  reviewed        Medications    Pertinent Medications Reviewed  reviewed     Pertinent Medications Comments  insulin, keppra, miralax -- IVF dc         Physical Findings     Row Name 01/23/20 1055          Physical Findings    Overall Physical Appearance  tetraplegia (quadriplegia)     Gastrointestinal  feeding tube;other (see comments) + BM x5 (mineral oil dc)     Tubes  gastrostomy tube     Skin  other (see comments) skin intact           Nutrition Prescription Ordered     Row Name 01/23/20 1055          Nutrition Prescription PO    Current PO Diet  NPO        Nutrition Prescription EN    Enteral Route  Gastrostomy     Product  Diabetisource AC (Glucerna 1.2)     TF Delivery Method  Continuous     Continuous TF Goal Rate (mL/hr)  75 mL/hr x 22 hr/day     Continuous TF Current Rate (mL/hr)  (S) 50 mL/hr     Water flush (mL)   0 mL RN giving 30 cc with meds                 Problem/Interventions:  Problem 1     Row Name 01/23/20  1058          Nutrition Diagnoses Problem 1    Resolved?  Yes               Intervention Goal     Row Name 01/23/20 1058          Intervention Goal    General  Maintain nutrition;Nutrition support treatment     TF/PN  Tolerate TF at goal     Weight  No significant weight loss         Nutrition Intervention     Row Name 01/23/20 1059          Nutrition Intervention    RD/Tech Action  Recommend/ordered     Recommended/Ordered  EN         Nutrition Prescription     Row Name 01/23/20 1059          Nutrition Prescription EN    Enteral Prescription  Continue same protocol;Enteral begin/change     Water flush (mL)   45 mL     Water Flush Frequency  Per hour     New EN Prescription Ordered?  Yes         Education/Evaluation     Row Name 01/23/20 1059          Monitor/Evaluation    Monitor  Per protocol           Electronically signed by:  Racheal Wang RD  01/23/20 11:00 AM

## 2020-01-23 NOTE — PLAN OF CARE
Problem: Patient Care Overview  Goal: Plan of Care Review  Outcome: Ongoing (interventions implemented as appropriate)  Flowsheets (Taken 1/23/2020 9243)  Progress: improving  Plan of Care Reviewed With: mother  Note:   Pt diana TF, no N/V, abd soft, and + BS. Pt has been asymptomatic and appears to have rested/slept comfortably through shift. Mother at bsd.   Continue to monitor and tx per POC and MD orders.

## 2020-01-23 NOTE — PROGRESS NOTES
"General Surgery  Progress Note    CC: Follow-up partial SBO vs ileus    S: He is currently tolerating tube feeds at 50 cc/h with no nausea or vomiting.  He is having bowel movements.    ROS: Unable to be obtained given nonverbal status    O:/76 (BP Location: Right arm, Patient Position: Lying)   Pulse 85   Temp 98.2 °F (36.8 °C) (Oral)   Resp 16   Ht 180.1 cm (70.91\")   Wt 78.3 kg (172 lb 9.9 oz)   SpO2 94%   BMI 24.14 kg/m²     Intake & Output:  BM x2    GENERAL: sleeping soundly, appears comfortable  HEENT: former trach scar at base of neck, normocephalic, atraumatic  CHEST: clear to auscultation, no wheezes, rales or rhonchi, symmetric air entry  CARDIAC: regular rate and rhythm    ABDOMEN: soft, nondistended, no facial grimace with abdominal palpation, Gtube in LUQ  EXTREMITIES: 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 male with partial small bowel obstruction versus ileus    Advance tube feeds to goal.  He can be discharged back to [facility] today and does not need to follow-up with me.    Lula Shah MD  General and Endoscopic Surgery  [facility]    [address], [address]  [place], [place], [address]  P: [phone]  F: [phone]     "
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
## 2020-02-13 ENCOUNTER — HOSPITAL ENCOUNTER (EMERGENCY)
Facility: HOSPITAL | Age: 40
Discharge: NURSING, REHAB OR LONG-TERM CARE | End: 2020-02-14
Attending: EMERGENCY MEDICINE | Admitting: EMERGENCY MEDICINE

## 2020-02-13 ENCOUNTER — APPOINTMENT (OUTPATIENT)
Dept: GENERAL RADIOLOGY | Facility: HOSPITAL | Age: 40
End: 2020-02-13

## 2020-02-13 DIAGNOSIS — R11.2 NON-INTRACTABLE VOMITING WITH NAUSEA, UNSPECIFIED VOMITING TYPE: Primary | ICD-10-CM

## 2020-02-13 DIAGNOSIS — N39.0 ACUTE UTI: ICD-10-CM

## 2020-02-13 LAB
BASOPHILS # BLD AUTO: 0.07 10*3/MM3 (ref 0–0.2)
BASOPHILS NFR BLD AUTO: 0.7 % (ref 0–1.5)
DEPRECATED RDW RBC AUTO: 45.7 FL (ref 37–54)
EOSINOPHIL # BLD AUTO: 0.19 10*3/MM3 (ref 0–0.4)
EOSINOPHIL NFR BLD AUTO: 1.9 % (ref 0.3–6.2)
ERYTHROCYTE [DISTWIDTH] IN BLOOD BY AUTOMATED COUNT: 13.7 % (ref 12.3–15.4)
HCT VFR BLD AUTO: 53.2 % (ref 37.5–51)
HGB BLD-MCNC: 17.4 G/DL (ref 13–17.7)
IMM GRANULOCYTES # BLD AUTO: 0.07 10*3/MM3 (ref 0–0.05)
IMM GRANULOCYTES NFR BLD AUTO: 0.7 % (ref 0–0.5)
LYMPHOCYTES # BLD AUTO: 2.01 10*3/MM3 (ref 0.7–3.1)
LYMPHOCYTES NFR BLD AUTO: 20.4 % (ref 19.6–45.3)
MCH RBC QN AUTO: 29.9 PG (ref 26.6–33)
MCHC RBC AUTO-ENTMCNC: 32.7 G/DL (ref 31.5–35.7)
MCV RBC AUTO: 91.4 FL (ref 79–97)
MONOCYTES # BLD AUTO: 1.04 10*3/MM3 (ref 0.1–0.9)
MONOCYTES NFR BLD AUTO: 10.6 % (ref 5–12)
NEUTROPHILS # BLD AUTO: 6.47 10*3/MM3 (ref 1.7–7)
NEUTROPHILS NFR BLD AUTO: 65.7 % (ref 42.7–76)
NRBC BLD AUTO-RTO: 0.1 /100 WBC (ref 0–0.2)
PLATELET # BLD AUTO: 208 10*3/MM3 (ref 140–450)
PMV BLD AUTO: 12.5 FL (ref 6–12)
RBC # BLD AUTO: 5.82 10*6/MM3 (ref 4.14–5.8)
WBC NRBC COR # BLD: 9.85 10*3/MM3 (ref 3.4–10.8)

## 2020-02-13 PROCEDURE — 83690 ASSAY OF LIPASE: CPT | Performed by: NURSE PRACTITIONER

## 2020-02-13 PROCEDURE — 80053 COMPREHEN METABOLIC PANEL: CPT | Performed by: NURSE PRACTITIONER

## 2020-02-13 PROCEDURE — 81001 URINALYSIS AUTO W/SCOPE: CPT | Performed by: NURSE PRACTITIONER

## 2020-02-13 PROCEDURE — 74022 RADEX COMPL AQT ABD SERIES: CPT

## 2020-02-13 PROCEDURE — 99284 EMERGENCY DEPT VISIT MOD MDM: CPT

## 2020-02-13 PROCEDURE — 96365 THER/PROPH/DIAG IV INF INIT: CPT

## 2020-02-13 PROCEDURE — 85025 COMPLETE CBC W/AUTO DIFF WBC: CPT | Performed by: NURSE PRACTITIONER

## 2020-02-13 RX ORDER — SODIUM CHLORIDE 0.9 % (FLUSH) 0.9 %
10 SYRINGE (ML) INJECTION AS NEEDED
Status: DISCONTINUED | OUTPATIENT
Start: 2020-02-13 | End: 2020-02-14 | Stop reason: HOSPADM

## 2020-02-14 VITALS
HEART RATE: 98 BPM | SYSTOLIC BLOOD PRESSURE: 111 MMHG | BODY MASS INDEX: 23.41 KG/M2 | HEIGHT: 72 IN | RESPIRATION RATE: 18 BRPM | OXYGEN SATURATION: 96 % | TEMPERATURE: 98.7 F | DIASTOLIC BLOOD PRESSURE: 82 MMHG

## 2020-02-14 LAB
ALBUMIN SERPL-MCNC: 4.4 G/DL (ref 3.5–5.2)
ALBUMIN/GLOB SERPL: 1.3 G/DL
ALP SERPL-CCNC: 130 U/L (ref 39–117)
ALT SERPL W P-5'-P-CCNC: 137 U/L (ref 1–41)
ANION GAP SERPL CALCULATED.3IONS-SCNC: 13.9 MMOL/L (ref 5–15)
AST SERPL-CCNC: 65 U/L (ref 1–40)
BACTERIA UR QL AUTO: ABNORMAL /HPF
BILIRUB SERPL-MCNC: 0.8 MG/DL (ref 0.2–1.2)
BILIRUB UR QL STRIP: NEGATIVE
BUN BLD-MCNC: 25 MG/DL (ref 6–20)
BUN/CREAT SERPL: 34.2 (ref 7–25)
CALCIUM SPEC-SCNC: 9.7 MG/DL (ref 8.6–10.5)
CHLORIDE SERPL-SCNC: 91 MMOL/L (ref 98–107)
CLARITY UR: ABNORMAL
CO2 SERPL-SCNC: 32.1 MMOL/L (ref 22–29)
COLOR UR: YELLOW
CREAT BLD-MCNC: 0.73 MG/DL (ref 0.76–1.27)
GFR SERPL CREATININE-BSD FRML MDRD: 120 ML/MIN/1.73
GLOBULIN UR ELPH-MCNC: 3.4 GM/DL
GLUCOSE BLD-MCNC: 129 MG/DL (ref 65–99)
GLUCOSE UR STRIP-MCNC: NEGATIVE MG/DL
HGB UR QL STRIP.AUTO: ABNORMAL
HOLD SPECIMEN: NORMAL
HOLD SPECIMEN: NORMAL
HYALINE CASTS UR QL AUTO: ABNORMAL /LPF
KETONES UR QL STRIP: NEGATIVE
LEUKOCYTE ESTERASE UR QL STRIP.AUTO: ABNORMAL
LIPASE SERPL-CCNC: 12 U/L (ref 13–60)
NITRITE UR QL STRIP: NEGATIVE
PH UR STRIP.AUTO: 7.5 [PH] (ref 5–8)
POTASSIUM BLD-SCNC: 3.3 MMOL/L (ref 3.5–5.2)
PROT SERPL-MCNC: 7.8 G/DL (ref 6–8.5)
PROT UR QL STRIP: ABNORMAL
RBC # UR: ABNORMAL /HPF
REF LAB TEST METHOD: ABNORMAL
SODIUM BLD-SCNC: 137 MMOL/L (ref 136–145)
SP GR UR STRIP: 1.02 (ref 1–1.03)
SQUAMOUS #/AREA URNS HPF: ABNORMAL /HPF
UROBILINOGEN UR QL STRIP: ABNORMAL
WBC UR QL AUTO: ABNORMAL /HPF
WHOLE BLOOD HOLD SPECIMEN: NORMAL
WHOLE BLOOD HOLD SPECIMEN: NORMAL

## 2020-02-14 PROCEDURE — 96365 THER/PROPH/DIAG IV INF INIT: CPT

## 2020-02-14 PROCEDURE — 25010000002 CEFTRIAXONE PER 250 MG: Performed by: EMERGENCY MEDICINE

## 2020-02-14 RX ORDER — CEFTRIAXONE SODIUM 1 G/50ML
1 INJECTION, SOLUTION INTRAVENOUS ONCE
Status: COMPLETED | OUTPATIENT
Start: 2020-02-14 | End: 2020-02-14

## 2020-02-14 RX ADMIN — CEFTRIAXONE SODIUM 1 G: 1 INJECTION, SOLUTION INTRAVENOUS at 01:57

## 2020-02-14 NOTE — ED NOTES
"Pt arrived via EMS from [facility]. EMS states pt \"has projectile vomiting since 6am this morning. Pt has hx of bowel obstruction and diabetes.\" EMS states blood sugar to be 146. EMS states pt is responsive to pain and has been semi-\comatose for the last 15 years. Pt's mother states the nursing home gave pt phenergan before EMS arrival.           Shaji Uribe, RN  02/13/20 2103    "

## 2020-02-14 NOTE — ED NOTES
LPN at [facility] called and this RN gave report of pt.    EMS notified to take pt back to facility.     Pt's mother updated.      Lacey Briggs, RN  02/14/20 7259

## 2020-02-14 NOTE — ED NOTES
Pt's mother states he has vomited x5 times today that looks like bile. Pt has recent hx of bowel obstruction. Pt is nonverbal. Appears to be in NAD at this time.     Lacey Briggs, RN  02/13/20 5892

## 2020-02-14 NOTE — ED PROVIDER NOTES
MD ATTESTATION NOTE    Pt presents to the ED complaining of several episodes of vomiting today. Per mother, the patient was given Phenergan at his nursing facility earlier this evening, and he has not vomited since the medication was given. Patient has a hx of bowel obstruction in January which resolved. Per mother, the patient has had a bowel movement today.    Physical Exam:  Heart is RRR without murmur. Lungs CTAB. Patient is sleeping, and he is in no obvious distress. Abdomen is non-tender, normal active bowel sounds, . G-tube in the LUQ of the abdomen. Feet are in pressure stockings and there is no edema to the BLE.      PROGRESS NOTES/CONSULTS    Lab Results (last 24 hours)     Procedure Component Value Units Date/Time    CBC & Differential [[id number]] Collected:  02/13/20 2253    Specimen:  Blood Updated:  02/13/20 2331    Narrative:       The following orders were created for panel order CBC & Differential.  Procedure                               Abnormality         Status                     ---------                               -----------         ------                     CBC Auto Differential[[id number]]        Abnormal            Final result                 Please view results for these tests on the individual orders.    Comprehensive Metabolic Panel [[id number]]  (Abnormal) Collected:  02/13/20 2253    Specimen:  Blood Updated:  02/14/20 0029     Glucose 129 mg/dL      BUN 25 mg/dL      Creatinine 0.73 mg/dL      Sodium 137 mmol/L      Potassium 3.3 mmol/L      Chloride 91 mmol/L      CO2 32.1 mmol/L      Calcium 9.7 mg/dL      Total Protein 7.8 g/dL      Albumin 4.40 g/dL      ALT (SGPT) 137 U/L      AST (SGOT) 65 U/L      Alkaline Phosphatase 130 U/L      Total Bilirubin 0.8 mg/dL      eGFR Non African Amer 120 mL/min/1.73      Globulin 3.4 gm/dL      A/G Ratio 1.3 g/dL      BUN/Creatinine Ratio 34.2     Anion Gap 13.9 mmol/L     Narrative:       GFR Normal >60  Chronic Kidney Disease <60  Kidney  Failure <15      Lipase [[id number]]  (Abnormal) Collected:  02/13/20 2253    Specimen:  Blood Updated:  02/14/20 0025     Lipase 12 U/L     CBC Auto Differential [[id number]]  (Abnormal) Collected:  02/13/20 2253    Specimen:  Blood Updated:  02/13/20 2331     WBC 9.85 10*3/mm3      RBC 5.82 10*6/mm3      Hemoglobin 17.4 g/dL      Hematocrit 53.2 %      MCV 91.4 fL      MCH 29.9 pg      MCHC 32.7 g/dL      RDW 13.7 %      RDW-SD 45.7 fl      MPV 12.5 fL      Platelets 208 10*3/mm3      Neutrophil % 65.7 %      Lymphocyte % 20.4 %      Monocyte % 10.6 %      Eosinophil % 1.9 %      Basophil % 0.7 %      Immature Grans % 0.7 %      Neutrophils, Absolute 6.47 10*3/mm3      Lymphocytes, Absolute 2.01 10*3/mm3      Monocytes, Absolute 1.04 10*3/mm3      Eosinophils, Absolute 0.19 10*3/mm3      Basophils, Absolute 0.07 10*3/mm3      Immature Grans, Absolute 0.07 10*3/mm3      nRBC 0.1 /100 WBC     Urinalysis With Microscopic If Indicated (No Culture) - Urine, Catheter [[id number]]  (Abnormal) Collected:  02/13/20 2355    Specimen:  Urine, Catheter Updated:  02/14/20 0011     Color, UA Yellow     Appearance, UA Cloudy     pH, UA 7.5     Specific Gravity, UA 1.017     Glucose, UA Negative     Ketones, UA Negative     Bilirubin, UA Negative     Blood, UA Trace     Protein, UA Trace     Leuk Esterase, UA Large (3+)     Nitrite, UA Negative     Urobilinogen, UA 0.2 E.U./dL    Urinalysis, Microscopic Only - Urine, Catheter [[id number]]  (Abnormal) Collected:  02/13/20 2355    Specimen:  Urine, Catheter Updated:  02/14/20 0030     RBC, UA 0-2 /HPF      WBC, UA Too Numerous to Count /HPF      Bacteria, UA 4+ /HPF      Squamous Epithelial Cells, UA 0-2 /HPF      Hyaline Casts, UA 0-2 /LPF      Methodology Manual Light Microscopy        Xr Abdomen 2+ Vw With Chest 1 Vw    Result Date: 2/14/2020  Persistent distention of multiple loops of large and small bowel, suggesting ileus. Patient also has a large amount of fecal material within  the rectum, which may represent fecal impaction.  This report was finalized on 2/14/2020 12:29 AM by Dr. Luisa Christianson M.D.          0004 Assumed care of the patient from JOSHUA Perdomo, at this time.    0123 Rechecked the patient. BP- 104/74 HR- 94 Temp- 98.9 °F (37.2 °C) (Tympanic) O2 sat- 96% Informed the patient and mother of all labs and imaging including bacteria present in the UA which suggests a UTI. Discussed the plan to give Rocephin and discharge with Rx for Ceftin. Mother understands and agrees with the plan, all questions answered.      DIAGNOSIS  Final diagnoses:   Non-intractable vomiting with nausea, unspecified vomiting type   Acute UTI       DISPOSITION  DISCHARGE    Patient discharged in stable condition.    Reviewed implications of results, diagnosis, meds, responsibility to follow up, warning signs and symptoms of possible worsening, potential complications and reasons to return to ER, including any new or worsening symptoms.    Patient/Family voiced understanding of above instructions.    Discussed plan for discharge, as there is no emergent indication for admission. Patient referred to primary care provider for BP management due to today's BP. Pt/family is agreeable and understands need for follow up and repeat testing.  Pt is aware that discharge does not mean that nothing is wrong but it indicates no emergency is present that requires admission and they must continue care with follow-up as given below or physician of their choice.     FOLLOW-UP  Sav Kaur (Godwin) MD [facility]  [address] [address]  [phone]    Schedule an appointment as soon as possible for a visit            Medication List      New Prescriptions    cefuroxime 250 MG/5ML suspension  Commonly known as:  CEFTIN  5 mL by Per G Tube route 2 (Two) Times a Day.                The VON and I have discussed this patient's history, physical exam, and treatment plan.  I have reviewed the documentation and  personally had a face to face interaction with the patient. I affirm the documentation and agree with the treatment and plan.  The attached note describes my personal findings.    Documentation assistance provided by timo Hewitt for Dr. Amado MD. Information recorded by the scribe was done at my direction and has been verified and validated by me.            Leela Hewitt  02/14/20 0130       Sav Fischer MD  02/14/20 1533
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
## 2020-02-14 NOTE — DISCHARGE INSTRUCTIONS
Continue your home medications including the Phenergan.  Use the antibiotic until completed.  Please return to the emergency department symptoms worsen with increasing vomiting, despite the Phenergan, increasing abdominal pain, or fever.

## 2020-02-17 ENCOUNTER — HOSPITAL ENCOUNTER (EMERGENCY)
Facility: HOSPITAL | Age: 40
Discharge: HOME | End: 2020-02-17
Attending: EMERGENCY MEDICINE | Admitting: EMERGENCY MEDICINE

## 2020-02-17 VITALS
DIASTOLIC BLOOD PRESSURE: 82 MMHG | SYSTOLIC BLOOD PRESSURE: 105 MMHG | BODY MASS INDEX: 22.35 KG/M2 | TEMPERATURE: 95.7 F | HEIGHT: 72 IN | OXYGEN SATURATION: 99 % | HEART RATE: 78 BPM | RESPIRATION RATE: 14 BRPM | WEIGHT: 165 LBS

## 2020-02-17 DIAGNOSIS — K94.23 MALFUNCTION OF GASTROSTOMY TUBE (HCC): Primary | ICD-10-CM

## 2020-02-17 PROCEDURE — 43762 RPLC GTUBE NO REVJ TRC: CPT | Performed by: EMERGENCY MEDICINE

## 2020-02-17 PROCEDURE — 99282 EMERGENCY DEPT VISIT SF MDM: CPT

## 2020-02-17 RX ORDER — LIDOCAINE HYDROCHLORIDE AND EPINEPHRINE 10; 10 MG/ML; UG/ML
INJECTION, SOLUTION INFILTRATION; PERINEURAL
Status: DISCONTINUED
Start: 2020-02-17 | End: 2020-02-17 | Stop reason: HOSPADM

## 2020-02-17 RX ORDER — LIDOCAINE HYDROCHLORIDE AND EPINEPHRINE BITARTRATE 20; .01 MG/ML; MG/ML
10 INJECTION, SOLUTION SUBCUTANEOUS ONCE
Status: DISCONTINUED | OUTPATIENT
Start: 2020-02-17 | End: 2020-02-17 | Stop reason: HOSPADM

## 2020-02-17 NOTE — DISCHARGE INSTRUCTIONS
May resume tube feeds per routine.  Please return to the ER for any signs of pain, fevers, bleeding, leaking feeds, vomiting, difficulties breathing, or any other concerns.
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
## 2020-02-20 ENCOUNTER — HOSPITAL ENCOUNTER (INPATIENT)
Facility: HOSPITAL | Age: 40
LOS: 8 days | Discharge: NURSING, REHAB OR LONG-TERM CARE | End: 2020-02-29
Attending: EMERGENCY MEDICINE | Admitting: HOSPITALIST

## 2020-02-20 DIAGNOSIS — R11.2 NON-INTRACTABLE VOMITING WITH NAUSEA, UNSPECIFIED VOMITING TYPE: ICD-10-CM

## 2020-02-20 DIAGNOSIS — R10.84 GENERALIZED ABDOMINAL PAIN: Primary | ICD-10-CM

## 2020-02-20 PROCEDURE — 81001 URINALYSIS AUTO W/SCOPE: CPT | Performed by: EMERGENCY MEDICINE

## 2020-02-20 PROCEDURE — 80053 COMPREHEN METABOLIC PANEL: CPT | Performed by: EMERGENCY MEDICINE

## 2020-02-20 PROCEDURE — 83690 ASSAY OF LIPASE: CPT | Performed by: EMERGENCY MEDICINE

## 2020-02-20 PROCEDURE — 85025 COMPLETE CBC W/AUTO DIFF WBC: CPT | Performed by: EMERGENCY MEDICINE

## 2020-02-20 PROCEDURE — P9612 CATHETERIZE FOR URINE SPEC: HCPCS

## 2020-02-20 PROCEDURE — 83605 ASSAY OF LACTIC ACID: CPT | Performed by: EMERGENCY MEDICINE

## 2020-02-20 PROCEDURE — 36415 COLL VENOUS BLD VENIPUNCTURE: CPT

## 2020-02-20 PROCEDURE — 99284 EMERGENCY DEPT VISIT MOD MDM: CPT

## 2020-02-20 RX ORDER — SODIUM CHLORIDE 0.9 % (FLUSH) 0.9 %
10 SYRINGE (ML) INJECTION AS NEEDED
Status: DISCONTINUED | OUTPATIENT
Start: 2020-02-20 | End: 2020-02-29 | Stop reason: HOSPADM

## 2020-02-21 ENCOUNTER — APPOINTMENT (OUTPATIENT)
Dept: CT IMAGING | Facility: HOSPITAL | Age: 40
End: 2020-02-21

## 2020-02-21 PROBLEM — R10.84 GENERALIZED ABDOMINAL PAIN: Status: ACTIVE | Noted: 2020-02-21

## 2020-02-21 LAB
ALBUMIN SERPL-MCNC: 4.6 G/DL (ref 3.5–5.2)
ALBUMIN/GLOB SERPL: 1.4 G/DL
ALP SERPL-CCNC: 132 U/L (ref 39–117)
ALT SERPL W P-5'-P-CCNC: 86 U/L (ref 1–41)
ANION GAP SERPL CALCULATED.3IONS-SCNC: 13.4 MMOL/L (ref 5–15)
ANION GAP SERPL CALCULATED.3IONS-SCNC: 17.5 MMOL/L (ref 5–15)
AST SERPL-CCNC: 30 U/L (ref 1–40)
BACTERIA UR QL AUTO: ABNORMAL /HPF
BASOPHILS # BLD AUTO: 0.06 10*3/MM3 (ref 0–0.2)
BASOPHILS NFR BLD AUTO: 0.6 % (ref 0–1.5)
BILIRUB SERPL-MCNC: 0.7 MG/DL (ref 0.2–1.2)
BILIRUB UR QL STRIP: NEGATIVE
BUN BLD-MCNC: 18 MG/DL (ref 6–20)
BUN BLD-MCNC: 25 MG/DL (ref 6–20)
BUN/CREAT SERPL: 36 (ref 7–25)
BUN/CREAT SERPL: 41.7 (ref 7–25)
CALCIUM SPEC-SCNC: 9.1 MG/DL (ref 8.6–10.5)
CALCIUM SPEC-SCNC: 9.9 MG/DL (ref 8.6–10.5)
CHLORIDE SERPL-SCNC: 89 MMOL/L (ref 98–107)
CHLORIDE SERPL-SCNC: 92 MMOL/L (ref 98–107)
CLARITY UR: ABNORMAL
CO2 SERPL-SCNC: 28.5 MMOL/L (ref 22–29)
CO2 SERPL-SCNC: 35.6 MMOL/L (ref 22–29)
COLOR UR: YELLOW
CREAT BLD-MCNC: 0.5 MG/DL (ref 0.76–1.27)
CREAT BLD-MCNC: 0.6 MG/DL (ref 0.76–1.27)
D-LACTATE SERPL-SCNC: 2.1 MMOL/L (ref 0.5–2)
D-LACTATE SERPL-SCNC: 2.6 MMOL/L (ref 0.5–2)
DEPRECATED RDW RBC AUTO: 45.7 FL (ref 37–54)
DEPRECATED RDW RBC AUTO: 46.1 FL (ref 37–54)
EOSINOPHIL # BLD AUTO: 0.07 10*3/MM3 (ref 0–0.4)
EOSINOPHIL NFR BLD AUTO: 0.6 % (ref 0.3–6.2)
ERYTHROCYTE [DISTWIDTH] IN BLOOD BY AUTOMATED COUNT: 13.8 % (ref 12.3–15.4)
ERYTHROCYTE [DISTWIDTH] IN BLOOD BY AUTOMATED COUNT: 14 % (ref 12.3–15.4)
GFR SERPL CREATININE-BSD FRML MDRD: 150 ML/MIN/1.73
GFR SERPL CREATININE-BSD FRML MDRD: >150 ML/MIN/1.73
GLOBULIN UR ELPH-MCNC: 3.4 GM/DL
GLUCOSE BLD-MCNC: 162 MG/DL (ref 65–99)
GLUCOSE BLD-MCNC: 171 MG/DL (ref 65–99)
GLUCOSE BLDC GLUCOMTR-MCNC: 139 MG/DL (ref 70–130)
GLUCOSE BLDC GLUCOMTR-MCNC: 148 MG/DL (ref 70–130)
GLUCOSE BLDC GLUCOMTR-MCNC: 155 MG/DL (ref 70–130)
GLUCOSE BLDC GLUCOMTR-MCNC: 157 MG/DL (ref 70–130)
GLUCOSE UR STRIP-MCNC: NEGATIVE MG/DL
HCT VFR BLD AUTO: 47.9 % (ref 37.5–51)
HCT VFR BLD AUTO: 49.6 % (ref 37.5–51)
HGB BLD-MCNC: 16.3 G/DL (ref 13–17.7)
HGB BLD-MCNC: 17.2 G/DL (ref 13–17.7)
HGB UR QL STRIP.AUTO: ABNORMAL
HYALINE CASTS UR QL AUTO: ABNORMAL /LPF
IMM GRANULOCYTES # BLD AUTO: 0.09 10*3/MM3 (ref 0–0.05)
IMM GRANULOCYTES NFR BLD AUTO: 0.8 % (ref 0–0.5)
KETONES UR QL STRIP: NEGATIVE
LACTATE HOLD SPECIMEN: NORMAL
LEUKOCYTE ESTERASE UR QL STRIP.AUTO: ABNORMAL
LIPASE SERPL-CCNC: 17 U/L (ref 13–60)
LYMPHOCYTES # BLD AUTO: 1.23 10*3/MM3 (ref 0.7–3.1)
LYMPHOCYTES NFR BLD AUTO: 11.3 % (ref 19.6–45.3)
MCH RBC QN AUTO: 30.8 PG (ref 26.6–33)
MCH RBC QN AUTO: 31.4 PG (ref 26.6–33)
MCHC RBC AUTO-ENTMCNC: 34 G/DL (ref 31.5–35.7)
MCHC RBC AUTO-ENTMCNC: 34.7 G/DL (ref 31.5–35.7)
MCV RBC AUTO: 90.4 FL (ref 79–97)
MCV RBC AUTO: 90.7 FL (ref 79–97)
MONOCYTES # BLD AUTO: 0.93 10*3/MM3 (ref 0.1–0.9)
MONOCYTES NFR BLD AUTO: 8.6 % (ref 5–12)
NEUTROPHILS # BLD AUTO: 8.49 10*3/MM3 (ref 1.7–7)
NEUTROPHILS NFR BLD AUTO: 78.1 % (ref 42.7–76)
NITRITE UR QL STRIP: NEGATIVE
NRBC BLD AUTO-RTO: 0 /100 WBC (ref 0–0.2)
PH UR STRIP.AUTO: 6 [PH] (ref 5–8)
PLATELET # BLD AUTO: 185 10*3/MM3 (ref 140–450)
PLATELET # BLD AUTO: 202 10*3/MM3 (ref 140–450)
PMV BLD AUTO: 10.6 FL (ref 6–12)
PMV BLD AUTO: 11 FL (ref 6–12)
POTASSIUM BLD-SCNC: 3 MMOL/L (ref 3.5–5.2)
POTASSIUM BLD-SCNC: 3.3 MMOL/L (ref 3.5–5.2)
PROT SERPL-MCNC: 8 G/DL (ref 6–8.5)
PROT UR QL STRIP: ABNORMAL
RBC # BLD AUTO: 5.3 10*6/MM3 (ref 4.14–5.8)
RBC # BLD AUTO: 5.47 10*6/MM3 (ref 4.14–5.8)
RBC # UR: ABNORMAL /HPF
REF LAB TEST METHOD: ABNORMAL
SODIUM BLD-SCNC: 138 MMOL/L (ref 136–145)
SODIUM BLD-SCNC: 138 MMOL/L (ref 136–145)
SP GR UR STRIP: 1.02 (ref 1–1.03)
SQUAMOUS #/AREA URNS HPF: ABNORMAL /HPF
UROBILINOGEN UR QL STRIP: ABNORMAL
WBC NRBC COR # BLD: 10.87 10*3/MM3 (ref 3.4–10.8)
WBC NRBC COR # BLD: 12.26 10*3/MM3 (ref 3.4–10.8)
WBC UR QL AUTO: ABNORMAL /HPF

## 2020-02-21 PROCEDURE — 99222 1ST HOSP IP/OBS MODERATE 55: CPT | Performed by: INTERNAL MEDICINE

## 2020-02-21 PROCEDURE — 85027 COMPLETE CBC AUTOMATED: CPT | Performed by: HOSPITALIST

## 2020-02-21 PROCEDURE — 25010000002 CEFTRIAXONE PER 250 MG: Performed by: HOSPITALIST

## 2020-02-21 PROCEDURE — 74177 CT ABD & PELVIS W/CONTRAST: CPT

## 2020-02-21 PROCEDURE — 83605 ASSAY OF LACTIC ACID: CPT | Performed by: EMERGENCY MEDICINE

## 2020-02-21 PROCEDURE — 87040 BLOOD CULTURE FOR BACTERIA: CPT | Performed by: INTERNAL MEDICINE

## 2020-02-21 PROCEDURE — 80048 BASIC METABOLIC PNL TOTAL CA: CPT | Performed by: HOSPITALIST

## 2020-02-21 PROCEDURE — 25010000002 PROMETHAZINE PER 50 MG: Performed by: EMERGENCY MEDICINE

## 2020-02-21 PROCEDURE — 25010000002 LEVETIRACETAM IN NACL 0.82% 500 MG/100ML SOLUTION: Performed by: HOSPITALIST

## 2020-02-21 PROCEDURE — 25810000003 SODIUM CHLORIDE 0.9 % WITH KCL 20 MEQ 20-0.9 MEQ/L-% SOLUTION: Performed by: HOSPITALIST

## 2020-02-21 PROCEDURE — 25010000002 HYDROMORPHONE PER 4 MG: Performed by: HOSPITALIST

## 2020-02-21 PROCEDURE — 82962 GLUCOSE BLOOD TEST: CPT

## 2020-02-21 PROCEDURE — 25010000002 IOPAMIDOL 61 % SOLUTION: Performed by: EMERGENCY MEDICINE

## 2020-02-21 RX ORDER — SUCRALFATE ORAL 1 G/10ML
1 SUSPENSION ORAL
Status: DISCONTINUED | OUTPATIENT
Start: 2020-02-21 | End: 2020-02-24

## 2020-02-21 RX ORDER — HYDROCODONE BITARTRATE AND ACETAMINOPHEN 7.5; 325 MG/1; MG/1
1 TABLET ORAL EVERY 6 HOURS PRN
Status: ON HOLD | COMMUNITY
End: 2020-02-29 | Stop reason: SDUPTHER

## 2020-02-21 RX ORDER — CEFTRIAXONE SODIUM 1 G/50ML
1 INJECTION, SOLUTION INTRAVENOUS EVERY 24 HOURS
Status: DISCONTINUED | OUTPATIENT
Start: 2020-02-21 | End: 2020-02-22

## 2020-02-21 RX ORDER — PANTOPRAZOLE SODIUM 40 MG/10ML
80 INJECTION, POWDER, LYOPHILIZED, FOR SOLUTION INTRAVENOUS ONCE
Status: DISCONTINUED | OUTPATIENT
Start: 2020-02-21 | End: 2020-02-21

## 2020-02-21 RX ORDER — PANTOPRAZOLE SODIUM 40 MG/10ML
40 INJECTION, POWDER, LYOPHILIZED, FOR SOLUTION INTRAVENOUS
Status: DISCONTINUED | OUTPATIENT
Start: 2020-02-21 | End: 2020-02-22

## 2020-02-21 RX ORDER — PROMETHAZINE HYDROCHLORIDE 25 MG/ML
12.5 INJECTION, SOLUTION INTRAMUSCULAR; INTRAVENOUS EVERY 6 HOURS PRN
Status: DISCONTINUED | OUTPATIENT
Start: 2020-02-21 | End: 2020-02-29 | Stop reason: HOSPADM

## 2020-02-21 RX ORDER — PROMETHAZINE HYDROCHLORIDE 25 MG/ML
12.5 INJECTION, SOLUTION INTRAMUSCULAR; INTRAVENOUS ONCE
Status: COMPLETED | OUTPATIENT
Start: 2020-02-21 | End: 2020-02-21

## 2020-02-21 RX ORDER — PROMETHAZINE HYDROCHLORIDE 12.5 MG/1
12.5 TABLET ORAL EVERY 6 HOURS PRN
Status: DISCONTINUED | OUTPATIENT
Start: 2020-02-21 | End: 2020-02-21

## 2020-02-21 RX ORDER — LEVETIRACETAM 5 MG/ML
500 INJECTION INTRAVASCULAR EVERY 12 HOURS SCHEDULED
Status: DISCONTINUED | OUTPATIENT
Start: 2020-02-21 | End: 2020-02-27

## 2020-02-21 RX ORDER — HYDROMORPHONE HYDROCHLORIDE 1 MG/ML
0.5 INJECTION, SOLUTION INTRAMUSCULAR; INTRAVENOUS; SUBCUTANEOUS EVERY 4 HOURS PRN
Status: DISCONTINUED | OUTPATIENT
Start: 2020-02-21 | End: 2020-02-27

## 2020-02-21 RX ORDER — PROMETHAZINE HYDROCHLORIDE 25 MG/1
12.5 SUPPOSITORY RECTAL EVERY 6 HOURS PRN
Status: DISCONTINUED | OUTPATIENT
Start: 2020-02-21 | End: 2020-02-21

## 2020-02-21 RX ORDER — PROMETHAZINE HYDROCHLORIDE 25 MG/ML
12.5 INJECTION, SOLUTION INTRAMUSCULAR; INTRAVENOUS ONCE
Status: DISCONTINUED | OUTPATIENT
Start: 2020-02-21 | End: 2020-02-21

## 2020-02-21 RX ORDER — FLUOCINOLONE ACETONIDE 0.1 MG/ML
SOLUTION TOPICAL
COMMUNITY
End: 2020-02-29 | Stop reason: HOSPADM

## 2020-02-21 RX ORDER — SODIUM CHLORIDE AND POTASSIUM CHLORIDE 150; 900 MG/100ML; MG/100ML
75 INJECTION, SOLUTION INTRAVENOUS CONTINUOUS
Status: DISCONTINUED | OUTPATIENT
Start: 2020-02-21 | End: 2020-02-24

## 2020-02-21 RX ORDER — PANTOPRAZOLE SODIUM 40 MG/10ML
80 INJECTION, POWDER, LYOPHILIZED, FOR SOLUTION INTRAVENOUS ONCE
Status: COMPLETED | OUTPATIENT
Start: 2020-02-21 | End: 2020-02-21

## 2020-02-21 RX ORDER — PROMETHAZINE HYDROCHLORIDE 25 MG/1
25 TABLET ORAL EVERY 6 HOURS PRN
COMMUNITY
End: 2020-07-01 | Stop reason: HOSPADM

## 2020-02-21 RX ADMIN — HYDROMORPHONE HYDROCHLORIDE 0.5 MG: 1 INJECTION, SOLUTION INTRAMUSCULAR; INTRAVENOUS; SUBCUTANEOUS at 20:54

## 2020-02-21 RX ADMIN — POTASSIUM CHLORIDE AND SODIUM CHLORIDE 75 ML/HR: 900; 150 INJECTION, SOLUTION INTRAVENOUS at 18:35

## 2020-02-21 RX ADMIN — AMANTADINE HYDROCHLORIDE 100 MG: 50 SOLUTION ORAL at 12:45

## 2020-02-21 RX ADMIN — CEFTRIAXONE SODIUM 1 G: 1 INJECTION, SOLUTION INTRAVENOUS at 13:01

## 2020-02-21 RX ADMIN — SODIUM CHLORIDE, PRESERVATIVE FREE 10 ML: 5 INJECTION INTRAVENOUS at 20:44

## 2020-02-21 RX ADMIN — PROMETHAZINE HYDROCHLORIDE 12.5 MG: 25 INJECTION INTRAMUSCULAR; INTRAVENOUS at 00:43

## 2020-02-21 RX ADMIN — POTASSIUM CHLORIDE AND SODIUM CHLORIDE 125 ML/HR: 900; 150 INJECTION, SOLUTION INTRAVENOUS at 08:17

## 2020-02-21 RX ADMIN — LEVETIRACETAM INJECTION 500 MG: 5 INJECTION INTRAVENOUS at 20:44

## 2020-02-21 RX ADMIN — LEVETIRACETAM INJECTION 500 MG: 5 INJECTION INTRAVENOUS at 12:45

## 2020-02-21 RX ADMIN — SUCRALFATE 1 G: 1 SUSPENSION ORAL at 20:44

## 2020-02-21 RX ADMIN — SODIUM CHLORIDE 1000 ML: 9 INJECTION, SOLUTION INTRAVENOUS at 00:42

## 2020-02-21 RX ADMIN — PANTOPRAZOLE SODIUM 40 MG: 40 INJECTION, POWDER, FOR SOLUTION INTRAVENOUS at 12:44

## 2020-02-21 RX ADMIN — PANTOPRAZOLE SODIUM 80 MG: 40 INJECTION, POWDER, FOR SOLUTION INTRAVENOUS at 02:14

## 2020-02-21 RX ADMIN — SODIUM CHLORIDE, PRESERVATIVE FREE 10 ML: 5 INJECTION INTRAVENOUS at 00:44

## 2020-02-21 RX ADMIN — IOPAMIDOL 85 ML: 612 INJECTION, SOLUTION INTRAVENOUS at 01:02

## 2020-02-21 RX ADMIN — HYDROMORPHONE HYDROCHLORIDE 0.5 MG: 1 INJECTION, SOLUTION INTRAMUSCULAR; INTRAVENOUS; SUBCUTANEOUS at 12:21

## 2020-02-22 ENCOUNTER — APPOINTMENT (OUTPATIENT)
Dept: GENERAL RADIOLOGY | Facility: HOSPITAL | Age: 40
End: 2020-02-22

## 2020-02-22 LAB
ALBUMIN SERPL-MCNC: 3.9 G/DL (ref 3.5–5.2)
ALBUMIN/GLOB SERPL: 1.3 G/DL
ALP SERPL-CCNC: 96 U/L (ref 39–117)
ALT SERPL W P-5'-P-CCNC: 51 U/L (ref 1–41)
ANION GAP SERPL CALCULATED.3IONS-SCNC: 13.3 MMOL/L (ref 5–15)
ARTERIAL PATENCY WRIST A: POSITIVE
AST SERPL-CCNC: 17 U/L (ref 1–40)
ATMOSPHERIC PRESS: 762.5 MMHG
B PARAPERT DNA SPEC QL NAA+PROBE: NOT DETECTED
B PERT DNA SPEC QL NAA+PROBE: NOT DETECTED
BASE EXCESS BLDA CALC-SCNC: 7.7 MMOL/L (ref 0–2)
BASOPHILS # BLD AUTO: 0.03 10*3/MM3 (ref 0–0.2)
BASOPHILS NFR BLD AUTO: 0.3 % (ref 0–1.5)
BDY SITE: ABNORMAL
BILIRUB SERPL-MCNC: 0.7 MG/DL (ref 0.2–1.2)
BUN BLD-MCNC: 16 MG/DL (ref 6–20)
BUN/CREAT SERPL: 28.6 (ref 7–25)
C PNEUM DNA NPH QL NAA+NON-PROBE: NOT DETECTED
CALCIUM SPEC-SCNC: 8.8 MG/DL (ref 8.6–10.5)
CHLORIDE SERPL-SCNC: 98 MMOL/L (ref 98–107)
CHOLEST SERPL-MCNC: 135 MG/DL (ref 0–200)
CO2 SERPL-SCNC: 29.7 MMOL/L (ref 22–29)
CREAT BLD-MCNC: 0.56 MG/DL (ref 0.76–1.27)
D-LACTATE SERPL-SCNC: 0.9 MMOL/L (ref 0.5–2)
DEPRECATED RDW RBC AUTO: 46.8 FL (ref 37–54)
EOSINOPHIL # BLD AUTO: 0.03 10*3/MM3 (ref 0–0.4)
EOSINOPHIL NFR BLD AUTO: 0.3 % (ref 0.3–6.2)
ERYTHROCYTE [DISTWIDTH] IN BLOOD BY AUTOMATED COUNT: 13.9 % (ref 12.3–15.4)
FLUAV H1 2009 PAND RNA NPH QL NAA+PROBE: NOT DETECTED
FLUAV H1 HA GENE NPH QL NAA+PROBE: NOT DETECTED
FLUAV H3 RNA NPH QL NAA+PROBE: NOT DETECTED
FLUAV SUBTYP SPEC NAA+PROBE: NOT DETECTED
FLUBV RNA ISLT QL NAA+PROBE: NOT DETECTED
GAS FLOW AIRWAY: 6 LPM
GFR SERPL CREATININE-BSD FRML MDRD: >150 ML/MIN/1.73
GLOBULIN UR ELPH-MCNC: 2.9 GM/DL
GLUCOSE BLD-MCNC: 159 MG/DL (ref 65–99)
GLUCOSE BLDC GLUCOMTR-MCNC: 127 MG/DL (ref 70–130)
GLUCOSE BLDC GLUCOMTR-MCNC: 148 MG/DL (ref 70–130)
GLUCOSE BLDC GLUCOMTR-MCNC: 157 MG/DL (ref 70–130)
GLUCOSE BLDC GLUCOMTR-MCNC: 160 MG/DL (ref 70–130)
GLUCOSE BLDC GLUCOMTR-MCNC: 161 MG/DL (ref 70–130)
HADV DNA SPEC NAA+PROBE: NOT DETECTED
HBA1C MFR BLD: 5.7 % (ref 4.8–5.6)
HCO3 BLDA-SCNC: 32.9 MMOL/L (ref 22–28)
HCOV 229E RNA SPEC QL NAA+PROBE: NOT DETECTED
HCOV HKU1 RNA SPEC QL NAA+PROBE: NOT DETECTED
HCOV NL63 RNA SPEC QL NAA+PROBE: NOT DETECTED
HCOV OC43 RNA SPEC QL NAA+PROBE: NOT DETECTED
HCT VFR BLD AUTO: 44.9 % (ref 37.5–51)
HDLC SERPL-MCNC: 40 MG/DL (ref 40–60)
HGB BLD-MCNC: 14.9 G/DL (ref 13–17.7)
HMPV RNA NPH QL NAA+NON-PROBE: NOT DETECTED
HPIV1 RNA SPEC QL NAA+PROBE: NOT DETECTED
HPIV2 RNA SPEC QL NAA+PROBE: NOT DETECTED
HPIV3 RNA NPH QL NAA+PROBE: NOT DETECTED
HPIV4 P GENE NPH QL NAA+PROBE: NOT DETECTED
IMM GRANULOCYTES # BLD AUTO: 0.06 10*3/MM3 (ref 0–0.05)
IMM GRANULOCYTES NFR BLD AUTO: 0.6 % (ref 0–0.5)
LDLC SERPL CALC-MCNC: 64 MG/DL (ref 0–100)
LDLC/HDLC SERPL: 1.61 {RATIO}
LYMPHOCYTES # BLD AUTO: 1 10*3/MM3 (ref 0.7–3.1)
LYMPHOCYTES NFR BLD AUTO: 9.6 % (ref 19.6–45.3)
M PNEUMO IGG SER IA-ACNC: NOT DETECTED
MCH RBC QN AUTO: 30.3 PG (ref 26.6–33)
MCHC RBC AUTO-ENTMCNC: 33.2 G/DL (ref 31.5–35.7)
MCV RBC AUTO: 91.4 FL (ref 79–97)
MODALITY: ABNORMAL
MONOCYTES # BLD AUTO: 0.65 10*3/MM3 (ref 0.1–0.9)
MONOCYTES NFR BLD AUTO: 6.3 % (ref 5–12)
NEUTROPHILS # BLD AUTO: 8.61 10*3/MM3 (ref 1.7–7)
NEUTROPHILS NFR BLD AUTO: 82.9 % (ref 42.7–76)
NRBC BLD AUTO-RTO: 0.1 /100 WBC (ref 0–0.2)
NT-PROBNP SERPL-MCNC: 208.7 PG/ML (ref 5–450)
PCO2 BLDA: 46 MM HG (ref 35–45)
PH BLDA: 7.46 PH UNITS (ref 7.35–7.45)
PLATELET # BLD AUTO: 192 10*3/MM3 (ref 140–450)
PMV BLD AUTO: 11 FL (ref 6–12)
PO2 BLDA: 64 MM HG (ref 80–100)
POTASSIUM BLD-SCNC: 3.3 MMOL/L (ref 3.5–5.2)
PROCALCITONIN SERPL-MCNC: 0.17 NG/ML (ref 0.1–0.25)
PROT SERPL-MCNC: 6.8 G/DL (ref 6–8.5)
RBC # BLD AUTO: 4.91 10*6/MM3 (ref 4.14–5.8)
RHINOVIRUS RNA SPEC NAA+PROBE: NOT DETECTED
RSV RNA NPH QL NAA+NON-PROBE: NOT DETECTED
SAO2 % BLDCOA: 93 % (ref 92–99)
SODIUM BLD-SCNC: 141 MMOL/L (ref 136–145)
TOTAL RATE: 20 BREATHS/MINUTE
TRIGL SERPL-MCNC: 154 MG/DL (ref 0–150)
TSH SERPL DL<=0.05 MIU/L-ACNC: 1.96 UIU/ML (ref 0.27–4.2)
VLDLC SERPL-MCNC: 30.8 MG/DL
WBC NRBC COR # BLD: 10.38 10*3/MM3 (ref 3.4–10.8)

## 2020-02-22 PROCEDURE — 25810000003 SODIUM CHLORIDE 0.9 % WITH KCL 20 MEQ 20-0.9 MEQ/L-% SOLUTION: Performed by: HOSPITALIST

## 2020-02-22 PROCEDURE — 25010000002 CEFTRIAXONE PER 250 MG: Performed by: HOSPITALIST

## 2020-02-22 PROCEDURE — 84443 ASSAY THYROID STIM HORMONE: CPT | Performed by: HOSPITALIST

## 2020-02-22 PROCEDURE — 82962 GLUCOSE BLOOD TEST: CPT

## 2020-02-22 PROCEDURE — 71045 X-RAY EXAM CHEST 1 VIEW: CPT

## 2020-02-22 PROCEDURE — 94799 UNLISTED PULMONARY SVC/PX: CPT

## 2020-02-22 PROCEDURE — 80053 COMPREHEN METABOLIC PANEL: CPT | Performed by: HOSPITALIST

## 2020-02-22 PROCEDURE — 83036 HEMOGLOBIN GLYCOSYLATED A1C: CPT | Performed by: HOSPITALIST

## 2020-02-22 PROCEDURE — 84145 PROCALCITONIN (PCT): CPT | Performed by: HOSPITALIST

## 2020-02-22 PROCEDURE — 83605 ASSAY OF LACTIC ACID: CPT | Performed by: HOSPITALIST

## 2020-02-22 PROCEDURE — 25010000002 PIPERACILLIN SOD-TAZOBACTAM PER 1 G: Performed by: HOSPITALIST

## 2020-02-22 PROCEDURE — 85025 COMPLETE CBC W/AUTO DIFF WBC: CPT | Performed by: HOSPITALIST

## 2020-02-22 PROCEDURE — 83880 ASSAY OF NATRIURETIC PEPTIDE: CPT | Performed by: HOSPITALIST

## 2020-02-22 PROCEDURE — 36600 WITHDRAWAL OF ARTERIAL BLOOD: CPT

## 2020-02-22 PROCEDURE — 74018 RADEX ABDOMEN 1 VIEW: CPT

## 2020-02-22 PROCEDURE — 82803 BLOOD GASES ANY COMBINATION: CPT

## 2020-02-22 PROCEDURE — 99232 SBSQ HOSP IP/OBS MODERATE 35: CPT | Performed by: INTERNAL MEDICINE

## 2020-02-22 PROCEDURE — 25010000002 LEVETIRACETAM IN NACL 0.82% 500 MG/100ML SOLUTION: Performed by: HOSPITALIST

## 2020-02-22 PROCEDURE — 80061 LIPID PANEL: CPT | Performed by: HOSPITALIST

## 2020-02-22 PROCEDURE — 0100U HC BIOFIRE FILMARRAY RESP PANEL 2: CPT | Performed by: HOSPITALIST

## 2020-02-22 RX ORDER — SIMETHICONE 80 MG
80 TABLET,CHEWABLE ORAL EVERY 8 HOURS
Status: DISCONTINUED | OUTPATIENT
Start: 2020-02-22 | End: 2020-02-24

## 2020-02-22 RX ORDER — IPRATROPIUM BROMIDE AND ALBUTEROL SULFATE 2.5; .5 MG/3ML; MG/3ML
3 SOLUTION RESPIRATORY (INHALATION)
Status: DISCONTINUED | OUTPATIENT
Start: 2020-02-22 | End: 2020-02-27

## 2020-02-22 RX ORDER — PANTOPRAZOLE SODIUM 40 MG/10ML
40 INJECTION, POWDER, LYOPHILIZED, FOR SOLUTION INTRAVENOUS EVERY 12 HOURS SCHEDULED
Status: DISCONTINUED | OUTPATIENT
Start: 2020-02-22 | End: 2020-02-27

## 2020-02-22 RX ADMIN — LEVETIRACETAM INJECTION 500 MG: 5 INJECTION INTRAVENOUS at 08:59

## 2020-02-22 RX ADMIN — SIMETHICONE CHEW TAB 80 MG 80 MG: 80 TABLET ORAL at 13:42

## 2020-02-22 RX ADMIN — AMANTADINE HYDROCHLORIDE 100 MG: 50 SOLUTION ORAL at 00:20

## 2020-02-22 RX ADMIN — TAZOBACTAM SODIUM AND PIPERACILLIN SODIUM 3.38 G: 375; 3 INJECTION, SOLUTION INTRAVENOUS at 13:14

## 2020-02-22 RX ADMIN — IPRATROPIUM BROMIDE AND ALBUTEROL SULFATE 3 ML: 2.5; .5 SOLUTION RESPIRATORY (INHALATION) at 19:25

## 2020-02-22 RX ADMIN — SIMETHICONE CHEW TAB 80 MG 80 MG: 80 TABLET ORAL at 20:30

## 2020-02-22 RX ADMIN — IPRATROPIUM BROMIDE AND ALBUTEROL SULFATE 3 ML: 2.5; .5 SOLUTION RESPIRATORY (INHALATION) at 23:09

## 2020-02-22 RX ADMIN — PANTOPRAZOLE SODIUM 40 MG: 40 INJECTION, POWDER, FOR SOLUTION INTRAVENOUS at 20:26

## 2020-02-22 RX ADMIN — CEFTRIAXONE SODIUM 1 G: 1 INJECTION, SOLUTION INTRAVENOUS at 12:18

## 2020-02-22 RX ADMIN — TAZOBACTAM SODIUM AND PIPERACILLIN SODIUM 3.38 G: 375; 3 INJECTION, SOLUTION INTRAVENOUS at 20:41

## 2020-02-22 RX ADMIN — POTASSIUM CHLORIDE AND SODIUM CHLORIDE 75 ML/HR: 900; 150 INJECTION, SOLUTION INTRAVENOUS at 20:26

## 2020-02-22 RX ADMIN — LEVETIRACETAM INJECTION 500 MG: 5 INJECTION INTRAVENOUS at 20:26

## 2020-02-22 RX ADMIN — POTASSIUM CHLORIDE AND SODIUM CHLORIDE 75 ML/HR: 900; 150 INJECTION, SOLUTION INTRAVENOUS at 06:32

## 2020-02-22 RX ADMIN — SUCRALFATE 1 G: 1 SUSPENSION ORAL at 06:32

## 2020-02-22 RX ADMIN — IPRATROPIUM BROMIDE AND ALBUTEROL SULFATE 3 ML: 2.5; .5 SOLUTION RESPIRATORY (INHALATION) at 15:03

## 2020-02-22 RX ADMIN — SODIUM CHLORIDE, PRESERVATIVE FREE 10 ML: 5 INJECTION INTRAVENOUS at 20:26

## 2020-02-23 ENCOUNTER — APPOINTMENT (OUTPATIENT)
Dept: GENERAL RADIOLOGY | Facility: HOSPITAL | Age: 40
End: 2020-02-23

## 2020-02-23 LAB
ANION GAP SERPL CALCULATED.3IONS-SCNC: 10.7 MMOL/L (ref 5–15)
BASOPHILS # BLD AUTO: 0.02 10*3/MM3 (ref 0–0.2)
BASOPHILS NFR BLD AUTO: 0.2 % (ref 0–1.5)
BUN BLD-MCNC: 26 MG/DL (ref 6–20)
BUN/CREAT SERPL: 38.2 (ref 7–25)
CALCIUM SPEC-SCNC: 8.6 MG/DL (ref 8.6–10.5)
CHLORIDE SERPL-SCNC: 113 MMOL/L (ref 98–107)
CO2 SERPL-SCNC: 25.3 MMOL/L (ref 22–29)
CREAT BLD-MCNC: 0.68 MG/DL (ref 0.76–1.27)
DEPRECATED RDW RBC AUTO: 46.4 FL (ref 37–54)
EOSINOPHIL # BLD AUTO: 0.02 10*3/MM3 (ref 0–0.4)
EOSINOPHIL NFR BLD AUTO: 0.2 % (ref 0.3–6.2)
ERYTHROCYTE [DISTWIDTH] IN BLOOD BY AUTOMATED COUNT: 13.8 % (ref 12.3–15.4)
GFR SERPL CREATININE-BSD FRML MDRD: 130 ML/MIN/1.73
GLUCOSE BLD-MCNC: 127 MG/DL (ref 65–99)
GLUCOSE BLDC GLUCOMTR-MCNC: 116 MG/DL (ref 70–130)
GLUCOSE BLDC GLUCOMTR-MCNC: 119 MG/DL (ref 70–130)
GLUCOSE BLDC GLUCOMTR-MCNC: 140 MG/DL (ref 70–130)
GLUCOSE BLDC GLUCOMTR-MCNC: 198 MG/DL (ref 70–130)
HCT VFR BLD AUTO: 40.6 % (ref 37.5–51)
HGB BLD-MCNC: 13.5 G/DL (ref 13–17.7)
IMM GRANULOCYTES # BLD AUTO: 0.06 10*3/MM3 (ref 0–0.05)
IMM GRANULOCYTES NFR BLD AUTO: 0.6 % (ref 0–0.5)
LYMPHOCYTES # BLD AUTO: 1.01 10*3/MM3 (ref 0.7–3.1)
LYMPHOCYTES NFR BLD AUTO: 10.5 % (ref 19.6–45.3)
MCH RBC QN AUTO: 30.5 PG (ref 26.6–33)
MCHC RBC AUTO-ENTMCNC: 33.3 G/DL (ref 31.5–35.7)
MCV RBC AUTO: 91.6 FL (ref 79–97)
MONOCYTES # BLD AUTO: 0.65 10*3/MM3 (ref 0.1–0.9)
MONOCYTES NFR BLD AUTO: 6.8 % (ref 5–12)
NEUTROPHILS # BLD AUTO: 7.82 10*3/MM3 (ref 1.7–7)
NEUTROPHILS NFR BLD AUTO: 81.7 % (ref 42.7–76)
NRBC BLD AUTO-RTO: 0.1 /100 WBC (ref 0–0.2)
PLATELET # BLD AUTO: 186 10*3/MM3 (ref 140–450)
PMV BLD AUTO: 10.7 FL (ref 6–12)
POTASSIUM BLD-SCNC: 3.2 MMOL/L (ref 3.5–5.2)
RBC # BLD AUTO: 4.43 10*6/MM3 (ref 4.14–5.8)
SODIUM BLD-SCNC: 149 MMOL/L (ref 136–145)
WBC NRBC COR # BLD: 9.58 10*3/MM3 (ref 3.4–10.8)

## 2020-02-23 PROCEDURE — 94799 UNLISTED PULMONARY SVC/PX: CPT

## 2020-02-23 PROCEDURE — 85025 COMPLETE CBC W/AUTO DIFF WBC: CPT | Performed by: HOSPITALIST

## 2020-02-23 PROCEDURE — 25810000003 SODIUM CHLORIDE 0.9 % WITH KCL 20 MEQ 20-0.9 MEQ/L-% SOLUTION: Performed by: HOSPITALIST

## 2020-02-23 PROCEDURE — 80048 BASIC METABOLIC PNL TOTAL CA: CPT | Performed by: HOSPITALIST

## 2020-02-23 PROCEDURE — 99232 SBSQ HOSP IP/OBS MODERATE 35: CPT | Performed by: INTERNAL MEDICINE

## 2020-02-23 PROCEDURE — 82962 GLUCOSE BLOOD TEST: CPT

## 2020-02-23 PROCEDURE — 25010000002 LEVETIRACETAM IN NACL 0.82% 500 MG/100ML SOLUTION: Performed by: HOSPITALIST

## 2020-02-23 PROCEDURE — 25010000002 PIPERACILLIN SOD-TAZOBACTAM PER 1 G: Performed by: HOSPITALIST

## 2020-02-23 PROCEDURE — 0 DIATRIZOATE MEGLUMINE & SODIUM PER 1 ML: Performed by: HOSPITALIST

## 2020-02-23 PROCEDURE — 74018 RADEX ABDOMEN 1 VIEW: CPT

## 2020-02-23 RX ADMIN — SUCRALFATE 1 G: 1 SUSPENSION ORAL at 17:47

## 2020-02-23 RX ADMIN — SUCRALFATE 1 G: 1 SUSPENSION ORAL at 21:18

## 2020-02-23 RX ADMIN — IPRATROPIUM BROMIDE AND ALBUTEROL SULFATE 3 ML: 2.5; .5 SOLUTION RESPIRATORY (INHALATION) at 23:14

## 2020-02-23 RX ADMIN — POTASSIUM CHLORIDE AND SODIUM CHLORIDE 75 ML/HR: 900; 150 INJECTION, SOLUTION INTRAVENOUS at 09:21

## 2020-02-23 RX ADMIN — SIMETHICONE CHEW TAB 80 MG 80 MG: 80 TABLET ORAL at 21:41

## 2020-02-23 RX ADMIN — IPRATROPIUM BROMIDE AND ALBUTEROL SULFATE 3 ML: 2.5; .5 SOLUTION RESPIRATORY (INHALATION) at 16:22

## 2020-02-23 RX ADMIN — DIATRIZOATE MEGLUMINE AND DIATRIZOATE SODIUM 60 ML: 660; 100 LIQUID ORAL; RECTAL at 12:06

## 2020-02-23 RX ADMIN — PANTOPRAZOLE SODIUM 40 MG: 40 INJECTION, POWDER, FOR SOLUTION INTRAVENOUS at 09:21

## 2020-02-23 RX ADMIN — TAZOBACTAM SODIUM AND PIPERACILLIN SODIUM 3.38 G: 375; 3 INJECTION, SOLUTION INTRAVENOUS at 03:57

## 2020-02-23 RX ADMIN — LEVETIRACETAM INJECTION 500 MG: 5 INJECTION INTRAVENOUS at 21:13

## 2020-02-23 RX ADMIN — IPRATROPIUM BROMIDE AND ALBUTEROL SULFATE 3 ML: 2.5; .5 SOLUTION RESPIRATORY (INHALATION) at 13:01

## 2020-02-23 RX ADMIN — PANTOPRAZOLE SODIUM 40 MG: 40 INJECTION, POWDER, FOR SOLUTION INTRAVENOUS at 21:18

## 2020-02-23 RX ADMIN — IPRATROPIUM BROMIDE AND ALBUTEROL SULFATE 3 ML: 2.5; .5 SOLUTION RESPIRATORY (INHALATION) at 09:04

## 2020-02-23 RX ADMIN — IPRATROPIUM BROMIDE AND ALBUTEROL SULFATE 3 ML: 2.5; .5 SOLUTION RESPIRATORY (INHALATION) at 02:28

## 2020-02-23 RX ADMIN — TAZOBACTAM SODIUM AND PIPERACILLIN SODIUM 3.38 G: 375; 3 INJECTION, SOLUTION INTRAVENOUS at 12:52

## 2020-02-23 RX ADMIN — IPRATROPIUM BROMIDE AND ALBUTEROL SULFATE 3 ML: 2.5; .5 SOLUTION RESPIRATORY (INHALATION) at 19:07

## 2020-02-23 RX ADMIN — TAZOBACTAM SODIUM AND PIPERACILLIN SODIUM 3.38 G: 375; 3 INJECTION, SOLUTION INTRAVENOUS at 21:49

## 2020-02-23 RX ADMIN — LEVETIRACETAM INJECTION 500 MG: 5 INJECTION INTRAVENOUS at 09:21

## 2020-02-24 ENCOUNTER — APPOINTMENT (OUTPATIENT)
Dept: GENERAL RADIOLOGY | Facility: HOSPITAL | Age: 40
End: 2020-02-24

## 2020-02-24 LAB
ANION GAP SERPL CALCULATED.3IONS-SCNC: 11.7 MMOL/L (ref 5–15)
BASOPHILS # BLD AUTO: 0.04 10*3/MM3 (ref 0–0.2)
BASOPHILS NFR BLD AUTO: 0.5 % (ref 0–1.5)
BUN BLD-MCNC: 18 MG/DL (ref 6–20)
BUN/CREAT SERPL: 34 (ref 7–25)
CALCIUM SPEC-SCNC: 8.7 MG/DL (ref 8.6–10.5)
CHLORIDE SERPL-SCNC: 119 MMOL/L (ref 98–107)
CO2 SERPL-SCNC: 23.3 MMOL/L (ref 22–29)
CREAT BLD-MCNC: 0.53 MG/DL (ref 0.76–1.27)
DEPRECATED RDW RBC AUTO: 49.8 FL (ref 37–54)
EOSINOPHIL # BLD AUTO: 0.09 10*3/MM3 (ref 0–0.4)
EOSINOPHIL NFR BLD AUTO: 1.2 % (ref 0.3–6.2)
ERYTHROCYTE [DISTWIDTH] IN BLOOD BY AUTOMATED COUNT: 14.4 % (ref 12.3–15.4)
GFR SERPL CREATININE-BSD FRML MDRD: >150 ML/MIN/1.73
GLUCOSE BLD-MCNC: 120 MG/DL (ref 65–99)
GLUCOSE BLDC GLUCOMTR-MCNC: 117 MG/DL (ref 70–130)
GLUCOSE BLDC GLUCOMTR-MCNC: 121 MG/DL (ref 70–130)
GLUCOSE BLDC GLUCOMTR-MCNC: 160 MG/DL (ref 70–130)
HCT VFR BLD AUTO: 33.8 % (ref 37.5–51)
HGB BLD-MCNC: 11.1 G/DL (ref 13–17.7)
IMM GRANULOCYTES # BLD AUTO: 0.1 10*3/MM3 (ref 0–0.05)
IMM GRANULOCYTES NFR BLD AUTO: 1.3 % (ref 0–0.5)
LYMPHOCYTES # BLD AUTO: 0.88 10*3/MM3 (ref 0.7–3.1)
LYMPHOCYTES NFR BLD AUTO: 11.6 % (ref 19.6–45.3)
MCH RBC QN AUTO: 31.1 PG (ref 26.6–33)
MCHC RBC AUTO-ENTMCNC: 32.8 G/DL (ref 31.5–35.7)
MCV RBC AUTO: 94.7 FL (ref 79–97)
MONOCYTES # BLD AUTO: 0.54 10*3/MM3 (ref 0.1–0.9)
MONOCYTES NFR BLD AUTO: 7.1 % (ref 5–12)
NEUTROPHILS # BLD AUTO: 5.96 10*3/MM3 (ref 1.7–7)
NEUTROPHILS NFR BLD AUTO: 78.3 % (ref 42.7–76)
NRBC BLD AUTO-RTO: 0.3 /100 WBC (ref 0–0.2)
PLATELET # BLD AUTO: 170 10*3/MM3 (ref 140–450)
PMV BLD AUTO: 9.9 FL (ref 6–12)
POTASSIUM BLD-SCNC: 3.6 MMOL/L (ref 3.5–5.2)
RBC # BLD AUTO: 3.57 10*6/MM3 (ref 4.14–5.8)
SODIUM BLD-SCNC: 154 MMOL/L (ref 136–145)
WBC NRBC COR # BLD: 7.61 10*3/MM3 (ref 3.4–10.8)

## 2020-02-24 PROCEDURE — 82962 GLUCOSE BLOOD TEST: CPT

## 2020-02-24 PROCEDURE — 94799 UNLISTED PULMONARY SVC/PX: CPT

## 2020-02-24 PROCEDURE — 85025 COMPLETE CBC W/AUTO DIFF WBC: CPT | Performed by: HOSPITALIST

## 2020-02-24 PROCEDURE — 99232 SBSQ HOSP IP/OBS MODERATE 35: CPT | Performed by: INTERNAL MEDICINE

## 2020-02-24 PROCEDURE — 25010000002 LEVETIRACETAM IN NACL 0.82% 500 MG/100ML SOLUTION: Performed by: HOSPITALIST

## 2020-02-24 PROCEDURE — 25810000003 DEXTROSE 5 % WITH KCL 20 MEQ 20-5 MEQ/L-% SOLUTION: Performed by: HOSPITALIST

## 2020-02-24 PROCEDURE — 25810000003 SODIUM CHLORIDE 0.9 % WITH KCL 20 MEQ 20-0.9 MEQ/L-% SOLUTION: Performed by: HOSPITALIST

## 2020-02-24 PROCEDURE — 74018 RADEX ABDOMEN 1 VIEW: CPT

## 2020-02-24 PROCEDURE — 80048 BASIC METABOLIC PNL TOTAL CA: CPT | Performed by: HOSPITALIST

## 2020-02-24 PROCEDURE — 25010000002 PIPERACILLIN SOD-TAZOBACTAM PER 1 G: Performed by: HOSPITALIST

## 2020-02-24 RX ORDER — SIMETHICONE 20 MG/.3ML
80 EMULSION ORAL EVERY 8 HOURS
Status: DISCONTINUED | OUTPATIENT
Start: 2020-02-24 | End: 2020-02-29 | Stop reason: HOSPADM

## 2020-02-24 RX ORDER — SUCRALFATE ORAL 1 G/10ML
1 SUSPENSION ORAL
Status: DISCONTINUED | OUTPATIENT
Start: 2020-02-24 | End: 2020-02-29 | Stop reason: HOSPADM

## 2020-02-24 RX ORDER — POTASSIUM CHLORIDE, DEXTROSE MONOHYDRATE 150; 5 MG/100ML; G/100ML
125 INJECTION, SOLUTION INTRAVENOUS CONTINUOUS
Status: DISCONTINUED | OUTPATIENT
Start: 2020-02-24 | End: 2020-02-26

## 2020-02-24 RX ADMIN — IPRATROPIUM BROMIDE AND ALBUTEROL SULFATE 3 ML: 2.5; .5 SOLUTION RESPIRATORY (INHALATION) at 08:27

## 2020-02-24 RX ADMIN — IPRATROPIUM BROMIDE AND ALBUTEROL SULFATE 3 ML: 2.5; .5 SOLUTION RESPIRATORY (INHALATION) at 11:50

## 2020-02-24 RX ADMIN — POTASSIUM CHLORIDE AND SODIUM CHLORIDE 75 ML/HR: 900; 150 INJECTION, SOLUTION INTRAVENOUS at 01:31

## 2020-02-24 RX ADMIN — TAZOBACTAM SODIUM AND PIPERACILLIN SODIUM 3.38 G: 375; 3 INJECTION, SOLUTION INTRAVENOUS at 04:12

## 2020-02-24 RX ADMIN — SUCRALFATE 1 G: 1 SUSPENSION ORAL at 06:22

## 2020-02-24 RX ADMIN — SUCRALFATE 1 G: 1 SUSPENSION ORAL at 18:40

## 2020-02-24 RX ADMIN — SIMETHICONE CHEW TAB 80 MG 80 MG: 80 TABLET ORAL at 06:21

## 2020-02-24 RX ADMIN — IPRATROPIUM BROMIDE AND ALBUTEROL SULFATE 3 ML: 2.5; .5 SOLUTION RESPIRATORY (INHALATION) at 23:28

## 2020-02-24 RX ADMIN — POTASSIUM CHLORIDE AND DEXTROSE MONOHYDRATE 75 ML/HR: 150; 5 INJECTION, SOLUTION INTRAVENOUS at 13:35

## 2020-02-24 RX ADMIN — SUCRALFATE 1 G: 1 SUSPENSION ORAL at 21:05

## 2020-02-24 RX ADMIN — PANTOPRAZOLE SODIUM 40 MG: 40 INJECTION, POWDER, FOR SOLUTION INTRAVENOUS at 21:04

## 2020-02-24 RX ADMIN — AMANTADINE HYDROCHLORIDE 100 MG: 50 SOLUTION ORAL at 01:31

## 2020-02-24 RX ADMIN — PANTOPRAZOLE SODIUM 40 MG: 40 INJECTION, POWDER, FOR SOLUTION INTRAVENOUS at 08:27

## 2020-02-24 RX ADMIN — LEVETIRACETAM INJECTION 500 MG: 5 INJECTION INTRAVENOUS at 08:27

## 2020-02-24 RX ADMIN — IPRATROPIUM BROMIDE AND ALBUTEROL SULFATE 3 ML: 2.5; .5 SOLUTION RESPIRATORY (INHALATION) at 03:36

## 2020-02-24 RX ADMIN — IPRATROPIUM BROMIDE AND ALBUTEROL SULFATE 3 ML: 2.5; .5 SOLUTION RESPIRATORY (INHALATION) at 19:54

## 2020-02-24 RX ADMIN — TAZOBACTAM SODIUM AND PIPERACILLIN SODIUM 3.38 G: 375; 3 INJECTION, SOLUTION INTRAVENOUS at 19:06

## 2020-02-24 RX ADMIN — IPRATROPIUM BROMIDE AND ALBUTEROL SULFATE 3 ML: 2.5; .5 SOLUTION RESPIRATORY (INHALATION) at 15:32

## 2020-02-24 RX ADMIN — Medication 80 MG: at 13:36

## 2020-02-24 RX ADMIN — SUCRALFATE 1 G: 1 SUSPENSION ORAL at 12:00

## 2020-02-24 RX ADMIN — TAZOBACTAM SODIUM AND PIPERACILLIN SODIUM 3.38 G: 375; 3 INJECTION, SOLUTION INTRAVENOUS at 11:18

## 2020-02-24 RX ADMIN — Medication 80 MG: at 21:04

## 2020-02-24 RX ADMIN — LEVETIRACETAM INJECTION 500 MG: 5 INJECTION INTRAVENOUS at 21:04

## 2020-02-24 RX ADMIN — AMANTADINE HYDROCHLORIDE 100 MG: 50 SOLUTION ORAL at 13:36

## 2020-02-25 ENCOUNTER — APPOINTMENT (OUTPATIENT)
Dept: GENERAL RADIOLOGY | Facility: HOSPITAL | Age: 40
End: 2020-02-25

## 2020-02-25 LAB
ANION GAP SERPL CALCULATED.3IONS-SCNC: 12.3 MMOL/L (ref 5–15)
BASOPHILS # BLD AUTO: 0.03 10*3/MM3 (ref 0–0.2)
BASOPHILS NFR BLD AUTO: 0.4 % (ref 0–1.5)
BUN BLD-MCNC: 11 MG/DL (ref 6–20)
BUN/CREAT SERPL: 20.4 (ref 7–25)
CALCIUM SPEC-SCNC: 8.4 MG/DL (ref 8.6–10.5)
CHLORIDE SERPL-SCNC: 115 MMOL/L (ref 98–107)
CO2 SERPL-SCNC: 21.7 MMOL/L (ref 22–29)
CREAT BLD-MCNC: 0.54 MG/DL (ref 0.76–1.27)
DEPRECATED RDW RBC AUTO: 47.4 FL (ref 37–54)
EOSINOPHIL # BLD AUTO: 0.15 10*3/MM3 (ref 0–0.4)
EOSINOPHIL NFR BLD AUTO: 2.2 % (ref 0.3–6.2)
ERYTHROCYTE [DISTWIDTH] IN BLOOD BY AUTOMATED COUNT: 14.1 % (ref 12.3–15.4)
GFR SERPL CREATININE-BSD FRML MDRD: >150 ML/MIN/1.73
GLUCOSE BLD-MCNC: 128 MG/DL (ref 65–99)
GLUCOSE BLDC GLUCOMTR-MCNC: 115 MG/DL (ref 70–130)
GLUCOSE BLDC GLUCOMTR-MCNC: 124 MG/DL (ref 70–130)
GLUCOSE BLDC GLUCOMTR-MCNC: 132 MG/DL (ref 70–130)
GLUCOSE BLDC GLUCOMTR-MCNC: 136 MG/DL (ref 70–130)
GLUCOSE BLDC GLUCOMTR-MCNC: 162 MG/DL (ref 70–130)
HCT VFR BLD AUTO: 29.3 % (ref 37.5–51)
HGB BLD-MCNC: 9.6 G/DL (ref 13–17.7)
IMM GRANULOCYTES # BLD AUTO: 0.12 10*3/MM3 (ref 0–0.05)
IMM GRANULOCYTES NFR BLD AUTO: 1.8 % (ref 0–0.5)
LYMPHOCYTES # BLD AUTO: 1.38 10*3/MM3 (ref 0.7–3.1)
LYMPHOCYTES NFR BLD AUTO: 20.7 % (ref 19.6–45.3)
MCH RBC QN AUTO: 30.4 PG (ref 26.6–33)
MCHC RBC AUTO-ENTMCNC: 32.8 G/DL (ref 31.5–35.7)
MCV RBC AUTO: 92.7 FL (ref 79–97)
MONOCYTES # BLD AUTO: 0.54 10*3/MM3 (ref 0.1–0.9)
MONOCYTES NFR BLD AUTO: 8.1 % (ref 5–12)
NEUTROPHILS # BLD AUTO: 4.45 10*3/MM3 (ref 1.7–7)
NEUTROPHILS NFR BLD AUTO: 66.8 % (ref 42.7–76)
NRBC BLD AUTO-RTO: 0.3 /100 WBC (ref 0–0.2)
PLATELET # BLD AUTO: 155 10*3/MM3 (ref 140–450)
PMV BLD AUTO: 10.3 FL (ref 6–12)
POTASSIUM BLD-SCNC: 3.2 MMOL/L (ref 3.5–5.2)
RBC # BLD AUTO: 3.16 10*6/MM3 (ref 4.14–5.8)
SODIUM BLD-SCNC: 149 MMOL/L (ref 136–145)
WBC NRBC COR # BLD: 6.67 10*3/MM3 (ref 3.4–10.8)

## 2020-02-25 PROCEDURE — 82962 GLUCOSE BLOOD TEST: CPT

## 2020-02-25 PROCEDURE — 25010000002 HYDROMORPHONE PER 4 MG: Performed by: HOSPITALIST

## 2020-02-25 PROCEDURE — 25010000002 PIPERACILLIN SOD-TAZOBACTAM PER 1 G: Performed by: HOSPITALIST

## 2020-02-25 PROCEDURE — 25810000003 DEXTROSE 5 % WITH KCL 20 MEQ 20-5 MEQ/L-% SOLUTION: Performed by: HOSPITALIST

## 2020-02-25 PROCEDURE — 85025 COMPLETE CBC W/AUTO DIFF WBC: CPT | Performed by: HOSPITALIST

## 2020-02-25 PROCEDURE — 74018 RADEX ABDOMEN 1 VIEW: CPT

## 2020-02-25 PROCEDURE — 94799 UNLISTED PULMONARY SVC/PX: CPT

## 2020-02-25 PROCEDURE — 25010000002 LEVETIRACETAM IN NACL 0.82% 500 MG/100ML SOLUTION: Performed by: HOSPITALIST

## 2020-02-25 PROCEDURE — 80048 BASIC METABOLIC PNL TOTAL CA: CPT | Performed by: HOSPITALIST

## 2020-02-25 PROCEDURE — 99232 SBSQ HOSP IP/OBS MODERATE 35: CPT | Performed by: INTERNAL MEDICINE

## 2020-02-25 PROCEDURE — 25010000002 PROMETHAZINE PER 50 MG: Performed by: HOSPITALIST

## 2020-02-25 RX ADMIN — PANTOPRAZOLE SODIUM 40 MG: 40 INJECTION, POWDER, FOR SOLUTION INTRAVENOUS at 09:04

## 2020-02-25 RX ADMIN — HYDROMORPHONE HYDROCHLORIDE 0.5 MG: 1 INJECTION, SOLUTION INTRAMUSCULAR; INTRAVENOUS; SUBCUTANEOUS at 16:12

## 2020-02-25 RX ADMIN — TAZOBACTAM SODIUM AND PIPERACILLIN SODIUM 3.38 G: 375; 3 INJECTION, SOLUTION INTRAVENOUS at 13:27

## 2020-02-25 RX ADMIN — PANTOPRAZOLE SODIUM 40 MG: 40 INJECTION, POWDER, FOR SOLUTION INTRAVENOUS at 20:53

## 2020-02-25 RX ADMIN — SUCRALFATE 1 G: 1 SUSPENSION ORAL at 20:53

## 2020-02-25 RX ADMIN — IPRATROPIUM BROMIDE AND ALBUTEROL SULFATE 3 ML: 2.5; .5 SOLUTION RESPIRATORY (INHALATION) at 11:06

## 2020-02-25 RX ADMIN — IPRATROPIUM BROMIDE AND ALBUTEROL SULFATE 3 ML: 2.5; .5 SOLUTION RESPIRATORY (INHALATION) at 15:19

## 2020-02-25 RX ADMIN — Medication 80 MG: at 07:55

## 2020-02-25 RX ADMIN — POTASSIUM CHLORIDE AND DEXTROSE MONOHYDRATE 75 ML/HR: 150; 5 INJECTION, SOLUTION INTRAVENOUS at 02:47

## 2020-02-25 RX ADMIN — LEVETIRACETAM INJECTION 500 MG: 5 INJECTION INTRAVENOUS at 09:04

## 2020-02-25 RX ADMIN — IPRATROPIUM BROMIDE AND ALBUTEROL SULFATE 3 ML: 2.5; .5 SOLUTION RESPIRATORY (INHALATION) at 07:37

## 2020-02-25 RX ADMIN — SUCRALFATE 1 G: 1 SUSPENSION ORAL at 18:28

## 2020-02-25 RX ADMIN — AMANTADINE HYDROCHLORIDE 100 MG: 50 SOLUTION ORAL at 13:28

## 2020-02-25 RX ADMIN — SUCRALFATE 1 G: 1 SUSPENSION ORAL at 09:04

## 2020-02-25 RX ADMIN — LEVETIRACETAM INJECTION 500 MG: 5 INJECTION INTRAVENOUS at 20:57

## 2020-02-25 RX ADMIN — POTASSIUM CHLORIDE AND DEXTROSE MONOHYDRATE 75 ML/HR: 150; 5 INJECTION, SOLUTION INTRAVENOUS at 16:53

## 2020-02-25 RX ADMIN — Medication 80 MG: at 13:37

## 2020-02-25 RX ADMIN — PROMETHAZINE HYDROCHLORIDE 12.5 MG: 25 INJECTION INTRAMUSCULAR; INTRAVENOUS at 16:54

## 2020-02-25 RX ADMIN — AMANTADINE HYDROCHLORIDE 100 MG: 50 SOLUTION ORAL at 00:01

## 2020-02-25 RX ADMIN — IPRATROPIUM BROMIDE AND ALBUTEROL SULFATE 3 ML: 2.5; .5 SOLUTION RESPIRATORY (INHALATION) at 03:42

## 2020-02-25 RX ADMIN — TAZOBACTAM SODIUM AND PIPERACILLIN SODIUM 3.38 G: 375; 3 INJECTION, SOLUTION INTRAVENOUS at 04:40

## 2020-02-25 RX ADMIN — TAZOBACTAM SODIUM AND PIPERACILLIN SODIUM 3.38 G: 375; 3 INJECTION, SOLUTION INTRAVENOUS at 21:15

## 2020-02-25 RX ADMIN — Medication 80 MG: at 21:02

## 2020-02-25 RX ADMIN — IPRATROPIUM BROMIDE AND ALBUTEROL SULFATE 3 ML: 2.5; .5 SOLUTION RESPIRATORY (INHALATION) at 22:40

## 2020-02-26 LAB
ANION GAP SERPL CALCULATED.3IONS-SCNC: 15.1 MMOL/L (ref 5–15)
BACTERIA SPEC AEROBE CULT: NORMAL
BACTERIA SPEC AEROBE CULT: NORMAL
BASOPHILS # BLD AUTO: 0.07 10*3/MM3 (ref 0–0.2)
BASOPHILS NFR BLD AUTO: 1.3 % (ref 0–1.5)
BUN BLD-MCNC: 6 MG/DL (ref 6–20)
BUN/CREAT SERPL: 10.5 (ref 7–25)
CALCIUM SPEC-SCNC: 8.9 MG/DL (ref 8.6–10.5)
CHLORIDE SERPL-SCNC: 117 MMOL/L (ref 98–107)
CO2 SERPL-SCNC: 19.9 MMOL/L (ref 22–29)
CREAT BLD-MCNC: 0.57 MG/DL (ref 0.76–1.27)
DEPRECATED RDW RBC AUTO: 47.4 FL (ref 37–54)
EOSINOPHIL # BLD AUTO: 0.15 10*3/MM3 (ref 0–0.4)
EOSINOPHIL NFR BLD AUTO: 2.8 % (ref 0.3–6.2)
ERYTHROCYTE [DISTWIDTH] IN BLOOD BY AUTOMATED COUNT: 13.9 % (ref 12.3–15.4)
GFR SERPL CREATININE-BSD FRML MDRD: >150 ML/MIN/1.73
GLUCOSE BLD-MCNC: 156 MG/DL (ref 65–99)
GLUCOSE BLDC GLUCOMTR-MCNC: 141 MG/DL (ref 70–130)
GLUCOSE BLDC GLUCOMTR-MCNC: 160 MG/DL (ref 70–130)
GLUCOSE BLDC GLUCOMTR-MCNC: 168 MG/DL (ref 70–130)
GLUCOSE BLDC GLUCOMTR-MCNC: 169 MG/DL (ref 70–130)
HCT VFR BLD AUTO: 30.9 % (ref 37.5–51)
HGB BLD-MCNC: 10.1 G/DL (ref 13–17.7)
IMM GRANULOCYTES # BLD AUTO: 0.22 10*3/MM3 (ref 0–0.05)
IMM GRANULOCYTES NFR BLD AUTO: 4.1 % (ref 0–0.5)
LYMPHOCYTES # BLD AUTO: 1.63 10*3/MM3 (ref 0.7–3.1)
LYMPHOCYTES NFR BLD AUTO: 30.5 % (ref 19.6–45.3)
MCH RBC QN AUTO: 30.8 PG (ref 26.6–33)
MCHC RBC AUTO-ENTMCNC: 32.7 G/DL (ref 31.5–35.7)
MCV RBC AUTO: 94.2 FL (ref 79–97)
MONOCYTES # BLD AUTO: 0.44 10*3/MM3 (ref 0.1–0.9)
MONOCYTES NFR BLD AUTO: 8.2 % (ref 5–12)
NEUTROPHILS # BLD AUTO: 2.83 10*3/MM3 (ref 1.7–7)
NEUTROPHILS NFR BLD AUTO: 53.1 % (ref 42.7–76)
NRBC BLD AUTO-RTO: 0.9 /100 WBC (ref 0–0.2)
PLATELET # BLD AUTO: 169 10*3/MM3 (ref 140–450)
PMV BLD AUTO: 10.2 FL (ref 6–12)
POTASSIUM BLD-SCNC: 3.8 MMOL/L (ref 3.5–5.2)
RBC # BLD AUTO: 3.28 10*6/MM3 (ref 4.14–5.8)
SODIUM BLD-SCNC: 152 MMOL/L (ref 136–145)
WBC NRBC COR # BLD: 5.34 10*3/MM3 (ref 3.4–10.8)

## 2020-02-26 PROCEDURE — 25810000003 DEXTROSE 5 % WITH KCL 20 MEQ 20-5 MEQ/L-% SOLUTION: Performed by: HOSPITALIST

## 2020-02-26 PROCEDURE — 25010000002 LEVETIRACETAM IN NACL 0.82% 500 MG/100ML SOLUTION: Performed by: HOSPITALIST

## 2020-02-26 PROCEDURE — 85025 COMPLETE CBC W/AUTO DIFF WBC: CPT | Performed by: HOSPITALIST

## 2020-02-26 PROCEDURE — 80048 BASIC METABOLIC PNL TOTAL CA: CPT | Performed by: HOSPITALIST

## 2020-02-26 PROCEDURE — 25010000002 PIPERACILLIN SOD-TAZOBACTAM PER 1 G: Performed by: HOSPITALIST

## 2020-02-26 PROCEDURE — 99232 SBSQ HOSP IP/OBS MODERATE 35: CPT | Performed by: INTERNAL MEDICINE

## 2020-02-26 PROCEDURE — 25010000002 HYDROMORPHONE PER 4 MG: Performed by: HOSPITALIST

## 2020-02-26 PROCEDURE — 82962 GLUCOSE BLOOD TEST: CPT

## 2020-02-26 PROCEDURE — 94799 UNLISTED PULMONARY SVC/PX: CPT

## 2020-02-26 RX ORDER — DEXTROSE MONOHYDRATE 50 MG/ML
75 INJECTION, SOLUTION INTRAVENOUS CONTINUOUS
Status: DISCONTINUED | OUTPATIENT
Start: 2020-02-26 | End: 2020-02-27

## 2020-02-26 RX ADMIN — SUCRALFATE 1 G: 1 SUSPENSION ORAL at 21:59

## 2020-02-26 RX ADMIN — SUCRALFATE 1 G: 1 SUSPENSION ORAL at 13:50

## 2020-02-26 RX ADMIN — IPRATROPIUM BROMIDE AND ALBUTEROL SULFATE 3 ML: 2.5; .5 SOLUTION RESPIRATORY (INHALATION) at 00:37

## 2020-02-26 RX ADMIN — TAZOBACTAM SODIUM AND PIPERACILLIN SODIUM 3.38 G: 375; 3 INJECTION, SOLUTION INTRAVENOUS at 19:45

## 2020-02-26 RX ADMIN — IPRATROPIUM BROMIDE AND ALBUTEROL SULFATE 3 ML: 2.5; .5 SOLUTION RESPIRATORY (INHALATION) at 21:23

## 2020-02-26 RX ADMIN — PANTOPRAZOLE SODIUM 40 MG: 40 INJECTION, POWDER, FOR SOLUTION INTRAVENOUS at 21:59

## 2020-02-26 RX ADMIN — IPRATROPIUM BROMIDE AND ALBUTEROL SULFATE 3 ML: 2.5; .5 SOLUTION RESPIRATORY (INHALATION) at 11:22

## 2020-02-26 RX ADMIN — TAZOBACTAM SODIUM AND PIPERACILLIN SODIUM 3.38 G: 375; 3 INJECTION, SOLUTION INTRAVENOUS at 03:36

## 2020-02-26 RX ADMIN — IPRATROPIUM BROMIDE AND ALBUTEROL SULFATE 3 ML: 2.5; .5 SOLUTION RESPIRATORY (INHALATION) at 08:04

## 2020-02-26 RX ADMIN — IPRATROPIUM BROMIDE AND ALBUTEROL SULFATE 3 ML: 2.5; .5 SOLUTION RESPIRATORY (INHALATION) at 03:55

## 2020-02-26 RX ADMIN — PANTOPRAZOLE SODIUM 40 MG: 40 INJECTION, POWDER, FOR SOLUTION INTRAVENOUS at 10:16

## 2020-02-26 RX ADMIN — IPRATROPIUM BROMIDE AND ALBUTEROL SULFATE 3 ML: 2.5; .5 SOLUTION RESPIRATORY (INHALATION) at 15:38

## 2020-02-26 RX ADMIN — LEVETIRACETAM INJECTION 500 MG: 5 INJECTION INTRAVENOUS at 21:59

## 2020-02-26 RX ADMIN — LEVETIRACETAM INJECTION 500 MG: 5 INJECTION INTRAVENOUS at 10:16

## 2020-02-26 RX ADMIN — TAZOBACTAM SODIUM AND PIPERACILLIN SODIUM 3.38 G: 375; 3 INJECTION, SOLUTION INTRAVENOUS at 13:47

## 2020-02-26 RX ADMIN — SODIUM CHLORIDE, PRESERVATIVE FREE 10 ML: 5 INJECTION INTRAVENOUS at 10:17

## 2020-02-26 RX ADMIN — HYDROMORPHONE HYDROCHLORIDE 0.5 MG: 1 INJECTION, SOLUTION INTRAMUSCULAR; INTRAVENOUS; SUBCUTANEOUS at 22:03

## 2020-02-26 RX ADMIN — AMANTADINE HYDROCHLORIDE 100 MG: 50 SOLUTION ORAL at 00:27

## 2020-02-26 RX ADMIN — POTASSIUM CHLORIDE AND DEXTROSE MONOHYDRATE 125 ML/HR: 150; 5 INJECTION, SOLUTION INTRAVENOUS at 10:17

## 2020-02-26 RX ADMIN — SUCRALFATE 1 G: 1 SUSPENSION ORAL at 18:17

## 2020-02-26 RX ADMIN — POTASSIUM CHLORIDE AND DEXTROSE MONOHYDRATE 125 ML/HR: 150; 5 INJECTION, SOLUTION INTRAVENOUS at 03:05

## 2020-02-26 RX ADMIN — DEXTROSE MONOHYDRATE 75 ML/HR: 50 INJECTION, SOLUTION INTRAVENOUS at 17:05

## 2020-02-26 RX ADMIN — AMANTADINE HYDROCHLORIDE 100 MG: 50 SOLUTION ORAL at 13:58

## 2020-02-27 ENCOUNTER — APPOINTMENT (OUTPATIENT)
Dept: GENERAL RADIOLOGY | Facility: HOSPITAL | Age: 40
End: 2020-02-27

## 2020-02-27 LAB
ANION GAP SERPL CALCULATED.3IONS-SCNC: 12.5 MMOL/L (ref 5–15)
BASOPHILS # BLD AUTO: 0.06 10*3/MM3 (ref 0–0.2)
BASOPHILS NFR BLD AUTO: 1.2 % (ref 0–1.5)
BUN BLD-MCNC: 4 MG/DL (ref 6–20)
BUN/CREAT SERPL: 7.5 (ref 7–25)
CALCIUM SPEC-SCNC: 9 MG/DL (ref 8.6–10.5)
CHLORIDE SERPL-SCNC: 109 MMOL/L (ref 98–107)
CO2 SERPL-SCNC: 22.5 MMOL/L (ref 22–29)
CREAT BLD-MCNC: 0.53 MG/DL (ref 0.76–1.27)
DEPRECATED RDW RBC AUTO: 45.7 FL (ref 37–54)
EOSINOPHIL # BLD AUTO: 0.14 10*3/MM3 (ref 0–0.4)
EOSINOPHIL NFR BLD AUTO: 2.7 % (ref 0.3–6.2)
ERYTHROCYTE [DISTWIDTH] IN BLOOD BY AUTOMATED COUNT: 14 % (ref 12.3–15.4)
GFR SERPL CREATININE-BSD FRML MDRD: >150 ML/MIN/1.73
GLUCOSE BLD-MCNC: 128 MG/DL (ref 65–99)
GLUCOSE BLDC GLUCOMTR-MCNC: 102 MG/DL (ref 70–130)
GLUCOSE BLDC GLUCOMTR-MCNC: 104 MG/DL (ref 70–130)
GLUCOSE BLDC GLUCOMTR-MCNC: 130 MG/DL (ref 70–130)
GLUCOSE BLDC GLUCOMTR-MCNC: 132 MG/DL (ref 70–130)
HCT VFR BLD AUTO: 34.5 % (ref 37.5–51)
HGB BLD-MCNC: 11.9 G/DL (ref 13–17.7)
IMM GRANULOCYTES # BLD AUTO: 0.23 10*3/MM3 (ref 0–0.05)
IMM GRANULOCYTES NFR BLD AUTO: 4.4 % (ref 0–0.5)
LYMPHOCYTES # BLD AUTO: 1.38 10*3/MM3 (ref 0.7–3.1)
LYMPHOCYTES NFR BLD AUTO: 26.5 % (ref 19.6–45.3)
MCH RBC QN AUTO: 31.2 PG (ref 26.6–33)
MCHC RBC AUTO-ENTMCNC: 34.5 G/DL (ref 31.5–35.7)
MCV RBC AUTO: 90.6 FL (ref 79–97)
MONOCYTES # BLD AUTO: 0.34 10*3/MM3 (ref 0.1–0.9)
MONOCYTES NFR BLD AUTO: 6.5 % (ref 5–12)
NEUTROPHILS # BLD AUTO: 3.06 10*3/MM3 (ref 1.7–7)
NEUTROPHILS NFR BLD AUTO: 58.7 % (ref 42.7–76)
NRBC BLD AUTO-RTO: 0.4 /100 WBC (ref 0–0.2)
PLATELET # BLD AUTO: 176 10*3/MM3 (ref 140–450)
PMV BLD AUTO: 10.1 FL (ref 6–12)
POTASSIUM BLD-SCNC: 3.4 MMOL/L (ref 3.5–5.2)
RBC # BLD AUTO: 3.81 10*6/MM3 (ref 4.14–5.8)
SODIUM BLD-SCNC: 144 MMOL/L (ref 136–145)
WBC NRBC COR # BLD: 5.21 10*3/MM3 (ref 3.4–10.8)

## 2020-02-27 PROCEDURE — 80048 BASIC METABOLIC PNL TOTAL CA: CPT | Performed by: HOSPITALIST

## 2020-02-27 PROCEDURE — 71045 X-RAY EXAM CHEST 1 VIEW: CPT

## 2020-02-27 PROCEDURE — 25010000002 LEVETIRACETAM IN NACL 0.82% 500 MG/100ML SOLUTION: Performed by: HOSPITALIST

## 2020-02-27 PROCEDURE — 85025 COMPLETE CBC W/AUTO DIFF WBC: CPT | Performed by: HOSPITALIST

## 2020-02-27 PROCEDURE — 94799 UNLISTED PULMONARY SVC/PX: CPT

## 2020-02-27 PROCEDURE — 82962 GLUCOSE BLOOD TEST: CPT

## 2020-02-27 PROCEDURE — 25010000002 PIPERACILLIN SOD-TAZOBACTAM PER 1 G: Performed by: HOSPITALIST

## 2020-02-27 PROCEDURE — 99232 SBSQ HOSP IP/OBS MODERATE 35: CPT | Performed by: INTERNAL MEDICINE

## 2020-02-27 RX ORDER — LEVETIRACETAM 500 MG/1
500 TABLET ORAL EVERY 12 HOURS SCHEDULED
Status: DISCONTINUED | OUTPATIENT
Start: 2020-02-27 | End: 2020-02-29

## 2020-02-27 RX ORDER — LANSOPRAZOLE
15 KIT EVERY MORNING
Status: DISCONTINUED | OUTPATIENT
Start: 2020-02-28 | End: 2020-02-27

## 2020-02-27 RX ORDER — METOCLOPRAMIDE HYDROCHLORIDE 5 MG/ML
5 INJECTION INTRAMUSCULAR; INTRAVENOUS EVERY 6 HOURS
Status: DISCONTINUED | OUTPATIENT
Start: 2020-02-27 | End: 2020-02-28

## 2020-02-27 RX ORDER — LANSOPRAZOLE
30 KIT
Status: DISCONTINUED | OUTPATIENT
Start: 2020-02-27 | End: 2020-02-29 | Stop reason: HOSPADM

## 2020-02-27 RX ORDER — IPRATROPIUM BROMIDE AND ALBUTEROL SULFATE 2.5; .5 MG/3ML; MG/3ML
3 SOLUTION RESPIRATORY (INHALATION) EVERY 4 HOURS PRN
Status: DISCONTINUED | OUTPATIENT
Start: 2020-02-27 | End: 2020-02-29 | Stop reason: HOSPADM

## 2020-02-27 RX ORDER — HYDROCODONE BITARTRATE AND ACETAMINOPHEN 5; 325 MG/1; MG/1
1 TABLET ORAL EVERY 6 HOURS PRN
Status: DISCONTINUED | OUTPATIENT
Start: 2020-02-27 | End: 2020-02-29 | Stop reason: HOSPADM

## 2020-02-27 RX ADMIN — LANSOPRAZOLE 30 MG: KIT at 17:37

## 2020-02-27 RX ADMIN — IPRATROPIUM BROMIDE AND ALBUTEROL SULFATE 3 ML: 2.5; .5 SOLUTION RESPIRATORY (INHALATION) at 08:40

## 2020-02-27 RX ADMIN — IPRATROPIUM BROMIDE AND ALBUTEROL SULFATE 3 ML: 2.5; .5 SOLUTION RESPIRATORY (INHALATION) at 04:09

## 2020-02-27 RX ADMIN — AMANTADINE HYDROCHLORIDE 100 MG: 50 SOLUTION ORAL at 12:23

## 2020-02-27 RX ADMIN — SUCRALFATE 1 G: 1 SUSPENSION ORAL at 08:08

## 2020-02-27 RX ADMIN — SUCRALFATE 1 G: 1 SUSPENSION ORAL at 17:37

## 2020-02-27 RX ADMIN — LEVETIRACETAM INJECTION 500 MG: 5 INJECTION INTRAVENOUS at 09:58

## 2020-02-27 RX ADMIN — DEXTROSE MONOHYDRATE 75 ML/HR: 50 INJECTION, SOLUTION INTRAVENOUS at 06:39

## 2020-02-27 RX ADMIN — TAZOBACTAM SODIUM AND PIPERACILLIN SODIUM 3.38 G: 375; 3 INJECTION, SOLUTION INTRAVENOUS at 03:45

## 2020-02-27 RX ADMIN — PANTOPRAZOLE SODIUM 40 MG: 40 INJECTION, POWDER, FOR SOLUTION INTRAVENOUS at 09:58

## 2020-02-27 RX ADMIN — IPRATROPIUM BROMIDE AND ALBUTEROL SULFATE 3 ML: 2.5; .5 SOLUTION RESPIRATORY (INHALATION) at 12:14

## 2020-02-27 RX ADMIN — LEVETIRACETAM 500 MG: 500 TABLET, FILM COATED ORAL at 22:40

## 2020-02-27 RX ADMIN — SUCRALFATE 1 G: 1 SUSPENSION ORAL at 22:41

## 2020-02-27 RX ADMIN — IPRATROPIUM BROMIDE AND ALBUTEROL SULFATE 3 ML: 2.5; .5 SOLUTION RESPIRATORY (INHALATION) at 00:48

## 2020-02-27 RX ADMIN — HYDROCODONE BITARTRATE AND ACETAMINOPHEN 1 TABLET: 5; 325 TABLET ORAL at 17:37

## 2020-02-27 RX ADMIN — SUCRALFATE 1 G: 1 SUSPENSION ORAL at 12:26

## 2020-02-27 RX ADMIN — TAZOBACTAM SODIUM AND PIPERACILLIN SODIUM 3.38 G: 375; 3 INJECTION, SOLUTION INTRAVENOUS at 12:23

## 2020-02-27 RX ADMIN — AMANTADINE HYDROCHLORIDE 100 MG: 50 SOLUTION ORAL at 00:15

## 2020-02-28 LAB
GLUCOSE BLDC GLUCOMTR-MCNC: 108 MG/DL (ref 70–130)
GLUCOSE BLDC GLUCOMTR-MCNC: 113 MG/DL (ref 70–130)
GLUCOSE BLDC GLUCOMTR-MCNC: 116 MG/DL (ref 70–130)

## 2020-02-28 PROCEDURE — 82962 GLUCOSE BLOOD TEST: CPT

## 2020-02-28 PROCEDURE — 25010000002 METOCLOPRAMIDE PER 10 MG: Performed by: INTERNAL MEDICINE

## 2020-02-28 PROCEDURE — 99232 SBSQ HOSP IP/OBS MODERATE 35: CPT | Performed by: INTERNAL MEDICINE

## 2020-02-28 RX ORDER — METOCLOPRAMIDE HYDROCHLORIDE 5 MG/5ML
10 SOLUTION ORAL
Status: DISCONTINUED | OUTPATIENT
Start: 2020-02-28 | End: 2020-02-28

## 2020-02-28 RX ORDER — METOCLOPRAMIDE HYDROCHLORIDE 5 MG/5ML
10 SOLUTION ORAL EVERY 6 HOURS SCHEDULED
Status: DISCONTINUED | OUTPATIENT
Start: 2020-02-28 | End: 2020-02-29 | Stop reason: HOSPADM

## 2020-02-28 RX ADMIN — AMANTADINE HYDROCHLORIDE 100 MG: 50 SOLUTION ORAL at 00:38

## 2020-02-28 RX ADMIN — HYDROCODONE BITARTRATE AND ACETAMINOPHEN 1 TABLET: 5; 325 TABLET ORAL at 18:28

## 2020-02-28 RX ADMIN — METOCLOPRAMIDE HYDROCHLORIDE 5 MG: 5 INJECTION INTRAMUSCULAR; INTRAVENOUS at 05:40

## 2020-02-28 RX ADMIN — Medication 80 MG: at 07:38

## 2020-02-28 RX ADMIN — AMANTADINE HYDROCHLORIDE 100 MG: 50 SOLUTION ORAL at 23:54

## 2020-02-28 RX ADMIN — LANSOPRAZOLE 30 MG: KIT at 18:02

## 2020-02-28 RX ADMIN — SUCRALFATE 1 G: 1 SUSPENSION ORAL at 07:38

## 2020-02-28 RX ADMIN — METOCLOPRAMIDE HYDROCHLORIDE 10 MG: 5 SOLUTION ORAL at 15:29

## 2020-02-28 RX ADMIN — AMANTADINE HYDROCHLORIDE 100 MG: 50 SOLUTION ORAL at 12:50

## 2020-02-28 RX ADMIN — HYDROCODONE BITARTRATE AND ACETAMINOPHEN 1 TABLET: 5; 325 TABLET ORAL at 12:56

## 2020-02-28 RX ADMIN — LANSOPRAZOLE 30 MG: KIT at 07:38

## 2020-02-28 RX ADMIN — METOCLOPRAMIDE HYDROCHLORIDE 5 MG: 5 INJECTION INTRAMUSCULAR; INTRAVENOUS at 09:33

## 2020-02-28 RX ADMIN — SUCRALFATE 1 G: 1 SUSPENSION ORAL at 12:50

## 2020-02-28 RX ADMIN — Medication 80 MG: at 00:38

## 2020-02-28 RX ADMIN — LEVETIRACETAM 500 MG: 500 TABLET, FILM COATED ORAL at 21:03

## 2020-02-28 RX ADMIN — METOCLOPRAMIDE HYDROCHLORIDE 10 MG: 5 SOLUTION ORAL at 23:54

## 2020-02-28 RX ADMIN — METOCLOPRAMIDE HYDROCHLORIDE 5 MG: 5 INJECTION INTRAMUSCULAR; INTRAVENOUS at 00:37

## 2020-02-28 RX ADMIN — SUCRALFATE 1 G: 1 SUSPENSION ORAL at 21:03

## 2020-02-28 RX ADMIN — LEVETIRACETAM 500 MG: 500 TABLET, FILM COATED ORAL at 09:32

## 2020-02-28 RX ADMIN — METOCLOPRAMIDE HYDROCHLORIDE 10 MG: 5 SOLUTION ORAL at 21:03

## 2020-02-28 RX ADMIN — Medication 80 MG: at 21:03

## 2020-02-29 VITALS
TEMPERATURE: 99.1 F | RESPIRATION RATE: 16 BRPM | HEART RATE: 91 BPM | DIASTOLIC BLOOD PRESSURE: 69 MMHG | SYSTOLIC BLOOD PRESSURE: 103 MMHG | OXYGEN SATURATION: 94 % | HEIGHT: 71 IN | BODY MASS INDEX: 21.88 KG/M2 | WEIGHT: 156.3 LBS

## 2020-02-29 LAB
GLUCOSE BLDC GLUCOMTR-MCNC: 105 MG/DL (ref 70–130)
GLUCOSE BLDC GLUCOMTR-MCNC: 106 MG/DL (ref 70–130)
GLUCOSE BLDC GLUCOMTR-MCNC: 97 MG/DL (ref 70–130)

## 2020-02-29 PROCEDURE — 82962 GLUCOSE BLOOD TEST: CPT

## 2020-02-29 RX ORDER — SUCRALFATE ORAL 1 G/10ML
1 SUSPENSION ORAL
Qty: 1200 ML | Refills: 0 | Status: SHIPPED | OUTPATIENT
Start: 2020-02-29 | End: 2020-03-30

## 2020-02-29 RX ORDER — SIMETHICONE 20 MG/.3ML
80 EMULSION ORAL EVERY 8 HOURS
Qty: 100 ML | Refills: 0 | Status: SHIPPED | OUTPATIENT
Start: 2020-02-29 | End: 2020-03-30

## 2020-02-29 RX ORDER — LEVETIRACETAM 100 MG/ML
500 SOLUTION ORAL EVERY 12 HOURS SCHEDULED
Status: DISCONTINUED | OUTPATIENT
Start: 2020-02-29 | End: 2020-02-29 | Stop reason: HOSPADM

## 2020-02-29 RX ORDER — LANSOPRAZOLE
30 KIT
Qty: 300 ML | Refills: 0 | Status: SHIPPED | OUTPATIENT
Start: 2020-02-29 | End: 2020-03-30

## 2020-02-29 RX ORDER — HYDROCODONE BITARTRATE AND ACETAMINOPHEN 7.5; 325 MG/1; MG/1
1 TABLET ORAL EVERY 6 HOURS PRN
Qty: 10 TABLET | Refills: 0 | Status: SHIPPED | OUTPATIENT
Start: 2020-02-29 | End: 2020-03-03

## 2020-02-29 RX ORDER — METOCLOPRAMIDE HYDROCHLORIDE 5 MG/5ML
10 SOLUTION ORAL EVERY 6 HOURS SCHEDULED
Qty: 1200 ML | Refills: 0 | Status: SHIPPED | OUTPATIENT
Start: 2020-02-29 | End: 2020-03-30

## 2020-02-29 RX ORDER — POLYETHYLENE GLYCOL 3350 17 G/17G
17 POWDER, FOR SOLUTION ORAL DAILY
Qty: 510 G | Refills: 0 | Status: SHIPPED | OUTPATIENT
Start: 2020-02-29 | End: 2020-03-30

## 2020-02-29 RX ADMIN — Medication 80 MG: at 14:30

## 2020-02-29 RX ADMIN — LANSOPRAZOLE 30 MG: KIT at 06:33

## 2020-02-29 RX ADMIN — SUCRALFATE 1 G: 1 SUSPENSION ORAL at 06:33

## 2020-02-29 RX ADMIN — HYDROCODONE BITARTRATE AND ACETAMINOPHEN 1 TABLET: 5; 325 TABLET ORAL at 14:30

## 2020-02-29 RX ADMIN — METOCLOPRAMIDE HYDROCHLORIDE 10 MG: 5 SOLUTION ORAL at 11:45

## 2020-02-29 RX ADMIN — LEVETIRACETAM 500 MG: 100 SOLUTION ORAL at 09:43

## 2020-02-29 RX ADMIN — METOCLOPRAMIDE HYDROCHLORIDE 10 MG: 5 SOLUTION ORAL at 06:33

## 2020-02-29 RX ADMIN — SUCRALFATE 1 G: 1 SUSPENSION ORAL at 11:45

## 2020-02-29 RX ADMIN — Medication 80 MG: at 06:33

## 2020-02-29 RX ADMIN — AMANTADINE HYDROCHLORIDE 100 MG: 50 SOLUTION ORAL at 11:45

## 2020-03-02 NOTE — PROGRESS NOTES
Case Management Discharge Note      Final Note: Discharged to [facility], IC level bed on 2/29/2020. [facility]. PINA London, CCP    Provided Post Acute Provider List?: N/A  N/A Provider List Comment: Plans are to return to LTC @ [facility]     Destination - Selection Complete      Service Provider Request Status Selected Services Address Phone Number Fax Number    [facility] Selected Intermediate Care [address] [facility] [address] [phone]      Durable Medical Equipment      No service has been selected for the patient.      Dialysis/Infusion      No service has been selected for the patient.      Home Medical Care      No service has been selected for the patient.      Therapy      No service has been selected for the patient.      Community Resources      No service has been selected for the patient.        Transportation Services  Ambulance: [facility]    Final Discharge Disposition Code: 04 - intermediate care facility

## 2020-03-02 NOTE — PROGRESS NOTES
Case Management Discharge Note           Provided Post Acute Provider List?: N/A  N/A Provider List Comment: Plans are to return to [facility]TC @ [facility]     Destination - Selection Complete      Service Provider Request Status Selected Services Address Phone Number Fax Number    [facility] [address] [facility] [address] [phone]      Durable Medical Equipment      No service has been selected for the patient.      Dialysis/Infusion      No service has been selected for the patient.      Home Medical Care      No service has been selected for the patient.      Therapy      No service has been selected for the patient.      Community Resources      No service has been selected for the patient.        Transportation Services  Ambulance: [facility]

## 2020-05-14 ENCOUNTER — APPOINTMENT (OUTPATIENT)
Dept: GENERAL RADIOLOGY | Facility: HOSPITAL | Age: 40
End: 2020-05-14

## 2020-05-14 ENCOUNTER — ANESTHESIA EVENT (OUTPATIENT)
Dept: PERIOP | Facility: HOSPITAL | Age: 40
End: 2020-05-14

## 2020-05-14 ENCOUNTER — APPOINTMENT (OUTPATIENT)
Dept: CT IMAGING | Facility: HOSPITAL | Age: 40
End: 2020-05-14

## 2020-05-14 ENCOUNTER — ANESTHESIA (OUTPATIENT)
Dept: PERIOP | Facility: HOSPITAL | Age: 40
End: 2020-05-14

## 2020-05-14 ENCOUNTER — HOSPITAL ENCOUNTER (INPATIENT)
Facility: HOSPITAL | Age: 40
LOS: 7 days | Discharge: NURSING, REHAB OR LONG-TERM CARE | End: 2020-05-21
Attending: EMERGENCY MEDICINE | Admitting: HOSPITALIST

## 2020-05-14 DIAGNOSIS — N39.0 ACUTE UTI: ICD-10-CM

## 2020-05-14 DIAGNOSIS — J18.9 HCAP (HEALTHCARE-ASSOCIATED PNEUMONIA): ICD-10-CM

## 2020-05-14 DIAGNOSIS — K56.609 SBO (SMALL BOWEL OBSTRUCTION) (HCC): Primary | ICD-10-CM

## 2020-05-14 DIAGNOSIS — D72.829 LEUKOCYTOSIS, UNSPECIFIED TYPE: ICD-10-CM

## 2020-05-14 LAB
ALBUMIN SERPL-MCNC: 4.7 G/DL (ref 3.5–5.2)
ALBUMIN/GLOB SERPL: 1.4 G/DL
ALP SERPL-CCNC: 127 U/L (ref 39–117)
ALT SERPL W P-5'-P-CCNC: 40 U/L (ref 1–41)
ANION GAP SERPL CALCULATED.3IONS-SCNC: 15.7 MMOL/L (ref 5–15)
ARTERIAL PATENCY WRIST A: POSITIVE
AST SERPL-CCNC: 17 U/L (ref 1–40)
ATMOSPHERIC PRESS: 753.4 MMHG
B PARAPERT DNA SPEC QL NAA+PROBE: NOT DETECTED
B PERT DNA SPEC QL NAA+PROBE: NOT DETECTED
BACTERIA UR QL AUTO: ABNORMAL /HPF
BASE EXCESS BLDA CALC-SCNC: 10.1 MMOL/L (ref 0–2)
BASOPHILS # BLD AUTO: 0.04 10*3/MM3 (ref 0–0.2)
BASOPHILS NFR BLD AUTO: 0.2 % (ref 0–1.5)
BDY SITE: ABNORMAL
BILIRUB SERPL-MCNC: 0.9 MG/DL (ref 0.2–1.2)
BILIRUB UR QL STRIP: NEGATIVE
BUN BLD-MCNC: 36 MG/DL (ref 6–20)
BUN/CREAT SERPL: 48.6 (ref 7–25)
C PNEUM DNA NPH QL NAA+NON-PROBE: NOT DETECTED
CALCIUM SPEC-SCNC: 9.9 MG/DL (ref 8.6–10.5)
CHLORIDE SERPL-SCNC: 93 MMOL/L (ref 98–107)
CLARITY UR: CLEAR
CO2 SERPL-SCNC: 32.3 MMOL/L (ref 22–29)
COLOR UR: YELLOW
CREAT BLD-MCNC: 0.74 MG/DL (ref 0.76–1.27)
D-LACTATE SERPL-SCNC: 3 MMOL/L (ref 0.5–2)
DEPRECATED RDW RBC AUTO: 43.2 FL (ref 37–54)
EOSINOPHIL # BLD AUTO: 0 10*3/MM3 (ref 0–0.4)
EOSINOPHIL NFR BLD AUTO: 0 % (ref 0.3–6.2)
ERYTHROCYTE [DISTWIDTH] IN BLOOD BY AUTOMATED COUNT: 13.3 % (ref 12.3–15.4)
FLUAV H1 2009 PAND RNA NPH QL NAA+PROBE: NOT DETECTED
FLUAV H1 HA GENE NPH QL NAA+PROBE: NOT DETECTED
FLUAV H3 RNA NPH QL NAA+PROBE: NOT DETECTED
FLUAV SUBTYP SPEC NAA+PROBE: NOT DETECTED
FLUBV RNA ISLT QL NAA+PROBE: NOT DETECTED
GFR SERPL CREATININE-BSD FRML MDRD: 117 ML/MIN/1.73
GLOBULIN UR ELPH-MCNC: 3.3 GM/DL
GLUCOSE BLD-MCNC: 234 MG/DL (ref 65–99)
GLUCOSE BLDC GLUCOMTR-MCNC: 191 MG/DL (ref 70–130)
GLUCOSE BLDC GLUCOMTR-MCNC: 191 MG/DL (ref 70–130)
GLUCOSE UR STRIP-MCNC: NEGATIVE MG/DL
HADV DNA SPEC NAA+PROBE: NOT DETECTED
HCO3 BLDA-SCNC: 35.6 MMOL/L (ref 22–28)
HCOV 229E RNA SPEC QL NAA+PROBE: NOT DETECTED
HCOV HKU1 RNA SPEC QL NAA+PROBE: NOT DETECTED
HCOV NL63 RNA SPEC QL NAA+PROBE: NOT DETECTED
HCOV OC43 RNA SPEC QL NAA+PROBE: NOT DETECTED
HCT VFR BLD AUTO: 49.9 % (ref 37.5–51)
HGB BLD-MCNC: 16.9 G/DL (ref 13–17.7)
HGB UR QL STRIP.AUTO: ABNORMAL
HMPV RNA NPH QL NAA+NON-PROBE: NOT DETECTED
HOROWITZ INDEX BLD+IHG-RTO: 100 %
HPIV1 RNA SPEC QL NAA+PROBE: NOT DETECTED
HPIV2 RNA SPEC QL NAA+PROBE: NOT DETECTED
HPIV3 RNA NPH QL NAA+PROBE: NOT DETECTED
HPIV4 P GENE NPH QL NAA+PROBE: NOT DETECTED
HYALINE CASTS UR QL AUTO: ABNORMAL /LPF
IMM GRANULOCYTES # BLD AUTO: 0.11 10*3/MM3 (ref 0–0.05)
IMM GRANULOCYTES NFR BLD AUTO: 0.5 % (ref 0–0.5)
KETONES UR QL STRIP: NEGATIVE
LACTATE HOLD SPECIMEN: NORMAL
LEUKOCYTE ESTERASE UR QL STRIP.AUTO: ABNORMAL
LIPASE SERPL-CCNC: 16 U/L (ref 13–60)
LYMPHOCYTES # BLD AUTO: 0.69 10*3/MM3 (ref 0.7–3.1)
LYMPHOCYTES NFR BLD AUTO: 3.2 % (ref 19.6–45.3)
M PNEUMO IGG SER IA-ACNC: NOT DETECTED
MAGNESIUM SERPL-MCNC: 2.6 MG/DL (ref 1.6–2.6)
MCH RBC QN AUTO: 30.5 PG (ref 26.6–33)
MCHC RBC AUTO-ENTMCNC: 33.9 G/DL (ref 31.5–35.7)
MCV RBC AUTO: 90.1 FL (ref 79–97)
MODALITY: ABNORMAL
MONOCYTES # BLD AUTO: 1.27 10*3/MM3 (ref 0.1–0.9)
MONOCYTES NFR BLD AUTO: 5.9 % (ref 5–12)
NEUTROPHILS # BLD AUTO: 19.25 10*3/MM3 (ref 1.7–7)
NEUTROPHILS NFR BLD AUTO: 90.2 % (ref 42.7–76)
NITRITE UR QL STRIP: NEGATIVE
NRBC BLD AUTO-RTO: 0 /100 WBC (ref 0–0.2)
NT-PROBNP SERPL-MCNC: 209.1 PG/ML (ref 5–450)
O2 A-A PPRESDIFF RESPIRATORY: 0.3 MMHG
PCO2 BLDA: 47.5 MM HG (ref 35–45)
PEEP RESPIRATORY: 5 CM[H2O]
PH BLDA: 7.48 PH UNITS (ref 7.35–7.45)
PH UR STRIP.AUTO: 6 [PH] (ref 5–8)
PLATELET # BLD AUTO: 241 10*3/MM3 (ref 140–450)
PMV BLD AUTO: 11.2 FL (ref 6–12)
PO2 BLDA: 180.3 MM HG (ref 80–100)
POTASSIUM BLD-SCNC: 3.5 MMOL/L (ref 3.5–5.2)
PROT SERPL-MCNC: 8 G/DL (ref 6–8.5)
PROT UR QL STRIP: ABNORMAL
RBC # BLD AUTO: 5.54 10*6/MM3 (ref 4.14–5.8)
RBC # UR: ABNORMAL /HPF
REF LAB TEST METHOD: ABNORMAL
RHINOVIRUS RNA SPEC NAA+PROBE: NOT DETECTED
RSV RNA NPH QL NAA+NON-PROBE: NOT DETECTED
SAO2 % BLDCOA: 99.7 % (ref 92–99)
SARS-COV-2 RNA RESP QL NAA+PROBE: NOT DETECTED
SET MECH RESP RATE: 16
SODIUM BLD-SCNC: 141 MMOL/L (ref 136–145)
SP GR UR STRIP: 1.02 (ref 1–1.03)
SQUAMOUS #/AREA URNS HPF: ABNORMAL /HPF
TOTAL RATE: 16 BREATHS/MINUTE
TROPONIN T SERPL-MCNC: <0.01 NG/ML (ref 0–0.03)
UROBILINOGEN UR QL STRIP: ABNORMAL
VENTILATOR MODE: ABNORMAL
VT ON VENT VENT: 600 ML
WBC NRBC COR # BLD: 21.36 10*3/MM3 (ref 3.4–10.8)
WBC UR QL AUTO: ABNORMAL /HPF

## 2020-05-14 PROCEDURE — 87086 URINE CULTURE/COLONY COUNT: CPT | Performed by: EMERGENCY MEDICINE

## 2020-05-14 PROCEDURE — 0DS80ZZ REPOSITION SMALL INTESTINE, OPEN APPROACH: ICD-10-PCS | Performed by: SURGERY

## 2020-05-14 PROCEDURE — 82962 GLUCOSE BLOOD TEST: CPT

## 2020-05-14 PROCEDURE — 87150 DNA/RNA AMPLIFIED PROBE: CPT | Performed by: EMERGENCY MEDICINE

## 2020-05-14 PROCEDURE — 94002 VENT MGMT INPAT INIT DAY: CPT

## 2020-05-14 PROCEDURE — 87635 SARS-COV-2 COVID-19 AMP PRB: CPT | Performed by: EMERGENCY MEDICINE

## 2020-05-14 PROCEDURE — 87147 CULTURE TYPE IMMUNOLOGIC: CPT | Performed by: EMERGENCY MEDICINE

## 2020-05-14 PROCEDURE — 25010000002 CEFTRIAXONE PER 250 MG: Performed by: EMERGENCY MEDICINE

## 2020-05-14 PROCEDURE — 99285 EMERGENCY DEPT VISIT HI MDM: CPT

## 2020-05-14 PROCEDURE — C9290 INJ, BUPIVACAINE LIPOSOME: HCPCS | Performed by: SURGERY

## 2020-05-14 PROCEDURE — 36600 WITHDRAWAL OF ARTERIAL BLOOD: CPT

## 2020-05-14 PROCEDURE — 74018 RADEX ABDOMEN 1 VIEW: CPT

## 2020-05-14 PROCEDURE — 49000 EXPLORATION OF ABDOMEN: CPT | Performed by: SURGERY

## 2020-05-14 PROCEDURE — 93010 ELECTROCARDIOGRAM REPORT: CPT | Performed by: INTERNAL MEDICINE

## 2020-05-14 PROCEDURE — 81001 URINALYSIS AUTO W/SCOPE: CPT | Performed by: EMERGENCY MEDICINE

## 2020-05-14 PROCEDURE — 94799 UNLISTED PULMONARY SVC/PX: CPT

## 2020-05-14 PROCEDURE — 25010000002 CEFAZOLIN PER 500 MG: Performed by: NURSE ANESTHETIST, CERTIFIED REGISTERED

## 2020-05-14 PROCEDURE — 0WWG0JZ REVISION OF SYNTHETIC SUBSTITUTE IN PERITONEAL CAVITY, OPEN APPROACH: ICD-10-PCS | Performed by: SURGERY

## 2020-05-14 PROCEDURE — 25010000002 PROPOFOL 10 MG/ML EMULSION: Performed by: NURSE ANESTHETIST, CERTIFIED REGISTERED

## 2020-05-14 PROCEDURE — 25010000002 FENTANYL CITRATE (PF) 100 MCG/2ML SOLUTION: Performed by: NURSE ANESTHETIST, CERTIFIED REGISTERED

## 2020-05-14 PROCEDURE — 83735 ASSAY OF MAGNESIUM: CPT | Performed by: EMERGENCY MEDICINE

## 2020-05-14 PROCEDURE — 71046 X-RAY EXAM CHEST 2 VIEWS: CPT

## 2020-05-14 PROCEDURE — 70450 CT HEAD/BRAIN W/O DYE: CPT

## 2020-05-14 PROCEDURE — 87040 BLOOD CULTURE FOR BACTERIA: CPT | Performed by: EMERGENCY MEDICINE

## 2020-05-14 PROCEDURE — 84484 ASSAY OF TROPONIN QUANT: CPT | Performed by: EMERGENCY MEDICINE

## 2020-05-14 PROCEDURE — 85025 COMPLETE CBC W/AUTO DIFF WBC: CPT | Performed by: EMERGENCY MEDICINE

## 2020-05-14 PROCEDURE — 74177 CT ABD & PELVIS W/CONTRAST: CPT

## 2020-05-14 PROCEDURE — 80053 COMPREHEN METABOLIC PANEL: CPT | Performed by: EMERGENCY MEDICINE

## 2020-05-14 PROCEDURE — 83690 ASSAY OF LIPASE: CPT | Performed by: EMERGENCY MEDICINE

## 2020-05-14 PROCEDURE — 25010000003 BUPIVACAINE LIPOSOME 1.3 % SUSPENSION 20 ML VIAL: Performed by: SURGERY

## 2020-05-14 PROCEDURE — 0100U HC BIOFIRE FILMARRAY RESP PANEL 2: CPT | Performed by: EMERGENCY MEDICINE

## 2020-05-14 PROCEDURE — 71045 X-RAY EXAM CHEST 1 VIEW: CPT

## 2020-05-14 PROCEDURE — 25010000002 AZITHROMYCIN PER 500 MG: Performed by: EMERGENCY MEDICINE

## 2020-05-14 PROCEDURE — 83880 ASSAY OF NATRIURETIC PEPTIDE: CPT | Performed by: EMERGENCY MEDICINE

## 2020-05-14 PROCEDURE — 70250 X-RAY EXAM OF SKULL: CPT

## 2020-05-14 PROCEDURE — 99222 1ST HOSP IP/OBS MODERATE 55: CPT | Performed by: SURGERY

## 2020-05-14 PROCEDURE — P9612 CATHETERIZE FOR URINE SPEC: HCPCS

## 2020-05-14 PROCEDURE — 82803 BLOOD GASES ANY COMBINATION: CPT

## 2020-05-14 PROCEDURE — 83605 ASSAY OF LACTIC ACID: CPT | Performed by: EMERGENCY MEDICINE

## 2020-05-14 PROCEDURE — 25010000002 IOPAMIDOL 61 % SOLUTION: Performed by: EMERGENCY MEDICINE

## 2020-05-14 PROCEDURE — 93005 ELECTROCARDIOGRAM TRACING: CPT | Performed by: EMERGENCY MEDICINE

## 2020-05-14 RX ORDER — ACETAMINOPHEN 325 MG/1
650 TABLET ORAL ONCE AS NEEDED
Status: DISCONTINUED | OUTPATIENT
Start: 2020-05-14 | End: 2020-05-14 | Stop reason: HOSPADM

## 2020-05-14 RX ORDER — CEFAZOLIN SODIUM 500 MG/2.2ML
INJECTION, POWDER, FOR SOLUTION INTRAMUSCULAR; INTRAVENOUS AS NEEDED
Status: DISCONTINUED | OUTPATIENT
Start: 2020-05-14 | End: 2020-05-14 | Stop reason: SURG

## 2020-05-14 RX ORDER — FENTANYL CITRATE 50 UG/ML
50 INJECTION, SOLUTION INTRAMUSCULAR; INTRAVENOUS
Status: DISCONTINUED | OUTPATIENT
Start: 2020-05-14 | End: 2020-05-14 | Stop reason: HOSPADM

## 2020-05-14 RX ORDER — FLUMAZENIL 0.1 MG/ML
0.2 INJECTION INTRAVENOUS AS NEEDED
Status: DISCONTINUED | OUTPATIENT
Start: 2020-05-14 | End: 2020-05-14 | Stop reason: HOSPADM

## 2020-05-14 RX ORDER — FAMOTIDINE 10 MG/ML
20 INJECTION, SOLUTION INTRAVENOUS ONCE
Status: DISCONTINUED | OUTPATIENT
Start: 2020-05-14 | End: 2020-05-14 | Stop reason: HOSPADM

## 2020-05-14 RX ORDER — PROMETHAZINE HYDROCHLORIDE 25 MG/ML
12.5 INJECTION, SOLUTION INTRAMUSCULAR; INTRAVENOUS
Status: DISCONTINUED | OUTPATIENT
Start: 2020-05-14 | End: 2020-05-21 | Stop reason: HOSPADM

## 2020-05-14 RX ORDER — SODIUM CHLORIDE 9 MG/ML
125 INJECTION, SOLUTION INTRAVENOUS CONTINUOUS
Status: DISCONTINUED | OUTPATIENT
Start: 2020-05-15 | End: 2020-05-15

## 2020-05-14 RX ORDER — PROMETHAZINE HYDROCHLORIDE 25 MG/1
25 SUPPOSITORY RECTAL ONCE AS NEEDED
Status: DISCONTINUED | OUTPATIENT
Start: 2020-05-14 | End: 2020-05-14 | Stop reason: HOSPADM

## 2020-05-14 RX ORDER — SODIUM CHLORIDE 0.9 % (FLUSH) 0.9 %
3 SYRINGE (ML) INJECTION EVERY 12 HOURS SCHEDULED
Status: DISCONTINUED | OUTPATIENT
Start: 2020-05-14 | End: 2020-05-14 | Stop reason: HOSPADM

## 2020-05-14 RX ORDER — HYDROMORPHONE HYDROCHLORIDE 1 MG/ML
0.5 INJECTION, SOLUTION INTRAMUSCULAR; INTRAVENOUS; SUBCUTANEOUS
Status: DISCONTINUED | OUTPATIENT
Start: 2020-05-14 | End: 2020-05-14 | Stop reason: HOSPADM

## 2020-05-14 RX ORDER — LEVETIRACETAM 100 MG/ML
1500 SOLUTION ORAL EVERY 12 HOURS SCHEDULED
Status: DISCONTINUED | OUTPATIENT
Start: 2020-05-14 | End: 2020-05-15

## 2020-05-14 RX ORDER — CEFTRIAXONE SODIUM 1 G/50ML
1 INJECTION, SOLUTION INTRAVENOUS EVERY 24 HOURS
Status: DISCONTINUED | OUTPATIENT
Start: 2020-05-15 | End: 2020-05-17

## 2020-05-14 RX ORDER — PANTOPRAZOLE SODIUM 40 MG/10ML
40 INJECTION, POWDER, LYOPHILIZED, FOR SOLUTION INTRAVENOUS EVERY 12 HOURS SCHEDULED
Status: DISCONTINUED | OUTPATIENT
Start: 2020-05-14 | End: 2020-05-21

## 2020-05-14 RX ORDER — SODIUM CHLORIDE, SODIUM LACTATE, POTASSIUM CHLORIDE, CALCIUM CHLORIDE 600; 310; 30; 20 MG/100ML; MG/100ML; MG/100ML; MG/100ML
9 INJECTION, SOLUTION INTRAVENOUS CONTINUOUS
Status: DISCONTINUED | OUTPATIENT
Start: 2020-05-14 | End: 2020-05-14

## 2020-05-14 RX ORDER — DIPHENHYDRAMINE HCL 25 MG
25 CAPSULE ORAL
Status: DISCONTINUED | OUTPATIENT
Start: 2020-05-14 | End: 2020-05-14 | Stop reason: HOSPADM

## 2020-05-14 RX ORDER — PROMETHAZINE HYDROCHLORIDE 25 MG/1
25 TABLET ORAL ONCE AS NEEDED
Status: DISCONTINUED | OUTPATIENT
Start: 2020-05-14 | End: 2020-05-14 | Stop reason: HOSPADM

## 2020-05-14 RX ORDER — EPHEDRINE SULFATE 50 MG/ML
5 INJECTION, SOLUTION INTRAVENOUS ONCE AS NEEDED
Status: DISCONTINUED | OUTPATIENT
Start: 2020-05-14 | End: 2020-05-14 | Stop reason: HOSPADM

## 2020-05-14 RX ORDER — HYDRALAZINE HYDROCHLORIDE 20 MG/ML
5 INJECTION INTRAMUSCULAR; INTRAVENOUS
Status: DISCONTINUED | OUTPATIENT
Start: 2020-05-14 | End: 2020-05-14 | Stop reason: HOSPADM

## 2020-05-14 RX ORDER — NALOXONE HCL 0.4 MG/ML
0.2 VIAL (ML) INJECTION AS NEEDED
Status: DISCONTINUED | OUTPATIENT
Start: 2020-05-14 | End: 2020-05-14 | Stop reason: HOSPADM

## 2020-05-14 RX ORDER — SODIUM CHLORIDE 0.9 % (FLUSH) 0.9 %
3-10 SYRINGE (ML) INJECTION AS NEEDED
Status: DISCONTINUED | OUTPATIENT
Start: 2020-05-14 | End: 2020-05-14 | Stop reason: HOSPADM

## 2020-05-14 RX ORDER — GUAIFENESIN 600 MG/1
600 TABLET, EXTENDED RELEASE ORAL EVERY 12 HOURS SCHEDULED
Status: DISCONTINUED | OUTPATIENT
Start: 2020-05-14 | End: 2020-05-15

## 2020-05-14 RX ORDER — OXYCODONE AND ACETAMINOPHEN 7.5; 325 MG/1; MG/1
1 TABLET ORAL ONCE AS NEEDED
Status: DISCONTINUED | OUTPATIENT
Start: 2020-05-14 | End: 2020-05-14 | Stop reason: HOSPADM

## 2020-05-14 RX ORDER — DIPHENHYDRAMINE HYDROCHLORIDE 50 MG/ML
12.5 INJECTION INTRAMUSCULAR; INTRAVENOUS
Status: DISCONTINUED | OUTPATIENT
Start: 2020-05-14 | End: 2020-05-14 | Stop reason: HOSPADM

## 2020-05-14 RX ORDER — LABETALOL HYDROCHLORIDE 5 MG/ML
5 INJECTION, SOLUTION INTRAVENOUS
Status: DISCONTINUED | OUTPATIENT
Start: 2020-05-14 | End: 2020-05-14 | Stop reason: HOSPADM

## 2020-05-14 RX ORDER — PROMETHAZINE HYDROCHLORIDE 25 MG/ML
12.5 INJECTION, SOLUTION INTRAMUSCULAR; INTRAVENOUS ONCE AS NEEDED
Status: DISCONTINUED | OUTPATIENT
Start: 2020-05-14 | End: 2020-05-14 | Stop reason: HOSPADM

## 2020-05-14 RX ORDER — LEVETIRACETAM 100 MG/ML
300 SOLUTION ORAL EVERY 12 HOURS SCHEDULED
Status: DISCONTINUED | OUTPATIENT
Start: 2020-05-14 | End: 2020-05-14

## 2020-05-14 RX ORDER — IPRATROPIUM BROMIDE AND ALBUTEROL SULFATE 2.5; .5 MG/3ML; MG/3ML
3 SOLUTION RESPIRATORY (INHALATION)
Status: DISCONTINUED | OUTPATIENT
Start: 2020-05-14 | End: 2020-05-14

## 2020-05-14 RX ORDER — ROCURONIUM BROMIDE 10 MG/ML
INJECTION, SOLUTION INTRAVENOUS AS NEEDED
Status: DISCONTINUED | OUTPATIENT
Start: 2020-05-14 | End: 2020-05-14 | Stop reason: SURG

## 2020-05-14 RX ORDER — IPRATROPIUM BROMIDE AND ALBUTEROL SULFATE 2.5; .5 MG/3ML; MG/3ML
3 SOLUTION RESPIRATORY (INHALATION) EVERY 4 HOURS PRN
Status: DISCONTINUED | OUTPATIENT
Start: 2020-05-14 | End: 2020-05-21 | Stop reason: HOSPADM

## 2020-05-14 RX ORDER — FENTANYL CITRATE 50 UG/ML
INJECTION, SOLUTION INTRAMUSCULAR; INTRAVENOUS AS NEEDED
Status: DISCONTINUED | OUTPATIENT
Start: 2020-05-14 | End: 2020-05-14 | Stop reason: SURG

## 2020-05-14 RX ORDER — SODIUM CHLORIDE AND POTASSIUM CHLORIDE 150; 900 MG/100ML; MG/100ML
125 INJECTION, SOLUTION INTRAVENOUS CONTINUOUS
Status: DISCONTINUED | OUTPATIENT
Start: 2020-05-14 | End: 2020-05-14

## 2020-05-14 RX ORDER — LIDOCAINE HYDROCHLORIDE 20 MG/ML
INJECTION, SOLUTION INFILTRATION; PERINEURAL AS NEEDED
Status: DISCONTINUED | OUTPATIENT
Start: 2020-05-14 | End: 2020-05-14 | Stop reason: SURG

## 2020-05-14 RX ORDER — LIDOCAINE HYDROCHLORIDE 10 MG/ML
0.5 INJECTION, SOLUTION EPIDURAL; INFILTRATION; INTRACAUDAL; PERINEURAL ONCE AS NEEDED
Status: DISCONTINUED | OUTPATIENT
Start: 2020-05-14 | End: 2020-05-14 | Stop reason: HOSPADM

## 2020-05-14 RX ORDER — PROPOFOL 10 MG/ML
VIAL (ML) INTRAVENOUS AS NEEDED
Status: DISCONTINUED | OUTPATIENT
Start: 2020-05-14 | End: 2020-05-14 | Stop reason: SURG

## 2020-05-14 RX ORDER — FAMOTIDINE 10 MG/ML
20 INJECTION, SOLUTION INTRAVENOUS ONCE
Status: COMPLETED | OUTPATIENT
Start: 2020-05-14 | End: 2020-05-14

## 2020-05-14 RX ORDER — MAGNESIUM HYDROXIDE 1200 MG/15ML
LIQUID ORAL AS NEEDED
Status: DISCONTINUED | OUTPATIENT
Start: 2020-05-14 | End: 2020-05-14 | Stop reason: HOSPADM

## 2020-05-14 RX ORDER — CEFTRIAXONE SODIUM 1 G/50ML
1 INJECTION, SOLUTION INTRAVENOUS ONCE
Status: COMPLETED | OUTPATIENT
Start: 2020-05-14 | End: 2020-05-14

## 2020-05-14 RX ORDER — HYDROCODONE BITARTRATE AND ACETAMINOPHEN 7.5; 325 MG/1; MG/1
1 TABLET ORAL ONCE AS NEEDED
Status: DISCONTINUED | OUTPATIENT
Start: 2020-05-14 | End: 2020-05-14 | Stop reason: HOSPADM

## 2020-05-14 RX ORDER — PROMETHAZINE HYDROCHLORIDE 25 MG/ML
6.25 INJECTION, SOLUTION INTRAMUSCULAR; INTRAVENOUS
Status: DISCONTINUED | OUTPATIENT
Start: 2020-05-14 | End: 2020-05-14 | Stop reason: HOSPADM

## 2020-05-14 RX ORDER — HYDROMORPHONE HYDROCHLORIDE 1 MG/ML
0.5 INJECTION, SOLUTION INTRAMUSCULAR; INTRAVENOUS; SUBCUTANEOUS 4 TIMES DAILY PRN
Status: DISCONTINUED | OUTPATIENT
Start: 2020-05-14 | End: 2020-05-21 | Stop reason: HOSPADM

## 2020-05-14 RX ADMIN — AZITHROMYCIN MONOHYDRATE 500 MG: 500 INJECTION, POWDER, LYOPHILIZED, FOR SOLUTION INTRAVENOUS at 12:11

## 2020-05-14 RX ADMIN — SODIUM CHLORIDE 500 ML: 9 INJECTION, SOLUTION INTRAVENOUS at 08:53

## 2020-05-14 RX ADMIN — FENTANYL CITRATE 50 MCG: 50 INJECTION INTRAMUSCULAR; INTRAVENOUS at 19:27

## 2020-05-14 RX ADMIN — FENTANYL CITRATE 100 MCG: 50 INJECTION INTRAMUSCULAR; INTRAVENOUS at 17:53

## 2020-05-14 RX ADMIN — SUGAMMADEX 100 MG: 100 INJECTION, SOLUTION INTRAVENOUS at 18:36

## 2020-05-14 RX ADMIN — SUGAMMADEX 200 MG: 100 INJECTION, SOLUTION INTRAVENOUS at 18:29

## 2020-05-14 RX ADMIN — PROPOFOL 150 MG: 10 INJECTION, EMULSION INTRAVENOUS at 17:53

## 2020-05-14 RX ADMIN — IOPAMIDOL 85 ML: 612 INJECTION, SOLUTION INTRAVENOUS at 09:38

## 2020-05-14 RX ADMIN — FAMOTIDINE 20 MG: 10 INJECTION, SOLUTION INTRAVENOUS at 16:29

## 2020-05-14 RX ADMIN — CEFAZOLIN SODIUM 2 G: 1 INJECTION, POWDER, FOR SOLUTION INTRAMUSCULAR; INTRAVENOUS at 18:10

## 2020-05-14 RX ADMIN — ROCURONIUM BROMIDE 80 MG: 10 INJECTION, SOLUTION INTRAVENOUS at 17:53

## 2020-05-14 RX ADMIN — SODIUM CHLORIDE, POTASSIUM CHLORIDE, SODIUM LACTATE AND CALCIUM CHLORIDE: 600; 310; 30; 20 INJECTION, SOLUTION INTRAVENOUS at 17:25

## 2020-05-14 RX ADMIN — LIDOCAINE HYDROCHLORIDE 80 MG: 20 INJECTION, SOLUTION INFILTRATION; PERINEURAL at 17:53

## 2020-05-14 RX ADMIN — CEFTRIAXONE SODIUM 1 G: 1 INJECTION, SOLUTION INTRAVENOUS at 11:39

## 2020-05-14 RX ADMIN — SODIUM CHLORIDE, POTASSIUM CHLORIDE, SODIUM LACTATE AND CALCIUM CHLORIDE 9 ML/HR: 600; 310; 30; 20 INJECTION, SOLUTION INTRAVENOUS at 20:12

## 2020-05-14 RX ADMIN — FENTANYL CITRATE 50 MCG: 50 INJECTION INTRAMUSCULAR; INTRAVENOUS at 18:17

## 2020-05-14 NOTE — OP NOTE
Operative Note :  Lula Shah MD      Adrian Kuo  1980    Procedure Date: 05/14/20    Pre-op Diagnosis:  SBO (small bowel obstruction) (CMS/[facility]) [K56.609]    Post-Operative Diagnosis:  Adynamic ileus    Procedure:   Exploratory laparotomy    Surgeon: Lula Shah MD    Assistant: Natividad Anne CFA    Anesthesia:  General (general endotracheal tube)    Estimated Blood Loss: minimal    Specimens: None    Complications: None    Indications:  Mr. Kuo is a 40-year-old gentleman with history of traumatic brain injury resulting in a persistent vegetative state who was admitted for about a week earlier this year with a small bowel obstruction.  At that time, his obstruction responded well to conservative measures.  He was brought to the emergency room early this morning from his nursing home with reports of acute nausea and vomiting.  He is unable to speak for himself given his vegetative state, on CT scan, he appears to have a high-grade small bowel obstruction with feculent drainage from the nasogastric tube placed.  I spoke with his mother, surely, and recommended proceeding to the operating room for exploratory laparotomy with lysis of adhesions.  She understands the risks of the procedure, and has consented to proceed.    Findings: Small bowel tethered by  shunt in right lower quadrant but no obvious sign of infection,  shunt repositioned up over the liver to minimize recurrent tingling with the bowel    Description of procedure:  Patient was brought to the operating room and placed on the OR table in supine position.  An endotracheal tube was easily inserted and general anesthesia was induced.  A Fiore catheter was inserted into the urinary bladder using sterile technique.  His anterior abdominal wall was shaved, prepped, and draped in a sterile fashion.  A surgical timeout was completed.  A midline laparotomy incision was made through the skin and subcutaneous tissues to the level of  the fascia which was incised sharply.  The peritoneum was also divided sharply while elevating the fascia anteriorly.  I then extended the fascial and peritoneal incision, taking care not to damage any of the underlying small bowel.  The small bowel was acutely dilated and showed serosal injection diffusely.  I eviscerated the small bowel and began tracing up proximally to the ligament of Treitz.  There was no obvious sign of obstruction upstream.  I then began tracing the small bowel downstream to the level of the terminal ileum.  The small bowel was twisted upon itself around the ventriculoperitoneal shunt within the right lower quadrant.  I was able to quickly untwisted this and found no signs of ischemia.  I then repositioned the  shunt into the right upper quadrant over the liver to minimize any recurrent issues in the future.  The small bowel was then followed further distally to the ileocecal valve and showed no further signs of obstruction.  I then inspected the colon and found it to be diffusely dilated consistent with some degree of colonic inertia secondary to his quadriplegia.  There was no sign of colonic ischemia or perforation and the colon diffusely with soft and mobile.  I then reflected the omentum over the small bowel and again verify that the  shunt was positioned safely in the right upper quadrant away from the bowel.  The nasogastric tube was palpated within the lumen of the stomach in good position.  I then closed the midline fascia using running loop PDS suture and skin was closed using interrupted skin staples.  A medium island dressing was applied and he was extubated.  He was then transferred to PACU in stable condition with all counts correct per nursing.    Lula Shah MD  General and Endoscopic Surgery  [facility]    [address], [address]  [place], [place], [address]  P: [phone]  F: [phone]
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
## 2020-05-14 NOTE — PERIOPERATIVE NURSING NOTE
Dr Herny at bedside. Discussed with him patient waking, on vent but not assisting yet. We will obtain ABGs shortly prior to extubation attempt

## 2020-05-14 NOTE — ANESTHESIA PREPROCEDURE EVALUATION
Anesthesia Evaluation     NPO Solid Status: > 8 hours             Airway   Mallampati: II  TM distance: >3 FB  Neck ROM: full  Dental - normal exam     Pulmonary - normal exam   Cardiovascular - normal exam        Neuro/Psych  (+) seizures,       ROS Comment: Quadriplegic, nonverbal  GI/Hepatic/Renal/Endo    (+)  GERD,  diabetes mellitus,     ROS Comment: Small bowel obstruction    Musculoskeletal     Abdominal    Substance History      OB/GYN          Other                        Anesthesia Plan    ASA 3     general   Rapid sequence(Recommend RSI with videolaryngoscope and rocuronium)      Plan discussed with attending and CRNA.

## 2020-05-14 NOTE — PERIOPERATIVE NURSING NOTE
Dr Thompson at bedside, updated on patient status, awaiting ABG results. Also discussed patient when waking was not overbreathing or assisting vent breaths.
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
## 2020-05-14 NOTE — PERIOPERATIVE NURSING NOTE
Current ABG results shown to Dr Thompson, Fio2 decreased to 60%. She asked to give patient a bit more time to wake. Will continue to monitor
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
## 2020-05-15 ENCOUNTER — APPOINTMENT (OUTPATIENT)
Dept: GENERAL RADIOLOGY | Facility: HOSPITAL | Age: 40
End: 2020-05-15

## 2020-05-15 ENCOUNTER — ANESTHESIA (OUTPATIENT)
Dept: PERIOP | Facility: HOSPITAL | Age: 40
End: 2020-05-15

## 2020-05-15 ENCOUNTER — APPOINTMENT (OUTPATIENT)
Dept: CT IMAGING | Facility: HOSPITAL | Age: 40
End: 2020-05-15

## 2020-05-15 ENCOUNTER — ANESTHESIA EVENT (OUTPATIENT)
Dept: PERIOP | Facility: HOSPITAL | Age: 40
End: 2020-05-15

## 2020-05-15 LAB
ALBUMIN SERPL-MCNC: 3.8 G/DL (ref 3.5–5.2)
ALBUMIN/GLOB SERPL: 1.1 G/DL
ALP SERPL-CCNC: 98 U/L (ref 39–117)
ALT SERPL W P-5'-P-CCNC: 37 U/L (ref 1–41)
ANION GAP SERPL CALCULATED.3IONS-SCNC: 15.2 MMOL/L (ref 5–15)
AST SERPL-CCNC: 20 U/L (ref 1–40)
BACTERIA BLD CULT: ABNORMAL
BACTERIA SPEC AEROBE CULT: NORMAL
BASOPHILS # BLD AUTO: 0.03 10*3/MM3 (ref 0–0.2)
BASOPHILS NFR BLD AUTO: 0.3 % (ref 0–1.5)
BILIRUB SERPL-MCNC: 0.5 MG/DL (ref 0.2–1.2)
BUN BLD-MCNC: 27 MG/DL (ref 6–20)
BUN/CREAT SERPL: 38 (ref 7–25)
CALCIUM SPEC-SCNC: 9.4 MG/DL (ref 8.6–10.5)
CHLORIDE SERPL-SCNC: 99 MMOL/L (ref 98–107)
CHOLEST SERPL-MCNC: 124 MG/DL (ref 0–200)
CO2 SERPL-SCNC: 30.8 MMOL/L (ref 22–29)
CREAT BLD-MCNC: 0.71 MG/DL (ref 0.76–1.27)
D-LACTATE SERPL-SCNC: 3.1 MMOL/L (ref 0.5–2)
DEPRECATED RDW RBC AUTO: 45.4 FL (ref 37–54)
EOSINOPHIL # BLD AUTO: 0.01 10*3/MM3 (ref 0–0.4)
EOSINOPHIL NFR BLD AUTO: 0.1 % (ref 0.3–6.2)
ERYTHROCYTE [DISTWIDTH] IN BLOOD BY AUTOMATED COUNT: 13.7 % (ref 12.3–15.4)
GFR SERPL CREATININE-BSD FRML MDRD: 123 ML/MIN/1.73
GLOBULIN UR ELPH-MCNC: 3.4 GM/DL
GLUCOSE BLD-MCNC: 204 MG/DL (ref 65–99)
GLUCOSE BLDC GLUCOMTR-MCNC: 165 MG/DL (ref 70–130)
GLUCOSE BLDC GLUCOMTR-MCNC: 178 MG/DL (ref 70–130)
HBA1C MFR BLD: 5.7 % (ref 4.8–5.6)
HCT VFR BLD AUTO: 43.2 % (ref 37.5–51)
HDLC SERPL-MCNC: 39 MG/DL (ref 40–60)
HGB BLD-MCNC: 15 G/DL (ref 13–17.7)
IMM GRANULOCYTES # BLD AUTO: 0.06 10*3/MM3 (ref 0–0.05)
IMM GRANULOCYTES NFR BLD AUTO: 0.6 % (ref 0–0.5)
LDLC SERPL CALC-MCNC: 46 MG/DL (ref 0–100)
LDLC/HDLC SERPL: 1.19 {RATIO}
LYMPHOCYTES # BLD AUTO: 1.02 10*3/MM3 (ref 0.7–3.1)
LYMPHOCYTES NFR BLD AUTO: 9.6 % (ref 19.6–45.3)
MAGNESIUM SERPL-MCNC: 2.2 MG/DL (ref 1.6–2.6)
MCH RBC QN AUTO: 31.4 PG (ref 26.6–33)
MCHC RBC AUTO-ENTMCNC: 34.7 G/DL (ref 31.5–35.7)
MCV RBC AUTO: 90.4 FL (ref 79–97)
MONOCYTES # BLD AUTO: 0.86 10*3/MM3 (ref 0.1–0.9)
MONOCYTES NFR BLD AUTO: 8.1 % (ref 5–12)
NEUTROPHILS # BLD AUTO: 8.6 10*3/MM3 (ref 1.7–7)
NEUTROPHILS NFR BLD AUTO: 81.3 % (ref 42.7–76)
NRBC BLD AUTO-RTO: 0 /100 WBC (ref 0–0.2)
NT-PROBNP SERPL-MCNC: 174.1 PG/ML (ref 5–450)
PHOSPHATE SERPL-MCNC: 2 MG/DL (ref 2.5–4.5)
PLATELET # BLD AUTO: 232 10*3/MM3 (ref 140–450)
PMV BLD AUTO: 11.4 FL (ref 6–12)
POTASSIUM BLD-SCNC: 2.7 MMOL/L (ref 3.5–5.2)
PROCALCITONIN SERPL-MCNC: 0.16 NG/ML (ref 0.1–0.25)
PROT SERPL-MCNC: 7.2 G/DL (ref 6–8.5)
RBC # BLD AUTO: 4.78 10*6/MM3 (ref 4.14–5.8)
SODIUM BLD-SCNC: 145 MMOL/L (ref 136–145)
TRIGL SERPL-MCNC: 193 MG/DL (ref 0–150)
TSH SERPL DL<=0.05 MIU/L-ACNC: 6.2 UIU/ML (ref 0.27–4.2)
VLDLC SERPL-MCNC: 38.6 MG/DL (ref 5–40)
WBC NRBC COR # BLD: 10.58 10*3/MM3 (ref 3.4–10.8)

## 2020-05-15 PROCEDURE — 25010000003 LIDOCAINE 1 % SOLUTION: Performed by: RADIOLOGY

## 2020-05-15 PROCEDURE — C1729 CATH, DRAINAGE: HCPCS

## 2020-05-15 PROCEDURE — 25010000002 MIDAZOLAM PER 1 MG: Performed by: STUDENT IN AN ORGANIZED HEALTH CARE EDUCATION/TRAINING PROGRAM

## 2020-05-15 PROCEDURE — 25010000002 FENTANYL CITRATE (PF) 100 MCG/2ML SOLUTION: Performed by: ANESTHESIOLOGY

## 2020-05-15 PROCEDURE — 0T9030Z DRAINAGE OF RIGHT KIDNEY WITH DRAINAGE DEVICE, PERCUTANEOUS APPROACH: ICD-10-PCS | Performed by: RADIOLOGY

## 2020-05-15 PROCEDURE — 94003 VENT MGMT INPAT SUBQ DAY: CPT

## 2020-05-15 PROCEDURE — 87205 SMEAR GRAM STAIN: CPT | Performed by: UROLOGY

## 2020-05-15 PROCEDURE — 83605 ASSAY OF LACTIC ACID: CPT | Performed by: SURGERY

## 2020-05-15 PROCEDURE — 87205 SMEAR GRAM STAIN: CPT | Performed by: RADIOLOGY

## 2020-05-15 PROCEDURE — 80061 LIPID PANEL: CPT | Performed by: SURGERY

## 2020-05-15 PROCEDURE — 0 IOVERSOL 74 % SOLUTION: Performed by: UROLOGY

## 2020-05-15 PROCEDURE — 25010000002 CEFTRIAXONE PER 250 MG: Performed by: SURGERY

## 2020-05-15 PROCEDURE — 94799 UNLISTED PULMONARY SVC/PX: CPT

## 2020-05-15 PROCEDURE — 25010000002 HYDROMORPHONE PER 4 MG: Performed by: UROLOGY

## 2020-05-15 PROCEDURE — C1769 GUIDE WIRE: HCPCS | Performed by: UROLOGY

## 2020-05-15 PROCEDURE — 87015 SPECIMEN INFECT AGNT CONCNTJ: CPT | Performed by: UROLOGY

## 2020-05-15 PROCEDURE — 87070 CULTURE OTHR SPECIMN AEROBIC: CPT | Performed by: RADIOLOGY

## 2020-05-15 PROCEDURE — 74420 UROGRAPHY RTRGR +-KUB: CPT

## 2020-05-15 PROCEDURE — C1758 CATHETER, URETERAL: HCPCS | Performed by: UROLOGY

## 2020-05-15 PROCEDURE — 71045 X-RAY EXAM CHEST 1 VIEW: CPT

## 2020-05-15 PROCEDURE — 25010000002 PROPOFOL 10 MG/ML EMULSION: Performed by: ANESTHESIOLOGY

## 2020-05-15 PROCEDURE — 84100 ASSAY OF PHOSPHORUS: CPT | Performed by: INTERNAL MEDICINE

## 2020-05-15 PROCEDURE — 85025 COMPLETE CBC W/AUTO DIFF WBC: CPT | Performed by: SURGERY

## 2020-05-15 PROCEDURE — 87070 CULTURE OTHR SPECIMN AEROBIC: CPT | Performed by: UROLOGY

## 2020-05-15 PROCEDURE — 99024 POSTOP FOLLOW-UP VISIT: CPT | Performed by: SURGERY

## 2020-05-15 PROCEDURE — 84145 PROCALCITONIN (PCT): CPT | Performed by: SURGERY

## 2020-05-15 PROCEDURE — 25010000003 POTASSIUM CHLORIDE 10 MEQ/100ML SOLUTION: Performed by: UROLOGY

## 2020-05-15 PROCEDURE — 25010000003 LEVETIRACETAM IN NACL 0.54% 1500 MG/100ML SOLUTION: Performed by: INTERNAL MEDICINE

## 2020-05-15 PROCEDURE — 25010000002 HYDROMORPHONE PER 4 MG: Performed by: SURGERY

## 2020-05-15 PROCEDURE — 83735 ASSAY OF MAGNESIUM: CPT | Performed by: INTERNAL MEDICINE

## 2020-05-15 PROCEDURE — 87102 FUNGUS ISOLATION CULTURE: CPT | Performed by: UROLOGY

## 2020-05-15 PROCEDURE — 82962 GLUCOSE BLOOD TEST: CPT

## 2020-05-15 PROCEDURE — 87040 BLOOD CULTURE FOR BACTERIA: CPT | Performed by: INTERNAL MEDICINE

## 2020-05-15 PROCEDURE — 84443 ASSAY THYROID STIM HORMONE: CPT | Performed by: SURGERY

## 2020-05-15 PROCEDURE — BT1DZZZ FLUOROSCOPY OF RIGHT KIDNEY, URETER AND BLADDER: ICD-10-PCS | Performed by: UROLOGY

## 2020-05-15 PROCEDURE — 25010000002 POTASSIUM CHLORIDE PER 2 MEQ OF POTASSIUM: Performed by: INTERNAL MEDICINE

## 2020-05-15 PROCEDURE — 80053 COMPREHEN METABOLIC PANEL: CPT | Performed by: SURGERY

## 2020-05-15 PROCEDURE — 83036 HEMOGLOBIN GLYCOSYLATED A1C: CPT | Performed by: SURGERY

## 2020-05-15 PROCEDURE — 83880 ASSAY OF NATRIURETIC PEPTIDE: CPT | Performed by: SURGERY

## 2020-05-15 RX ORDER — POTASSIUM CHLORIDE 7.45 MG/ML
10 INJECTION INTRAVENOUS
Status: DISCONTINUED | OUTPATIENT
Start: 2020-05-15 | End: 2020-05-21 | Stop reason: HOSPADM

## 2020-05-15 RX ORDER — MAGNESIUM SULFATE HEPTAHYDRATE 40 MG/ML
4 INJECTION, SOLUTION INTRAVENOUS AS NEEDED
Status: DISCONTINUED | OUTPATIENT
Start: 2020-05-15 | End: 2020-05-21 | Stop reason: HOSPADM

## 2020-05-15 RX ORDER — SODIUM CHLORIDE 0.9 % (FLUSH) 0.9 %
3 SYRINGE (ML) INJECTION EVERY 12 HOURS SCHEDULED
Status: DISCONTINUED | OUTPATIENT
Start: 2020-05-15 | End: 2020-05-17

## 2020-05-15 RX ORDER — NICOTINE POLACRILEX 4 MG
15 LOZENGE BUCCAL
Status: DISCONTINUED | OUTPATIENT
Start: 2020-05-15 | End: 2020-05-21 | Stop reason: HOSPADM

## 2020-05-15 RX ORDER — POTASSIUM CHLORIDE 1.5 G/1.77G
40 POWDER, FOR SOLUTION ORAL AS NEEDED
Status: DISCONTINUED | OUTPATIENT
Start: 2020-05-15 | End: 2020-05-21 | Stop reason: HOSPADM

## 2020-05-15 RX ORDER — LEVETIRACETAM 15 MG/ML
1500 INJECTION INTRAVASCULAR EVERY 12 HOURS
Status: DISCONTINUED | OUTPATIENT
Start: 2020-05-15 | End: 2020-05-21

## 2020-05-15 RX ORDER — SODIUM CHLORIDE 0.9 % (FLUSH) 0.9 %
10 SYRINGE (ML) INJECTION AS NEEDED
Status: DISCONTINUED | OUTPATIENT
Start: 2020-05-15 | End: 2020-05-17

## 2020-05-15 RX ORDER — POTASSIUM CHLORIDE 1.5 G/1.77G
POWDER, FOR SOLUTION ORAL
Status: COMPLETED
Start: 2020-05-15 | End: 2020-05-15

## 2020-05-15 RX ORDER — PROPOFOL 10 MG/ML
VIAL (ML) INTRAVENOUS AS NEEDED
Status: DISCONTINUED | OUTPATIENT
Start: 2020-05-15 | End: 2020-05-15 | Stop reason: SURG

## 2020-05-15 RX ORDER — CHLORHEXIDINE GLUCONATE 0.12 MG/ML
15 RINSE ORAL EVERY 12 HOURS SCHEDULED
Status: DISCONTINUED | OUTPATIENT
Start: 2020-05-15 | End: 2020-05-21 | Stop reason: HOSPADM

## 2020-05-15 RX ORDER — LIDOCAINE HYDROCHLORIDE 10 MG/ML
20 INJECTION, SOLUTION INFILTRATION; PERINEURAL ONCE
Status: COMPLETED | OUTPATIENT
Start: 2020-05-15 | End: 2020-05-15

## 2020-05-15 RX ORDER — MAGNESIUM HYDROXIDE 1200 MG/15ML
LIQUID ORAL AS NEEDED
Status: DISCONTINUED | OUTPATIENT
Start: 2020-05-15 | End: 2020-05-15 | Stop reason: HOSPADM

## 2020-05-15 RX ORDER — POTASSIUM CHLORIDE 750 MG/1
40 CAPSULE, EXTENDED RELEASE ORAL AS NEEDED
Status: DISCONTINUED | OUTPATIENT
Start: 2020-05-15 | End: 2020-05-21 | Stop reason: HOSPADM

## 2020-05-15 RX ORDER — MAGNESIUM SULFATE HEPTAHYDRATE 40 MG/ML
2 INJECTION, SOLUTION INTRAVENOUS AS NEEDED
Status: DISCONTINUED | OUTPATIENT
Start: 2020-05-15 | End: 2020-05-21 | Stop reason: HOSPADM

## 2020-05-15 RX ORDER — POTASSIUM CHLORIDE 29.8 MG/ML
INJECTION INTRAVENOUS
Status: DISPENSED
Start: 2020-05-15 | End: 2020-05-15

## 2020-05-15 RX ORDER — SODIUM CHLORIDE, SODIUM LACTATE, POTASSIUM CHLORIDE, CALCIUM CHLORIDE 600; 310; 30; 20 MG/100ML; MG/100ML; MG/100ML; MG/100ML
INJECTION, SOLUTION INTRAVENOUS CONTINUOUS PRN
Status: DISCONTINUED | OUTPATIENT
Start: 2020-05-15 | End: 2020-05-15 | Stop reason: SURG

## 2020-05-15 RX ORDER — FENTANYL CITRATE 50 UG/ML
INJECTION, SOLUTION INTRAMUSCULAR; INTRAVENOUS AS NEEDED
Status: DISCONTINUED | OUTPATIENT
Start: 2020-05-15 | End: 2020-05-15 | Stop reason: SURG

## 2020-05-15 RX ORDER — MIDAZOLAM HYDROCHLORIDE 1 MG/ML
INJECTION INTRAMUSCULAR; INTRAVENOUS AS NEEDED
Status: DISCONTINUED | OUTPATIENT
Start: 2020-05-15 | End: 2020-05-15 | Stop reason: SURG

## 2020-05-15 RX ORDER — ROCURONIUM BROMIDE 10 MG/ML
INJECTION, SOLUTION INTRAVENOUS AS NEEDED
Status: DISCONTINUED | OUTPATIENT
Start: 2020-05-15 | End: 2020-05-15 | Stop reason: SURG

## 2020-05-15 RX ORDER — DEXTROSE MONOHYDRATE 25 G/50ML
25 INJECTION, SOLUTION INTRAVENOUS
Status: DISCONTINUED | OUTPATIENT
Start: 2020-05-15 | End: 2020-05-21 | Stop reason: HOSPADM

## 2020-05-15 RX ADMIN — PANTOPRAZOLE SODIUM 40 MG: 40 INJECTION, POWDER, FOR SOLUTION INTRAVENOUS at 20:41

## 2020-05-15 RX ADMIN — HYDROMORPHONE HYDROCHLORIDE 0.5 MG: 1 INJECTION, SOLUTION INTRAMUSCULAR; INTRAVENOUS; SUBCUTANEOUS at 20:41

## 2020-05-15 RX ADMIN — POTASSIUM CHLORIDE 10 MEQ: 7.46 INJECTION, SOLUTION INTRAVENOUS at 23:19

## 2020-05-15 RX ADMIN — CHLORHEXIDINE GLUCONATE 15 ML: 1.2 RINSE ORAL at 09:00

## 2020-05-15 RX ADMIN — POTASSIUM CHLORIDE 125 ML/HR: 2 INJECTION, SOLUTION, CONCENTRATE INTRAVENOUS at 13:35

## 2020-05-15 RX ADMIN — FENTANYL CITRATE 100 MCG: 50 INJECTION INTRAMUSCULAR; INTRAVENOUS at 16:49

## 2020-05-15 RX ADMIN — ROCURONIUM BROMIDE 10 MG: 10 INJECTION, SOLUTION INTRAVENOUS at 16:50

## 2020-05-15 RX ADMIN — SODIUM CHLORIDE 125 ML/HR: 9 INJECTION, SOLUTION INTRAVENOUS at 00:20

## 2020-05-15 RX ADMIN — PROPOFOL 50 MG: 10 INJECTION, EMULSION INTRAVENOUS at 16:20

## 2020-05-15 RX ADMIN — CEFTRIAXONE SODIUM 1 G: 1 INJECTION, SOLUTION INTRAVENOUS at 13:54

## 2020-05-15 RX ADMIN — POTASSIUM CHLORIDE 40 MEQ: 1.5 POWDER, FOR SOLUTION ORAL at 06:43

## 2020-05-15 RX ADMIN — HYDROMORPHONE HYDROCHLORIDE 0.5 MG: 1 INJECTION, SOLUTION INTRAMUSCULAR; INTRAVENOUS; SUBCUTANEOUS at 05:34

## 2020-05-15 RX ADMIN — MIDAZOLAM 2 MG: 1 INJECTION INTRAMUSCULAR; INTRAVENOUS at 17:00

## 2020-05-15 RX ADMIN — HYDROMORPHONE HYDROCHLORIDE 0.5 MG: 1 INJECTION, SOLUTION INTRAMUSCULAR; INTRAVENOUS; SUBCUTANEOUS at 00:20

## 2020-05-15 RX ADMIN — LEVETIRACETAM 1500 MG: 100 SOLUTION ORAL at 03:05

## 2020-05-15 RX ADMIN — SODIUM CHLORIDE, PRESERVATIVE FREE 10 ML: 5 INJECTION INTRAVENOUS at 20:45

## 2020-05-15 RX ADMIN — PANTOPRAZOLE SODIUM 40 MG: 40 INJECTION, POWDER, FOR SOLUTION INTRAVENOUS at 13:50

## 2020-05-15 RX ADMIN — SODIUM CHLORIDE, POTASSIUM CHLORIDE, SODIUM LACTATE AND CALCIUM CHLORIDE: 600; 310; 30; 20 INJECTION, SOLUTION INTRAVENOUS at 16:13

## 2020-05-15 RX ADMIN — ROCURONIUM BROMIDE 20 MG: 10 INJECTION, SOLUTION INTRAVENOUS at 16:20

## 2020-05-15 RX ADMIN — LIDOCAINE HYDROCHLORIDE 20 ML: 10 INJECTION, SOLUTION INFILTRATION; PERINEURAL at 20:15

## 2020-05-15 RX ADMIN — LEVETIRACETAM 1500 MG: 15 INJECTION INTRAVENOUS at 18:10

## 2020-05-15 RX ADMIN — POTASSIUM PHOSPHATE, MONOBASIC AND POTASSIUM PHOSPHATE, DIBASIC 30 MMOL: 224; 236 INJECTION, SOLUTION, CONCENTRATE INTRAVENOUS at 13:39

## 2020-05-15 NOTE — SIGNIFICANT NOTE
0064: Spoke briefly with DR Muse. Updated on patient status, brief history and procedure today, unsuccessful weaning attempts x2. Stated he will come see patient shortly

## 2020-05-15 NOTE — SIGNIFICANT NOTE
Spoke briefly with Dr Shah by phone. Updated her on patient status including several attempts to wean from vent with patient showing 30 second periods of apnea, Anesthesia request for ICU bed, cxr and intensivist consult. No additional orders at this time

## 2020-05-15 NOTE — PROGRESS NOTES
"General Surgery  Progress Note    CC: Follow-up small bowel obstruction    POD#1 exploratory laparotomy        S: He remained intubated postoperatively due to low tidal volumes and inability to safely extubate.  He remains unresponsive due to his history of traumatic brain injury.  He has not had any bowel function.  His nasogastric tube output is more bilious today and no longer feculent appearing.    O:/76   Pulse 103   Temp 98.3 °F (36.8 °C) (Oral)   Resp 16   Ht 180.3 cm (71\")   Wt 70.8 kg (156 lb 1.4 oz)   SpO2 96%   BMI 21.77 kg/m²     Intake & Output:  UOP: 1200 cc/24 hrs  NGT: 500 c today    GENERAL: Unresponsive (chronic due to his vegetative state), resting comfortably, shows no signs of agitation  HEENT: normocephalic, atraumatic, moist mucous membranes, clear sclerae   CHEST: clear to auscultation, no wheezes, rales or rhonchi, symmetric air entry  CARDIAC: regular rate and rhythm    ABDOMEN: Soft, nontender, nondistended, incision covered and dry, PEG tube in left upper quadrant capped  EXTREMITIES: Chronic contractures of the bilateral upper extremities due to quadriplegia  SKIN: Warm and moist, no rashes    LABS  Results from last 7 days   Lab Units 05/15/20  0423 05/14/20  0815   WBC 10*3/mm3 10.58 21.36*   HEMOGLOBIN g/dL 15.0 16.9   HEMATOCRIT % 43.2 49.9   PLATELETS 10*3/mm3 232 241     Results from last 7 days   Lab Units 05/15/20  0423 05/14/20  0815   SODIUM mmol/L 145 141   POTASSIUM mmol/L 2.7* 3.5   CHLORIDE mmol/L 99 93*   CO2 mmol/L 30.8* 32.3*   BUN mg/dL 27* 36*   CREATININE mg/dL 0.71* 0.74*   CALCIUM mg/dL 9.4 9.9   BILIRUBIN mg/dL 0.5 0.9   ALK PHOS U/L 98 127*   ALT (SGPT) U/L 37 40   AST (SGOT) U/L 20 17   GLUCOSE mg/dL 204* 234*             A/P: 40 y.o. male POD#1 exploratory laparotomy.     His intraoperative findings yesterday were most consistent with an adynamic ileus which is quite common in patients who are quadriplegic.  This requires an aggressive bowel " regimen, so I have ordered daily fleets enemas to begin after he is done in the operating room today with urology.  Once he begins to have regular bowel function, if his nasogastric tube decreases we can eventually remove it and resume trickle tube feeds.      Lula Shah MD  General and Endoscopic Surgery  [facility]    [address], [address]  [place], [place], [address]  P: [phone]  F: [phone]

## 2020-05-15 NOTE — PROGRESS NOTES
LOS: 1 day   Patient Care Team:  Dewayne Rodriguez MD as PCP - General (Family Medicine)  Reasor, Kee Antoine MD as PCP - Claims Attributed    Subjective     Mother is at bedside he does not appear in acute distress    Review of Systems:          Objective     Vital Signs  Vital Sign Min/Max for last 24 hours  Temp  Min: 96.8 °F (36 °C)  Max: 98.3 °F (36.8 °C)   BP  Min: 108/78  Max: 134/87   Pulse  Min: 59  Max: 101   Resp  Min: 16  Max: 20   SpO2  Min: 95 %  Max: 100 %   No data recorded   Weight  Min: 70.8 kg (156 lb)  Max: 70.8 kg (156 lb 1.4 oz)        Ventilator/Non-Invasive Ventilation Settings (From admission, onward)     Start     Ordered    05/14/20 1903  Ventilator - AC/VC; (16); 100; 5; 600  Continuous     Question Answer Comment   Vent Mode AC/VC    Breath rate  16   FiO2 100    PEEP 5    Tidal Volume 600        05/14/20 1904                             Body mass index is 21.78 kg/m².  I/O last 3 completed shifts:  In: 2159.2 [I.V.:1549.2; Other:60; IV Piggyback:550]  Out: 1250 [Urine:1200; Emesis/NG output:50]  No intake/output data recorded.        Physical Exam:  General Appearance: White male orally intubated just lying in bed does not really have a lot of response.  He does not look in particular acute distress  Eyes: He does resist eye opening his pupils are rather cloudy he has some disconjugate gaze the right eye tends to deviate down in the left up into the left  ENT: Mucous membranes are little dry he has an oral endotracheal tube 7-1/2 at about 23 cm.  Nasal septum midline he has nasoenteric tube in place.  He has extensive surgical changes to his cranium and scalp.  Neck: Hard to tell think his trachea is midline I do not see any jugular venous tension or palpate adenopathy or thyromegaly  Lungs: Clear no wheezes rales or rhonchi chest expansion appears to be fairly symmetric  Cardiac: Regular rate rhythm no murmur  Abdomen: Soft he has a dry midline dressing and a left upper  quadrant PEG type tube the site looks okay I do not hear a whole lot of bowel sounds  : Fiore catheter to drainage she has clear yellow urine  Musculoskeletal: He has contractures of both upper extremities and has looks like foot drop in both lower extremities are just sort of extensor postured  Skin: No jaundice no petechiae skin is warm he is a little diaphoretic  Neuro: He does not seem to follow any commands he does grimace has a little bit of movement his arms with noxious stimuli not much because so contracted.  Extremities/P Vascular: No clubbing no cyanosis no edema  MSE: Unable to assess       Labs:  Results from last 7 days   Lab Units 05/15/20  0423 05/14/20  0815   GLUCOSE mg/dL 204* 234*   SODIUM mmol/L 145 141   POTASSIUM mmol/L 2.7* 3.5   MAGNESIUM mg/dL 2.2 2.6   CO2 mmol/L 30.8* 32.3*   CHLORIDE mmol/L 99 93*   ANION GAP mmol/L 15.2* 15.7*   CREATININE mg/dL 0.71* 0.74*   BUN mg/dL 27* 36*   BUN / CREAT RATIO  38.0* 48.6*   CALCIUM mg/dL 9.4 9.9   EGFR IF NONAFRICN AM mL/min/1.73 123 117   ALK PHOS U/L 98 127*   TOTAL PROTEIN g/dL 7.2 8.0   ALT (SGPT) U/L 37 40   AST (SGOT) U/L 20 17   BILIRUBIN mg/dL 0.5 0.9   ALBUMIN g/dL 3.80 4.70   GLOBULIN gm/dL 3.4 3.3     Estimated Creatinine Clearance: 138.5 mL/min (A) (by C-G formula based on SCr of 0.71 mg/dL (L)).      Results from last 7 days   Lab Units 05/15/20  0423 05/14/20  0815   WBC 10*3/mm3 10.58 21.36*   RBC 10*6/mm3 4.78 5.54   HEMOGLOBIN g/dL 15.0 16.9   HEMATOCRIT % 43.2 49.9   MCV fL 90.4 90.1   MCH pg 31.4 30.5   MCHC g/dL 34.7 33.9   RDW % 13.7 13.3   RDW-SD fl 45.4 43.2   MPV fL 11.4 11.2   PLATELETS 10*3/mm3 232 241   NEUTROPHIL % % 81.3* 90.2*   LYMPHOCYTE % % 9.6* 3.2*   MONOCYTES % % 8.1 5.9   EOSINOPHIL % % 0.1* 0.0*   BASOPHIL % % 0.3 0.2   IMM GRAN % % 0.6* 0.5   NEUTROS ABS 10*3/mm3 8.60* 19.25*   LYMPHS ABS 10*3/mm3 1.02 0.69*   MONOS ABS 10*3/mm3 0.86 1.27*   EOS ABS 10*3/mm3 0.01 0.00   BASOS ABS 10*3/mm3 0.03 0.04   IMMATURE  GRANS (ABS) 10*3/mm3 0.06* 0.11*   NRBC /100 WBC 0.0 0.0     Results from last 7 days   Lab Units 05/14/20  1938   PH, ARTERIAL pH units 7.483*   PO2 ART mm Hg 180.3*   PCO2, ARTERIAL mm Hg 47.5*   HCO3 ART mmol/L 35.6*     Results from last 7 days   Lab Units 05/14/20  0815   TROPONIN T ng/mL <0.010     Results from last 7 days   Lab Units 05/15/20  0423 05/14/20  0815   PROBNP pg/mL 174.1 209.1     Results from last 7 days   Lab Units 05/15/20  0423   TSH uIU/mL 6.200*     Results from last 7 days   Lab Units 05/15/20  0423 05/14/20  1022   LACTATE mmol/L 3.1* 3.0*   PROCALCITONIN ng/mL 0.16  --          Microbiology Results (last 10 days)     Procedure Component Value - Date/Time    Blood Culture - Blood, Arm, Right [[id number]]  (Abnormal) Collected:  05/14/20 1138    Lab Status:  Preliminary result Specimen:  Blood from Arm, Right Updated:  05/15/20 0834     Blood Culture Abnormal Stain     Gram Stain Anaerobic Bottle Gram positive cocci      Aerobic Bottle Gram positive cocci in clusters    Blood Culture ID, PCR - Blood, Arm, Right [[id number]]  (Abnormal) Collected:  05/14/20 1138    Lab Status:  Final result Specimen:  Blood from Arm, Right Updated:  05/15/20 0835     BCID, PCR Staphylococcus spp, not aureus. Identification by BCID PCR.    SARS-CoV-2 PCR ([place] IN-HOUSE PERFORMED), NP SWAB IN TRANSPORT MEDIA - Swab, Nasopharynx [[id number]]  (Normal) Collected:  05/14/20 1024    Lab Status:  Final result Specimen:  Swab from Nasopharynx Updated:  05/14/20 1213     COVID19 Not Detected    Respiratory Panel, PCR - Swab, Nasopharynx [[id number]]  (Normal) Collected:  05/14/20 1024    Lab Status:  Final result Specimen:  Swab from Nasopharynx Updated:  05/14/20 1150     ADENOVIRUS, PCR Not Detected     Coronavirus 229E Not Detected     Coronavirus HKU1 Not Detected     Coronavirus NL63 Not Detected     Coronavirus OC43 Not Detected     Human Metapneumovirus Not Detected     Human Rhinovirus/Enterovirus Not  Detected     Influenza B PCR Not Detected     Parainfluenza Virus 1 Not Detected     Parainfluenza Virus 2 Not Detected     Parainfluenza Virus 3 Not Detected     Parainfluenza Virus 4 Not Detected     Bordetella pertussis pcr Not Detected     Influenza A H1 2009 PCR Not Detected     Chlamydophila pneumoniae PCR Not Detected     Mycoplasma pneumo by PCR Not Detected     Influenza A PCR Not Detected     Influenza A H3 Not Detected     Influenza A H1 Not Detected     RSV, PCR Not Detected     Bordetella parapertussis PCR Not Detected    Narrative:       The coronavirus on the RVP is NOT COVID-19 and is NOT indicative of infection with COVID-19.                 amantadine 100 mg Per G Tube Q12H   azithromycin 500 mg Intravenous Q24H   cefTRIAXone 1 g Intravenous Q24H   guaiFENesin 600 mg Oral Q12H   insulin lispro 0-14 Units Subcutaneous TID AC   levETIRAcetam 1,500 mg Oral Q12H   pantoprazole 40 mg Intravenous Q12H   potassium chloride          sodium chloride 125 mL/hr Last Rate: 125 mL/hr (05/15/20 0020)       Diagnostics:  Ct Head Without Contrast    Result Date: 5/14/2020  CT HEAD WITHOUT CONTRAST  HISTORY: 40-year-old male with nausea and vomiting.  TECHNIQUE:  Head CT includes axial imaging from the base of skull to the vertex without intravenous contrast. Radiation dose reduction techniques were utilized, including automated exposure control and exposure modulation based on body size.  COMPARISON: Head CT without contrast 12/20/2017 at [facility].  FINDINGS: There is a right parietal approach ventriculostomy drainage catheter with tip extending into the anterior aspect of the left lateral ventricle. There is ventricular enlargement. Marked enlargement is present at the occipital horns of the lateral ventricles, greater on the left and this appears associated with ex vacuo dilatation. There is a prominent CSF density and apparent cystic encephalomalacia anterior right frontal lobe involving an area  measuring 6.2 x 4 x 6 cm and contacting the anterior margin of the frontal horn right lateral ventricle. This is without change. There are additional areas of encephalomalacia involving the anteromedial left frontal lobe, left temporal lobe. There has been left temporal-occipital craniectomy and left frontoparietal craniotomy with similar configuration to the previous exam. There is CSF density extending along the posterior and inferior aspect of the left cerebellar hemisphere without change. Within the posteroinferior left cerebellar hemisphere there is a 9 mm focus of low density within the parenchyma without change. There is no evidence for cerebral edema or shift of the midline structures. There is no evidence for significant change compared to the previous head CT 12/20/2017. Within the posterior right maxillary sinus there is increased density material with bubbles of gas that may be associated with right maxillary sinusitis and this is new when compared to prior exam 12/20/2017.      1.  Extensive postsurgical changes with areas of encephalomalacia and cystic encephalomalacia without evidence for change compared to the previous exam 12/20/2017. There is ventricular enlargement that is without change.  Right parietal ventriculostomy drainage catheter tip extends into the left lateral ventricle without change. No evidence for intracranial hemorrhage. 2.  Right posterior maxillary sinus mucosal thickening with bubbles of gas that may represent sinusitis and this is new when compared to prior exam 12/20/2017.  This report was finalized on 5/14/2020 10:57 AM by Dr. Neville Chisholm M.D.      Ct Abdomen Pelvis With Contrast    Result Date: 5/14/2020  CT ABDOMEN AND PELVIS WITH CONTRAST  HISTORY: Nausea and vomiting. The patient has history of small bowel obstruction.  TECHNIQUE: Axial CT images of the abdomen and pelvis were obtained following administration of intravenous contrast. The patient was not given oral  contrast Coronal and sagittal reformats were obtained.  COMPARISON: 02/21/2020  FINDINGS: Percutaneous gastrostomy tube is in place. The stomach is dilated with an air-fluid level. There is diffuse dilatation of the proximal and mid small bowel loops demonstrating air-fluid levels measuring up to 4.2 cm. There is a long zone of transition seen within the midline pelvis, images 137 through 121, following which the distal small bowel is completely decompressed. The colon itself is unremarkable.  The liver demonstrates normal attenuation. Spleen is normal. Pancreas is normal without ductal dilatation. The gallbladder is surgically absent. Bilateral adrenal glands are normal. There is severe right-sided hydroureteronephrosis with numerous layering calculi seen within the right kidney. The right ureter remains dilated to the level of the pelvic inlet where it shows numerous internal calculi. The largest obstructing stone at this level measures up to 1.5 x 1.1 cm with numerous small additional nonobstructing calculi proximal to this large obstructing calculus. Distal to this, the ureter is collapsed. There are numerous calculi also seen within the left kidney however, there is no hydronephrosis and there are no stones within the left ureter. No pathological retroperitoneal lymphadenopathy. The urinary bladder is partially distended with circumferential wall thickening. There is bibasilar atelectasis with decreased inflation within bilateral lung fields. Patchy consolidation is seen posteriorly within the right upper lobe. A  shunt catheter is seen anteriorly within the subcutaneous right paramidline fat entering the abdomen in the right mid abdomen and tip within the left anterior pelvis. No focal fluid collection is seen in association. There is also an implanted pump device with a catheter extending into the spinal canal.      1. CT evidence of mid to distal small bowel obstruction. The transition point is gradual and  seen within the midline pelvis with completely collapsed distal small bowel loops. Adhesions are suspected. 2. Severe right-sided hydroureteronephrosis with numerous calculi within the right distal ureter, not significantly changed from prior CT. There are bilateral additional nephroliths also demonstrated. 3. Bibasilar atelectasis. Additional patchy area of consolidation within the posterior right upper lobe most concerning for pneumonia and clinical correlation is recommended.  These findings were discussed with Yobany Black MD by telephone.  Radiation dose reduction techniques were utilized, including automated exposure control and exposure modulation based on body size.       Xr Chest 1 View    Result Date: 5/14/2020  PORTABLE CHEST 05/14/2020 AT 9:41 PM  CLINICAL HISTORY: Patient intubated. History of small bowel obstruction. History of traumatic brain injury. Evaluate ET tube placement  Compared to the previous chest dated 02/27/2020.  In the interval, an endotracheal tube has been placed and appears in satisfactory position with its tip a few centimeters above the sal. Nasogastric tube is also been placed and appears in satisfactory position with its tip projecting off the inferior edge of this film. The lungs are poorly inflated but appear free of focal infiltrates. The heart is normal in size. There are no pleural effusions.  This report was finalized on 5/14/2020 10:01 PM by Dr. Leon Naqvi M.D.      Xr Abdomen Kub    Result Date: 5/14/2020  Abdominal radiographs  HISTORY:NG tube placement  TECHNIQUE:  2 AP supine radiographs of the abdomen  COMPARISON:CT abdomen and pelvis 05/14/2020      FINDINGS AND IMPRESSION: A gastrostomy tube is present. A nasogastric tube terminates in the stomach. There are right right quadrant surgical clips. Multiple moderately distended loops of small bowel are present measuring up to 4.7 cm, concerning for small bowel obstruction given its appearance on CT. Please  refer to recent CT abdomen and pelvis for further evaluation.  Presumed shunt catheter courses through the right aspect of the abdomen. Intravenous contrast is present within the left kidney. More dense renal calculi overlie the left kidney and measure up to 1.1 cm, best seen on recent CT. Pelvic stimulator is incompletely visualized.  This report was finalized on 5/14/2020 10:14 AM by Dr. Maurizio Girard M.D.      Xr Shunt Series    Result Date: 5/14/2020  SHUNT SERIES  HISTORY: Nausea and vomiting.  shunt.  TECHNIQUE: A total of nine images of the head, neck, chest, and abdomen are provided. These are correlated KUB 02/25/2020.  FINDINGS: There is evidence of a previous large left craniotomy. A ventriculoperitoneal shunt is placed from a right occipital approach and has its tip near the midline of the head. The shunt tubing appears intact along its course through the head, across the neck, anterior right chest wall, and over the abdomen. The distal end of the shunt is coiled over the pelvis.  There are clips from cholecystectomy and there is a gastrostomy tube. The stomach is dilated with air. There are dilated loops of small bowel in the central abdomen which measure up to 4.5 cm diameter. There is some gas in the colon and the rectum. No intraperitoneal free air is present. The lungs are clear.  There is left nephrolithiasis with the largest calculus projecting over the upper pole measuring 18 mm.      Ventriculoperitoneal shunt appears intact. There is gaseous dilatation of the small bowel and the stomach with a G-tube in place. Some gas is observed in the colon. The gas pattern suggests early small bowel obstruction.  This report was finalized on 5/14/2020 8:28 AM by Dr. Brett Oakley M.D.         Chest x-ray reviewed there is some basilar atelectasis particularly on the right very low lung volumes psych ET tube is in okay position      Active Hospital Problems    Diagnosis  POA   • **SBO (small bowel  obstruction) (CMS/[facility]) [K56.609]  Yes      Resolved Hospital Problems   No resolved problems to display.         Assessment/Plan     1. Status post 5/14/2020 exploratory laparotomy for possible small bowel obstruction with finding  adynamic ileus  2. Respiratory failure postoperatively continue ventilator support does have significant atelectasis and can add some PEEP to try and recruit atelectatic lung.  3. Possible aspiration pneumonia patient is on Rocephin, normal white count, calcitonin, afebrile no definite infiltrate on chest x-ray more atelectasis I would continue Rocephin because he may also have a urinary tract infection.  4. Positive blood culture 1 of 2 with a non-staph aureus staph ID pending I suspect this is a coag negative staph I will get repeat blood cultures I am not going to add vancomycin at this time  5. Right hydronephrosis urology is evaluating possibly taking to the OR today  6. 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 Seizure disorder continue Keppra  9. Dysphasia with G-tube use got when okay with surgery  10. Diabetes mellitus type 2 sliding scale insulin  11. Fluids/electrolytes/nutrition we will build to start any nutrition until he is completed his surgeries his potassium is quite low along with his phosphorus will replace per protocol  12. Lactic acidosis mild  13. Elevated TSH will tachycardia check a T4      Discussed with patient's mother at bedside  Plan for disposition:    Donny Spears MD  05/15/20  09:30    Time: Critical care time 40 minutes

## 2020-05-15 NOTE — SIGNIFICANT NOTE
Called and updated Dr Thompson re: second attempt placing patient on PS, again with 30 second periods of apnea. Phone orders received for ICU bed post op, Intensivist consult, pcxr

## 2020-05-15 NOTE — PROGRESS NOTES
Consult was called to my office but was placed by Dr Clarke for Dr Salgado. Therefore I called the ICU and asked if they would call Dr Salgado's office with the consult. It looks like Dr Salgado saw the patient back in 2018 as well.

## 2020-05-15 NOTE — SIGNIFICANT NOTE
Dr Thompson updated on patient status, order received to place vent on PS  to assess extubation readiness. RT at bedside. Placed on PS as ordered. Patient making minimal effort, vent triggering apnea backup ventilation rate. Returned to initial AC settings, will continue to monitor and reevaluate/ re attempt soon

## 2020-05-15 NOTE — DISCHARGE PLACEMENT REQUEST
"Donovan Kuo P (40 y.o. Male)     Date of Birth Social Security Number Address Home Phone MRN    1980  [id number]  [facility] [phone] [id number]    Sikhism Marital Status          Mandaen Single       Admission Date Admission Type Admitting Provider Attending Provider Department, Room/Bed    5/14/20 Emergency Marcus Clarke MD Ahmed, Aftab, MD [facility] INTENSIVE CARE, I379/1    Discharge Date Discharge Disposition Discharge Destination                       Attending Provider:  Marcus Clarke MD    Allergies:  Zofran [Ondansetron Hcl]    Isolation:  None   Infection:  None   Code Status:  No CPR    Ht:  180.3 cm (71\")   Wt:  70.8 kg (156 lb 1.4 oz)    Admission Cmt:  None   Principal Problem:  SBO (small bowel obstruction) (CMS/[facility]) [K56.609]                 Active Insurance as of 5/14/2020     Primary Coverage     Payor Plan Insurance Group Employer/Plan Group    MEDICARE MEDICARE A & B      Payor Plan Address Payor Plan Phone Number Payor Plan Fax Number Effective Dates    [address] [phone]  8/1/2007 - None Entered    [facility] [address]       Subscriber Name Subscriber Birth Date Member ID       DONOVAN KUO P 1980 [id number]           Secondary Coverage     Payor Plan Insurance Group Employer/Plan Group    [place]      Payor Plan Address Payor Plan Phone Number Payor Plan Fax Number Effective Dates    [address] [phone]  12/20/2016 - None Entered    [facility] [id number]       Subscriber Name Subscriber Birth Date Member ID       DONOVAN KUO P 1980 [id number]                 Emergency Contacts      (Rel.) Home Phone Work Phone Mobile Phone    AyaanLizzeth (Mother) [phone] -- --    AyaanMark (Brother) [phone] -- --    Harika Uribe Aunt (Relative) [phone] -- [phone]              "

## 2020-05-15 NOTE — SIGNIFICANT NOTE
RT at bedside. Patient woken. Placed on PS, patient having 30 second periods of apnea. Returned to prior AC settings. Anesthesia paged

## 2020-05-15 NOTE — SIGNIFICANT NOTE
Spoke with patient mother and father by phone. Updated both at length on patient post op status, to remain on vent tonite and to ICU for close monitoring. Questions encouraged

## 2020-05-15 NOTE — CONSULTS
"Adult Nutrition  Assessment/PES    Patient Name:  Adrian Kuo  YOB: 1980  MRN: [id number]  Admit Date:  5/14/2020    Assessment Date:  5/15/2020    Comments:  Nutrition Consult. S/P Exp Lap for SBO, On the vent post op.  Has NG to suction and Gtube in.  Plan is return to the OR today.  Was on Diabetisource AC at NH.  Will follow for readiness to resume TF.     Reason for Assessment     Row Name 05/15/20 1040          Reason for Assessment    Reason For Assessment  identified at risk by screening criteria;nurse/nurse practitioner consult     Diagnosis  neurologic conditions SBO, 5/14 Exp Lap, Resp FAilure-vent, Hx TBI, quadriplegic with contractures          Nutrition/Diet History     Row Name 05/15/20 1042          Nutrition/Diet History    Typical Food/Fluid Intake  NH - Diabetisource AC at 75cc/hr x 22 hrs     Factors Affecting Nutritional Intake  altered gastrointestinal function;impaired cognitive status/motor control;compromised airway;difficulty/impaired swallowing         Anthropometrics     Row Name 05/15/20 1043 05/15/20 0556       Anthropometrics    Height  180.3 cm (71\")  --    Weight  --  70.8 kg (156 lb 1.4 oz)       Admit Weight    Admit Weight  70.8 kg (156 lb)  --       Ideal Body Weight (IBW)    Ideal Body Weight (IBW) (kg)  79.27  --    % of Ideal Body Weight Assessment  80-90%: mild deficit 89% IBW  --       Usual Body Weight (UBW)    Weight Loss Time Frame  16lb loss in 5 months  --       Body Mass Index (BMI)    BMI Assessment  BMI 18.5-24.9: normal BMI 21.8  --        Labs/Tests/Procedures/Meds     Row Name 05/15/20 1044          Labs/Procedures/Meds    Lab Results Reviewed  reviewed, pertinent     Lab Results Comments  K, BUN, Cr, glu, phos, trig        Diagnostic Tests/Procedures    Diagnostic Test/Procedure Reviewed  reviewed, pertinent        Medications    Pertinent Medications Reviewed  reviewed, pertinent     Pertinent Medications Comments  Abx, insulin, protonix, " "keppra, ivf         Physical Findings     Row Name 05/15/20 1047          Physical Findings    Overall Physical Appearance  on ventilator support;hypertonia;tetraplegia (quadriplegia)     Gastrointestinal  feeding tube     Tubes  gastrostomy tube;nasogastric tube NG to suction     Skin  other (see comments);surgical incision b-12         Estimated/Assessed Needs     Row Name 05/15/20 1047 05/15/20 1043       Calculation Measurements    Weight Used For Calculations  70.8 kg (156 lb 1.4 oz)  --    Height  --  180.3 cm (71\")       Estimated/Assessed Needs    Additional Documentation  KCAL/KG (Group);Fluid Requirements (Group);Protein Requirements (Group)  --       KCAL/KG    KCAL/KG  25 Kcal/Kg (kcal)  --    25 Kcal/Kg (kcal)  1770  --       Protein Requirements    Weight Used For Protein Calculations  70.8 kg (156 lb 1.4 oz)  --    Est Protein Requirement Amount (gms/kg)  1.2 gm protein  --    Estimated Protein Requirements (gms/day)  84.96  --       Fluid Requirements    Estimated Fluid Requirements (mL/day)  1800  --    RDA Method (mL)  1800  --        Nutrition Prescription Ordered     Row Name 05/15/20 1048          Nutrition Prescription PO    Current PO Diet  NPO        Nutrition Prescription EN    Enteral Route  Gastrostomy         Evaluation of Received Nutrient/Fluid Intake     Row Name 05/15/20 1049          Fluid Intake Evaluation    IV Fluid (mL)  3000        EN Evaluation    TF Residual  50 out NG     HOB  Greater than or equal to 30 degress           Problem/Interventions:  Problem 1     Row Name 05/15/20 1051          Nutrition Diagnoses Problem 1    Problem 1  Altered GI Function     Etiology (related to)  Medical Diagnosis     Gastrointestinal  SBO         Intervention Goal     Row Name 05/15/20 1051          Intervention Goal    General  Maintain nutrition;Reduce/improve symptoms;Meet nutritional needs for age/condition;Nutrition support treatment;Improved nutrition related lab(s);Disease " management/therapy     TF/PN  Inititiate TF/PN;Tolerate TF at goal     Weight  Maintain weight         Nutrition Intervention     Row Name 05/15/20 1051          Nutrition Intervention    RD/Tech Action  Care plan reviewd;Follow Tx progress           Education/Evaluation     Row Name 05/15/20 1051          Education    Education  Will Instruct as appropriate        Monitor/Evaluation    Monitor  Weight;Per protocol;I&O;Skin status;Symptoms;Pertinent labs;TF delivery/tolerance           Electronically signed by:  Radha London RD  05/15/20 10:53

## 2020-05-15 NOTE — ANESTHESIA POSTPROCEDURE EVALUATION
Patient: Adrian Kuo    Procedure Summary     Date:  05/15/20 Room / Location:  [facility] OR  / [facility] MAIN OR    Anesthesia Start:  1614 Anesthesia Stop:  1722    Procedure:  CYSTOSCOPY RETROGRADE (Right ) Diagnosis:      Surgeon:  Brett Jay Jr., MD Provider:  Ashlie Nagel MD    Anesthesia Type:  general ASA Status:  4          Anesthesia Type: general    Vitals  No vitals data found for the desired time range.  HR 90  RR 12  Temp 98  O2 95%  /68         Post Anesthesia Care and Evaluation    Patient location during evaluation: ICU  Patient participation: complete - patient cannot participate  Level of consciousness: obtunded/minimal responses  Pain management: adequate  Airway patency: Intubated.  Anesthetic complications: No anesthetic complications  PONV Status: controlled  Cardiovascular status: acceptable  Respiratory status: ETT  Hydration status: acceptable

## 2020-05-15 NOTE — PROGRESS NOTES
Discharge Planning Assessment  [facility]     Patient Name: Adrian Kuo  MRN: [id number]  Today's Date: 5/15/2020    Admit Date: 5/14/2020    Discharge Needs Assessment     Row Name 05/15/20 1408       Living Environment    Lives With  facility resident    Current Living Arrangements  extended care facility [facility]    Provides Primary Care For  no one    Family Caregiver if Needed  parent(s)    Family Caregiver Names  Mother/POA on file, Lizzeth Kuo, [phone]    Quality of Family Relationships  helpful;involved;supportive    Able to Return to Prior Arrangements  yes       Transition Planning    Patient/Family Anticipates Transition to  long term care facility    Patient/Family Anticipated Services at Transition  rehabilitation services;skilled nursing    Transportation Anticipated  health plan transportation       Discharge Needs Assessment    Concerns to be Addressed  discharge planning    Outpatient/Agency/Support Group Needs  skilled nursing facility    Discharge Facility/Level of Care Needs  nursing facility, skilled;nursing facility, intermediate    Discharge Coordination/Progress  Return to [facility]        Discharge Plan     Row Name 05/15/20 1421       Plan    Plan  Return to [facility] level pending facility confirmation    Provided Post Acute Provider List?  Refused    Refused Provider List Comment  From longterm care at [facility] declines other information    Patient/Family in Agreement with Plan  yes    Plan Comments  CCP spoke with pt's mother/POA on file (Lizzeth Kuo, [phone]) via telephone to verify information as pt remains sedated on ventilator. IMM letter given and emmailed per request. Facesheet verified and CCP role explained. Pt admitted from [facility] where, per mother, he is in long-term care, immobile, and will require ambulance transport to return. Awaiting bed status from facility. CCP to follow for additional needs. Sabina Mckeon LCSW         Destination      Service Provider Request Status Selected Services Address Phone Number Fax Number    [facility] Pending - Request Sent N/A [id number] [place] [address] [phone]      Durable Medical Equipment      Coordination has not been started for this encounter.      Dialysis/Infusion      Coordination has not been started for this encounter.      Home Medical Care      Coordination has not been started for this encounter.      Therapy      Coordination has not been started for this encounter.      Community Resources      Coordination has not been started for this encounter.          Demographic Summary     Row Name 05/15/20 1403       General Information    Admission Type  inpatient    Arrived From  subacute/long term acute care    Required Notices Provided  Important Message from Medicare    Referral Source  admission list    Reason for Consult  discharge planning    Preferred Language  English        Functional Status     Row Name 05/15/20 140       Functional Status    Usual Activity Tolerance  poor    Current Activity Tolerance  poor       Functional Status, IADL    Medications  completely dependent    Meal Preparation  completely dependent    Housekeeping  completely dependent    Laundry  completely dependent    Shopping  completely dependent       Mental Status Summary    Recent Changes in Mental Status/Cognitive Functioning  unable to assess        Psychosocial    No documentation.       Abuse/Neglect    No documentation.       Legal    No documentation.       Substance Abuse    No documentation.       Patient Forms    No documentation.           Ana Mckeon LCSW

## 2020-05-15 NOTE — NURSING NOTE
VSS. K+ protocol initiated due to K+ (2.7).  First dose of three 40 Meq of Potassium Chloride given through PEG tube. Mod High Sliding scale put in due to high BS. Pt. Has HX of Diabetes Melitis.

## 2020-05-15 NOTE — ANESTHESIA PREPROCEDURE EVALUATION
Anesthesia Evaluation     no history of anesthetic complications:               Airway   Comment: Non verbal, on vent  Dental      Pulmonary    Cardiovascular     (-) hypertension, hyperlipidemia    ROS comment: Poss ischemia    Neuro/Psych  (+) seizures, neuromuscular disease,       ROS Comment: Quaraplegia, TBI, persistent vegataive state  GI/Hepatic/Renal/Endo    (+)  GERD,  diabetes mellitus,     Musculoskeletal     Abdominal    Substance History      OB/GYN          Other                      Anesthesia Plan    ASA 4     general     intravenous induction     Anesthetic plan, all risks, benefits, and alternatives have been provided, discussed and informed consent has been obtained with: patient.
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
## 2020-05-15 NOTE — PROGRESS NOTES
"Pharmacokinetic Evaluation - Vancomycin    Adrian Kuo is a 40 y.o. male on vancomycin pharmacy to dose.  MRN: [id number]  : 1980    Day of vancomycin therapy:   Indication: CoNS bacteremia  Consulted by: Dr. Clarek. Dr. Taylor is following  Goal trough: 15-20 mcg/ml  Current dose: tbd  Other antimicrobials: ceftriaxone 1g q24    Blood pressure 111/78, pulse 103, temperature 98.9 °F (37.2 °C), temperature source Oral, resp. rate 16, height 180.3 cm (71\"), weight 70.8 kg (156 lb 1.4 oz), SpO2 95 %.  Results from last 7 days   Lab Units 05/15/20  0423 20  0815   CREATININE mg/dL 0.71* 0.74*     Estimated Creatinine Clearance: 138.5 mL/min (A) (by C-G formula based on SCr of 0.71 mg/dL (L)).  Results from last 7 days   Lab Units 05/15/20  0423 20  1022 20  0815   WBC 10*3/mm3 10.58  --  21.36*   HEMOGLOBIN g/dL 15.0  --  16.9   HEMATOCRIT % 43.2  --  49.9   PLATELETS 10*3/mm3 232  --  241   LACTATE mmol/L 3.1* 3.0*  --        Procal: 5/15  0.16    Cultures:      Microbiology Results (last 10 days)     Procedure Component Value - Date/Time    Blood Culture - Blood, Arm, Right [[id number]]  (Abnormal) Collected:  20 1138    Lab Status:  Preliminary result Specimen:  Blood from Arm, Right Updated:  05/15/20 0834     Blood Culture Abnormal Stain     Gram Stain Anaerobic Bottle Gram positive cocci      Aerobic Bottle Gram positive cocci in clusters    Blood Culture ID, PCR - Blood, Arm, Right [[id number]]  (Abnormal) Collected:  20 1138    Lab Status:  Final result Specimen:  Blood from Arm, Right Updated:  05/15/20 0835     BCID, PCR Staphylococcus spp, not aureus. Identification by BCID PCR.    SARS-CoV-2 PCR ([place] IN-HOUSE PERFORMED), NP SWAB IN TRANSPORT MEDIA - Swab, Nasopharynx [[id number]]  (Normal) Collected:  20 1024    Lab Status:  Final result Specimen:  Swab from Nasopharynx Updated:  20 1213     COVID19 Not Detected    Respiratory Panel, PCR - Swab, " Nasopharynx [[id number]]  (Normal) Collected:  05/14/20 1024    Lab Status:  Final result Specimen:  Swab from Nasopharynx Updated:  05/14/20 1150     ADENOVIRUS, PCR Not Detected     Coronavirus 229E Not Detected     Coronavirus HKU1 Not Detected     Coronavirus NL63 Not Detected     Coronavirus OC43 Not Detected     Human Metapneumovirus Not Detected     Human Rhinovirus/Enterovirus Not Detected     Influenza B PCR Not Detected     Parainfluenza Virus 1 Not Detected     Parainfluenza Virus 2 Not Detected     Parainfluenza Virus 3 Not Detected     Parainfluenza Virus 4 Not Detected     Bordetella pertussis pcr Not Detected     Influenza A H1 2009 PCR Not Detected     Chlamydophila pneumoniae PCR Not Detected     Mycoplasma pneumo by PCR Not Detected     Influenza A PCR Not Detected     Influenza A H3 Not Detected     Influenza A H1 Not Detected     RSV, PCR Not Detected     Bordetella parapertussis PCR Not Detected    Narrative:       The coronavirus on the RVP is NOT COVID-19 and is NOT indicative of infection with COVID-19.     Blood Culture - Blood, Arm, Left [[id number]] Collected:  05/14/20 1022    Lab Status:  Preliminary result Specimen:  Blood from Arm, Left Updated:  05/15/20 1030     Blood Culture No growth at 24 hours    Urine Culture - Urine, Urine, Catheter [[id number]] Collected:  05/14/20 0852    Lab Status:  Final result Specimen:  Urine, Catheter Updated:  05/15/20 1021     Urine Culture >100,000 CFU/mL Mixed Rosy Isolated    Narrative:       Specimen contains mixed organisms of questionable pathogenicity which indicates contamination with commensal rosy.  Further identification is unlikely to provide clinically useful information.  Suggest recollection.              Assessment:  Blood culture resulted with 1/2 sets of coag negative staph today and Dr. Clarke has started iv vancomycin.  Dr. Taylor is also following. Repeat blood cultures are ordered. Will follow and make appropriate  recommendations.    Plan:  1)  Vancomcyin 1750 (25 mg/kg) mg iv x 1 now followed by 750 mg iv q8.  2) vanc trough 5/16 prior to 1500 dose (4th overall)  3) Encourage adequate hydration if appropriate. Monitor for decreased UOP, rash or other signs of vancomycin intolerance.    Thanks for this consult, will follow until Kel kelley Pharm.D, [facility]

## 2020-05-15 NOTE — OP NOTE
Operative Report    [facility] OR    Patient: Adrian Kuo  Age:      40 y.o.  :     1980  Sex:      male    Medical Record:  [id number]    Date of Operation/Procedure:  5/15/2020    Pre-op Diagnosis:   Right ureteral stone    Post-Op Diagnosis Codes:   Same    Pre-operative Diagnosis Free Text:  * No pre-op diagnosis entered *     Name of Operation/Procedure:  Procedure(s) and Anesthesia Type:     * CYSTOSCOPY RETROGRADE URETEROGRAM - General    Findings/Complications:    Right retrograde pyelogram: Severely impacted mid-ureteral stone on fluoroscopy. Conray was injected through a Thomas in the distal ureter but no fluid moved beyond the mid-ureteral calculus.     After multiple unsuccessful attempts to manipulate a wire beyond the stone, the procedure was aborted.    Description of procedure: The patient was taken to the OR and placed under GA in lithotomy position.  Prepped and draped in sterile fashion.  The 21 Fr cystoscope was introduced and pan-cystoscopy was performed.  No tumors or stones were seen. The right ureteral orifice was easily identified. A Sensor guidewire was passed through the right ureteral orifice but was found to bow in the mid-ureter, at the point of a large calculus. A Thomas catheter was inserted up to the stone, but multiple attempts to pass glide wires (angled and straight tipped), Sensor wire and Super stiff wire were unsuccessful. A retrograde pyelogram was performed with findings as above. The procedure was aborted. We will order nephrostomy tube placement by IR. The 16 Fr simon catheter was replaced.    Estimated Blood Loss: minimal    Specimens: * No orders in the log *    Fluids/Drains: none    Brett Jay Jr., MD  5/15/2020  17:05

## 2020-05-15 NOTE — PROGRESS NOTES
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  Patient evaluated in ER found to have recurrent small bowel obstruction and pneumonia admit for management.  Patient also found to have UTI.  Again patient is nonverbal unable to give detailed history but we know the patient very well from previous admissions.  Patient is DNR per his wishes.  Patient ruled out for COVID-19     REVIEW OF SYSTEMS  Unable to perform review of systems secondary to nonverbal status     PHYSICAL EXAM  Blood pressure 111/78, pulse 103, temperature 98.9 °F (37.2 °C), temperature source Oral, resp. rate 16, height 180.3 cm (71\"), weight 70.8 kg (156 lb 1.4 oz), SpO2 95 %.    GENERAL: No acute distress  HENT: NCAT, PERRL, Nares patent  EYES: no scleral icterus  NECK: trachea midline, no ROM limitations  CV: regular rhythm, regular rate  RESPIRATORY: normal effort  ABDOMEN: soft, left upper quadrant G-tube with no signs of infection, distended, absent bowel sounds  : deferred  MUSCULOSKELETAL: no deformity  NEURO: Eyes closed, nonverbal at baseline, contractures all 4 extremities with history of quadriplegia.  SKIN: warm, dry     LAB RESULTS  Lab Results (last 24 hours)     Procedure Component Value Units Date/Time    Blood Culture - Blood, Arm, Left [[id number]] Collected:  05/14/20 1022    Specimen:  Blood from Arm, Left Updated:  05/15/20 1030     Blood Culture No growth at 24 hours    Urine Culture - Urine, Urine, Catheter [[id number]] Collected:  05/14/20 0852    Specimen:  Urine, Catheter Updated:  05/15/20 1021     Urine Culture >100,000 CFU/mL Mixed " Rosy Isolated    Narrative:       Specimen contains mixed organisms of questionable pathogenicity which indicates contamination with commensal rosy.  Further identification is unlikely to provide clinically useful information.  Suggest recollection.    Magnesium [[id number]]  (Normal) Collected:  05/15/20 0423    Specimen:  Blood Updated:  05/15/20 0840     Magnesium 2.2 mg/dL     Phosphorus [[id number]]  (Abnormal) Collected:  05/15/20 0423    Specimen:  Blood Updated:  05/15/20 0840     Phosphorus 2.0 mg/dL     Blood Culture ID, PCR - Blood, Arm, Right [[id number]]  (Abnormal) Collected:  05/14/20 1138    Specimen:  Blood from Arm, Right Updated:  05/15/20 0835     BCID, PCR Staphylococcus spp, not aureus. Identification by BCID PCR.    Blood Culture - Blood, Arm, Right [[id number]]  (Abnormal) Collected:  05/14/20 1138    Specimen:  Blood from Arm, Right Updated:  05/15/20 0834     Blood Culture Abnormal Stain     Gram Stain Anaerobic Bottle Gram positive cocci      Aerobic Bottle Gram positive cocci in clusters    TSH [[id number]]  (Abnormal) Collected:  05/15/20 0423    Specimen:  Blood Updated:  05/15/20 0521     TSH 6.200 uIU/mL      Comment: TSH results may be falsely decreased if patient taking Biotin.       BNP [[id number]]  (Normal) Collected:  05/15/20 0423    Specimen:  Blood Updated:  05/15/20 0521     proBNP 174.1 pg/mL     Narrative:       Among patients with dyspnea, NT-proBNP is highly sensitive for the detection of acute congestive heart failure. In addition NT-proBNP of <300 pg/ml effectively rules out acute congestive heart failure with 99% negative predictive value.    Results may be falsely decreased if patient taking Biotin.      Procalcitonin [[id number]]  (Normal) Collected:  05/15/20 0423    Specimen:  Blood Updated:  05/15/20 0521     Procalcitonin 0.16 ng/mL     Narrative:       As a Marker for Sepsis (Non-Neonates):   1. <0.5 ng/mL represents a low risk of severe sepsis and/or septic  "shock.  1. >2 ng/mL represents a high risk of severe sepsis and/or septic shock.    As a Marker for Lower Respiratory Tract Infections that require antibiotic therapy:  PCT on Admission     Antibiotic Therapy             6-12 Hrs later  > 0.5                Strongly Recommended            >0.25 - <0.5         Recommended  0.1 - 0.25           Discouraged                   Remeasure/reassess PCT  <0.1                 Strongly Discouraged          Remeasure/reassess PCT      As 28 day mortality risk marker: \"Change in Procalcitonin Result\" (> 80 % or <=80 %) if Day 0 (or Day 1) and Day 4 values are available. Refer to http://www.HotelQuicklypct-calculator.com/   Change in PCT <=80 %   A decrease of PCT levels below or equal to 80 % defines a positive change in PCT test result representing a higher risk for 28-day all-cause mortality of patients diagnosed with severe sepsis or septic shock.  Change in PCT > 80 %   A decrease of PCT levels of more than 80 % defines a negative change in PCT result representing a lower risk for 28-day all-cause mortality of patients diagnosed with severe sepsis or septic shock.                Results may be falsely decreased if patient taking Biotin.     Comprehensive Metabolic Panel [[id number]]  (Abnormal) Collected:  05/15/20 0423    Specimen:  Blood Updated:  05/15/20 0511     Glucose 204 mg/dL      BUN 27 mg/dL      Creatinine 0.71 mg/dL      Sodium 145 mmol/L      Potassium 2.7 mmol/L      Chloride 99 mmol/L      CO2 30.8 mmol/L      Calcium 9.4 mg/dL      Total Protein 7.2 g/dL      Albumin 3.80 g/dL      ALT (SGPT) 37 U/L      AST (SGOT) 20 U/L      Alkaline Phosphatase 98 U/L      Total Bilirubin 0.5 mg/dL      eGFR Non African Amer 123 mL/min/1.73      Globulin 3.4 gm/dL      A/G Ratio 1.1 g/dL      BUN/Creatinine Ratio 38.0     Anion Gap 15.2 mmol/L     Narrative:       GFR Normal >60  Chronic Kidney Disease <60  Kidney Failure <15      Lipid Panel [[id number]]  (Abnormal) Collected:  " 05/15/20 0423    Specimen:  Blood Updated:  05/15/20 0511     Total Cholesterol 124 mg/dL      Triglycerides 193 mg/dL      HDL Cholesterol 39 mg/dL      LDL Cholesterol  46 mg/dL      VLDL Cholesterol 38.6 mg/dL      LDL/HDL Ratio 1.19    Narrative:       Cholesterol Reference Ranges  (U.S. Department of Health and Human Services ATP III Classifications)    Desirable          <200 mg/dL  Borderline High    200-239 mg/dL  High Risk          >240 mg/dL      Triglyceride Reference Ranges  (U.S. Department of Health and Human Services ATP III Classifications)    Normal           <150 mg/dL  Borderline High  150-199 mg/dL  High             200-499 mg/dL  Very High        >500 mg/dL    HDL Reference Ranges  (U.S. Department of Health and Human Services ATP III Classifcations)    Low     <40 mg/dl (major risk factor for CHD)  High    >60 mg/dl ('negative' risk factor for CHD)        LDL Reference Ranges  (U.S. Department of Health and Human Services ATP III Classifcations)    Optimal          <100 mg/dL  Near Optimal     100-129 mg/dL  Borderline High  130-159 mg/dL  High             160-189 mg/dL  Very High        >189 mg/dL    CBC & Differential [[id number]] Collected:  05/15/20 0423    Specimen:  Blood Updated:  05/15/20 0508    Narrative:       The following orders were created for panel order CBC & Differential.  Procedure                               Abnormality         Status                     ---------                               -----------         ------                     CBC Auto Differential[[id number]]        Abnormal            Final result                 Please view results for these tests on the individual orders.    CBC Auto Differential [[id number]]  (Abnormal) Collected:  05/15/20 0423    Specimen:  Blood Updated:  05/15/20 0508     WBC 10.58 10*3/mm3      RBC 4.78 10*6/mm3      Hemoglobin 15.0 g/dL      Hematocrit 43.2 %      MCV 90.4 fL      MCH 31.4 pg      MCHC 34.7 g/dL      RDW 13.7 %       RDW-SD 45.4 fl      MPV 11.4 fL      Platelets 232 10*3/mm3      Neutrophil % 81.3 %      Lymphocyte % 9.6 %      Monocyte % 8.1 %      Eosinophil % 0.1 %      Basophil % 0.3 %      Immature Grans % 0.6 %      Neutrophils, Absolute 8.60 10*3/mm3      Lymphocytes, Absolute 1.02 10*3/mm3      Monocytes, Absolute 0.86 10*3/mm3      Eosinophils, Absolute 0.01 10*3/mm3      Basophils, Absolute 0.03 10*3/mm3      Immature Grans, Absolute 0.06 10*3/mm3      nRBC 0.0 /100 WBC     Lactic Acid, Reflex [[id number]]  (Abnormal) Collected:  05/15/20 0423    Specimen:  Blood Updated:  05/15/20 0459     Lactate 3.1 mmol/L     Hemoglobin A1c [[id number]]  (Abnormal) Collected:  05/15/20 0423    Specimen:  Blood Updated:  05/15/20 0450     Hemoglobin A1C 5.70 %     Narrative:       Hemoglobin A1C Ranges:    Increased Risk for Diabetes  5.7% to 6.4%  Diabetes                     >= 6.5%  Diabetic Goal                < 7.0%    POC Glucose Once [[id number]]  (Abnormal) Collected:  05/15/20 0005    Specimen:  Blood Updated:  05/15/20 0006     Glucose 178 mg/dL     POC Glucose Once [[id number]]  (Abnormal) Collected:  05/14/20 2236    Specimen:  Blood Updated:  05/14/20 2238     Glucose 191 mg/dL     Blood Gas, Arterial [[id number]]  (Abnormal) Collected:  05/14/20 1938    Specimen:  Arterial Blood Updated:  05/14/20 1941     Site Arterial: right radial     Ash's Test Positive     pH, Arterial 7.483 pH units      pCO2, Arterial 47.5 mm Hg      pO2, Arterial 180.3 mm Hg      HCO3, Arterial 35.6 mmol/L      Base Excess, Arterial 10.1 mmol/L      O2 Saturation Calculated 99.7 %      A-a Gradiant 0.3 mmHg      Barometric Pressure for Blood Gas 753.4 mmHg      Modality Adult Vent     FIO2 100 %      Ventilator Mode VC     Set Tidal Volume 600     Set Mech Resp Rate 16     Rate 16 Breaths/minute      PEEP 5    POC Glucose Once [[id number]]  (Abnormal) Collected:  05/14/20 1907    Specimen:  Blood Updated:  05/14/20 1908     Glucose 191 mg/dL      Lactic Acid, Reflex Timer (This will reflex a repeat order 3-3:15 hours after ordered.) [[id number]] Collected:  05/14/20 1022    Specimen:  Blood Updated:  05/14/20 1400     Hold Tube Hold for add-ons.     Comment: Auto resulted.           Imaging Results (Last 24 Hours)     Procedure Component Value Units Date/Time    XR Chest 1 View [[id number]] Resulted:  05/15/20 1319     Updated:  05/15/20 1319    XR Chest 1 View [[id number]] Collected:  05/14/20 2159     Updated:  05/14/20 2204    Narrative:       PORTABLE CHEST 05/14/2020 AT 9:41 PM     CLINICAL HISTORY: Patient intubated. History of small bowel obstruction.  History of traumatic brain injury. Evaluate ET tube placement     Compared to the previous chest dated 02/27/2020.     In the interval, an endotracheal tube has been placed and appears in  satisfactory position with its tip a few centimeters above the sal.  Nasogastric tube is also been placed and appears in satisfactory  position with its tip projecting off the inferior edge of this film. The  lungs are poorly inflated but appear free of focal infiltrates. The  heart is normal in size. There are no pleural effusions.     This report was finalized on 5/14/2020 10:01 PM by Dr. Leon Naqvi M.D.           ECG 12 Lead        HEART RATE= 93  bpm  RR Interval= 640  ms  VA Interval= 140  ms  P Horizontal Axis= -6  deg  P Front Axis= 65  deg  QRSD Interval= 107  ms  QT Interval= 406  ms  QRS Axis= 43  deg  T Wave Axis= -59  deg  - ABNORMAL ECG -  Sinus rhythm  Repol abnrm suggests ischemia, diffuse leads  Borderline ST elevation, anterolateral leads  Prolonged QT interval  qt length is new             Current Facility-Administered Medications:   •  amantadine (SYMMETREL) solution 100 mg, 100 mg, Per G Tube, Q12H, Lula Shah MD  •  cefTRIAXone (ROCEPHIN) IVPB 1 g, 1 g, Intravenous, Q24H, Lula Shah MD  •  chlorhexidine (PERIDEX) 0.12 % solution 15 mL, 15 mL, Mouth/Throat, Q12H, Tory  Donny Vines MD  •  dextrose (D50W) 25 g/ 50mL Intravenous Solution 25 g, 25 g, Intravenous, Q15 Min PRN, Min Barahona MD  •  dextrose (GLUTOSE) oral gel 15 g, 15 g, Oral, Q15 Min PRN, Min Barahona MD  •  glucagon (human recombinant) (GLUCAGEN DIAGNOSTIC) injection 1 mg, 1 mg, Subcutaneous, Q15 Min PRN, Min Barahona MD  •  Glycerin-Hypromellose- (ARTIFICIAL TEARS) 0.2-0.2-1 % ophthalmic solution solution 1 drop, 1 drop, Both Eyes, Q3H PRN, Lula Shah MD  •  HYDROmorphone (DILAUDID) injection 0.5 mg, 0.5 mg, Intravenous, 4x Daily PRN, Lula Shah MD, 0.5 mg at 05/15/20 0534  •  insulin lispro (humaLOG) injection 0-14 Units, 0-14 Units, Subcutaneous, TID AC, Min Barahona MD  •  ipratropium-albuterol (DUO-NEB) nebulizer solution 3 mL, 3 mL, Nebulization, Q4H PRN, Lula Shah MD  •  lactated ringers 1,000 mL with potassium chloride 20 mEq infusion, 125 mL/hr, Intravenous, Continuous, Donny Spears MD  •  levETIRAcetam in NaCl 0.54% (KEPPRA) IVPB 1,500 mg, 1,500 mg, Intravenous, Q12H, Donny Spears MD  •  Magnesium Sulfate 2 gram Bolus, followed by 8 gram infusion (total Mg dose 10 grams)- Mg less than or equal to 1mg/dL, 2 g, Intravenous, PRN **OR** Magnesium Sulfate 2 gram / 50mL Infusion (GIVE X 3 BAGS TO EQUAL 6GM TOTAL DOSE) - Mg 1.1 - 1.5 mg/dl, 2 g, Intravenous, PRN **OR** Magnesium Sulfate 4 gram infusion- Mg 1.6-1.9 mg/dL, 4 g, Intravenous, PRN, Donny Spears MD  •  pantoprazole (PROTONIX) injection 40 mg, 40 mg, Intravenous, Q12H, Lula Shah MD  •  potassium chloride (KLOR-CON) packet 40 mEq, 40 mEq, Oral, PRN, Min Barahona MD, 40 mEq at 05/15/20 0643  •  potassium chloride (MICRO-K) CR capsule 40 mEq, 40 mEq, Oral, PRN **OR** potassium chloride (KLOR-CON) packet 40 mEq, 40 mEq, Oral, PRN **OR** potassium chloride 10 mEq in 100 mL IVPB, 10 mEq, Intravenous, Q1H PRN, Donny Spears MD  •  potassium chloride (MICRO-K) CR capsule 40  mEq, 40 mEq, Oral, PRN, Min Barahona MD  •  potassium chloride 20 MEQ/50ML IVPB  - ADS Override Pull, , , ,   •  potassium phosphate 45 mmol in sodium chloride 0.9 % 500 mL infusion, 45 mmol, Intravenous, PRN **OR** potassium phosphate 30 mmol in sodium chloride 0.9 % 250 mL infusion, 30 mmol, Intravenous, PRN **OR** potassium phosphate 15 mmol in sodium chloride 0.9 % 100 mL infusion, 15 mmol, Intravenous, PRN **OR** sodium phosphates 40 mmol in sodium chloride 0.9 % 500 mL IVPB, 40 mmol, Intravenous, PRN **OR** sodium phosphates 20 mmol in sodium chloride 0.9 % 250 mL IVPB, 20 mmol, Intravenous, PRN, Donny Spears MD  •  potassium phosphate 30 mmol in sodium chloride 0.9 % 500 mL infusion, 30 mmol, Intravenous, Once, Donny Spears MD  •  promethazine (PHENERGAN) injection 12.5 mg, 12.5 mg, Intravenous, 5x Daily PRN, Lula Shah MD     ASSESSMENT  Acute hypoxic respiratory failure  Gram-positive cocci bacteremia with sepsis  Adynamic ileus  Status post exploratory laparotomy  Right upper lobe pneumonia  Acute UTI  Right hydronephrosis  Traumatic brain injury  Seizure disorder  Quadriplegic with contractures  Diabetes mellitus  Dysphagia with G-tube in place  Immobilization syndrome    PLAN  CPM  Vent support  NG tube  IVF  NPO  IV antibiotics  General surgery consult appreciated  Urology to follow patient  Adjust home medications  Stress ulcer DVT prophylaxis  Supportive care  DNR  Discussed with nursing staff  Follow closely further recommendation current hospital course    MICHEL KURTZ MD

## 2020-05-16 LAB
ANION GAP SERPL CALCULATED.3IONS-SCNC: 14.5 MMOL/L (ref 5–15)
ARTERIAL PATENCY WRIST A: POSITIVE
ATMOSPHERIC PRESS: 749.2 MMHG
BASE EXCESS BLDA CALC-SCNC: 3.4 MMOL/L (ref 0–2)
BDY SITE: ABNORMAL
BUN BLD-MCNC: 15 MG/DL (ref 6–20)
BUN/CREAT SERPL: 33.3 (ref 7–25)
CALCIUM SPEC-SCNC: 8.5 MG/DL (ref 8.6–10.5)
CHLORIDE SERPL-SCNC: 108 MMOL/L (ref 98–107)
CO2 SERPL-SCNC: 23.5 MMOL/L (ref 22–29)
CREAT BLD-MCNC: 0.45 MG/DL (ref 0.76–1.27)
DEPRECATED RDW RBC AUTO: 46.1 FL (ref 37–54)
ERYTHROCYTE [DISTWIDTH] IN BLOOD BY AUTOMATED COUNT: 13.5 % (ref 12.3–15.4)
GFR SERPL CREATININE-BSD FRML MDRD: >150 ML/MIN/1.73
GLUCOSE BLD-MCNC: 148 MG/DL (ref 65–99)
GLUCOSE BLDC GLUCOMTR-MCNC: 117 MG/DL (ref 70–130)
GLUCOSE BLDC GLUCOMTR-MCNC: 134 MG/DL (ref 70–130)
GLUCOSE BLDC GLUCOMTR-MCNC: 138 MG/DL (ref 70–130)
GLUCOSE BLDC GLUCOMTR-MCNC: 156 MG/DL (ref 70–130)
HCO3 BLDA-SCNC: 28.2 MMOL/L (ref 22–28)
HCT VFR BLD AUTO: 39.5 % (ref 37.5–51)
HGB BLD-MCNC: 13.2 G/DL (ref 13–17.7)
HOROWITZ INDEX BLD+IHG-RTO: 21 %
MCH RBC QN AUTO: 30.5 PG (ref 26.6–33)
MCHC RBC AUTO-ENTMCNC: 33.4 G/DL (ref 31.5–35.7)
MCV RBC AUTO: 91.2 FL (ref 79–97)
MODALITY: ABNORMAL
O2 A-A PPRESDIFF RESPIRATORY: 0.7 MMHG
PCO2 BLDA: 42.8 MM HG (ref 35–45)
PEEP RESPIRATORY: 5 CM[H2O]
PH BLDA: 7.43 PH UNITS (ref 7.35–7.45)
PHOSPHATE SERPL-MCNC: 3.4 MG/DL (ref 2.5–4.5)
PLATELET # BLD AUTO: 203 10*3/MM3 (ref 140–450)
PMV BLD AUTO: 11.1 FL (ref 6–12)
PO2 BLDA: 70.4 MM HG (ref 80–100)
POTASSIUM BLD-SCNC: 3.7 MMOL/L (ref 3.5–5.2)
PSV: 8 CMH2O
RBC # BLD AUTO: 4.33 10*6/MM3 (ref 4.14–5.8)
SAO2 % BLDCOA: 94.2 % (ref 92–99)
SODIUM BLD-SCNC: 146 MMOL/L (ref 136–145)
T4 FREE SERPL-MCNC: 2.3 NG/DL (ref 0.93–1.7)
TOTAL RATE: 13 BREATHS/MINUTE
VENTILATOR MODE: ABNORMAL
WBC NRBC COR # BLD: 10.26 10*3/MM3 (ref 3.4–10.8)

## 2020-05-16 PROCEDURE — 25010000002 VANCOMYCIN PER 500 MG: Performed by: HOSPITALIST

## 2020-05-16 PROCEDURE — 36600 WITHDRAWAL OF ARTERIAL BLOOD: CPT

## 2020-05-16 PROCEDURE — 25010000002 POTASSIUM CHLORIDE PER 2 MEQ OF POTASSIUM: Performed by: UROLOGY

## 2020-05-16 PROCEDURE — 94799 UNLISTED PULMONARY SVC/PX: CPT

## 2020-05-16 PROCEDURE — 82962 GLUCOSE BLOOD TEST: CPT

## 2020-05-16 PROCEDURE — 85027 COMPLETE CBC AUTOMATED: CPT | Performed by: UROLOGY

## 2020-05-16 PROCEDURE — 25010000002 CEFTRIAXONE PER 250 MG: Performed by: UROLOGY

## 2020-05-16 PROCEDURE — 84439 ASSAY OF FREE THYROXINE: CPT | Performed by: UROLOGY

## 2020-05-16 PROCEDURE — 80048 BASIC METABOLIC PNL TOTAL CA: CPT | Performed by: UROLOGY

## 2020-05-16 PROCEDURE — 94003 VENT MGMT INPAT SUBQ DAY: CPT

## 2020-05-16 PROCEDURE — 84100 ASSAY OF PHOSPHORUS: CPT | Performed by: UROLOGY

## 2020-05-16 PROCEDURE — 25010000003 POTASSIUM CHLORIDE 10 MEQ/100ML SOLUTION: Performed by: UROLOGY

## 2020-05-16 PROCEDURE — 25010000003 LEVETIRACETAM IN NACL 0.54% 1500 MG/100ML SOLUTION: Performed by: UROLOGY

## 2020-05-16 PROCEDURE — 99024 POSTOP FOLLOW-UP VISIT: CPT | Performed by: SURGERY

## 2020-05-16 PROCEDURE — 25010000002 VANCOMYCIN 750 MG RECONSTITUTED SOLUTION: Performed by: UROLOGY

## 2020-05-16 PROCEDURE — 82803 BLOOD GASES ANY COMBINATION: CPT

## 2020-05-16 RX ORDER — VANCOMYCIN HYDROCHLORIDE 1 G/200ML
1000 INJECTION, SOLUTION INTRAVENOUS ONCE
Status: COMPLETED | OUTPATIENT
Start: 2020-05-16 | End: 2020-05-16

## 2020-05-16 RX ADMIN — CEFTRIAXONE SODIUM 1 G: 1 INJECTION, SOLUTION INTRAVENOUS at 15:03

## 2020-05-16 RX ADMIN — POTASSIUM CHLORIDE 10 MEQ: 7.46 INJECTION, SOLUTION INTRAVENOUS at 01:25

## 2020-05-16 RX ADMIN — CHLORHEXIDINE GLUCONATE 15 ML: 1.2 RINSE ORAL at 20:14

## 2020-05-16 RX ADMIN — LEVETIRACETAM 1500 MG: 15 INJECTION INTRAVENOUS at 16:44

## 2020-05-16 RX ADMIN — POTASSIUM CHLORIDE 125 ML/HR: 2 INJECTION, SOLUTION, CONCENTRATE INTRAVENOUS at 03:01

## 2020-05-16 RX ADMIN — PANTOPRAZOLE SODIUM 40 MG: 40 INJECTION, POWDER, FOR SOLUTION INTRAVENOUS at 20:13

## 2020-05-16 RX ADMIN — SODIUM CHLORIDE 750 MG: 900 INJECTION, SOLUTION INTRAVENOUS at 11:33

## 2020-05-16 RX ADMIN — PANTOPRAZOLE SODIUM 40 MG: 40 INJECTION, POWDER, FOR SOLUTION INTRAVENOUS at 09:40

## 2020-05-16 RX ADMIN — SODIUM CHLORIDE, PRESERVATIVE FREE 3 ML: 5 INJECTION INTRAVENOUS at 20:37

## 2020-05-16 RX ADMIN — VANCOMYCIN HYDROCHLORIDE 1000 MG: 1 INJECTION, SOLUTION INTRAVENOUS at 03:34

## 2020-05-16 RX ADMIN — SODIUM CHLORIDE 750 MG: 900 INJECTION, SOLUTION INTRAVENOUS at 02:32

## 2020-05-16 RX ADMIN — POTASSIUM CHLORIDE 10 MEQ: 7.46 INJECTION, SOLUTION INTRAVENOUS at 03:57

## 2020-05-16 RX ADMIN — POTASSIUM CHLORIDE 10 MEQ: 7.46 INJECTION, SOLUTION INTRAVENOUS at 02:59

## 2020-05-16 RX ADMIN — LEVETIRACETAM 1500 MG: 15 INJECTION INTRAVENOUS at 03:57

## 2020-05-16 RX ADMIN — CHLORHEXIDINE GLUCONATE 15 ML: 1.2 RINSE ORAL at 09:43

## 2020-05-16 RX ADMIN — POTASSIUM CHLORIDE 10 MEQ: 7.46 INJECTION, SOLUTION INTRAVENOUS at 00:23

## 2020-05-16 RX ADMIN — POTASSIUM CHLORIDE 125 ML/HR: 2 INJECTION, SOLUTION, CONCENTRATE INTRAVENOUS at 15:03

## 2020-05-16 RX ADMIN — SODIUM CHLORIDE, PRESERVATIVE FREE 3 ML: 5 INJECTION INTRAVENOUS at 09:41

## 2020-05-16 RX ADMIN — POTASSIUM CHLORIDE 125 ML/HR: 2 INJECTION, SOLUTION, CONCENTRATE INTRAVENOUS at 23:05

## 2020-05-16 RX ADMIN — POTASSIUM CHLORIDE 10 MEQ: 7.46 INJECTION, SOLUTION INTRAVENOUS at 04:57

## 2020-05-16 NOTE — PROGRESS NOTES
"General Surgery  Progress Note    CC: Follow-up small bowel obstruction    POD#2 exploratory laparotomy        S: He underwent cystoscopy with attempted ureteral stenting yesterday, but the guidewire would not pass beyond a large obstructive stone within the right ureter.  He therefore underwent percutaneous nephrostomy in radiology last night.  He had a pasty bowel movement this morning and has tolerated a fleets enema today.  His nasogastric tube output is still quite bilious.    O:/86   Pulse 93   Temp 98.5 °F (36.9 °C) (Oral)   Resp 13   Ht 180.3 cm (71\")   Wt 70.8 kg (156 lb 1.4 oz)   SpO2 95%   BMI 21.77 kg/m²     Intake & Output:  UOP: 750 cc/24 hrs  NGT: 800 c today  Right nephrostomy tube: 90 cc  BM x1    GENERAL: Unresponsive (chronic due to his vegetative state), resting comfortably, shows no signs of agitation  HEENT: normocephalic, atraumatic, moist mucous membranes, clear sclerae   CHEST: clear to auscultation, no wheezes, rales or rhonchi, symmetric air entry  CARDIAC: regular rate and rhythm    ABDOMEN: Soft, nontender, nondistended, incision clean dry and intact with staples, PEG tube in left upper quadrant capped  EXTREMITIES: Chronic contractures of the bilateral upper extremities due to quadriplegia  SKIN: Warm and moist, no rashes    LABS  Results from last 7 days   Lab Units 05/16/20  0745 05/15/20  0423 05/14/20  0815   WBC 10*3/mm3 10.26 10.58 21.36*   HEMOGLOBIN g/dL 13.2 15.0 16.9   HEMATOCRIT % 39.5 43.2 49.9   PLATELETS 10*3/mm3 203 232 241     Results from last 7 days   Lab Units 05/16/20  0745 05/15/20  0423 05/14/20  0815   SODIUM mmol/L 146* 145 141   POTASSIUM mmol/L 3.7 2.7* 3.5   CHLORIDE mmol/L 108* 99 93*   CO2 mmol/L 23.5 30.8* 32.3*   BUN mg/dL 15 27* 36*   CREATININE mg/dL 0.45* 0.71* 0.74*   CALCIUM mg/dL 8.5* 9.4 9.9   BILIRUBIN mg/dL  --  0.5 0.9   ALK PHOS U/L  --  98 127*   ALT (SGPT) U/L  --  37 40   AST (SGOT) U/L  --  20 17   GLUCOSE mg/dL 148* 204* 234* "             A/P: 40 y.o. male POD#2 exploratory laparotomy.     Continue daily fleets enemas to aid in his adynamic ileus.  Once his nasogastric tube has lessened some, we can likely remove it and begin tube feeds but I do not think he is ready for this today.    Lula Shah MD  General and Endoscopic Surgery  [facility]    [address], [address]  [place], [place], [address]  P: [phone]  F: [phone]

## 2020-05-16 NOTE — PROGRESS NOTES
LOS: 2 days   Patient Care Team:  Dewayne Rodriguez MD as PCP - General (Family Medicine)  ReasorKee MD as PCP - Claims Attributed    Chief Complaint:  F/up respiratory failure, post laparotomy, post nephrostomy, pneumonia and UTI, ICU care and medical problems listed below    Subjective   Interval History  I reviewed the admission note, progress notes, PMH, PSH, Family hx, social history, imagings and prior records on this admission, summarized the finding in my note and formulated a transition of care plan.    Mechanical relation with assist control mode.  He has moderate secretions from the ET tube.  He does not follow commands and this is his baseline.  He is quadriplegic.  No reports of fever.  He has an NG tube with minimal output.  Has not had a bowel movement    REVIEW OF SYSTEMS:   Cannot assess due to mechanical ventilation    Ventilator/Non-Invasive Ventilation Settings (From admission, onward)     Start     Ordered    05/16/20 0003  Ventilator - AC/VC; (16); 10; 600  Continuous     Question Answer Comment   Vent Mode AC/VC    Breath rate  16   PEEP 10    Tidal Volume 600        05/16/20 0003    05/15/20 1257  Ventilator - AC/VC; (16); 100; 10; 600  Continuous,   Status:  Canceled     Question Answer Comment   Vent Mode AC/VC    Breath rate  16   FiO2 100    PEEP 10    Tidal Volume 600        05/15/20 1256    05/14/20 1903  Ventilator - AC/VC; (16); 100; 5; 600  Continuous,   Status:  Canceled     Question Answer Comment   Vent Mode AC/VC    Breath rate  16   FiO2 100    PEEP 5    Tidal Volume 600        05/14/20 1904                      Physical Exam:     Vital Signs  Temp:  [98.3 °F (36.8 °C)-98.9 °F (37.2 °C)] 98.5 °F (36.9 °C)  Heart Rate:  [] 89  Resp:  [16] 16  BP: (102-125)/(71-92) 122/86  FiO2 (%):  [21 %] 21 %    Intake/Output Summary (Last 24 hours) at 5/16/2020 1129  Last data filed at 5/16/2020 0600  Gross per 24 hour   Intake  "2194.9 ml   Output 1640 ml   Net 554.9 ml     Flowsheet Rows      First Filed Value   Admission Height  180.3 cm (70.98\") Documented at 05/14/2020 1725   Admission Weight  70.8 kg (156 lb) Documented at 05/14/2020 1725          General Appearance:   Alert.  On mechanical ventilation.  Not in acute distress   ENMT:  ET tube 8.  Moist mucous membrane.   Eyes:  Pupils equals and reactive to light. EOMI   Neck:   Trachea midline. No thyromegaly.   Lungs:    Decreased breath sounds at the bases.  No crackles or wheezing.  Nonlabored breathing with recommendation    Heart:   Regular rhythm and normal rate, normal S1 and S2, no         murmur   Skin:   No rash   Abdomen:    Soft. No tenderness. No HSM.  Positive bowel sounds.  Midline incision noted.  PEG tube noted with mild redness around the insertion site but no discharge or pus.   Neuro:  Quadriplegic with contracted arms.  Does not move his extremities.  Positive cough reflex.  Otherwise cannot assess due to baseline neurological status.   Extremities:  Warm extremities and well-perfused.  Mild edema in legs.  No cyanosis or clubbing.          Results Review:        Results from last 7 days   Lab Units 05/16/20  0745 05/15/20  0423 05/14/20  0815   SODIUM mmol/L 146* 145 141   POTASSIUM mmol/L 3.7 2.7* 3.5   CHLORIDE mmol/L 108* 99 93*   CO2 mmol/L 23.5 30.8* 32.3*   BUN mg/dL 15 27* 36*   CREATININE mg/dL 0.45* 0.71* 0.74*   GLUCOSE mg/dL 148* 204* 234*   CALCIUM mg/dL 8.5* 9.4 9.9     Results from last 7 days   Lab Units 05/14/20  0815   TROPONIN T ng/mL <0.010     Results from last 7 days   Lab Units 05/16/20  0745 05/15/20  0423 05/14/20  0815   WBC 10*3/mm3 10.26 10.58 21.36*   HEMOGLOBIN g/dL 13.2 15.0 16.9   HEMATOCRIT % 39.5 43.2 49.9   PLATELETS 10*3/mm3 203 232 241         Results from last 7 days   Lab Units 05/15/20  0423   PROBNP pg/mL 174.1       I reviewed the patient's new clinical results.  I personally viewed and interpreted the patient's CXR        "   Medication Review:     amantadine 100 mg Per G Tube Q12H   cefTRIAXone 1 g Intravenous Q24H   chlorhexidine 15 mL Mouth/Throat Q12H   insulin lispro 0-14 Units Subcutaneous TID AC   levETIRAcetam 1,500 mg Intravenous Q12H   pantoprazole 40 mg Intravenous Q12H   sodium chloride 3 mL Intravenous Q12H   vancomycin 750 mg Intravenous Q8H         custom IV KCl infusion builder 125 mL/hr Last Rate: 125 mL/hr (05/16/20 0301)   Pharmacy to dose vancomycin         Diagnostic imaging:  I personally and independently reviewed the following images:  CXR 5/15/2020 and lung portion of CT of the abdomen 5/14/2020:  Right lower lobe consolidation and bilateral lower lobe atelectasis.      Assessment   1. Status post 5/14/2020 exploratory laparotomy for possible small bowel obstruction with finding  adynamic ileus  2. Postop respiratory failure  3. Possible aspiration pneumonia, right lower lobe  4. Bilateral lower lobe atelectasis  5. Positive blood culture 1/2 for staph coagulase-negative, likely contaminant  6. Right hydronephrosis secondary to ureteral stone,, s/p right nephrostomy tube on 5/10/2020  7. Right ureteral stone,  s/p cystoscopy with retrograde urethrogram, failed extraction of the stone  8. Remote traumatic brain injury with chronic neurologic deficits   9. 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 Seizure disorder  11. Dysphasia with G-tube  12. Diabetes mellitus type 2  13. Lactic acidosis mild  14. Elevated TSH will tachycardia check a T4  15. Hypernatremia    All problems new to me      Plan   · Mechanical ventilation management: Switched from assist control volume-cycled to pressure support ventilation with PS 7 and PEEP 5.  Parameters 2 minutes were acceptable with minute ventilation 4.5.  TV was around 450 and RSB I was less than 80.  Check ABG in 1 hour and if satisfactory will proceed with extubation.  · Discontinue vancomycin.  Continue Rocephin for possible UTI and  pneumonia  · Continue IV hydration  · Tube feeding via G-tube when okay with surgery  · NG tube on LIS for now.  · Continue Keppra for seizure  · Insulin sliding scale for hyperglycemia  · DVT prophylaxis with SCD.  Pharmacological prophylaxis when okay with surgery      Americo Mendez MD  05/16/20  11:29      Time: Critical care 37 min      This note was dictated utilizing Sendmybag dictation

## 2020-05-16 NOTE — PROGRESS NOTES
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  Patient evaluated in ER found to have recurrent small bowel obstruction and pneumonia admit for management.  Patient also found to have UTI.  Again patient is nonverbal unable to give detailed history but we know the patient very well from previous admissions.  Patient is DNR per his wishes.  Patient ruled out for COVID-19     REVIEW OF SYSTEMS  Unable to perform      PHYSICAL EXAM  Blood pressure 122/86, pulse 93, temperature 98.5 °F (36.9 °C), temperature source Oral, resp. rate 13, height 180.3 cm (71\"), weight 70.8 kg (156 lb 1.4 oz), SpO2 95 %.    GENERAL:  On vent and sedated  CV: regular rhythm, regular rate  RESPIRATORY: normal effort  ABDOMEN: soft, left upper quadrant G-tube with no signs of infection, distended, absent bowel sounds  : deferred  MUSCULOSKELETAL:  Contracted and chronic deformities  NEURO: Eyes closed, nonverbal at baseline, contractures all 4 extremities with history of quadriplegia.  SKIN: warm, dry     LAB RESULTS  Lab Results (last 24 hours)     Procedure Component Value Units Date/Time    Blood Gas, Arterial [[id number]]  (Abnormal) Collected:  05/16/20 1303    Specimen:  Arterial Blood Updated:  05/16/20 1307     Site Arterial: right radial     Ash's Test Positive     pH, Arterial 7.427 pH units      pCO2, Arterial 42.8 mm Hg      pO2, Arterial 70.4 mm Hg      HCO3, Arterial 28.2 mmol/L      Base Excess, Arterial 3.4 mmol/L      O2 Saturation Calculated 94.2 %      A-a Gradiant 0.7 mmHg      Barometric Pressure for Blood Gas 749.2 " mmHg      Modality Adult Vent     FIO2 21 %      Ventilator Mode PS     Rate 13 Breaths/minute      PEEP 5     PSV 8 cmH2O     POC Glucose Once [[id number]]  (Abnormal) Collected:  05/16/20 1107    Specimen:  Blood Updated:  05/16/20 1109     Glucose 134 mg/dL     T4, Free [[id number]]  (Abnormal) Collected:  05/16/20 0745    Specimen:  Blood from Hand, Right Updated:  05/16/20 0905     Free T4 2.30 ng/dL     Narrative:       Results may be falsely increased if patient taking Biotin.      Phosphorus [[id number]]  (Normal) Collected:  05/16/20 0745    Specimen:  Blood from Hand, Right Updated:  05/16/20 0904     Phosphorus 3.4 mg/dL     Basic Metabolic Panel [[id number]]  (Abnormal) Collected:  05/16/20 0745    Specimen:  Blood from Hand, Right Updated:  05/16/20 0904     Glucose 148 mg/dL      BUN 15 mg/dL      Creatinine 0.45 mg/dL      Sodium 146 mmol/L      Potassium 3.7 mmol/L      Chloride 108 mmol/L      CO2 23.5 mmol/L      Calcium 8.5 mg/dL      eGFR Non African Amer >150 mL/min/1.73      BUN/Creatinine Ratio 33.3     Anion Gap 14.5 mmol/L     Narrative:       GFR Normal >60  Chronic Kidney Disease <60  Kidney Failure <15      CBC (No Diff) [[id number]]  (Normal) Collected:  05/16/20 0745    Specimen:  Blood Updated:  05/16/20 0838     WBC 10.26 10*3/mm3      RBC 4.33 10*6/mm3      Hemoglobin 13.2 g/dL      Hematocrit 39.5 %      MCV 91.2 fL      MCH 30.5 pg      MCHC 33.4 g/dL      RDW 13.5 %      RDW-SD 46.1 fl      MPV 11.1 fL      Platelets 203 10*3/mm3     POC Glucose Once [[id number]]  (Abnormal) Collected:  05/16/20 0801    Specimen:  Blood Updated:  05/16/20 0802     Glucose 138 mg/dL     Body Fluid Culture - Body Fluid, Urine, Clean Catch [[id number]] Collected:  05/15/20 1725    Specimen:  Body Fluid from Urine, Clean Catch Updated:  05/16/20 0724     Body Fluid Culture No growth     Gram Stain No organisms seen      Occasional WBCs per low power field    Body Fluid Culture - Body Fluid, Kidney, Right  [[id number]] Collected:  05/15/20 2053    Specimen:  Body Fluid from Kidney, Right Updated:  05/16/20 0724     Body Fluid Culture No growth     Gram Stain No organisms seen      Occasional WBCs per low power field    Blood Culture - Blood, Arm, Right [[id number]]  (Abnormal) Collected:  05/14/20 1138    Specimen:  Blood from Arm, Right Updated:  05/16/20 0629     Blood Culture Staphylococcus, coagulase negative     Isolated from Aerobic and Anaerobic Bottles     Gram Stain Anaerobic Bottle Gram positive cocci      Aerobic Bottle Gram positive cocci in clusters    Blood Culture - Blood, Arm, Left [[id number]]  (Abnormal) Collected:  05/14/20 1022    Specimen:  Blood from Arm, Left Updated:  05/16/20 0629     Blood Culture Staphylococcus, coagulase negative     Isolated from Aerobic Bottle     Gram Stain Aerobic Bottle Gram positive cocci in clusters    POC Glucose Once [[id number]]  (Abnormal) Collected:  05/16/20 0617    Specimen:  Blood Updated:  05/16/20 0623     Glucose 156 mg/dL     Fungus Culture - Body Fluid, Kidney, Right [[id number]] Collected:  05/15/20 2053    Specimen:  Body Fluid from Kidney, Right Updated:  05/15/20 2104    Blood Culture - Blood, Arm, Left [[id number]] Collected:  05/15/20 1526    Specimen:  Blood from Arm, Left Updated:  05/15/20 1533    Blood Culture - Blood, Hand, Left [[id number]] Collected:  05/15/20 1526    Specimen:  Blood from Hand, Left Updated:  05/15/20 1533        Imaging Results (Last 24 Hours)     Procedure Component Value Units Date/Time    CT Guided Nephrostomy Tube Placement [[id number]] Collected:  05/15/20 2025     Updated:  05/15/20 2031    Narrative:       CT-GUIDED INSERTION OF RIGHT NEPHROSTOMY DRAIN     HISTORY: Severe right renal obstruction.     The patient is on a ventilator and was placed in the left decubitus  position. A timeout was performed with nursing staff and patient and  patient identification. Following sterile prep and local anesthetic, an  18-gauge needle  was advanced into the dilated right renal collecting  system by Dr. Wesley. This was followed by a 0.038 wire and 7 Moroccan  dilator and subsequent insertion of an 8 Moroccan pigtail drain which was  confirmed in good position by CT scan.     Somewhat bloody urine return was demonstrated and orders were written  for intermittent irrigation until clearing. The drain was attached to  gravity drainage. The patient tolerated the procedure well and there  were no immediate complications.     CONCLUSION: CT-guided insertion of right nephrostomy brain as described  above.     This report was finalized on 5/15/2020 8:28 PM by Dr. Monster Wesley M.D.       FL Retrograde Pyelogram In OR [[id number]] Collected:  05/15/20 1721     Updated:  05/15/20 1815    Narrative:       RETROGRADE PYELOGRAM     HISTORY: Several right ureteral stones with marked obstruction.     Imaging in the operating room shows instrumentation of the distal right  ureter and injection of contrast partially outlines the obstructing  stone within the distal ureter. Further instrumentation of the right  ureter was performed but a wire could not be passed beyond the  obstructing stone based on the images provided.     7 images were obtained and the fluoroscopy time measures 1 minute and 24  seconds.     This report was finalized on 5/15/2020 6:12 PM by Dr. Monster Wesley M.D.           ECG 12 Lead        HEART RATE= 93  bpm  RR Interval= 640  ms  IN Interval= 140  ms  P Horizontal Axis= -6  deg  P Front Axis= 65  deg  QRSD Interval= 107  ms  QT Interval= 406  ms  QRS Axis= 43  deg  T Wave Axis= -59  deg  - ABNORMAL ECG -  Sinus rhythm  Repol abnrm suggests ischemia, diffuse leads  Borderline ST elevation, anterolateral leads  Prolonged QT interval  qt length is new             Current Facility-Administered Medications:   •  amantadine (SYMMETREL) solution 100 mg, 100 mg, Per G Tube, Q12H, Brett Jay Jr., MD, Stopped at 05/15/20 2050  •  cefTRIAXone  (ROCEPHIN) IVPB 1 g, 1 g, Intravenous, Q24H, Brett Jay Jr., MD, Last Rate: 100 mL/hr at 05/15/20 1354, 1 g at 05/15/20 1354  •  chlorhexidine (PERIDEX) 0.12 % solution 15 mL, 15 mL, Mouth/Throat, Q12H, Brett Jay Jr., MD, 15 mL at 05/16/20 0943  •  dextrose (D50W) 25 g/ 50mL Intravenous Solution 25 g, 25 g, Intravenous, Q15 Min PRN, Brett Jay Jr., MD  •  dextrose (GLUTOSE) oral gel 15 g, 15 g, Oral, Q15 Min PRN, Brett Jay Jr., MD  •  glucagon (human recombinant) (GLUCAGEN DIAGNOSTIC) injection 1 mg, 1 mg, Subcutaneous, Q15 Min PRN, Brett Jay Jr., MD  •  Glycerin-Hypromellose- (ARTIFICIAL TEARS) 0.2-0.2-1 % ophthalmic solution solution 1 drop, 1 drop, Both Eyes, Q3H PRN, Brett Jay Jr., MD  •  HYDROmorphone (DILAUDID) injection 0.5 mg, 0.5 mg, Intravenous, 4x Daily PRN, Brett Jay Jr., MD, 0.5 mg at 05/15/20 2041  •  insulin lispro (humaLOG) injection 0-14 Units, 0-14 Units, Subcutaneous, TID AC, Brett Jay Jr., MD, Stopped at 05/16/20 0646  •  ipratropium-albuterol (DUO-NEB) nebulizer solution 3 mL, 3 mL, Nebulization, Q4H PRN, Brett Jay Jr., MD  •  lactated ringers 1,000 mL with potassium chloride 20 mEq infusion, 125 mL/hr, Intravenous, Continuous, Brett Jay Jr., MD, Last Rate: 125 mL/hr at 05/16/20 0301, 125 mL/hr at 05/16/20 0301  •  levETIRAcetam in NaCl 0.54% (KEPPRA) IVPB 1,500 mg, 1,500 mg, Intravenous, Q12H, Brett Jay Jr., MD, 1,500 mg at 05/16/20 0357  •  Magnesium Sulfate 2 gram Bolus, followed by 8 gram infusion (total Mg dose 10 grams)- Mg less than or equal to 1mg/dL, 2 g, Intravenous, PRN **OR** Magnesium Sulfate 2 gram / 50mL Infusion (GIVE X 3 BAGS TO EQUAL 6GM TOTAL DOSE) - Mg 1.1 - 1.5 mg/dl, 2 g, Intravenous, PRN **OR** Magnesium Sulfate 4 gram infusion- Mg 1.6-1.9 mg/dL, 4 g, Intravenous, PRN, Brett Jay Jr., MD  •  pantoprazole (PROTONIX) injection 40 mg, 40 mg,  Intravenous, Q12H, Brett Jay Jr., MD, 40 mg at 05/16/20 0940  •  potassium chloride (KLOR-CON) packet 40 mEq, 40 mEq, Oral, PRN, Brett Jay Jr., MD, 40 mEq at 05/15/20 0643  •  potassium chloride (MICRO-K) CR capsule 40 mEq, 40 mEq, Oral, PRN **OR** potassium chloride (KLOR-CON) packet 40 mEq, 40 mEq, Oral, PRN **OR** potassium chloride 10 mEq in 100 mL IVPB, 10 mEq, Intravenous, Q1H PRN, Brett Jay Jr., MD, Last Rate: 100 mL/hr at 05/16/20 0457, 10 mEq at 05/16/20 0457  •  potassium chloride (MICRO-K) CR capsule 40 mEq, 40 mEq, Oral, PRN, Brett Jay Jr., MD  •  potassium phosphate 45 mmol in sodium chloride 0.9 % 500 mL infusion, 45 mmol, Intravenous, PRN **OR** potassium phosphate 30 mmol in sodium chloride 0.9 % 250 mL infusion, 30 mmol, Intravenous, PRN **OR** potassium phosphate 15 mmol in sodium chloride 0.9 % 100 mL infusion, 15 mmol, Intravenous, PRN **OR** sodium phosphates 40 mmol in sodium chloride 0.9 % 500 mL IVPB, 40 mmol, Intravenous, PRN **OR** sodium phosphates 20 mmol in sodium chloride 0.9 % 250 mL IVPB, 20 mmol, Intravenous, PRN, Brett Jay Jr., MD  •  promethazine (PHENERGAN) injection 12.5 mg, 12.5 mg, Intravenous, 5x Daily PRN, Brett Jay Jr., MD  •  sodium chloride 0.9 % flush 10 mL, 10 mL, Intravenous, PRN, Marcus Clarke MD, 10 mL at 05/15/20 2045  •  sodium chloride 0.9 % flush 3 mL, 3 mL, Intravenous, Q12H, Marcus Clarke MD, 3 mL at 05/16/20 0941     ASSESSMENT  Acute hypoxic respiratory failure  Gram-positive cocci bacteremia with sepsis  Adynamic ileus  Status post exploratory laparotomy  Right upper lobe pneumonia  Acute UTI  Right hydronephrosis status post cystoscopy  Traumatic brain injury  Seizure disorder  Quadriplegic with contractures  Diabetes mellitus  Dysphagia with G-tube in place  Immobilization syndrome    PLAN  CPM  Vent support  NG tube  IVF  NPO  IV antibiotics per infectious disease  General surgery consult  appreciated  Urology to follow patient  Adjust home medications  Stress ulcer DVT prophylaxis  Supportive care  DNR  Discussed with nursing staff  Follow closely further recommendation current hospital course    MICHEL KURTZ MD

## 2020-05-16 NOTE — PLAN OF CARE
Problem: Patient Care Overview  Goal: Individualization and Mutuality  Outcome: Ongoing (interventions implemented as appropriate)     Problem: Skin Injury Risk (Adult)  Goal: Identify Related Risk Factors and Signs and Symptoms  Outcome: Ongoing (interventions implemented as appropriate)     Problem: Skin Injury Risk (Adult)  Goal: Skin Health and Integrity  Outcome: Ongoing (interventions implemented as appropriate)
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
## 2020-05-17 ENCOUNTER — APPOINTMENT (OUTPATIENT)
Dept: GENERAL RADIOLOGY | Facility: HOSPITAL | Age: 40
End: 2020-05-17

## 2020-05-17 LAB
ANION GAP SERPL CALCULATED.3IONS-SCNC: 9 MMOL/L (ref 5–15)
BACTERIA SPEC AEROBE CULT: ABNORMAL
BASOPHILS # BLD AUTO: 0.04 10*3/MM3 (ref 0–0.2)
BASOPHILS NFR BLD AUTO: 0.6 % (ref 0–1.5)
BUN BLD-MCNC: 10 MG/DL (ref 6–20)
BUN/CREAT SERPL: 24.4 (ref 7–25)
CALCIUM SPEC-SCNC: 8.5 MG/DL (ref 8.6–10.5)
CHLORIDE SERPL-SCNC: 112 MMOL/L (ref 98–107)
CO2 SERPL-SCNC: 23 MMOL/L (ref 22–29)
CREAT BLD-MCNC: 0.41 MG/DL (ref 0.76–1.27)
DEPRECATED RDW RBC AUTO: 42.4 FL (ref 37–54)
EOSINOPHIL # BLD AUTO: 0.15 10*3/MM3 (ref 0–0.4)
EOSINOPHIL NFR BLD AUTO: 2.2 % (ref 0.3–6.2)
ERYTHROCYTE [DISTWIDTH] IN BLOOD BY AUTOMATED COUNT: 13 % (ref 12.3–15.4)
GFR SERPL CREATININE-BSD FRML MDRD: >150 ML/MIN/1.73
GLUCOSE BLD-MCNC: 94 MG/DL (ref 65–99)
GLUCOSE BLDC GLUCOMTR-MCNC: 80 MG/DL (ref 70–130)
GLUCOSE BLDC GLUCOMTR-MCNC: 82 MG/DL (ref 70–130)
GLUCOSE BLDC GLUCOMTR-MCNC: 82 MG/DL (ref 70–130)
GLUCOSE BLDC GLUCOMTR-MCNC: 91 MG/DL (ref 70–130)
GLUCOSE BLDC GLUCOMTR-MCNC: 91 MG/DL (ref 70–130)
GRAM STN SPEC: ABNORMAL
GRAM STN SPEC: ABNORMAL
HCT VFR BLD AUTO: 33.8 % (ref 37.5–51)
HGB BLD-MCNC: 11.3 G/DL (ref 13–17.7)
IMM GRANULOCYTES # BLD AUTO: 0.07 10*3/MM3 (ref 0–0.05)
IMM GRANULOCYTES NFR BLD AUTO: 1 % (ref 0–0.5)
ISOLATED FROM: ABNORMAL
LYMPHOCYTES # BLD AUTO: 1.38 10*3/MM3 (ref 0.7–3.1)
LYMPHOCYTES NFR BLD AUTO: 20.4 % (ref 19.6–45.3)
MCH RBC QN AUTO: 30.3 PG (ref 26.6–33)
MCHC RBC AUTO-ENTMCNC: 33.4 G/DL (ref 31.5–35.7)
MCV RBC AUTO: 90.6 FL (ref 79–97)
MONOCYTES # BLD AUTO: 0.56 10*3/MM3 (ref 0.1–0.9)
MONOCYTES NFR BLD AUTO: 8.3 % (ref 5–12)
NEUTROPHILS # BLD AUTO: 4.55 10*3/MM3 (ref 1.7–7)
NEUTROPHILS NFR BLD AUTO: 67.5 % (ref 42.7–76)
NRBC BLD AUTO-RTO: 0 /100 WBC (ref 0–0.2)
PLATELET # BLD AUTO: 171 10*3/MM3 (ref 140–450)
PMV BLD AUTO: 11 FL (ref 6–12)
POTASSIUM BLD-SCNC: 4.2 MMOL/L (ref 3.5–5.2)
RBC # BLD AUTO: 3.73 10*6/MM3 (ref 4.14–5.8)
SODIUM BLD-SCNC: 144 MMOL/L (ref 136–145)
WBC NRBC COR # BLD: 6.75 10*3/MM3 (ref 3.4–10.8)

## 2020-05-17 PROCEDURE — 85025 COMPLETE CBC W/AUTO DIFF WBC: CPT | Performed by: HOSPITALIST

## 2020-05-17 PROCEDURE — 99024 POSTOP FOLLOW-UP VISIT: CPT | Performed by: SURGERY

## 2020-05-17 PROCEDURE — 82962 GLUCOSE BLOOD TEST: CPT

## 2020-05-17 PROCEDURE — 74018 RADEX ABDOMEN 1 VIEW: CPT

## 2020-05-17 PROCEDURE — 25010000002 VANCOMYCIN 10 G RECONSTITUTED SOLUTION: Performed by: INTERNAL MEDICINE

## 2020-05-17 PROCEDURE — 80048 BASIC METABOLIC PNL TOTAL CA: CPT | Performed by: HOSPITALIST

## 2020-05-17 PROCEDURE — 25010000002 CEFTRIAXONE PER 250 MG: Performed by: UROLOGY

## 2020-05-17 PROCEDURE — 25010000002 POTASSIUM CHLORIDE PER 2 MEQ OF POTASSIUM: Performed by: UROLOGY

## 2020-05-17 PROCEDURE — 25010000002 POTASSIUM CHLORIDE PER 2 MEQ OF POTASSIUM: Performed by: INTERNAL MEDICINE

## 2020-05-17 PROCEDURE — 25010000003 LEVETIRACETAM IN NACL 0.54% 1500 MG/100ML SOLUTION: Performed by: UROLOGY

## 2020-05-17 PROCEDURE — 25010000003 LEVETIRACETAM IN NACL 0.54% 1500 MG/100ML SOLUTION: Performed by: INTERNAL MEDICINE

## 2020-05-17 RX ORDER — CEFTRIAXONE SODIUM 1 G/50ML
1 INJECTION, SOLUTION INTRAVENOUS EVERY 24 HOURS
Status: DISCONTINUED | OUTPATIENT
Start: 2020-05-18 | End: 2020-05-21 | Stop reason: HOSPADM

## 2020-05-17 RX ADMIN — LEVETIRACETAM 1500 MG: 15 INJECTION INTRAVENOUS at 03:05

## 2020-05-17 RX ADMIN — CHLORHEXIDINE GLUCONATE 15 ML: 1.2 RINSE ORAL at 22:14

## 2020-05-17 RX ADMIN — PANTOPRAZOLE SODIUM 40 MG: 40 INJECTION, POWDER, FOR SOLUTION INTRAVENOUS at 09:22

## 2020-05-17 RX ADMIN — LEVETIRACETAM 1500 MG: 15 INJECTION INTRAVENOUS at 19:53

## 2020-05-17 RX ADMIN — VANCOMYCIN HYDROCHLORIDE 1500 MG: 10 INJECTION, POWDER, LYOPHILIZED, FOR SOLUTION INTRAVENOUS at 22:14

## 2020-05-17 RX ADMIN — POTASSIUM CHLORIDE 125 ML/HR: 2 INJECTION, SOLUTION, CONCENTRATE INTRAVENOUS at 09:22

## 2020-05-17 RX ADMIN — SODIUM CHLORIDE, PRESERVATIVE FREE 3 ML: 5 INJECTION INTRAVENOUS at 09:22

## 2020-05-17 RX ADMIN — PANTOPRAZOLE SODIUM 40 MG: 40 INJECTION, POWDER, FOR SOLUTION INTRAVENOUS at 22:14

## 2020-05-17 RX ADMIN — AMANTADINE HYDROCHLORIDE 100 MG: 50 SOLUTION ORAL at 22:14

## 2020-05-17 RX ADMIN — CEFTRIAXONE SODIUM 1 G: 1 INJECTION, SOLUTION INTRAVENOUS at 13:23

## 2020-05-17 RX ADMIN — POTASSIUM CHLORIDE 125 ML/HR: 2 INJECTION, SOLUTION, CONCENTRATE INTRAVENOUS at 17:31

## 2020-05-17 RX ADMIN — CHLORHEXIDINE GLUCONATE 15 ML: 1.2 RINSE ORAL at 10:26

## 2020-05-17 NOTE — CONSULTS
Adult Nutrition  Assessment/PES    Patient Name:  Adrian Kuo  YOB: 1980  MRN: [id number]  Admit Date:  5/14/2020    Assessment Date:  5/17/2020  Nutrition follow up/TF consult. EMR reviewed. POD#3 ex lap. New gtube. MD okmontrell for trickle feeds, increase by 10 cc q 12 hours.   NH regimen: Diabetisource goal @ 75 x 22 hour.  Ordered Diabetisource @ 70 cc/hr. RD to monitor/follow closely.   Reason for Assessment     Row Name 05/17/20 1151          Reason for Assessment    Reason For Assessment  follow-up protocol;physician consult;TF/PN     Diagnosis  -- POD#3 exploratory laparotomy                 Labs/Tests/Procedures/Meds     Row Name 05/17/20 1152          Labs/Procedures/Meds    Lab Results Reviewed  reviewed, pertinent        Diagnostic Tests/Procedures    Diagnostic Test/Procedure Reviewed  reviewed, pertinent        Medications    Pertinent Medications Reviewed  reviewed, pertinent         Physical Findings     Row Name 05/17/20 1152          Physical Findings    Gastrointestinal  feeding tube     Tubes  gastrostomy tube           Nutrition Prescription Ordered     Row Name 05/17/20 1152          Nutrition Prescription PO    Current PO Diet  NPO         Problem/Interventions:    Intervention Goal     Row Name 05/17/20 1152          Intervention Goal    General  Maintain nutrition;Meet nutritional needs for age/condition;Nutrition support treatment     TF/PN  Inititiate TF/PN     Weight  Maintain weight         Nutrition Intervention     Row Name 05/17/20 1152          Nutrition Intervention    RD/Tech Action  Follow Tx progress;Care plan reviewd;Recommend/ordered     Recommended/Ordered  EN         Nutrition Prescription     Row Name 05/17/20 1152          Nutrition Prescription EN    Enteral Prescription  Enteral begin/change;Enteral to supply     Enteral Route  Gastrostomy     Product  Diabetisource     TF Delivery Method  Continuous     Continuous TF Goal Rate (mL/hr)  70 mL/hr      Water flush (mL)   25 mL     Water Flush Frequency  Per hour     New EN Prescription Ordered?  Yes        EN to Supply    Kcal/Day  2016 Kcal/Day     Kcal/Kg  28.4 Kcal/Kg     Protein (gm/day)  100.8 gm/day     TF Free H2O (mL)  1377.6 mL     Total Free H2O (mL/day)  1977.8 mL/day         Education/Evaluation     Row Name 05/17/20 1153          Education    Education  Will Instruct as appropriate        Monitor/Evaluation    Monitor  Per protocol           Electronically signed by:  Maritza Mckeon RD  05/17/20 11:54

## 2020-05-17 NOTE — PROGRESS NOTES
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  Patient evaluated in ER found to have recurrent small bowel obstruction and pneumonia admit for management.  Patient also found to have UTI.  Again patient is nonverbal unable to give detailed history but we know the patient very well from previous admissions.  Patient is DNR per his wishes.  Patient ruled out for COVID-19     REVIEW OF SYSTEMS  Unable to perform      PHYSICAL EXAM  Blood pressure 103/66, pulse (!) 46, temperature 94.3 °F (34.6 °C), resp. rate 13, height 180.3 cm (71\"), weight 70.8 kg (156 lb 1.4 oz), SpO2 100 %.    GENERAL:  Eyes open  CV: regular rhythm, regular rate  RESPIRATORY: normal effort  ABDOMEN: soft, left upper quadrant G-tube with no signs of infection, distended, absent bowel sounds  : deferred  MUSCULOSKELETAL:  Contracted and chronic deformities  NEURO:  nonverbal at baseline, contractures all 4 extremities with history of quadriplegia.  SKIN: warm, dry     LAB RESULTS  Lab Results (last 24 hours)     Procedure Component Value Units Date/Time    POC Glucose Once [[id number]]  (Normal) Collected:  05/17/20 1136    Specimen:  Blood Updated:  05/17/20 1138     Glucose 80 mg/dL     Basic Metabolic Panel [[id number]]  (Abnormal) Collected:  05/17/20 0559    Specimen:  Blood Updated:  05/17/20 0657     Glucose 94 mg/dL      BUN 10 mg/dL      Creatinine 0.41 mg/dL      Sodium 144 mmol/L      Potassium 4.2 mmol/L      Chloride 112 mmol/L      CO2 23.0 mmol/L      Calcium 8.5 mg/dL      eGFR Non African Amer >150 mL/min/1.73      BUN/Creatinine Ratio 24.4     " Anion Gap 9.0 mmol/L     Narrative:       GFR Normal >60  Chronic Kidney Disease <60  Kidney Failure <15      CBC & Differential [[id number]] Collected:  05/17/20 0559    Specimen:  Blood Updated:  05/17/20 0630    Narrative:       The following orders were created for panel order CBC & Differential.  Procedure                               Abnormality         Status                     ---------                               -----------         ------                     CBC Auto Differential[[id number]]        Abnormal            Final result                 Please view results for these tests on the individual orders.    CBC Auto Differential [[id number]]  (Abnormal) Collected:  05/17/20 0559    Specimen:  Blood Updated:  05/17/20 0630     WBC 6.75 10*3/mm3      RBC 3.73 10*6/mm3      Hemoglobin 11.3 g/dL      Hematocrit 33.8 %      MCV 90.6 fL      MCH 30.3 pg      MCHC 33.4 g/dL      RDW 13.0 %      RDW-SD 42.4 fl      MPV 11.0 fL      Platelets 171 10*3/mm3      Neutrophil % 67.5 %      Lymphocyte % 20.4 %      Monocyte % 8.3 %      Eosinophil % 2.2 %      Basophil % 0.6 %      Immature Grans % 1.0 %      Neutrophils, Absolute 4.55 10*3/mm3      Lymphocytes, Absolute 1.38 10*3/mm3      Monocytes, Absolute 0.56 10*3/mm3      Eosinophils, Absolute 0.15 10*3/mm3      Basophils, Absolute 0.04 10*3/mm3      Immature Grans, Absolute 0.07 10*3/mm3      nRBC 0.0 /100 WBC     POC Glucose Once [[id number]]  (Normal) Collected:  05/17/20 0622    Specimen:  Blood Updated:  05/17/20 0625     Glucose 82 mg/dL     Body Fluid Culture - Body Fluid, Urine, Clean Catch [[id number]] Collected:  05/15/20 1725    Specimen:  Body Fluid from Urine, Clean Catch Updated:  05/17/20 0613     Body Fluid Culture No growth at 2 days     Gram Stain No organisms seen      Occasional WBCs per low power field    Body Fluid Culture - Body Fluid, Kidney, Right [[id number]] Collected:  05/15/20 2053    Specimen:  Body Fluid from Kidney, Right Updated:   05/17/20 0612     Body Fluid Culture No growth at 2 days     Gram Stain No organisms seen      Occasional WBCs per low power field    Blood Culture - Blood, Arm, Left [[id number]]  (Abnormal) Collected:  05/14/20 1022    Specimen:  Blood from Arm, Left Updated:  05/17/20 0534     Blood Culture Staphylococcus capitis     Comment: Probable contaminant requires clinical correlation, susceptibility not performed unless requested by physician.          Isolated from Aerobic Bottle     Gram Stain Aerobic Bottle Gram positive cocci in clusters    Blood Culture - Blood, Arm, Right [[id number]]  (Abnormal) Collected:  05/14/20 1138    Specimen:  Blood from Arm, Right Updated:  05/17/20 0533     Blood Culture Staphylococcus epidermidis     Comment: Probable contaminant requires clinical correlation, susceptibility not performed unless requested by physician.          Isolated from Aerobic and Anaerobic Bottles     Gram Stain Anaerobic Bottle Gram positive cocci      Aerobic Bottle Gram positive cocci in clusters    POC Glucose Once [[id number]]  (Normal) Collected:  05/16/20 1544    Specimen:  Blood Updated:  05/16/20 1546     Glucose 117 mg/dL     Blood Culture - Blood, Arm, Left [[id number]] Collected:  05/15/20 1526    Specimen:  Blood from Arm, Left Updated:  05/16/20 1545     Blood Culture No growth at 24 hours    Blood Culture - Blood, Hand, Left [[id number]] Collected:  05/15/20 1526    Specimen:  Blood from Hand, Left Updated:  05/16/20 1545     Blood Culture No growth at 24 hours        Imaging Results (Last 24 Hours)     Procedure Component Value Units Date/Time    XR Abdomen KUB [[id number]] Collected:  05/17/20 0708     Updated:  05/17/20 0849    Narrative:       ONE VIEW PORTABLE ABDOMEN     HISTORY: Recent small bowel surgery. Recent right percutaneous  nephrostomy. Follow-up of ileus.     A single view partially visualized the percutaneous nephrostomy catheter  at the upper margin of the film. There is a small amount  bowel gas  scattered predominantly in the colon. No significant ileus is seen.     This report was finalized on 5/17/2020 8:46 AM by Dr. Monster Wesley M.D.           ECG 12 Lead        HEART RATE= 93  bpm  RR Interval= 640  ms  UT Interval= 140  ms  P Horizontal Axis= -6  deg  P Front Axis= 65  deg  QRSD Interval= 107  ms  QT Interval= 406  ms  QRS Axis= 43  deg  T Wave Axis= -59  deg  - ABNORMAL ECG -  Sinus rhythm  Repol abnrm suggests ischemia, diffuse leads  Borderline ST elevation, anterolateral leads  Prolonged QT interval  qt length is new             Current Facility-Administered Medications:   •  amantadine (SYMMETREL) solution 100 mg, 100 mg, Per G Tube, Q12H, Americo Mendez MD, Stopped at 05/15/20 2050  •  cefTRIAXone (ROCEPHIN) IVPB 1 g, 1 g, Intravenous, Q24H, Americo Mendez MD, Last Rate: 100 mL/hr at 05/17/20 1323, 1 g at 05/17/20 1323  •  chlorhexidine (PERIDEX) 0.12 % solution 15 mL, 15 mL, Mouth/Throat, Q12H, Americo Mendez MD, 15 mL at 05/17/20 1026  •  dextrose (D50W) 25 g/ 50mL Intravenous Solution 25 g, 25 g, Intravenous, Q15 Min PRN, Americo Mendez MD  •  dextrose (GLUTOSE) oral gel 15 g, 15 g, Oral, Q15 Min PRN, Americo Mendez MD  •  glucagon (human recombinant) (GLUCAGEN DIAGNOSTIC) injection 1 mg, 1 mg, Subcutaneous, Q15 Min PRN, Americo Mendez MD  •  Glycerin-Hypromellose- (ARTIFICIAL TEARS) 0.2-0.2-1 % ophthalmic solution solution 1 drop, 1 drop, Both Eyes, Q3H PRN, Americo Mendez MD  •  HYDROmorphone (DILAUDID) injection 0.5 mg, 0.5 mg, Intravenous, 4x Daily PRN, Americo Mendez MD, 0.5 mg at 05/15/20 2041  •  insulin lispro (humaLOG) injection 0-14 Units, 0-14 Units, Subcutaneous, TID AC, Americo Mendez MD, Stopped at 05/16/20 0646  •  ipratropium-albuterol (DUO-NEB) nebulizer solution 3 mL, 3 mL, Nebulization, Q4H PRN, Americo Mendez MD  •  lactated ringers 1,000 mL with potassium chloride 20 mEq infusion, 125 mL/hr, Intravenous, Continuous, Americo Mendez MD, Last Rate: 125  mL/hr at 05/17/20 0922, 125 mL/hr at 05/17/20 0922  •  levETIRAcetam in NaCl 0.54% (KEPPRA) IVPB 1,500 mg, 1,500 mg, Intravenous, Q12H, Americo Mendez MD, 1,500 mg at 05/17/20 0305  •  Magnesium Sulfate 2 gram Bolus, followed by 8 gram infusion (total Mg dose 10 grams)- Mg less than or equal to 1mg/dL, 2 g, Intravenous, PRN **OR** Magnesium Sulfate 2 gram / 50mL Infusion (GIVE X 3 BAGS TO EQUAL 6GM TOTAL DOSE) - Mg 1.1 - 1.5 mg/dl, 2 g, Intravenous, PRN **OR** Magnesium Sulfate 4 gram infusion- Mg 1.6-1.9 mg/dL, 4 g, Intravenous, PRN, Americo Mendez MD  •  pantoprazole (PROTONIX) injection 40 mg, 40 mg, Intravenous, Q12H, Americo Mendez MD, 40 mg at 05/17/20 0922  •  potassium chloride (KLOR-CON) packet 40 mEq, 40 mEq, Oral, PRNAndrea Rami, MD, 40 mEq at 05/15/20 0643  •  potassium chloride (MICRO-K) CR capsule 40 mEq, 40 mEq, Oral, PRN **OR** potassium chloride (KLOR-CON) packet 40 mEq, 40 mEq, Oral, PRN **OR** potassium chloride 10 mEq in 100 mL IVPB, 10 mEq, Intravenous, Q1H PRN, Americo Mendez MD, Last Rate: 100 mL/hr at 05/16/20 0457, 10 mEq at 05/16/20 0457  •  potassium chloride (MICRO-K) CR capsule 40 mEq, 40 mEq, Oral, PRNAndrea Rami, MD  •  potassium phosphate 45 mmol in sodium chloride 0.9 % 500 mL infusion, 45 mmol, Intravenous, PRN **OR** potassium phosphate 30 mmol in sodium chloride 0.9 % 250 mL infusion, 30 mmol, Intravenous, PRN **OR** potassium phosphate 15 mmol in sodium chloride 0.9 % 100 mL infusion, 15 mmol, Intravenous, PRN **OR** sodium phosphates 40 mmol in sodium chloride 0.9 % 500 mL IVPB, 40 mmol, Intravenous, PRN **OR** sodium phosphates 20 mmol in sodium chloride 0.9 % 250 mL IVPB, 20 mmol, Intravenous, PRN, Americo Mendez MD  •  promethazine (PHENERGAN) injection 12.5 mg, 12.5 mg, Intravenous, 5x Daily PRN, Americo Mendez MD     ASSESSMENT  Acute hypoxic respiratory failure  Gram-positive cocci bacteremia with sepsis  Adynamic ileus  Status post exploratory laparotomy  Right  upper lobe pneumonia  Acute UTI  Right hydronephrosis status post cystoscopy  Traumatic brain injury  Seizure disorder  Quadriplegic with contractures  Diabetes mellitus  Dysphagia with G-tube in place  Immobilization syndrome    PLAN  CPM  NG tube  IVF  NPO  IV antibiotics per infectious disease  General surgery consult appreciated  Urology to follow patient  Adjust home medications  Stress ulcer DVT prophylaxis  Supportive care  DNR  Discussed with nursing staff  Follow closely further recommendation current hospital course    MICHEL KURTZ MD

## 2020-05-17 NOTE — NURSING NOTE
Noted that gtube has fallen out around 0330, noted purulent drainage from insertion site. Unable to get anything in to keep site open. Called general surgery about gtube coming out. Dr. Shah asked to place smaller tube to keep site open. Able to place catheter in.

## 2020-05-17 NOTE — PROGRESS NOTES
"                                              LOS: 3 days   Patient Care Team:  Dewayne Rodriguez MD as PCP - General (Family Medicine)  Kee John MD as PCP - Claims Attributed    Chief Complaint:  F/up respiratory failure, post laparotomy, post nephrostomy, pneumonia and UTI, ICU care and medical problems listed below    Subjective   Interval History  Was liberated off ventilator yesterday.  Currently on room air.  No reports of vomiting or aspiration.  Nephrostomy tube noted with slightly turbid drainage.  Does not appear to be in distress otherwise cannot obtain history from the patient due to unresponsiveness    REVIEW OF SYSTEMS:   Cannot assess due to baseline unresponsiveness        Physical Exam:     Vital Signs  Temp:  [97.5 °F (36.4 °C)] 97.5 °F (36.4 °C)  Heart Rate:  [50-93] 50  Resp:  [13] 13  BP: ()/(64-87) 102/67    Intake/Output Summary (Last 24 hours) at 5/17/2020 1246  Last data filed at 5/17/2020 0400  Gross per 24 hour   Intake 3140.47 ml   Output 1130 ml   Net 2010.47 ml     Flowsheet Rows      First Filed Value   Admission Height  180.3 cm (70.98\") Documented at 05/14/2020 1725   Admission Weight  70.8 kg (156 lb) Documented at 05/14/2020 1725          General Appearance:   Alert.  Not cooperative.  Not in acute distress   ENMT:  External ears normal.  Mouth close.  No discharge.   Eyes:  Pupils equals and reactive to light. EOMI   Neck:   Trachea midline. No thyromegaly.   Lungs:    Decreased breath sounds at the bases.  No crackles or wheezing.  Nonlabored breathing with recommendation    Heart:   Regular rhythm and normal rate, normal S1 and S2, no         murmur   Skin:   No rash   Abdomen:    Soft. No tenderness. No HSM.  Positive bowel sounds.  Midline incision noted.  PEG tube noted with mild redness around the insertion site but no discharge or pus.   Neuro:  Quadriplegic with contracted arms.  Does not move his extremities.  Positive cough reflex.  Otherwise cannot " assess due to baseline neurological status.   Extremities:  Warm extremities and well-perfused.  Mild edema in legs.  No cyanosis or clubbing.          Results Review:        Results from last 7 days   Lab Units 05/17/20  0559 05/16/20  0745 05/15/20  0423   SODIUM mmol/L 144 146* 145   POTASSIUM mmol/L 4.2 3.7 2.7*   CHLORIDE mmol/L 112* 108* 99   CO2 mmol/L 23.0 23.5 30.8*   BUN mg/dL 10 15 27*   CREATININE mg/dL 0.41* 0.45* 0.71*   GLUCOSE mg/dL 94 148* 204*   CALCIUM mg/dL 8.5* 8.5* 9.4     Results from last 7 days   Lab Units 05/14/20  0815   TROPONIN T ng/mL <0.010     Results from last 7 days   Lab Units 05/17/20  0559 05/16/20  0745 05/15/20  0423   WBC 10*3/mm3 6.75 10.26 10.58   HEMOGLOBIN g/dL 11.3* 13.2 15.0   HEMATOCRIT % 33.8* 39.5 43.2   PLATELETS 10*3/mm3 171 203 232         Results from last 7 days   Lab Units 05/15/20  0423   PROBNP pg/mL 174.1       I reviewed the patient's new clinical results.  I personally viewed and interpreted the patient's CXR        Medication Review:     amantadine 100 mg Per G Tube Q12H   cefTRIAXone 1 g Intravenous Q24H   chlorhexidine 15 mL Mouth/Throat Q12H   insulin lispro 0-14 Units Subcutaneous TID AC   levETIRAcetam 1,500 mg Intravenous Q12H   pantoprazole 40 mg Intravenous Q12H   sodium chloride 3 mL Intravenous Q12H         custom IV KCl infusion builder 125 mL/hr Last Rate: 125 mL/hr (05/17/20 0922)       Diagnostic imaging:  I personally and independently reviewed the following images:  CXR 5/15/2020 and lung portion of CT of the abdomen 5/14/2020:  Right lower lobe consolidation and bilateral lower lobe atelectasis.      KUB 5/17/2020: Small colonic gas noted.    Assessment   1. Status post 5/14/2020 exploratory laparotomy for possible small bowel obstruction with finding  adynamic ileus.  Liberated off the ventilator 5/16/2020  2. Postop respiratory failure  3. Possible aspiration pneumonia, right lower lobe  4. Bilateral lower lobe atelectasis  5. Positive  blood culture 1/2 for staph coagulase-negative, likely contaminant  6. Right hydronephrosis secondary to ureteral stone,, s/p right nephrostomy tube on 5/10/2020  7. Right ureteral stone,  s/p cystoscopy with retrograde urethrogram, failed extraction of the stone  8. Remote traumatic brain injury with chronic neurologic deficits   9. 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 Seizure disorder  11. Dysphasia with G-tube  12. Diabetes mellitus type 2  13. Lactic acidosis mild  14. Elevated TSH will tachycardia check a T4  15. Hypernatremia, resolved  16. Mild anemia          Plan   · Antibiotics with vancomycin and Rocephin per ID  · Aspiration precaution  · Trickle tube feeding per surgery.  Agree.  · Follow-up a.m. hemoglobin due to anemia.  No evidence of blood loss.  Continue PPI prophylaxis  · Maintenance IV fluid in the  · Continue Keppra for seizure  · Insulin sliding scale for hyperglycemia  · DVT prophylaxis with SCD.  Pharmacological prophylaxis when okay with surgery    Transfer to floor.  Discussed with ICU team     Americo Mendez MD  05/17/20  12:46            This note was dictated utilizing Dragon dictation

## 2020-05-17 NOTE — PROGRESS NOTES
"General Surgery  Progress Note    CC: Follow-up small bowel obstruction    POD#3 exploratory laparotomy        S: His G-tube balloon burst last night, and the peg tube fell out.  A 16 Yi red rubber catheter was placed through the stoma and has been clamped.  His nasogastric output has been fairly negligible although still dark bile.  He had 2 bowel movements yesterday.    O:/76   Pulse 52   Temp 97.5 °F (36.4 °C) (Oral)   Resp 13   Ht 180.3 cm (71\")   Wt 70.8 kg (156 lb 1.4 oz)   SpO2 100%   BMI 21.77 kg/m²     Intake & Output:  UOP: 900 cc/24 hrs  NGT: 100 cc yesterday  Right nephrostomy tube: 130 cc  BM x2    GENERAL: Unresponsive (chronic due to his vegetative state), resting comfortably, shows no signs of agitation  HEENT: normocephalic, atraumatic, moist mucous membranes, clear sclerae   CHEST: clear to auscultation, no wheezes, rales or rhonchi, symmetric air entry  CARDIAC: regular rate and rhythm    ABDOMEN: Soft, nontender, nondistended, incision clean dry and intact with staples, red rubber catheter in left upper quadrant through stoma clamped  EXTREMITIES: Chronic contractures of the bilateral upper extremities due to quadriplegia  SKIN: Warm and moist, no rashes    LABS  Results from last 7 days   Lab Units 05/17/20  0559 05/16/20  0745 05/15/20  0423   WBC 10*3/mm3 6.75 10.26 10.58   HEMOGLOBIN g/dL 11.3* 13.2 15.0   HEMATOCRIT % 33.8* 39.5 43.2   PLATELETS 10*3/mm3 171 203 232     Results from last 7 days   Lab Units 05/17/20  0559 05/16/20  0745 05/15/20  0423 05/14/20  0815   SODIUM mmol/L 144 146* 145 141   POTASSIUM mmol/L 4.2 3.7 2.7* 3.5   CHLORIDE mmol/L 112* 108* 99 93*   CO2 mmol/L 23.0 23.5 30.8* 32.3*   BUN mg/dL 10 15 27* 36*   CREATININE mg/dL 0.41* 0.45* 0.71* 0.74*   CALCIUM mg/dL 8.5* 8.5* 9.4 9.9   BILIRUBIN mg/dL  --   --  0.5 0.9   ALK PHOS U/L  --   --  98 127*   ALT (SGPT) U/L  --   --  37 40   AST (SGOT) U/L  --   --  20 17   GLUCOSE mg/dL 94 148* 204* 234*       "       A/P: 40 y.o. male POD#3 exploratory laparotomy.     I reviewed a KUB done today, which I think shows an improved bowel gas pattern.  I am unable to appreciate any distended loops of small bowel at this time, which suggests that his adynamic ileus is improving.  I have ordered to discontinue his nasogastric tube.  I also exchanged the temporary red rubber catheter for a 16 [place] to be used as a G-tube through his left upper quadrant stoma.  I sutured it to the skin and was able to aspirate bilious contents.  It easily flushed with 60 cc of tap water.  We can begin trickle tube feeds through the new G-tube today.    Lula Shah MD  General and Endoscopic Surgery  [facility]    [address], [address]  [place], [place], [address]  P: [phone]  F: [phone]
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
## 2020-05-18 ENCOUNTER — APPOINTMENT (OUTPATIENT)
Dept: GENERAL RADIOLOGY | Facility: HOSPITAL | Age: 40
End: 2020-05-18

## 2020-05-18 LAB
ANION GAP SERPL CALCULATED.3IONS-SCNC: 13.1 MMOL/L (ref 5–15)
BACTERIA FLD CULT: NORMAL
BACTERIA FLD CULT: NORMAL
BASOPHILS # BLD AUTO: 0.03 10*3/MM3 (ref 0–0.2)
BASOPHILS NFR BLD AUTO: 0.5 % (ref 0–1.5)
BUN BLD-MCNC: 9 MG/DL (ref 6–20)
BUN/CREAT SERPL: 21.4 (ref 7–25)
CALCIUM SPEC-SCNC: 8.8 MG/DL (ref 8.6–10.5)
CHLORIDE SERPL-SCNC: 109 MMOL/L (ref 98–107)
CO2 SERPL-SCNC: 20.9 MMOL/L (ref 22–29)
CREAT BLD-MCNC: 0.42 MG/DL (ref 0.76–1.27)
DEPRECATED RDW RBC AUTO: 43.3 FL (ref 37–54)
EOSINOPHIL # BLD AUTO: 0.13 10*3/MM3 (ref 0–0.4)
EOSINOPHIL NFR BLD AUTO: 2.1 % (ref 0.3–6.2)
ERYTHROCYTE [DISTWIDTH] IN BLOOD BY AUTOMATED COUNT: 13.1 % (ref 12.3–15.4)
GFR SERPL CREATININE-BSD FRML MDRD: >150 ML/MIN/1.73
GLUCOSE BLD-MCNC: 80 MG/DL (ref 65–99)
GLUCOSE BLDC GLUCOMTR-MCNC: 70 MG/DL (ref 70–130)
GLUCOSE BLDC GLUCOMTR-MCNC: 71 MG/DL (ref 70–130)
GLUCOSE BLDC GLUCOMTR-MCNC: 74 MG/DL (ref 70–130)
GLUCOSE BLDC GLUCOMTR-MCNC: 82 MG/DL (ref 70–130)
GLUCOSE BLDC GLUCOMTR-MCNC: 91 MG/DL (ref 70–130)
GRAM STN SPEC: NORMAL
HCT VFR BLD AUTO: 31.8 % (ref 37.5–51)
HGB BLD-MCNC: 11 G/DL (ref 13–17.7)
IMM GRANULOCYTES # BLD AUTO: 0.08 10*3/MM3 (ref 0–0.05)
IMM GRANULOCYTES NFR BLD AUTO: 1.3 % (ref 0–0.5)
LYMPHOCYTES # BLD AUTO: 1.18 10*3/MM3 (ref 0.7–3.1)
LYMPHOCYTES NFR BLD AUTO: 18.8 % (ref 19.6–45.3)
MCH RBC QN AUTO: 31.3 PG (ref 26.6–33)
MCHC RBC AUTO-ENTMCNC: 34.6 G/DL (ref 31.5–35.7)
MCV RBC AUTO: 90.6 FL (ref 79–97)
MONOCYTES # BLD AUTO: 0.43 10*3/MM3 (ref 0.1–0.9)
MONOCYTES NFR BLD AUTO: 6.8 % (ref 5–12)
NEUTROPHILS # BLD AUTO: 4.43 10*3/MM3 (ref 1.7–7)
NEUTROPHILS NFR BLD AUTO: 70.5 % (ref 42.7–76)
NRBC BLD AUTO-RTO: 0 /100 WBC (ref 0–0.2)
PLATELET # BLD AUTO: 194 10*3/MM3 (ref 140–450)
PMV BLD AUTO: 10.7 FL (ref 6–12)
POTASSIUM BLD-SCNC: 4.8 MMOL/L (ref 3.5–5.2)
RBC # BLD AUTO: 3.51 10*6/MM3 (ref 4.14–5.8)
SODIUM BLD-SCNC: 143 MMOL/L (ref 136–145)
WBC NRBC COR # BLD: 6.28 10*3/MM3 (ref 3.4–10.8)

## 2020-05-18 PROCEDURE — 25010000002 VANCOMYCIN 10 G RECONSTITUTED SOLUTION: Performed by: INTERNAL MEDICINE

## 2020-05-18 PROCEDURE — 74018 RADEX ABDOMEN 1 VIEW: CPT

## 2020-05-18 PROCEDURE — 85025 COMPLETE CBC W/AUTO DIFF WBC: CPT | Performed by: HOSPITALIST

## 2020-05-18 PROCEDURE — 25010000002 HYDROMORPHONE PER 4 MG: Performed by: INTERNAL MEDICINE

## 2020-05-18 PROCEDURE — 82962 GLUCOSE BLOOD TEST: CPT

## 2020-05-18 PROCEDURE — 80048 BASIC METABOLIC PNL TOTAL CA: CPT | Performed by: HOSPITALIST

## 2020-05-18 PROCEDURE — 99024 POSTOP FOLLOW-UP VISIT: CPT | Performed by: SURGERY

## 2020-05-18 PROCEDURE — 25010000003 LEVETIRACETAM IN NACL 0.54% 1500 MG/100ML SOLUTION: Performed by: INTERNAL MEDICINE

## 2020-05-18 PROCEDURE — 25010000002 CEFTRIAXONE PER 250 MG: Performed by: INTERNAL MEDICINE

## 2020-05-18 PROCEDURE — 25010000002 POTASSIUM CHLORIDE PER 2 MEQ OF POTASSIUM: Performed by: INTERNAL MEDICINE

## 2020-05-18 RX ORDER — POLYETHYLENE GLYCOL 3350 17 G/17G
17 POWDER, FOR SOLUTION ORAL 2 TIMES DAILY
Status: DISCONTINUED | OUTPATIENT
Start: 2020-05-18 | End: 2020-05-21 | Stop reason: HOSPADM

## 2020-05-18 RX ADMIN — PANTOPRAZOLE SODIUM 40 MG: 40 INJECTION, POWDER, FOR SOLUTION INTRAVENOUS at 09:05

## 2020-05-18 RX ADMIN — AMANTADINE HYDROCHLORIDE 100 MG: 50 SOLUTION ORAL at 20:55

## 2020-05-18 RX ADMIN — VANCOMYCIN HYDROCHLORIDE 1250 MG: 10 INJECTION, POWDER, LYOPHILIZED, FOR SOLUTION INTRAVENOUS at 22:01

## 2020-05-18 RX ADMIN — PANTOPRAZOLE SODIUM 40 MG: 40 INJECTION, POWDER, FOR SOLUTION INTRAVENOUS at 20:55

## 2020-05-18 RX ADMIN — POLYETHYLENE GLYCOL 3350 17 G: 17 POWDER, FOR SOLUTION ORAL at 20:55

## 2020-05-18 RX ADMIN — POLYETHYLENE GLYCOL 3350 17 G: 17 POWDER, FOR SOLUTION ORAL at 10:20

## 2020-05-18 RX ADMIN — HYDROMORPHONE HYDROCHLORIDE 0.5 MG: 1 INJECTION, SOLUTION INTRAMUSCULAR; INTRAVENOUS; SUBCUTANEOUS at 04:28

## 2020-05-18 RX ADMIN — LEVETIRACETAM 1500 MG: 15 INJECTION INTRAVENOUS at 19:31

## 2020-05-18 RX ADMIN — POTASSIUM CHLORIDE 125 ML/HR: 2 INJECTION, SOLUTION, CONCENTRATE INTRAVENOUS at 13:43

## 2020-05-18 RX ADMIN — LEVETIRACETAM 1500 MG: 15 INJECTION INTRAVENOUS at 09:05

## 2020-05-18 RX ADMIN — CEFTRIAXONE SODIUM 1 G: 1 INJECTION, SOLUTION INTRAVENOUS at 13:43

## 2020-05-18 RX ADMIN — CHLORHEXIDINE GLUCONATE 15 ML: 1.2 RINSE ORAL at 20:55

## 2020-05-18 RX ADMIN — POTASSIUM CHLORIDE 125 ML/HR: 2 INJECTION, SOLUTION, CONCENTRATE INTRAVENOUS at 03:42

## 2020-05-18 RX ADMIN — MAGNESIUM HYDROXIDE 10 ML: 2400 SUSPENSION ORAL at 10:20

## 2020-05-18 RX ADMIN — VANCOMYCIN HYDROCHLORIDE 1250 MG: 10 INJECTION, POWDER, LYOPHILIZED, FOR SOLUTION INTRAVENOUS at 09:05

## 2020-05-18 NOTE — PROGRESS NOTES
"   LOS: 4 days   Patient Care Team:  Dewayne Rodriguez MD as PCP - General (Family Medicine)  RebaorKee MD as PCP - Claims Attributed      Subjective   Interval History: No events overnight. Clinically improved.    Objective     ROS   12 POINT NEG ROS PERTINENT IN HPI      Vital Signs  Temp:  [94.3 °F (34.6 °C)-98.7 °F (37.1 °C)] 98.4 °F (36.9 °C)  Heart Rate:  [46-73] 73  Resp:  [16-18] 16  BP: (102-132)/(65-80) 119/80      Intake/Output Summary (Last 24 hours) at 5/18/2020 0733  Last data filed at 5/18/2020 0535  Gross per 24 hour   Intake 468 ml   Output 2280 ml   Net -1812 ml       Flowsheet Rows      First Filed Value   Admission Height  180.3 cm (70.98\") Documented at 05/14/2020 1725   Admission Weight  70.8 kg (156 lb) Documented at 05/14/2020 1725          Physical Exam:     General wander: nonverbal, contracted  ABD:  Neph tube site without surrounding erythema. Neph tube connected to MAYE  Genitalia:  Fiore intact, urine yellow        Results Review:     I reviewed the patient's new clinical results.  Lab Results (all)     Procedure Component Value Units Date/Time    Basic Metabolic Panel [[id number]]  (Abnormal) Collected:  05/18/20 0635    Specimen:  Blood Updated:  05/18/20 0729     Glucose 80 mg/dL      BUN 9 mg/dL      Creatinine 0.42 mg/dL      Sodium 143 mmol/L      Potassium 4.8 mmol/L      Chloride 109 mmol/L      CO2 20.9 mmol/L      Calcium 8.8 mg/dL      eGFR Non African Amer >150 mL/min/1.73      BUN/Creatinine Ratio 21.4     Anion Gap 13.1 mmol/L     Narrative:       GFR Normal >60  Chronic Kidney Disease <60  Kidney Failure <15      CBC & Differential [[id number]] Collected:  05/18/20 0635    Specimen:  Blood Updated:  05/18/20 0702    Narrative:       The following orders were created for panel order CBC & Differential.  Procedure                               Abnormality         Status                     ---------                               -----------         ------                  "    CBC Auto Differential[[id number]]        Abnormal            Final result                 Please view results for these tests on the individual orders.    CBC Auto Differential [[id number]]  (Abnormal) Collected:  05/18/20 0635    Specimen:  Blood Updated:  05/18/20 0702     WBC 6.28 10*3/mm3      RBC 3.51 10*6/mm3      Hemoglobin 11.0 g/dL      Hematocrit 31.8 %      MCV 90.6 fL      MCH 31.3 pg      MCHC 34.6 g/dL      RDW 13.1 %      RDW-SD 43.3 fl      MPV 10.7 fL      Platelets 194 10*3/mm3      Neutrophil % 70.5 %      Lymphocyte % 18.8 %      Monocyte % 6.8 %      Eosinophil % 2.1 %      Basophil % 0.5 %      Immature Grans % 1.3 %      Neutrophils, Absolute 4.43 10*3/mm3      Lymphocytes, Absolute 1.18 10*3/mm3      Monocytes, Absolute 0.43 10*3/mm3      Eosinophils, Absolute 0.13 10*3/mm3      Basophils, Absolute 0.03 10*3/mm3      Immature Grans, Absolute 0.08 10*3/mm3      nRBC 0.0 /100 WBC     POC Glucose Once [[id number]]  (Normal) Collected:  05/18/20 0539    Specimen:  Blood Updated:  05/18/20 0540     Glucose 91 mg/dL     POC Glucose Once [[id number]]  (Normal) Collected:  05/17/20 2346    Specimen:  Blood Updated:  05/17/20 2348     Glucose 91 mg/dL     POC Glucose Once [[id number]]  (Normal) Collected:  05/17/20 2036    Specimen:  Blood Updated:  05/17/20 2038     Glucose 82 mg/dL     POC Glucose Once [[id number]]  (Normal) Collected:  05/17/20 1824    Specimen:  Blood Updated:  05/17/20 1825     Glucose 91 mg/dL     Blood Culture - Blood, Arm, Left [[id number]] Collected:  05/15/20 1526    Specimen:  Blood from Arm, Left Updated:  05/17/20 1545     Blood Culture No growth at 2 days    Blood Culture - Blood, Hand, Left [[id number]] Collected:  05/15/20 1526    Specimen:  Blood from Hand, Left Updated:  05/17/20 1545     Blood Culture No growth at 2 days    POC Glucose Once [[id number]]  (Normal) Collected:  05/17/20 1136    Specimen:  Blood Updated:  05/17/20 1138     Glucose 80 mg/dL     Basic Metabolic  Panel [[id number]]  (Abnormal) Collected:  05/17/20 0559    Specimen:  Blood Updated:  05/17/20 0657     Glucose 94 mg/dL      BUN 10 mg/dL      Creatinine 0.41 mg/dL      Sodium 144 mmol/L      Potassium 4.2 mmol/L      Chloride 112 mmol/L      CO2 23.0 mmol/L      Calcium 8.5 mg/dL      eGFR Non African Amer >150 mL/min/1.73      BUN/Creatinine Ratio 24.4     Anion Gap 9.0 mmol/L     Narrative:       GFR Normal >60  Chronic Kidney Disease <60  Kidney Failure <15      CBC & Differential [[id number]] Collected:  05/17/20 0559    Specimen:  Blood Updated:  05/17/20 0630    Narrative:       The following orders were created for panel order CBC & Differential.  Procedure                               Abnormality         Status                     ---------                               -----------         ------                     CBC Auto Differential[[id number]]        Abnormal            Final result                 Please view results for these tests on the individual orders.    CBC Auto Differential [[id number]]  (Abnormal) Collected:  05/17/20 0559    Specimen:  Blood Updated:  05/17/20 0630     WBC 6.75 10*3/mm3      RBC 3.73 10*6/mm3      Hemoglobin 11.3 g/dL      Hematocrit 33.8 %      MCV 90.6 fL      MCH 30.3 pg      MCHC 33.4 g/dL      RDW 13.0 %      RDW-SD 42.4 fl      MPV 11.0 fL      Platelets 171 10*3/mm3      Neutrophil % 67.5 %      Lymphocyte % 20.4 %      Monocyte % 8.3 %      Eosinophil % 2.2 %      Basophil % 0.6 %      Immature Grans % 1.0 %      Neutrophils, Absolute 4.55 10*3/mm3      Lymphocytes, Absolute 1.38 10*3/mm3      Monocytes, Absolute 0.56 10*3/mm3      Eosinophils, Absolute 0.15 10*3/mm3      Basophils, Absolute 0.04 10*3/mm3      Immature Grans, Absolute 0.07 10*3/mm3      nRBC 0.0 /100 WBC     POC Glucose Once [[id number]]  (Normal) Collected:  05/17/20 0622    Specimen:  Blood Updated:  05/17/20 0625     Glucose 82 mg/dL     Body Fluid Culture - Body Fluid, Urine, Clean Catch  [[id number]] Collected:  05/15/20 1725    Specimen:  Body Fluid from Urine, Clean Catch Updated:  05/17/20 0613     Body Fluid Culture No growth at 2 days     Gram Stain No organisms seen      Occasional WBCs per low power field    Body Fluid Culture - Body Fluid, Kidney, Right [[id number]] Collected:  05/15/20 2053    Specimen:  Body Fluid from Kidney, Right Updated:  05/17/20 0612     Body Fluid Culture No growth at 2 days     Gram Stain No organisms seen      Occasional WBCs per low power field    Blood Culture - Blood, Arm, Left [[id number]]  (Abnormal) Collected:  05/14/20 1022    Specimen:  Blood from Arm, Left Updated:  05/17/20 0534     Blood Culture Staphylococcus capitis     Comment: Probable contaminant requires clinical correlation, susceptibility not performed unless requested by physician.          Isolated from Aerobic Bottle     Gram Stain Aerobic Bottle Gram positive cocci in clusters    Blood Culture - Blood, Arm, Right [[id number]]  (Abnormal) Collected:  05/14/20 1138    Specimen:  Blood from Arm, Right Updated:  05/17/20 0533     Blood Culture Staphylococcus epidermidis     Comment: Probable contaminant requires clinical correlation, susceptibility not performed unless requested by physician.          Isolated from Aerobic and Anaerobic Bottles     Gram Stain Anaerobic Bottle Gram positive cocci      Aerobic Bottle Gram positive cocci in clusters    POC Glucose Once [[id number]]  (Normal) Collected:  05/16/20 1544    Specimen:  Blood Updated:  05/16/20 1546     Glucose 117 mg/dL     Blood Gas, Arterial [[id number]]  (Abnormal) Collected:  05/16/20 1303    Specimen:  Arterial Blood Updated:  05/16/20 1307     Site Arterial: right radial     Ash's Test Positive     pH, Arterial 7.427 pH units      pCO2, Arterial 42.8 mm Hg      pO2, Arterial 70.4 mm Hg      HCO3, Arterial 28.2 mmol/L      Base Excess, Arterial 3.4 mmol/L      O2 Saturation Calculated 94.2 %      A-a Gradiant 0.7 mmHg      Barometric  Pressure for Blood Gas 749.2 mmHg      Modality Adult Vent     FIO2 21 %      Ventilator Mode PS     Rate 13 Breaths/minute      PEEP 5     PSV 8 cmH2O     POC Glucose Once [[id number]]  (Abnormal) Collected:  05/16/20 1107    Specimen:  Blood Updated:  05/16/20 1109     Glucose 134 mg/dL     T4, Free [[id number]]  (Abnormal) Collected:  05/16/20 0745    Specimen:  Blood from Hand, Right Updated:  05/16/20 0905     Free T4 2.30 ng/dL     Narrative:       Results may be falsely increased if patient taking Biotin.      Phosphorus [[id number]]  (Normal) Collected:  05/16/20 0745    Specimen:  Blood from Hand, Right Updated:  05/16/20 0904     Phosphorus 3.4 mg/dL     Basic Metabolic Panel [[id number]]  (Abnormal) Collected:  05/16/20 0745    Specimen:  Blood from Hand, Right Updated:  05/16/20 0904     Glucose 148 mg/dL      BUN 15 mg/dL      Creatinine 0.45 mg/dL      Sodium 146 mmol/L      Potassium 3.7 mmol/L      Chloride 108 mmol/L      CO2 23.5 mmol/L      Calcium 8.5 mg/dL      eGFR Non African Amer >150 mL/min/1.73      BUN/Creatinine Ratio 33.3     Anion Gap 14.5 mmol/L     Narrative:       GFR Normal >60  Chronic Kidney Disease <60  Kidney Failure <15      CBC (No Diff) [[id number]]  (Normal) Collected:  05/16/20 0745    Specimen:  Blood Updated:  05/16/20 0838     WBC 10.26 10*3/mm3      RBC 4.33 10*6/mm3      Hemoglobin 13.2 g/dL      Hematocrit 39.5 %      MCV 91.2 fL      MCH 30.5 pg      MCHC 33.4 g/dL      RDW 13.5 %      RDW-SD 46.1 fl      MPV 11.1 fL      Platelets 203 10*3/mm3     POC Glucose Once [[id number]]  (Abnormal) Collected:  05/16/20 0801    Specimen:  Blood Updated:  05/16/20 0802     Glucose 138 mg/dL     POC Glucose Once [[id number]]  (Abnormal) Collected:  05/16/20 0617    Specimen:  Blood Updated:  05/16/20 0623     Glucose 156 mg/dL     Fungus Culture - Body Fluid, Kidney, Right [[id number]] Collected:  05/15/20 2053    Specimen:  Body Fluid from Kidney, Right Updated:  05/15/20 2104    POC  Glucose Once [[id number]]  (Abnormal) Collected:  05/15/20 1341    Specimen:  Blood Updated:  05/15/20 1343     Glucose 165 mg/dL     Urine Culture - Urine, Urine, Catheter [[id number]] Collected:  05/14/20 0852    Specimen:  Urine, Catheter Updated:  05/15/20 1021     Urine Culture >100,000 CFU/mL Mixed Rosy Isolated    Narrative:       Specimen contains mixed organisms of questionable pathogenicity which indicates contamination with commensal rosy.  Further identification is unlikely to provide clinically useful information.  Suggest recollection.    Magnesium [[id number]]  (Normal) Collected:  05/15/20 0423    Specimen:  Blood Updated:  05/15/20 0840     Magnesium 2.2 mg/dL     Phosphorus [[id number]]  (Abnormal) Collected:  05/15/20 0423    Specimen:  Blood Updated:  05/15/20 0840     Phosphorus 2.0 mg/dL     Blood Culture ID, PCR - Blood, Arm, Right [[id number]]  (Abnormal) Collected:  05/14/20 1138    Specimen:  Blood from Arm, Right Updated:  05/15/20 0835     BCID, PCR Staphylococcus spp, not aureus. Identification by BCID PCR.    TSH [[id number]]  (Abnormal) Collected:  05/15/20 0423    Specimen:  Blood Updated:  05/15/20 0521     TSH 6.200 uIU/mL      Comment: TSH results may be falsely decreased if patient taking Biotin.       BNP [[id number]]  (Normal) Collected:  05/15/20 0423    Specimen:  Blood Updated:  05/15/20 0521     proBNP 174.1 pg/mL     Narrative:       Among patients with dyspnea, NT-proBNP is highly sensitive for the detection of acute congestive heart failure. In addition NT-proBNP of <300 pg/ml effectively rules out acute congestive heart failure with 99% negative predictive value.    Results may be falsely decreased if patient taking Biotin.      Procalcitonin [[id number]]  (Normal) Collected:  05/15/20 0423    Specimen:  Blood Updated:  05/15/20 0521     Procalcitonin 0.16 ng/mL     Narrative:       As a Marker for Sepsis (Non-Neonates):   1. <0.5 ng/mL represents a low risk of severe sepsis  "and/or septic shock.  1. >2 ng/mL represents a high risk of severe sepsis and/or septic shock.    As a Marker for Lower Respiratory Tract Infections that require antibiotic therapy:  PCT on Admission     Antibiotic Therapy             6-12 Hrs later  > 0.5                Strongly Recommended            >0.25 - <0.5         Recommended  0.1 - 0.25           Discouraged                   Remeasure/reassess PCT  <0.1                 Strongly Discouraged          Remeasure/reassess PCT      As 28 day mortality risk marker: \"Change in Procalcitonin Result\" (> 80 % or <=80 %) if Day 0 (or Day 1) and Day 4 values are available. Refer to [email or web address]-TVpct-[email or web address]/   Change in PCT <=80 %   A decrease of PCT levels below or equal to 80 % defines a positive change in PCT test result representing a higher risk for 28-day all-cause mortality of patients diagnosed with severe sepsis or septic shock.  Change in PCT > 80 %   A decrease of PCT levels of more than 80 % defines a negative change in PCT result representing a lower risk for 28-day all-cause mortality of patients diagnosed with severe sepsis or septic shock.                Results may be falsely decreased if patient taking Biotin.     Comprehensive Metabolic Panel [[id number]]  (Abnormal) Collected:  05/15/20 0423    Specimen:  Blood Updated:  05/15/20 0511     Glucose 204 mg/dL      BUN 27 mg/dL      Creatinine 0.71 mg/dL      Sodium 145 mmol/L      Potassium 2.7 mmol/L      Chloride 99 mmol/L      CO2 30.8 mmol/L      Calcium 9.4 mg/dL      Total Protein 7.2 g/dL      Albumin 3.80 g/dL      ALT (SGPT) 37 U/L      AST (SGOT) 20 U/L      Alkaline Phosphatase 98 U/L      Total Bilirubin 0.5 mg/dL      eGFR Non African Amer 123 mL/min/1.73      Globulin 3.4 gm/dL      A/G Ratio 1.1 g/dL      BUN/Creatinine Ratio 38.0     Anion Gap 15.2 mmol/L     Narrative:       GFR Normal >60  Chronic Kidney Disease <60  Kidney Failure <15      Lipid Panel [[id number]]  " (Abnormal) Collected:  05/15/20 0423    Specimen:  Blood Updated:  05/15/20 0511     Total Cholesterol 124 mg/dL      Triglycerides 193 mg/dL      HDL Cholesterol 39 mg/dL      LDL Cholesterol  46 mg/dL      VLDL Cholesterol 38.6 mg/dL      LDL/HDL Ratio 1.19    Narrative:       Cholesterol Reference Ranges  (U.S. Department of Health and Human Services ATP III Classifications)    Desirable          <200 mg/dL  Borderline High    200-239 mg/dL  High Risk          >240 mg/dL      Triglyceride Reference Ranges  (U.S. Department of Health and Human Services ATP III Classifications)    Normal           <150 mg/dL  Borderline High  150-199 mg/dL  High             200-499 mg/dL  Very High        >500 mg/dL    HDL Reference Ranges  (U.S. Department of Health and Human Services ATP III Classifcations)    Low     <40 mg/dl (major risk factor for CHD)  High    >60 mg/dl ('negative' risk factor for CHD)        LDL Reference Ranges  (U.S. Department of Health and Human Services ATP III Classifcations)    Optimal          <100 mg/dL  Near Optimal     100-129 mg/dL  Borderline High  130-159 mg/dL  High             160-189 mg/dL  Very High        >189 mg/dL    CBC & Differential [[id number]] Collected:  05/15/20 0423    Specimen:  Blood Updated:  05/15/20 0508    Narrative:       The following orders were created for panel order CBC & Differential.  Procedure                               Abnormality         Status                     ---------                               -----------         ------                     CBC Auto Differential[[id number]]        Abnormal            Final result                 Please view results for these tests on the individual orders.    CBC Auto Differential [[id number]]  (Abnormal) Collected:  05/15/20 0423    Specimen:  Blood Updated:  05/15/20 0508     WBC 10.58 10*3/mm3      RBC 4.78 10*6/mm3      Hemoglobin 15.0 g/dL      Hematocrit 43.2 %      MCV 90.4 fL      MCH 31.4 pg      MCHC 34.7 g/dL       RDW 13.7 %      RDW-SD 45.4 fl      MPV 11.4 fL      Platelets 232 10*3/mm3      Neutrophil % 81.3 %      Lymphocyte % 9.6 %      Monocyte % 8.1 %      Eosinophil % 0.1 %      Basophil % 0.3 %      Immature Grans % 0.6 %      Neutrophils, Absolute 8.60 10*3/mm3      Lymphocytes, Absolute 1.02 10*3/mm3      Monocytes, Absolute 0.86 10*3/mm3      Eosinophils, Absolute 0.01 10*3/mm3      Basophils, Absolute 0.03 10*3/mm3      Immature Grans, Absolute 0.06 10*3/mm3      nRBC 0.0 /100 WBC     Lactic Acid, Reflex [[id number]]  (Abnormal) Collected:  05/15/20 0423    Specimen:  Blood Updated:  05/15/20 0459     Lactate 3.1 mmol/L     Hemoglobin A1c [[id number]]  (Abnormal) Collected:  05/15/20 0423    Specimen:  Blood Updated:  05/15/20 0450     Hemoglobin A1C 5.70 %     Narrative:       Hemoglobin A1C Ranges:    Increased Risk for Diabetes  5.7% to 6.4%  Diabetes                     >= 6.5%  Diabetic Goal                < 7.0%    POC Glucose Once [[id number]]  (Abnormal) Collected:  05/15/20 0005    Specimen:  Blood Updated:  05/15/20 0006     Glucose 178 mg/dL     POC Glucose Once [[id number]]  (Abnormal) Collected:  05/14/20 2236    Specimen:  Blood Updated:  05/14/20 2238     Glucose 191 mg/dL     Blood Gas, Arterial [[id number]]  (Abnormal) Collected:  05/14/20 1938    Specimen:  Arterial Blood Updated:  05/14/20 1941     Site Arterial: right radial     Ash's Test Positive     pH, Arterial 7.483 pH units      pCO2, Arterial 47.5 mm Hg      pO2, Arterial 180.3 mm Hg      HCO3, Arterial 35.6 mmol/L      Base Excess, Arterial 10.1 mmol/L      O2 Saturation Calculated 99.7 %      A-a Gradiant 0.3 mmHg      Barometric Pressure for Blood Gas 753.4 mmHg      Modality Adult Vent     FIO2 100 %      Ventilator Mode VC     Set Tidal Volume 600     Set Mech Resp Rate 16     Rate 16 Breaths/minute      PEEP 5    POC Glucose Once [[id number]]  (Abnormal) Collected:  05/14/20 1907    Specimen:  Blood Updated:  05/14/20 1908      Glucose 191 mg/dL     Lactic Acid, Reflex Timer (This will reflex a repeat order 3-3:15 hours after ordered.) [[id number]] Collected:  05/14/20 1022    Specimen:  Blood Updated:  05/14/20 1400     Hold Tube Hold for add-ons.     Comment: Auto resulted.       SARS-CoV-2 PCR ([facility] IN-HOUSE PERFORMED), NP SWAB IN TRANSPORT MEDIA - Swab, Nasopharynx [[id number]]  (Normal) Collected:  05/14/20 1024    Specimen:  Swab from Nasopharynx Updated:  05/14/20 1213     COVID19 Not Detected    Respiratory Panel, PCR - Swab, Nasopharynx [[id number]]  (Normal) Collected:  05/14/20 1024    Specimen:  Swab from Nasopharynx Updated:  05/14/20 1150     ADENOVIRUS, PCR Not Detected     Coronavirus 229E Not Detected     Coronavirus HKU1 Not Detected     Coronavirus NL63 Not Detected     Coronavirus OC43 Not Detected     Human Metapneumovirus Not Detected     Human Rhinovirus/Enterovirus Not Detected     Influenza B PCR Not Detected     Parainfluenza Virus 1 Not Detected     Parainfluenza Virus 2 Not Detected     Parainfluenza Virus 3 Not Detected     Parainfluenza Virus 4 Not Detected     Bordetella pertussis pcr Not Detected     Influenza A H1 2009 PCR Not Detected     Chlamydophila pneumoniae PCR Not Detected     Mycoplasma pneumo by PCR Not Detected     Influenza A PCR Not Detected     Influenza A H3 Not Detected     Influenza A H1 Not Detected     RSV, PCR Not Detected     Bordetella parapertussis PCR Not Detected    Narrative:       The coronavirus on the RVP is NOT COVID-19 and is NOT indicative of infection with COVID-19.     Lactic Acid, Plasma [[id number]]  (Abnormal) Collected:  05/14/20 1022    Specimen:  Blood Updated:  05/14/20 1058     Lactate 3.0 mmol/L     Urinalysis, Microscopic Only - Urine, Catheter [[id number]]  (Abnormal) Collected:  05/14/20 0852    Specimen:  Urine, Catheter Updated:  05/14/20 0926     RBC, UA 21-30 /HPF      WBC, UA 21-30 /HPF      Bacteria, UA 1+ /HPF      Squamous Epithelial Cells, UA None Seen  /HPF      Hyaline Casts, UA None Seen /LPF      Methodology Manual Light Microscopy    Urinalysis With Microscopic If Indicated (No Culture) - Urine, Catheter [[id number]]  (Abnormal) Collected:  05/14/20 0852    Specimen:  Urine, Catheter Updated:  05/14/20 0925     Color, UA Yellow     Appearance, UA Clear     pH, UA 6.0     Specific Gravity, UA 1.020     Glucose, UA Negative     Ketones, UA Negative     Bilirubin, UA Negative     Blood, UA Large (3+)     Protein, UA 30 mg/dL (1+)     Leuk Esterase, UA Large (3+)     Nitrite, UA Negative     Urobilinogen, UA 0.2 E.U./dL    Comprehensive Metabolic Panel [[id number]]  (Abnormal) Collected:  05/14/20 0815    Specimen:  Blood Updated:  05/14/20 0852     Glucose 234 mg/dL      BUN 36 mg/dL      Creatinine 0.74 mg/dL      Sodium 141 mmol/L      Potassium 3.5 mmol/L      Chloride 93 mmol/L      CO2 32.3 mmol/L      Calcium 9.9 mg/dL      Total Protein 8.0 g/dL      Albumin 4.70 g/dL      ALT (SGPT) 40 U/L      AST (SGOT) 17 U/L      Alkaline Phosphatase 127 U/L      Total Bilirubin 0.9 mg/dL      eGFR Non African Amer 117 mL/min/1.73      Globulin 3.3 gm/dL      A/G Ratio 1.4 g/dL      BUN/Creatinine Ratio 48.6     Anion Gap 15.7 mmol/L     Narrative:       GFR Normal >60  Chronic Kidney Disease <60  Kidney Failure <15      Lipase [[id number]]  (Normal) Collected:  05/14/20 0815    Specimen:  Blood Updated:  05/14/20 0852     Lipase 16 U/L     Troponin [[id number]]  (Normal) Collected:  05/14/20 0815    Specimen:  Blood Updated:  05/14/20 0852     Troponin T <0.010 ng/mL     Narrative:       Troponin T Reference Range:  <= 0.03 ng/mL-   Negative for AMI  >0.03 ng/mL-     Abnormal for myocardial necrosis.  Clinicians would have to utilize clinical acumen, EKG, Troponin and serial changes to determine if it is an Acute Myocardial Infarction or myocardial injury due to an underlying chronic condition.       Results may be falsely decreased if patient taking Biotin.       Magnesium [[id number]]  (Normal) Collected:  05/14/20 0815    Specimen:  Blood Updated:  05/14/20 0852     Magnesium 2.6 mg/dL     BNP [[id number]]  (Normal) Collected:  05/14/20 0815    Specimen:  Blood Updated:  05/14/20 0850     proBNP 209.1 pg/mL     Narrative:       Among patients with dyspnea, NT-proBNP is highly sensitive for the detection of acute congestive heart failure. In addition NT-proBNP of <300 pg/ml effectively rules out acute congestive heart failure with 99% negative predictive value.    Results may be falsely decreased if patient taking Biotin.      CBC & Differential [[id number]] Collected:  05/14/20 0815    Specimen:  Blood Updated:  05/14/20 0833    Narrative:       The following orders were created for panel order CBC & Differential.  Procedure                               Abnormality         Status                     ---------                               -----------         ------                     CBC Auto Differential[[id number]]        Abnormal            Final result                 Please view results for these tests on the individual orders.    CBC Auto Differential [[id number]]  (Abnormal) Collected:  05/14/20 0815    Specimen:  Blood Updated:  05/14/20 0833     WBC 21.36 10*3/mm3      RBC 5.54 10*6/mm3      Hemoglobin 16.9 g/dL      Hematocrit 49.9 %      MCV 90.1 fL      MCH 30.5 pg      MCHC 33.9 g/dL      RDW 13.3 %      RDW-SD 43.2 fl      MPV 11.2 fL      Platelets 241 10*3/mm3      Neutrophil % 90.2 %      Lymphocyte % 3.2 %      Monocyte % 5.9 %      Eosinophil % 0.0 %      Basophil % 0.2 %      Immature Grans % 0.5 %      Neutrophils, Absolute 19.25 10*3/mm3      Lymphocytes, Absolute 0.69 10*3/mm3      Monocytes, Absolute 1.27 10*3/mm3      Eosinophils, Absolute 0.00 10*3/mm3      Basophils, Absolute 0.04 10*3/mm3      Immature Grans, Absolute 0.11 10*3/mm3      nRBC 0.0 /100 WBC           Imaging Results (All)     Procedure Component Value Units Date/Time    XR Abdomen KUB  [[id number]] Collected:  05/18/20 0539     Updated:  05/18/20 0547    Narrative:       KUB     HISTORY: Abdominal pain     COMPARISON: 05/17/2020.      FINDINGS:  Gaseous distention of multiple loops of bowel is identified. This has  increased when compared to prior exam, and includes both small and large  bowel loops, suggesting may reflect ileus. Right-sided nephrostomy  catheter is noted. Patient also appears to have ventriculoperitoneal  shunt. Gallbladder surgically absent. Staghorn calculus is noted within  the left kidney. There are extensive degenerative changes involving the  hips bilaterally. Nasogastric tube has been removed. Remaining tubes and  lines are stable.       Impression:       Increasing gaseous distention of bowel loops, suggesting ileus.     This report was finalized on 5/18/2020 5:44 AM by Dr. Luisa Christianson M.D.       XR Abdomen KUB [[id number]] Collected:  05/17/20 0708     Updated:  05/17/20 0849    Narrative:       ONE VIEW PORTABLE ABDOMEN     HISTORY: Recent small bowel surgery. Recent right percutaneous  nephrostomy. Follow-up of ileus.     A single view partially visualized the percutaneous nephrostomy catheter  at the upper margin of the film. There is a small amount bowel gas  scattered predominantly in the colon. No significant ileus is seen.     This report was finalized on 5/17/2020 8:46 AM by Dr. Monster Wesley M.D.       CT Guided Nephrostomy Tube Placement [[id number]] Collected:  05/15/20 2025     Updated:  05/15/20 2031    Narrative:       CT-GUIDED INSERTION OF RIGHT NEPHROSTOMY DRAIN     HISTORY: Severe right renal obstruction.     The patient is on a ventilator and was placed in the left decubitus  position. A timeout was performed with nursing staff and patient and  patient identification. Following sterile prep and local anesthetic, an  18-gauge needle was advanced into the dilated right renal collecting  system by Dr. Wesley. This was followed by a 0.038 wire and  7 Ghanaian  dilator and subsequent insertion of an 8 Ghanaian pigtail drain which was  confirmed in good position by CT scan.     Somewhat bloody urine return was demonstrated and orders were written  for intermittent irrigation until clearing. The drain was attached to  gravity drainage. The patient tolerated the procedure well and there  were no immediate complications.     CONCLUSION: CT-guided insertion of right nephrostomy brain as described  above.     This report was finalized on 5/15/2020 8:28 PM by Dr. Monster Wesley M.D.       FL Retrograde Pyelogram In OR [[id number]] Collected:  05/15/20 1721     Updated:  05/15/20 1815    Narrative:       RETROGRADE PYELOGRAM     HISTORY: Several right ureteral stones with marked obstruction.     Imaging in the operating room shows instrumentation of the distal right  ureter and injection of contrast partially outlines the obstructing  stone within the distal ureter. Further instrumentation of the right  ureter was performed but a wire could not be passed beyond the  obstructing stone based on the images provided.     7 images were obtained and the fluoroscopy time measures 1 minute and 24  seconds.     This report was finalized on 5/15/2020 6:12 PM by Dr. Monster Wesley M.D.       CT Abdomen Pelvis With Contrast [[id number]] Collected:  05/14/20 1037     Updated:  05/15/20 1436    Narrative:       CT ABDOMEN AND PELVIS WITH CONTRAST     HISTORY: Nausea and vomiting. The patient has history of small bowel  obstruction.     TECHNIQUE: Axial CT images of the abdomen and pelvis were obtained  following administration of intravenous contrast. The patient was not  given oral contrast Coronal and sagittal reformats were obtained.     COMPARISON: 02/21/2020     FINDINGS: Percutaneous gastrostomy tube is in place. The stomach is  dilated with an air-fluid level. There is diffuse dilatation of the  proximal and mid small bowel loops demonstrating air-fluid levels  measuring up to  4.2 cm. There is a long zone of transition seen within  the midline pelvis, images 137 through 121, following which the distal  small bowel is completely decompressed. The colon itself is  unremarkable.     The liver demonstrates normal attenuation. Spleen is normal. Pancreas is  normal without ductal dilatation. The gallbladder is surgically absent.  Bilateral adrenal glands are normal. There is severe right-sided  hydroureteronephrosis with numerous layering calculi seen within the  right kidney. The right ureter remains dilated to the level of the  pelvic inlet where it shows numerous internal calculi. The largest  obstructing stone at this level measures up to 1.5 x 1.1 cm with  numerous small additional nonobstructing calculi proximal to this large  obstructing calculus. Distal to this, the ureter is collapsed. There are  numerous calculi also seen within the left kidney however, there is no  hydronephrosis and there are no stones within the left ureter. No  pathological retroperitoneal lymphadenopathy. The urinary bladder is  partially distended with circumferential wall thickening. There is  bibasilar atelectasis with decreased inflation within bilateral lung  fields. Patchy consolidation is seen posteriorly within the right upper  lobe. A  shunt catheter is seen anteriorly within the subcutaneous  right paramidline fat entering the abdomen in the right mid abdomen and  tip within the left anterior pelvis. No focal fluid collection is seen  in association. There is also an implanted pump device with a catheter  extending into the spinal canal.       Impression:       1. CT evidence of mid to distal small bowel obstruction. The transition  point is gradual and seen within the midline pelvis with completely  collapsed distal small bowel loops. Adhesions are suspected.  2. Severe right-sided hydroureteronephrosis with numerous calculi within  the right distal ureter, not significantly changed from prior CT.  There  are bilateral additional nephroliths also demonstrated.  3. Bibasilar atelectasis. Additional patchy area of consolidation within  the posterior right upper lobe most concerning for pneumonia and  clinical correlation is recommended.     These findings were discussed with Yobany Black MD by telephone.     Radiation dose reduction techniques were utilized, including automated  exposure control and exposure modulation based on body size.     This report was finalized on 5/15/2020 2:33 PM by Dr. Neo Shafer M.D.       XR Chest 1 View [[id number]] Collected:  05/15/20 1407     Updated:  05/15/20 1416    Narrative:       AP SEMIUPRIGHT PORTABLE CHEST     HISTORY: 4-year-old with ET tube placement.     COMPARISON: AP chest 05/14/2020.     FINDINGS: ET tube is in place and the tip is 2 cm above the sal. Lung  volumes are diminished and there is increased density within the medial  lower lobes consistent with atelectasis. No perihilar edema is  demonstrated. Ventricular peritoneal shunt tubing overlies the right  pneumothorax. A nasogastric tube tip extends in the region of the  gastric antrum.       Impression:       1. Interval intubation with ET tube tip 2 cm above the sal.  2. Diminished lung volumes with basilar atelectasis. Nasogastric tube  tip is in the region of the gastric antrum.     This report was finalized on 5/15/2020 2:13 PM by Dr. Neville Chisholm M.D.       XR Chest 1 View [[id number]] Collected:  05/14/20 2159     Updated:  05/14/20 2204    Narrative:       PORTABLE CHEST 05/14/2020 AT 9:41 PM     CLINICAL HISTORY: Patient intubated. History of small bowel obstruction.  History of traumatic brain injury. Evaluate ET tube placement     Compared to the previous chest dated 02/27/2020.     In the interval, an endotracheal tube has been placed and appears in  satisfactory position with its tip a few centimeters above the sal.  Nasogastric tube is also been placed and appears in  satisfactory  position with its tip projecting off the inferior edge of this film. The  lungs are poorly inflated but appear free of focal infiltrates. The  heart is normal in size. There are no pleural effusions.     This report was finalized on 5/14/2020 10:01 PM by Dr. Leon Naqvi M.D.       CT Head Without Contrast [[id number]] Collected:  05/14/20 1055     Updated:  05/14/20 1100    Narrative:       CT HEAD WITHOUT CONTRAST     HISTORY: 40-year-old male with nausea and vomiting.      TECHNIQUE:  Head CT includes axial imaging from the base of skull to the  vertex without intravenous contrast. Radiation dose reduction techniques  were utilized, including automated exposure control and exposure  modulation based on body size.     COMPARISON: Head CT without contrast 12/20/2017 at [facility].     FINDINGS: There is a right parietal approach ventriculostomy drainage  catheter with tip extending into the anterior aspect of the left lateral  ventricle. There is ventricular enlargement. Marked enlargement is  present at the occipital horns of the lateral ventricles, greater on the  left and this appears associated with ex vacuo dilatation. There is a  prominent CSF density and apparent cystic encephalomalacia anterior  right frontal lobe involving an area measuring 6.2 x 4 x 6 cm and  contacting the anterior margin of the frontal horn right lateral  ventricle. This is without change. There are additional areas of  encephalomalacia involving the anteromedial left frontal lobe, left  temporal lobe. There has been left temporal-occipital craniectomy and  left frontoparietal craniotomy with similar configuration to the  previous exam. There is CSF density extending along the posterior and  inferior aspect of the left cerebellar hemisphere without change. Within  the posteroinferior left cerebellar hemisphere there is a 9 mm focus of  low density within the parenchyma without change. There is no  evidence  for cerebral edema or shift of the midline structures. There is no  evidence for significant change compared to the previous head CT  12/20/2017. Within the posterior right maxillary sinus there is  increased density material with bubbles of gas that may be associated  with right maxillary sinusitis and this is new when compared to prior  exam 12/20/2017.        Impression:       1.  Extensive postsurgical changes with areas of encephalomalacia and  cystic encephalomalacia without evidence for change compared to the  previous exam 12/20/2017. There is ventricular enlargement that is  without change.  Right parietal ventriculostomy drainage catheter tip  extends into the left lateral ventricle without change. No evidence for  intracranial hemorrhage.  2.  Right posterior maxillary sinus mucosal thickening with bubbles of  gas that may represent sinusitis and this is new when compared to prior  exam 12/20/2017.     This report was finalized on 5/14/2020 10:57 AM by Dr. Neville Chisholm M.D.       XR Abdomen KUB [[id number]] Collected:  05/14/20 1010     Updated:  05/14/20 1017    Narrative:       Abdominal radiographs     HISTORY:NG tube placement     TECHNIQUE:  2 AP supine radiographs of the abdomen     COMPARISON:CT abdomen and pelvis 05/14/2020       Impression:       FINDINGS AND IMPRESSION:  A gastrostomy tube is present. A nasogastric tube terminates in the  stomach. There are right right quadrant surgical clips. Multiple  moderately distended loops of small bowel are present measuring up to  4.7 cm, concerning for small bowel obstruction given its appearance on  CT. Please refer to recent CT abdomen and pelvis for further evaluation.     Presumed shunt catheter courses through the right aspect of the abdomen.  Intravenous contrast is present within the left kidney. More dense renal  calculi overlie the left kidney and measure up to 1.1 cm, best seen on  recent CT. Pelvic stimulator is incompletely  visualized.     This report was finalized on 5/14/2020 10:14 AM by Dr. Maurizio Girard M.D.       XR Shunt Series [[id number]] Collected:  05/14/20 0809     Updated:  05/14/20 0831    Narrative:       SHUNT SERIES     HISTORY: Nausea and vomiting.  shunt.     TECHNIQUE: A total of nine images of the head, neck, chest, and abdomen  are provided. These are correlated KUB 02/25/2020.     FINDINGS: There is evidence of a previous large left craniotomy. A  ventriculoperitoneal shunt is placed from a right occipital approach and  has its tip near the midline of the head. The shunt tubing appears  intact along its course through the head, across the neck, anterior  right chest wall, and over the abdomen. The distal end of the shunt is  coiled over the pelvis.     There are clips from cholecystectomy and there is a gastrostomy tube.  The stomach is dilated with air. There are dilated loops of small bowel  in the central abdomen which measure up to 4.5 cm diameter. There is  some gas in the colon and the rectum. No intraperitoneal free air is  present. The lungs are clear.     There is left nephrolithiasis with the largest calculus projecting over  the upper pole measuring 18 mm.       Impression:       Ventriculoperitoneal shunt appears intact. There is gaseous  dilatation of the small bowel and the stomach with a G-tube in place.  Some gas is observed in the colon. The gas pattern suggests early small  bowel obstruction.     This report was finalized on 5/14/2020 8:28 AM by Dr. Brett Oakley M.D.             Medication Review:   Current Facility-Administered Medications   Medication Dose Route Frequency Provider Last Rate Last Dose   • amantadine (SYMMETREL) solution 100 mg  100 mg Per G Tube Q12H Americo Mendez MD   100 mg at 05/17/20 7942   • cefTRIAXone (ROCEPHIN) IVPB 1 g  1 g Intravenous Q24H Mayelin Taylor MD       • chlorhexidine (PERIDEX) 0.12 % solution 15 mL  15 mL Mouth/Throat Q12H Americo Mendez MD   15 mL  at 05/17/20 2214   • dextrose (D50W) 25 g/ 50mL Intravenous Solution 25 g  25 g Intravenous Q15 Min PRN Americo Mendez MD       • dextrose (GLUTOSE) oral gel 15 g  15 g Oral Q15 Min PRN Americo Mendez MD       • glucagon (human recombinant) (GLUCAGEN DIAGNOSTIC) injection 1 mg  1 mg Subcutaneous Q15 Min PRN Americo Mendez MD       • Glycerin-Hypromellose- (ARTIFICIAL TEARS) 0.2-0.2-1 % ophthalmic solution solution 1 drop  1 drop Both Eyes Q3H PRN Americo Mendez MD       • HYDROmorphone (DILAUDID) injection 0.5 mg  0.5 mg Intravenous 4x Daily PRN Americo Mendez MD   0.5 mg at 05/18/20 0428   • insulin lispro (humaLOG) injection 0-14 Units  0-14 Units Subcutaneous TID AC Americo Mendez MD   Stopped at 05/16/20 0646   • ipratropium-albuterol (DUO-NEB) nebulizer solution 3 mL  3 mL Nebulization Q4H PRN Americo Mendez MD       • lactated ringers 1,000 mL with potassium chloride 20 mEq infusion  125 mL/hr Intravenous Continuous Americo Mendez  mL/hr at 05/18/20 0342 125 mL/hr at 05/18/20 0342   • levETIRAcetam in NaCl 0.54% (KEPPRA) IVPB 1,500 mg  1,500 mg Intravenous Q12H Americo Mendez MD   1,500 mg at 05/17/20 1953   • Magnesium Sulfate 2 gram Bolus, followed by 8 gram infusion (total Mg dose 10 grams)- Mg less than or equal to 1mg/dL  2 g Intravenous PRN Americo Mendez MD        Or   • Magnesium Sulfate 2 gram / 50mL Infusion (GIVE X 3 BAGS TO EQUAL 6GM TOTAL DOSE) - Mg 1.1 - 1.5 mg/dl  2 g Intravenous PRN Americo Mendez MD        Or   • Magnesium Sulfate 4 gram infusion- Mg 1.6-1.9 mg/dL  4 g Intravenous PRN Americo Mendez MD       • pantoprazole (PROTONIX) injection 40 mg  40 mg Intravenous Q12H Americo Mendez MD   40 mg at 05/17/20 2214   • Pharmacy to dose vancomycin   Does not apply Continuous PRN Mayelin Taylor MD       • potassium chloride (KLOR-CON) packet 40 mEq  40 mEq Oral PRN Americo Mendez MD   40 mEq at 05/15/20 0643   • potassium chloride (MICRO-K) CR capsule 40 mEq  40 mEq Oral PRN Andrea  MD Americo        Or   • potassium chloride (KLOR-CON) packet 40 mEq  40 mEq Oral PRN Americo Mendez MD        Or   • potassium chloride 10 mEq in 100 mL IVPB  10 mEq Intravenous Q1H PRN Americo Mendez  mL/hr at 05/16/20 0457 10 mEq at 05/16/20 0457   • potassium chloride (MICRO-K) CR capsule 40 mEq  40 mEq Oral PRN Americo Mendez MD       • potassium phosphate 45 mmol in sodium chloride 0.9 % 500 mL infusion  45 mmol Intravenous PRN Americo Mendez MD        Or   • potassium phosphate 30 mmol in sodium chloride 0.9 % 250 mL infusion  30 mmol Intravenous PRN Americo Mendez MD        Or   • potassium phosphate 15 mmol in sodium chloride 0.9 % 100 mL infusion  15 mmol Intravenous PRN Americo Mendez MD        Or   • sodium phosphates 40 mmol in sodium chloride 0.9 % 500 mL IVPB  40 mmol Intravenous PRN Americo Mendez MD        Or   • sodium phosphates 20 mmol in sodium chloride 0.9 % 250 mL IVPB  20 mmol Intravenous PRN Americo Mendez MD       • promethazine (PHENERGAN) injection 12.5 mg  12.5 mg Intravenous 5x Daily PRN Americo Mendez MD       • vancomycin 1250 mg/250 mL 0.9% NS IVPB ([facility])  1,250 mg Intravenous Q12H Mayelin Taylor MD           Current Facility-Administered Medications:   •  amantadine (SYMMETREL) solution 100 mg, 100 mg, Per G Tube, Q12H, Americo Mendez MD, 100 mg at 05/17/20 2214  •  cefTRIAXone (ROCEPHIN) IVPB 1 g, 1 g, Intravenous, Q24H, Mayelin Taylor MD  •  chlorhexidine (PERIDEX) 0.12 % solution 15 mL, 15 mL, Mouth/Throat, Q12H, Americo Mendez MD, 15 mL at 05/17/20 2214  •  dextrose (D50W) 25 g/ 50mL Intravenous Solution 25 g, 25 g, Intravenous, Q15 Min PRN, Americo Mendez MD  •  dextrose (GLUTOSE) oral gel 15 g, 15 g, Oral, Q15 Min PRN, Americo Mendez MD  •  glucagon (human recombinant) (GLUCAGEN DIAGNOSTIC) injection 1 mg, 1 mg, Subcutaneous, Q15 Min PRN, Americo Mendez MD  •  Glycerin-Hypromellose- (ARTIFICIAL TEARS) 0.2-0.2-1 % ophthalmic solution solution 1 drop, 1 drop, Both Eyes,  Q3H PRN, Americo Mendez MD  •  HYDROmorphone (DILAUDID) injection 0.5 mg, 0.5 mg, Intravenous, 4x Daily PRN, Americo Mendez MD, 0.5 mg at 05/18/20 0428  •  insulin lispro (humaLOG) injection 0-14 Units, 0-14 Units, Subcutaneous, TID AC, Americo Mendez MD, Stopped at 05/16/20 0646  •  ipratropium-albuterol (DUO-NEB) nebulizer solution 3 mL, 3 mL, Nebulization, Q4H PRN, Americo Mendez MD  •  lactated ringers 1,000 mL with potassium chloride 20 mEq infusion, 125 mL/hr, Intravenous, Continuous, Americo Mendez MD, Last Rate: 125 mL/hr at 05/18/20 0342, 125 mL/hr at 05/18/20 0342  •  levETIRAcetam in NaCl 0.54% (KEPPRA) IVPB 1,500 mg, 1,500 mg, Intravenous, Q12H, Americo Mendez MD, 1,500 mg at 05/17/20 1953  •  Magnesium Sulfate 2 gram Bolus, followed by 8 gram infusion (total Mg dose 10 grams)- Mg less than or equal to 1mg/dL, 2 g, Intravenous, PRN **OR** Magnesium Sulfate 2 gram / 50mL Infusion (GIVE X 3 BAGS TO EQUAL 6GM TOTAL DOSE) - Mg 1.1 - 1.5 mg/dl, 2 g, Intravenous, PRN **OR** Magnesium Sulfate 4 gram infusion- Mg 1.6-1.9 mg/dL, 4 g, Intravenous, PRN, Americo Mendez MD  •  pantoprazole (PROTONIX) injection 40 mg, 40 mg, Intravenous, Q12H, Americo Mendez MD, 40 mg at 05/17/20 2214  •  Pharmacy to dose vancomycin, , Does not apply, Continuous PRN, Mayelin Taylor MD  •  potassium chloride (KLOR-CON) packet 40 mEq, 40 mEq, Oral, PRN, Americo Mendez MD, 40 mEq at 05/15/20 0643  •  potassium chloride (MICRO-K) CR capsule 40 mEq, 40 mEq, Oral, PRN **OR** potassium chloride (KLOR-CON) packet 40 mEq, 40 mEq, Oral, PRN **OR** potassium chloride 10 mEq in 100 mL IVPB, 10 mEq, Intravenous, Q1H PRN, Americo Mendez MD, Last Rate: 100 mL/hr at 05/16/20 0457, 10 mEq at 05/16/20 0457  •  potassium chloride (MICRO-K) CR capsule 40 mEq, 40 mEq, Oral, PRN, Americo Mendez MD  •  potassium phosphate 45 mmol in sodium chloride 0.9 % 500 mL infusion, 45 mmol, Intravenous, PRN **OR** potassium phosphate 30 mmol in sodium chloride 0.9 %  250 mL infusion, 30 mmol, Intravenous, PRN **OR** potassium phosphate 15 mmol in sodium chloride 0.9 % 100 mL infusion, 15 mmol, Intravenous, PRN **OR** sodium phosphates 40 mmol in sodium chloride 0.9 % 500 mL IVPB, 40 mmol, Intravenous, PRN **OR** sodium phosphates 20 mmol in sodium chloride 0.9 % 250 mL IVPB, 20 mmol, Intravenous, PRN, Americo Mendez MD  •  promethazine (PHENERGAN) injection 12.5 mg, 12.5 mg, Intravenous, 5x Daily PRN, Americo Mendez MD  •  [COMPLETED] vancomycin 1500 mg/500 mL 0.9% NS IVPB ([facility]), 1,500 mg, Intravenous, Once, 1,500 mg at 05/17/20 2214 **FOLLOWED BY** vancomycin 1250 mg/250 mL 0.9% NS IVPB ([facility]), 1,250 mg, Intravenous, Q12H, Mayelin Taylor MD  Medications Discontinued During This Encounter   Medication Reason   • iopamidol (ISOVUE-300) 61 % injection 100 mL    • sodium chloride 0.9 % bolus 500 mL    • ipratropium-albuterol (DUO-NEB) nebulizer solution 3 mL    • levETIRAcetam (KEPPRA) 100 MG/ML solution 300 mg    • sodium chloride (NS) irrigation solution Patient Discharge   • bupivacaine-EPINEPHrine PF 30 mL, bupivacaine liposome 20 mL mixture Patient Discharge   • sodium chloride 0.9 % flush 3 mL Patient Transfer   • sodium chloride 0.9 % flush 3-10 mL Patient Transfer   • lidocaine PF 1% (XYLOCAINE) injection 0.5 mL Patient Transfer   • fentaNYL citrate (PF) (SUBLIMAZE) injection 50 mcg Patient Transfer   • famotidine (PEPCID) injection 20 mg Patient Transfer   • acetaminophen (TYLENOL) tablet 650 mg Patient Transfer   • acetaminophen (TYLENOL) suppository 650 mg Patient Transfer   • HYDROcodone-acetaminophen (NORCO) 7.5-325 MG per tablet 1 tablet Patient Transfer   • HYDROmorphone (DILAUDID) injection 0.5 mg Patient Transfer   • oxyCODONE-acetaminophen (PERCOCET) 7.5-325 MG per tablet 1 tablet Patient Transfer   • fentaNYL citrate (PF) (SUBLIMAZE) injection 50 mcg Patient Transfer   • labetalol (NORMODYNE,TRANDATE) injection 5 mg Patient Transfer   • hydrALAZINE (APRESOLINE)  injection 5 mg Patient Transfer   • ePHEDrine injection 5 mg Patient Transfer   • naloxone (NARCAN) injection 0.2 mg Patient Transfer   • flumazenil (ROMAZICON) injection 0.2 mg Patient Transfer   • promethazine (PHENERGAN) injection 6.25 mg Patient Transfer   • promethazine (PHENERGAN) injection 12.5 mg Patient Transfer   • promethazine (PHENERGAN) suppository 25 mg Patient Transfer   • promethazine (PHENERGAN) tablet 25 mg Patient Transfer   • diphenhydrAMINE (BENADRYL) injection 12.5 mg Patient Transfer   • diphenhydrAMINE (BENADRYL) capsule 25 mg Patient Transfer   • lactated ringers infusion    • sodium chloride 0.9 % with KCl 20 mEq/L infusion    • sodium chloride 0.9 % infusion    • azithromycin (ZITHROMAX) 500 mg 0.9% NaCl (Add-vantage) 250 mL    • guaiFENesin (MUCINEX) 12 hr tablet 600 mg    • levETIRAcetam (KEPPRA) 100 MG/ML solution 1,500 mg    • Ioversol (OPTIRAY 350) injection Patient Discharge   • sodium chloride (NS) irrigation solution Patient Discharge   • vancomycin 1750 mg/500 mL 0.9% NS IVPB ([facility])    • vancomycin 750 mg/250 mL 0.9% NS add-vantage    • Pharmacy to dose vancomycin    • sodium chloride 0.9 % flush 3 mL    • sodium chloride 0.9 % flush 10 mL    • cefTRIAXone (ROCEPHIN) IVPB 1 g        Assessment/Plan   Hydronephrosis  Atrophic right kidney  Right ureteral stone      SBO (small bowel obstruction) (CMS/HCC)        Plan   - continue nephrostomy tube  - antibiotics per Dr. Taylor  - will plan definitive stone management in 2 weeks    Brett Jay Jr., MD  05/18/20  07:33

## 2020-05-18 NOTE — PROGRESS NOTES
"  Infectious Diseases Progress Note    Mayelin Taylor MD     [facility]  [place]: 3 days  Patient Identification:  Name: Adrian Kuo  Age: 40 y.o.  Sex: male  :  1980  MRN: [id number]         Primary Care Physician: Dewayne Rodriguez MD            Subjective: Nonverbal and does not engage.  Interval History: Transferred out of ICU after being hemodynamically stable.    Objective:    Scheduled Meds:  amantadine 100 mg Per G Tube Q12H   cefTRIAXone 1 g Intravenous Q24H   chlorhexidine 15 mL Mouth/Throat Q12H   insulin lispro 0-14 Units Subcutaneous TID AC   levETIRAcetam 1,500 mg Intravenous Q12H   pantoprazole 40 mg Intravenous Q12H     Continuous Infusions:  custom IV KCl infusion builder 125 mL/hr Last Rate: 125 mL/hr (20 1731)       Vital signs in last 24 hours:  Temp:  [94.3 °F (34.6 °C)-98.7 °F (37.1 °C)] 98.7 °F (37.1 °C)  Heart Rate:  [46-75] 72  Resp:  [18] 18  BP: ()/(65-79) 116/65    Intake/Output:    Intake/Output Summary (Last 24 hours) at 2020  Last data filed at 2020  Gross per 24 hour   Intake 1385.05 ml   Output 1300 ml   Net 85.05 ml       Exam:  /65 (BP Location: Right arm, Patient Position: Lying)   Pulse 72   Temp 98.7 °F (37.1 °C) (Rectal)   Resp 18   Ht 180.3 cm (71\")   Wt 70.8 kg (156 lb 1.4 oz)   SpO2 95%   BMI 21.77 kg/m²   Patient is examined using the personal protective equipment as per guidelines from infection control for this particular patient as enacted.  Hand washing was performed before and after patient interaction.  General Appearance:   Awake does not engage nonverbal   Head:    Normocephalic, without obvious abnormality, atraumatic   Eyes:    PERRL, conjunctivae/corneas clear, EOM's intact, both eyes   Ears:    Normal external ear canals, both ears   Nose:   Nares normal, septum midline, mucosa normal, no drainage    or sinus tenderness   Throat:   Lips, tongue, gums normal; oral mucosa pink and moist   Neck:   " Supple, symmetrical, trachea midline, no adenopathy;     thyroid:  no enlargement/tenderness/nodules; no carotid    bruit or JVD   Back:     Symmetric, no curvature, ROM normal, right-sided nephrostomy in place   Lungs:     Clear to auscultation bilaterally, respirations unlabored   Chest Wall:    No tenderness or deformity    Heart:    Regular rate and rhythm, S1 and S2 normal, no murmur, rub   or gallop   Abdomen:    Midline incision dressed   Extremities:  Contractures and atrophy the extremities noted   Pulses:   Pulses palpable in all extremities; symmetric all extremities   Skin:   Skin color normal, Skin is warm and dry,  no rashes or palpable lesions   Neurologic:  Contractions nonverbal due to traumatic brain injury            Data Review:    I reviewed the patient's new clinical results.  Results from last 7 days   Lab Units 05/17/20  0559 05/16/20  0745 05/15/20  0423 05/14/20  0815   WBC 10*3/mm3 6.75 10.26 10.58 21.36*   HEMOGLOBIN g/dL 11.3* 13.2 15.0 16.9   PLATELETS 10*3/mm3 171 203 232 241     Results from last 7 days   Lab Units 05/17/20  0559 05/16/20  0745 05/15/20  0423 05/14/20  0815   SODIUM mmol/L 144 146* 145 141   POTASSIUM mmol/L 4.2 3.7 2.7* 3.5   CHLORIDE mmol/L 112* 108* 99 93*   CO2 mmol/L 23.0 23.5 30.8* 32.3*   BUN mg/dL 10 15 27* 36*   CREATININE mg/dL 0.41* 0.45* 0.71* 0.74*   CALCIUM mg/dL 8.5* 8.5* 9.4 9.9   GLUCOSE mg/dL 94 148* 204* 234*     Microbiology Results (last 10 days)     Procedure Component Value - Date/Time    Body Fluid Culture - Body Fluid, Kidney, Right [[id number]] Collected:  05/15/20 2053    Lab Status:  Preliminary result Specimen:  Body Fluid from Kidney, Right Updated:  05/17/20 0612     Body Fluid Culture No growth at 2 days     Gram Stain No organisms seen      Occasional WBCs per low power field    Body Fluid Culture - Body Fluid, Urine, Clean Catch [[id number]] Collected:  05/15/20 1725    Lab Status:  Preliminary result Specimen:  Body Fluid from Urine,  Clean Catch Updated:  05/17/20 0613     Body Fluid Culture No growth at 2 days     Gram Stain No organisms seen      Occasional WBCs per low power field    Blood Culture - Blood, Arm, Left [[id number]] Collected:  05/15/20 1526    Lab Status:  Preliminary result Specimen:  Blood from Arm, Left Updated:  05/17/20 1545     Blood Culture No growth at 2 days    Blood Culture - Blood, Hand, Left [[id number]] Collected:  05/15/20 1526    Lab Status:  Preliminary result Specimen:  Blood from Hand, Left Updated:  05/17/20 1545     Blood Culture No growth at 2 days    Blood Culture - Blood, Arm, Right [[id number]]  (Abnormal) Collected:  05/14/20 1138    Lab Status:  Final result Specimen:  Blood from Arm, Right Updated:  05/17/20 0533     Blood Culture Staphylococcus epidermidis     Comment: Probable contaminant requires clinical correlation, susceptibility not performed unless requested by physician.          Isolated from Aerobic and Anaerobic Bottles     Gram Stain Anaerobic Bottle Gram positive cocci      Aerobic Bottle Gram positive cocci in clusters    Blood Culture ID, PCR - Blood, Arm, Right [[id number]]  (Abnormal) Collected:  05/14/20 1138    Lab Status:  Final result Specimen:  Blood from Arm, Right Updated:  05/15/20 0835     BCID, PCR Staphylococcus spp, not aureus. Identification by BCID PCR.    SARS-CoV-2 PCR ([place] IN-HOUSE PERFORMED), NP SWAB IN TRANSPORT MEDIA - Swab, Nasopharynx [[id number]]  (Normal) Collected:  05/14/20 1024    Lab Status:  Final result Specimen:  Swab from Nasopharynx Updated:  05/14/20 1213     COVID19 Not Detected    Respiratory Panel, PCR - Swab, Nasopharynx [[id number]]  (Normal) Collected:  05/14/20 1024    Lab Status:  Final result Specimen:  Swab from Nasopharynx Updated:  05/14/20 1150     ADENOVIRUS, PCR Not Detected     Coronavirus 229E Not Detected     Coronavirus HKU1 Not Detected     Coronavirus NL63 Not Detected     Coronavirus OC43 Not Detected     Human Metapneumovirus  Not Detected     Human Rhinovirus/Enterovirus Not Detected     Influenza B PCR Not Detected     Parainfluenza Virus 1 Not Detected     Parainfluenza Virus 2 Not Detected     Parainfluenza Virus 3 Not Detected     Parainfluenza Virus 4 Not Detected     Bordetella pertussis pcr Not Detected     Influenza A H1 2009 PCR Not Detected     Chlamydophila pneumoniae PCR Not Detected     Mycoplasma pneumo by PCR Not Detected     Influenza A PCR Not Detected     Influenza A H3 Not Detected     Influenza A H1 Not Detected     RSV, PCR Not Detected     Bordetella parapertussis PCR Not Detected    Narrative:       The coronavirus on the RVP is NOT COVID-19 and is NOT indicative of infection with COVID-19.     Blood Culture - Blood, Arm, Left [[id number]]  (Abnormal) Collected:  05/14/20 1022    Lab Status:  Final result Specimen:  Blood from Arm, Left Updated:  05/17/20 0534     Blood Culture Staphylococcus capitis     Comment: Probable contaminant requires clinical correlation, susceptibility not performed unless requested by physician.          Isolated from Aerobic Bottle     Gram Stain Aerobic Bottle Gram positive cocci in clusters    Urine Culture - Urine, Urine, Catheter [[id number]] Collected:  05/14/20 0852    Lab Status:  Final result Specimen:  Urine, Catheter Updated:  05/15/20 1021     Urine Culture >100,000 CFU/mL Mixed Rosy Isolated    Narrative:       Specimen contains mixed organisms of questionable pathogenicity which indicates contamination with commensal rosy.  Further identification is unlikely to provide clinically useful information.  Suggest recollection.            Assessment:  1-toxic metabolic encephalopathy secondary to  2-obstructed right-sided calculus pyelonephritis status post attempted cystoscopy and retrograde stenting followed by right-sided percutaneous nephrostomy and drainage.  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    6- other diagnosis per internal medicine service        Recommendations/Discussions:  · He seems to have improved physiologically with stable hemodynamics after percutaneous nephrostomy and drainage of obstructive  tract.    · His blood cultures could very well be contaminant but given his underlying medical condition I think it needs to be approached as this is a manifestation of obstructive pyelonephritis and this pathogen could be the cause of the  tract infection.    · Would recommend 2 weeks of IV vancomycin along with Rocephin address complicated  infection while aggressively providing him supportive care including prevention of recurrent aspiration.  · Rest of the supportive care per primary team.    Mayelin Taylor MD  5/17/2020  21:10    Much of this encounter note is an electronic transcription/translation of spoken language to printed text. The electronic translation of spoken language may permit erroneous, or at times, nonsensical words or phrases to be inadvertently transcribed; Although I have reviewed the note for such errors, some may still exist

## 2020-05-18 NOTE — PROGRESS NOTES
"Daily progress note    Chief complaint  Doing same  No respiratory distress  Nonverbal    History of present illness  40-year-old white male who is well-known to our service with history of traumatic brain injury seizure disorder quadriplegic with contracture diabetes and immobilization syndrome who is nonverbal who has multiple episodes of ileus brought to the emergency room with nausea vomiting started yesterday.  Patient evaluated in ER found to have recurrent small bowel obstruction and pneumonia admit for management.  Patient also found to have UTI.  Again patient is nonverbal unable to give detailed history but we know the patient very well from previous admissions.  Patient is DNR per his wishes.  Patient ruled out for COVID-19     REVIEW OF SYSTEMS  Unable to perform      PHYSICAL EXAM  Blood pressure 132/74, pulse 60, temperature 98.3 °F (36.8 °C), temperature source Rectal, resp. rate 16, height 180.3 cm (70.98\"), weight 70.8 kg (156 lb 1.4 oz), SpO2 94 %.    GENERAL:  Eyes open  CV: regular rhythm, regular rate  RESPIRATORY: normal effort  ABDOMEN: soft, left upper quadrant G-tube with no signs of infection, distended, absent bowel sounds  : deferred  MUSCULOSKELETAL:  Contracted and chronic deformities  NEURO:  nonverbal at baseline, contractures all 4 extremities with history of quadriplegia.  SKIN: warm, dry     LAB RESULTS  Lab Results (last 24 hours)     Procedure Component Value Units Date/Time    POC Glucose Once [[id number]]  (Normal) Collected:  05/18/20 1204    Specimen:  Blood Updated:  05/18/20 1205     Glucose 74 mg/dL     Body Fluid Culture - Body Fluid, Urine, Clean Catch [[id number]] Collected:  05/15/20 1725    Specimen:  Body Fluid from Urine, Clean Catch Updated:  05/18/20 0945     Body Fluid Culture No growth at 3 days     Gram Stain No organisms seen      Occasional WBCs per low power field    Body Fluid Culture - Body Fluid, Kidney, Right [[id number]] Collected:  05/15/20 2053    " Specimen:  Body Fluid from Kidney, Right Updated:  05/18/20 0944     Body Fluid Culture No growth at 3 days     Gram Stain No organisms seen      Occasional WBCs per low power field    Basic Metabolic Panel [[id number]]  (Abnormal) Collected:  05/18/20 0635    Specimen:  Blood Updated:  05/18/20 0729     Glucose 80 mg/dL      BUN 9 mg/dL      Creatinine 0.42 mg/dL      Sodium 143 mmol/L      Potassium 4.8 mmol/L      Chloride 109 mmol/L      CO2 20.9 mmol/L      Calcium 8.8 mg/dL      eGFR Non African Amer >150 mL/min/1.73      BUN/Creatinine Ratio 21.4     Anion Gap 13.1 mmol/L     Narrative:       GFR Normal >60  Chronic Kidney Disease <60  Kidney Failure <15      CBC & Differential [[id number]] Collected:  05/18/20 0635    Specimen:  Blood Updated:  05/18/20 0702    Narrative:       The following orders were created for panel order CBC & Differential.  Procedure                               Abnormality         Status                     ---------                               -----------         ------                     CBC Auto Differential[[id number]]        Abnormal            Final result                 Please view results for these tests on the individual orders.    CBC Auto Differential [[id number]]  (Abnormal) Collected:  05/18/20 0635    Specimen:  Blood Updated:  05/18/20 0702     WBC 6.28 10*3/mm3      RBC 3.51 10*6/mm3      Hemoglobin 11.0 g/dL      Hematocrit 31.8 %      MCV 90.6 fL      MCH 31.3 pg      MCHC 34.6 g/dL      RDW 13.1 %      RDW-SD 43.3 fl      MPV 10.7 fL      Platelets 194 10*3/mm3      Neutrophil % 70.5 %      Lymphocyte % 18.8 %      Monocyte % 6.8 %      Eosinophil % 2.1 %      Basophil % 0.5 %      Immature Grans % 1.3 %      Neutrophils, Absolute 4.43 10*3/mm3      Lymphocytes, Absolute 1.18 10*3/mm3      Monocytes, Absolute 0.43 10*3/mm3      Eosinophils, Absolute 0.13 10*3/mm3      Basophils, Absolute 0.03 10*3/mm3      Immature Grans, Absolute 0.08 10*3/mm3      nRBC 0.0 /100  WBC     POC Glucose Once [[id number]]  (Normal) Collected:  05/18/20 0539    Specimen:  Blood Updated:  05/18/20 0540     Glucose 91 mg/dL     POC Glucose Once [[id number]]  (Normal) Collected:  05/17/20 2346    Specimen:  Blood Updated:  05/17/20 2348     Glucose 91 mg/dL     POC Glucose Once [[id number]]  (Normal) Collected:  05/17/20 2036    Specimen:  Blood Updated:  05/17/20 2038     Glucose 82 mg/dL     POC Glucose Once [[id number]]  (Normal) Collected:  05/17/20 1824    Specimen:  Blood Updated:  05/17/20 1825     Glucose 91 mg/dL     Blood Culture - Blood, Arm, Left [[id number]] Collected:  05/15/20 1526    Specimen:  Blood from Arm, Left Updated:  05/17/20 1545     Blood Culture No growth at 2 days    Blood Culture - Blood, Hand, Left [[id number]] Collected:  05/15/20 1526    Specimen:  Blood from Hand, Left Updated:  05/17/20 1545     Blood Culture No growth at 2 days        Imaging Results (Last 24 Hours)     Procedure Component Value Units Date/Time    XR Abdomen KUB [[id number]] Collected:  05/18/20 0539     Updated:  05/18/20 0547    Narrative:       KUB     HISTORY: Abdominal pain     COMPARISON: 05/17/2020.      FINDINGS:  Gaseous distention of multiple loops of bowel is identified. This has  increased when compared to prior exam, and includes both small and large  bowel loops, suggesting may reflect ileus. Right-sided nephrostomy  catheter is noted. Patient also appears to have ventriculoperitoneal  shunt. Gallbladder surgically absent. Staghorn calculus is noted within  the left kidney. There are extensive degenerative changes involving the  hips bilaterally. Nasogastric tube has been removed. Remaining tubes and  lines are stable.       Impression:       Increasing gaseous distention of bowel loops, suggesting ileus.     This report was finalized on 5/18/2020 5:44 AM by Dr. Luisa Christianson M.D.           ECG 12 Lead        HEART RATE= 93  bpm  RR Interval= 640  ms  NV Interval= 140  ms  P Horizontal  Axis= -6  deg  P Front Axis= 65  deg  QRSD Interval= 107  ms  QT Interval= 406  ms  QRS Axis= 43  deg  T Wave Axis= -59  deg  - ABNORMAL ECG -  Sinus rhythm  Repol abnrm suggests ischemia, diffuse leads  Borderline ST elevation, anterolateral leads  Prolonged QT interval  qt length is new             Current Facility-Administered Medications:   •  amantadine (SYMMETREL) solution 100 mg, 100 mg, Per G Tube, Q12H, Americo Mendez MD, 100 mg at 05/17/20 2214  •  cefTRIAXone (ROCEPHIN) IVPB 1 g, 1 g, Intravenous, Q24H, BrandonMayelin zacarias MD, Last Rate: 100 mL/hr at 05/18/20 1343, 1 g at 05/18/20 1343  •  chlorhexidine (PERIDEX) 0.12 % solution 15 mL, 15 mL, Mouth/Throat, Q12H, Americo Mendez MD, 15 mL at 05/17/20 2214  •  dextrose (D50W) 25 g/ 50mL Intravenous Solution 25 g, 25 g, Intravenous, Q15 Min PRN, Americo Mendez MD  •  dextrose (GLUTOSE) oral gel 15 g, 15 g, Oral, Q15 Min PRN, Americo Mendez MD  •  glucagon (human recombinant) (GLUCAGEN DIAGNOSTIC) injection 1 mg, 1 mg, Subcutaneous, Q15 Min PRN, Americo Mendez MD  •  Glycerin-Hypromellose- (ARTIFICIAL TEARS) 0.2-0.2-1 % ophthalmic solution solution 1 drop, 1 drop, Both Eyes, Q3H PRN, Americo Mendez MD  •  HYDROmorphone (DILAUDID) injection 0.5 mg, 0.5 mg, Intravenous, 4x Daily PRN, Americo Mendez MD, 0.5 mg at 05/18/20 0428  •  insulin lispro (humaLOG) injection 0-14 Units, 0-14 Units, Subcutaneous, TID AC, Americo Mendez MD, Stopped at 05/16/20 0646  •  ipratropium-albuterol (DUO-NEB) nebulizer solution 3 mL, 3 mL, Nebulization, Q4H PRN, Americo Mendez MD  •  lactated ringers 1,000 mL with potassium chloride 20 mEq infusion, 125 mL/hr, Intravenous, Continuous, Americo Mendez MD, Last Rate: 125 mL/hr at 05/18/20 1343, 125 mL/hr at 05/18/20 1343  •  levETIRAcetam in NaCl 0.54% (KEPPRA) IVPB 1,500 mg, 1,500 mg, Intravenous, Q12H, Americo Mendez MD, 1,500 mg at 05/18/20 0905  •  magnesium hydroxide (MILK OF MAGNESIA) suspension 2400 mg/10mL 10 mL, 10 mL, Per G  Tube, Daily, Lula Shah MD, 10 mL at 05/18/20 1020  •  Magnesium Sulfate 2 gram Bolus, followed by 8 gram infusion (total Mg dose 10 grams)- Mg less than or equal to 1mg/dL, 2 g, Intravenous, PRN **OR** Magnesium Sulfate 2 gram / 50mL Infusion (GIVE X 3 BAGS TO EQUAL 6GM TOTAL DOSE) - Mg 1.1 - 1.5 mg/dl, 2 g, Intravenous, PRN **OR** Magnesium Sulfate 4 gram infusion- Mg 1.6-1.9 mg/dL, 4 g, Intravenous, PRN, Americo Mendez MD  •  pantoprazole (PROTONIX) injection 40 mg, 40 mg, Intravenous, Q12H, Americo Mendez MD, 40 mg at 05/18/20 0905  •  Pharmacy to dose vancomycin, , Does not apply, Continuous PRN, Mayelin Taylor MD  •  polyethylene glycol (MIRALAX) packet 17 g, 17 g, Per G Tube, BID, Lula Shah MD, 17 g at 05/18/20 1020  •  potassium chloride (KLOR-CON) packet 40 mEq, 40 mEq, Oral, PRN, Americo Mendez MD, 40 mEq at 05/15/20 0643  •  potassium chloride (MICRO-K) CR capsule 40 mEq, 40 mEq, Oral, PRN **OR** potassium chloride (KLOR-CON) packet 40 mEq, 40 mEq, Oral, PRN **OR** potassium chloride 10 mEq in 100 mL IVPB, 10 mEq, Intravenous, Q1H PRN, Americo Mendez MD, Last Rate: 100 mL/hr at 05/16/20 0457, 10 mEq at 05/16/20 0457  •  potassium chloride (MICRO-K) CR capsule 40 mEq, 40 mEq, Oral, PRNAndrea Rami, MD  •  potassium phosphate 45 mmol in sodium chloride 0.9 % 500 mL infusion, 45 mmol, Intravenous, PRN **OR** potassium phosphate 30 mmol in sodium chloride 0.9 % 250 mL infusion, 30 mmol, Intravenous, PRN **OR** potassium phosphate 15 mmol in sodium chloride 0.9 % 100 mL infusion, 15 mmol, Intravenous, PRN **OR** sodium phosphates 40 mmol in sodium chloride 0.9 % 500 mL IVPB, 40 mmol, Intravenous, PRN **OR** sodium phosphates 20 mmol in sodium chloride 0.9 % 250 mL IVPB, 20 mmol, Intravenous, PRN, Americo Mendez MD  •  promethazine (PHENERGAN) injection 12.5 mg, 12.5 mg, Intravenous, 5x Daily PRN, Americo Mendez MD  •  [COMPLETED] vancomycin 1500 mg/500 mL 0.9% NS IVPB ([facility]), 1,500 mg,  Intravenous, Once, 1,500 mg at 05/17/20 2214 **FOLLOWED BY** vancomycin 1250 mg/250 mL 0.9% NS IVPB ([facility]), 1,250 mg, Intravenous, Q12H, Mayelin Taylor MD, 1,250 mg at 05/18/20 0905     ASSESSMENT  Acute hypoxic respiratory failure  Gram-positive cocci bacteremia with sepsis  Adynamic ileus  Status post exploratory laparotomy  Right upper lobe pneumonia  Acute UTI  Right hydronephrosis status post cystoscopy  Traumatic brain injury  Seizure disorder  Quadriplegic with contractures  Diabetes mellitus  Dysphagia with G-tube in place  Immobilization syndrome    PLAN  CPM  Postop care  IVF  Start tube feeding  IV antibiotics per infectious disease  General surgery consult appreciated  Urology to follow patient  Adjust home medications  Stress ulcer DVT prophylaxis  Supportive care  DNR  Discussed with nursing staff  Follow closely further recommendation current hospital course    MICHEL KURTZ MD

## 2020-05-18 NOTE — PLAN OF CARE
Medicated for pain once, with relief d/t pt sleeping in bed. Q2 turn. Pt opens eyes at times. Tube feed being held per Dr. Blanchard. Medications administered per orders. Temperature WDL. VSS. Room air, tolerating well. No s/s of distress at this time. Will continue to monitor.      Problem: Patient Care Overview  Goal: Individualization and Mutuality  Outcome: Ongoing (interventions implemented as appropriate)  Goal: Discharge Needs Assessment  Outcome: Ongoing (interventions implemented as appropriate)  Goal: Interprofessional Rounds/Family Conf  Outcome: Ongoing (interventions implemented as appropriate)     Problem: Skin Injury Risk (Adult)  Goal: Identify Related Risk Factors and Signs and Symptoms  Outcome: Ongoing (interventions implemented as appropriate)  Goal: Skin Health and Integrity  Outcome: Ongoing (interventions implemented as appropriate)     Problem: Fall Risk (Adult)  Goal: Identify Related Risk Factors and Signs and Symptoms  Outcome: Ongoing (interventions implemented as appropriate)  Goal: Absence of Fall  Outcome: Ongoing (interventions implemented as appropriate)     Problem: Nutrition, Enteral (Adult)  Goal: Signs and Symptoms of Listed Potential Problems Will be Absent, Minimized or Managed (Nutrition, Enteral)  Outcome: Ongoing (interventions implemented as appropriate)

## 2020-05-18 NOTE — PROGRESS NOTES
"Adult Nutrition  Assessment/PES    Patient Name:  Adrian Kuo  YOB: 1980  MRN: [id number]  Admit Date:  5/14/2020    Assessment Date:  5/18/2020    Comments:  Nutrition follow up.  TFs of Diabetisource started yesterday at 10 ml/hr.  When RN advanced TFs to 20 ml/hr, patient began moaning/rubbing his abdomen.  TFs were stopped.  There were no elevated residuals, no abdominal distension, no emesis.  No BM x 2 days - MD increasing bowel regimen to encourage better bowel function for adynamic ileus.  TFs to be restarted at 10 ml/hr today.    RD to continue to follow.    Reason for Assessment     Row Name 05/18/20 1111          Reason for Assessment    Reason For Assessment  follow-up protocol;TF/PN         Nutrition/Diet History     Row Name 05/18/20 1111          Nutrition/Diet History    Typical Food/Fluid Intake  TFs currently on hold d/t abd pain; to resume at 10 ml/hr today per MD note         Anthropometrics     Row Name 05/18/20 1111          Anthropometrics    Height  180.3 cm (70.98\")        Admit Weight    Admit Weight  -- 156# 5/15        Ideal Body Weight (IBW)    Ideal Body Weight (IBW) (kg)  79.23        Body Mass Index (BMI)    BMI Assessment  BMI 18.5-24.9: normal 21.72         Labs/Tests/Procedures/Meds     Row Name 05/18/20 1112          Labs/Procedures/Meds    Lab Results Reviewed  reviewed, pertinent     Lab Results Comments  Creat, Hgb, Hct        Diagnostic Tests/Procedures    Diagnostic Test/Procedure Reviewed  reviewed, pertinent        Medications    Pertinent Medications Reviewed  reviewed, pertinent     Pertinent Medications Comments  humalog, MOM, protonix, miralax, vanc, IVFs         Physical Findings     Row Name 05/18/20 1112          Physical Findings    Overall Physical Appearance  hypertonia;tetraplegia (quadriplegia)     Gastrointestinal  feeding tube     Tubes  gastrostomy tube     Skin  other (see comments);surgical incision B=10, abd inc     " "    Estimated/Assessed Needs     Row Name 05/18/20 1111          Calculation Measurements    Height  180.3 cm (70.98\")         Nutrition Prescription Ordered     Row Name 05/18/20 1113          Nutrition Prescription PO    Current PO Diet  NPO        Nutrition Prescription EN    Enteral Route  Gastrostomy     Product  Diabetisource AC (Glucerna 1.2)     TF Delivery Method  Continuous     Continuous TF Goal Rate (mL/hr)  70 mL/hr     Continuous TF Current Rate (mL/hr)  0 mL/hr     Water flush (mL)   25 mL     Water Flush Frequency  Per hour         Problem/Interventions:    Intervention Goal     Row Name 05/18/20 1114          Intervention Goal    General  Maintain nutrition;Nutrition support treatment;Disease management/therapy;Reduce/improve symptoms;Meet nutritional needs for age/condition     TF/PN  Inititiate TF/PN;Establish TF tolerance;Tolerate TF at goal     Weight  Maintain weight         Nutrition Intervention     Row Name 05/18/20 1114          Nutrition Intervention    RD/Tech Action  Follow Tx progress;Care plan reviewd     Recommended/Ordered  EN         Nutrition Prescription     Row Name 05/18/20 1114          Nutrition Prescription EN    Enteral Prescription  Continue same protocol     Continuous TF Starting Rate (mL/hr)  10 mL/hr         Education/Evaluation     Row Name 05/18/20 1114          Education    Education  Will Instruct as appropriate        Monitor/Evaluation    Monitor  Per protocol;I&O;Pertinent labs;TF delivery/tolerance;Weight;Skin status;Symptoms     Education Follow-up  Reinforce PRN           Electronically signed by:  Donita Ruiz RD  05/18/20 11:15  "

## 2020-05-18 NOTE — NURSING NOTE
Called and spoke with Dr. Blanchard about pt's abdominal pain. RN explained that pt has 0 residual and bowel sounds are present. Dr. Blanchard stated to hold the tube feeds and get a portable KUB. She stated that the results do not need to be called back to her, Dr. Shah can assess pt in the AM. Will continue to monitor.

## 2020-05-18 NOTE — PROGRESS NOTES
Continued Stay Note  [facility]     Patient Name: Adrian Kuo  MRN: [id number]  Today's Date: 5/18/2020    Admit Date: 5/14/2020    Discharge Plan     Row Name 05/18/20 1416       Plan    Plan  return to [facility] IC level bed     Patient/Family in Agreement with Plan  yes    Plan Comments  Spoke with Krishan/[facility] and patient is from an IC level bed and they will accept back at [place].  Transfer packet in [facility]. Traci Shah RN        Discharge Codes    No documentation.             Traci Shah RN

## 2020-05-18 NOTE — PROGRESS NOTES
"  Infectious Diseases Progress Note    Mayelin Taylor MD     [facility]  [place]: 4 days  Patient Identification:  Name: Adrian Kuo  Age: 40 y.o.  Sex: male  :  1980  MRN: [id number]         Primary Care Physician: Dewayne Rodriguez MD            Subjective: Comfortable, does not engage.  Interval History: Transferred out of ICU after being hemodynamically stable.    Objective:    Scheduled Meds:    amantadine 100 mg Per G Tube Q12H   cefTRIAXone 1 g Intravenous Q24H   chlorhexidine 15 mL Mouth/Throat Q12H   insulin lispro 0-14 Units Subcutaneous TID AC   levETIRAcetam 1,500 mg Intravenous Q12H   magnesium hydroxide 10 mL Per G Tube Daily   pantoprazole 40 mg Intravenous Q12H   polyethylene glycol 17 g Per G Tube BID   vancomycin 1,250 mg Intravenous Q12H     Continuous Infusions:    custom IV KCl infusion builder 75 mL/hr Last Rate: 75 mL/hr (20 0064)   Pharmacy to dose vancomycin         Vital signs in last 24 hours:  Temp:  [98.3 °F (36.8 °C)-98.6 °F (37 °C)] 98.3 °F (36.8 °C)  Heart Rate:  [60-73] 70  Resp:  [16-18] 16  BP: (108-132)/(70-89) 127/89    Intake/Output:    Intake/Output Summary (Last 24 hours) at 2020  Last data filed at 2020 1920  Gross per 24 hour   Intake 498 ml   Output 3965 ml   Net -3467 ml       Exam:  /89 (BP Location: Right arm, Patient Position: Sitting)   Pulse 70   Temp 98.3 °F (36.8 °C) (Rectal)   Resp 16   Ht 180.3 cm (70.98\")   Wt 70.8 kg (156 lb 1.4 oz)   SpO2 96%   BMI 21.78 kg/m²   Patient is examined using the personal protective equipment as per guidelines from infection control for this particular patient as enacted.  Hand washing was performed before and after patient interaction.  General Appearance:   Awake does not engage nonverbal   Head:    Normocephalic, without obvious abnormality, atraumatic   Eyes:    PERRL, conjunctivae/corneas clear, EOM's intact, both eyes   Ears:    Normal external ear canals, both ears   "   Nose:   Nares normal, septum midline, mucosa normal, no drainage    or sinus tenderness   Throat:   Lips, tongue, gums normal; oral mucosa pink and moist   Neck:   Supple, symmetrical, trachea midline, no adenopathy;     thyroid:  no enlargement/tenderness/nodules; no carotid    bruit or JVD   Back:     Symmetric, no curvature, ROM normal, right-sided nephrostomy in place   Lungs:     Clear to auscultation bilaterally, respirations unlabored   Chest Wall:    No tenderness or deformity    Heart:    Regular rate and rhythm, S1 and S2 normal, no murmur, rub   or gallop   Abdomen:    Midline incision dressed   Extremities:  Contractures and atrophy the extremities noted   Pulses:   Pulses palpable in all extremities; symmetric all extremities   Skin:   Skin color normal, Skin is warm and dry,  no rashes or palpable lesions   Neurologic:  Contractions nonverbal due to traumatic brain injury            Data Review:    I reviewed the patient's new clinical results.  Results from last 7 days   Lab Units 05/18/20  0635 05/17/20  0559 05/16/20  0745 05/15/20  0423 05/14/20  0815   WBC 10*3/mm3 6.28 6.75 10.26 10.58 21.36*   HEMOGLOBIN g/dL 11.0* 11.3* 13.2 15.0 16.9   PLATELETS 10*3/mm3 194 171 203 232 241     Results from last 7 days   Lab Units 05/18/20  0635 05/17/20  0559 05/16/20  0745 05/15/20  0423 05/14/20  0815   SODIUM mmol/L 143 144 146* 145 141   POTASSIUM mmol/L 4.8 4.2 3.7 2.7* 3.5   CHLORIDE mmol/L 109* 112* 108* 99 93*   CO2 mmol/L 20.9* 23.0 23.5 30.8* 32.3*   BUN mg/dL 9 10 15 27* 36*   CREATININE mg/dL 0.42* 0.41* 0.45* 0.71* 0.74*   CALCIUM mg/dL 8.8 8.5* 8.5* 9.4 9.9   GLUCOSE mg/dL 80 94 148* 204* 234*     Microbiology Results (last 10 days)     Procedure Component Value - Date/Time    Body Fluid Culture - Body Fluid, Kidney, Right [[id number]] Collected:  05/15/20 2053    Lab Status:  Final result Specimen:  Body Fluid from Kidney, Right Updated:  05/18/20 0944     Body Fluid Culture No growth at 3  days     Gram Stain No organisms seen      Occasional WBCs per low power field    Body Fluid Culture - Body Fluid, Urine, Clean Catch [[id number]] Collected:  05/15/20 1725    Lab Status:  Final result Specimen:  Body Fluid from Urine, Clean Catch Updated:  05/18/20 0945     Body Fluid Culture No growth at 3 days     Gram Stain No organisms seen      Occasional WBCs per low power field    Blood Culture - Blood, Arm, Left [[id number]] Collected:  05/15/20 1526    Lab Status:  Preliminary result Specimen:  Blood from Arm, Left Updated:  05/18/20 1545     Blood Culture No growth at 3 days    Blood Culture - Blood, Hand, Left [[id number]] Collected:  05/15/20 1526    Lab Status:  Preliminary result Specimen:  Blood from Hand, Left Updated:  05/18/20 1545     Blood Culture No growth at 3 days    Blood Culture - Blood, Arm, Right [[id number]]  (Abnormal) Collected:  05/14/20 1138    Lab Status:  Final result Specimen:  Blood from Arm, Right Updated:  05/17/20 0533     Blood Culture Staphylococcus epidermidis     Comment: Probable contaminant requires clinical correlation, susceptibility not performed unless requested by physician.          Isolated from Aerobic and Anaerobic Bottles     Gram Stain Anaerobic Bottle Gram positive cocci      Aerobic Bottle Gram positive cocci in clusters    Blood Culture ID, PCR - Blood, Arm, Right [[id number]]  (Abnormal) Collected:  05/14/20 1138    Lab Status:  Final result Specimen:  Blood from Arm, Right Updated:  05/15/20 0835     BCID, PCR Staphylococcus spp, not aureus. Identification by BCID PCR.    SARS-CoV-2 PCR ([place] IN-HOUSE PERFORMED), NP SWAB IN TRANSPORT MEDIA - Swab, Nasopharynx [[id number]]  (Normal) Collected:  05/14/20 1024    Lab Status:  Final result Specimen:  Swab from Nasopharynx Updated:  05/14/20 1213     COVID19 Not Detected    Respiratory Panel, PCR - Swab, Nasopharynx [[id number]]  (Normal) Collected:  05/14/20 1024    Lab Status:  Final result Specimen:  Swab  from Nasopharynx Updated:  05/14/20 1150     ADENOVIRUS, PCR Not Detected     Coronavirus 229E Not Detected     Coronavirus HKU1 Not Detected     Coronavirus NL63 Not Detected     Coronavirus OC43 Not Detected     Human Metapneumovirus Not Detected     Human Rhinovirus/Enterovirus Not Detected     Influenza B PCR Not Detected     Parainfluenza Virus 1 Not Detected     Parainfluenza Virus 2 Not Detected     Parainfluenza Virus 3 Not Detected     Parainfluenza Virus 4 Not Detected     Bordetella pertussis pcr Not Detected     Influenza A H1 2009 PCR Not Detected     Chlamydophila pneumoniae PCR Not Detected     Mycoplasma pneumo by PCR Not Detected     Influenza A PCR Not Detected     Influenza A H3 Not Detected     Influenza A H1 Not Detected     RSV, PCR Not Detected     Bordetella parapertussis PCR Not Detected    Narrative:       The coronavirus on the RVP is NOT COVID-19 and is NOT indicative of infection with COVID-19.     Blood Culture - Blood, Arm, Left [[id number]]  (Abnormal) Collected:  05/14/20 1022    Lab Status:  Final result Specimen:  Blood from Arm, Left Updated:  05/17/20 0534     Blood Culture Staphylococcus capitis     Comment: Probable contaminant requires clinical correlation, susceptibility not performed unless requested by physician.          Isolated from Aerobic Bottle     Gram Stain Aerobic Bottle Gram positive cocci in clusters    Urine Culture - Urine, Urine, Catheter [[id number]] Collected:  05/14/20 0852    Lab Status:  Final result Specimen:  Urine, Catheter Updated:  05/15/20 1021     Urine Culture >100,000 CFU/mL Mixed Rosy Isolated    Narrative:       Specimen contains mixed organisms of questionable pathogenicity which indicates contamination with commensal rosy.  Further identification is unlikely to provide clinically useful information.  Suggest recollection.            Assessment:  1-toxic metabolic encephalopathy secondary to  2-obstructed right-sided calculus pyelonephritis  status post attempted cystoscopy and retrograde stenting followed by right-sided percutaneous nephrostomy and drainage.  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    6- other diagnosis per internal medicine service        Recommendations/Discussions:  · He seems to have improved physiologically with stable hemodynamics after percutaneous nephrostomy and drainage of obstructive  tract.    · His blood cultures could very well be contaminant but given his underlying medical condition I think it needs to be approached as this is a manifestation of obstructive pyelonephritis and this pathogen could be the cause of the  tract infection.    · Would recommend 2 weeks of IV vancomycin along with Rocephin to address complicated  infection while aggressively providing him supportive care including prevention of recurrent aspiration.  · Rest of the supportive care per primary team.    Mayelin Taylor MD  5/18/2020  19:47    Much of this encounter note is an electronic transcription/translation of spoken language to printed text. The electronic translation of spoken language may permit erroneous, or at times, nonsensical words or phrases to be inadvertently transcribed; Although I have reviewed the note for such errors, some may still exist

## 2020-05-18 NOTE — NURSING NOTE
Tube feed increased to 20cc/hr. 1 hour after increasing feed, pt was moaning and rubbing his abdomen. RN checked residual, with 0cc withdrawn. Rate decreased to 10cc/h. Abdomen unchanged, non-distended, bowel sounds present in all four quadrants. Will increase feed again.

## 2020-05-18 NOTE — PROGRESS NOTES
"General Surgery  Progress Note    CC: Follow-up small bowel obstruction    POD#4 exploratory laparotomy        S: He tolerated tube feeds beginning at 10 cc/h yesterday but once they were advanced to 20 cc/h he began moaning and rubbing his abdomen.  He had no elevated tube feed residuals, but but his tube feeds were stopped.  He is now resting comfortably.  He has had no emesis.  He has not had a bowel movement in about 2 days.    O:/70 (BP Location: Right arm, Patient Position: Lying)   Pulse 73   Temp 98.3 °F (36.8 °C) (Rectal)   Resp 16   Ht 180.3 cm (71\")   Wt 70.8 kg (156 lb 1.4 oz)   SpO2 94%   BMI 21.77 kg/m²     Intake & Output:  UOP: 2100 cc/24 hrs  Right nephrostomy tube: 180 cc/24 hrs    GENERAL: Unresponsive (chronic due to his vegetative state), resting comfortably, shows no signs of agitation  HEENT: normocephalic, atraumatic, moist mucous membranes, clear sclerae   CHEST: clear to auscultation, no wheezes, rales or rhonchi, symmetric air entry  CARDIAC: regular rate and rhythm    ABDOMEN: Soft, nontender, nondistended, incision clean dry and intact with staples, G-tube in left upper quadrant sutured to the skin and capped, no surrounding erythema  EXTREMITIES: Chronic contractures of the bilateral upper extremities due to quadriplegia  SKIN: Warm and moist, no rashes    LABS  Results from last 7 days   Lab Units 05/18/20  0635 05/17/20  0559 05/16/20  0745   WBC 10*3/mm3 6.28 6.75 10.26   HEMOGLOBIN g/dL 11.0* 11.3* 13.2   HEMATOCRIT % 31.8* 33.8* 39.5   PLATELETS 10*3/mm3 194 171 203     Results from last 7 days   Lab Units 05/18/20  0635 05/17/20  0559 05/16/20  0745 05/15/20  0423 05/14/20  0815   SODIUM mmol/L 143 144 146* 145 141   POTASSIUM mmol/L 4.8 4.2 3.7 2.7* 3.5   CHLORIDE mmol/L 109* 112* 108* 99 93*   CO2 mmol/L 20.9* 23.0 23.5 30.8* 32.3*   BUN mg/dL 9 10 15 27* 36*   CREATININE mg/dL 0.42* 0.41* 0.45* 0.71* 0.74*   CALCIUM mg/dL 8.8 8.5* 8.5* 9.4 9.9   BILIRUBIN mg/dL  -- "   --   --  0.5 0.9   ALK PHOS U/L  --   --   --  98 127*   ALT (SGPT) U/L  --   --   --  37 40   AST (SGOT) U/L  --   --   --  20 17   GLUCOSE mg/dL 80 94 148* 204* 234*             A/P: 40 y.o. male with chronic adynamic ileus POD#4 exploratory laparotomy    Begin twice daily MiraLAX and once daily milk of magnesia to encourage better bowel function for his adynamic ileus.  Continue daily fleets enemas.  Resume tube feeds at 10 cc/hr today.    Lula Shah MD  General and Endoscopic Surgery  [facility]    [address], [address]  [place], [place], [address]  P: [phone]  F: [phone]
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
## 2020-05-19 LAB
CREAT BLD-MCNC: 0.6 MG/DL (ref 0.76–1.27)
GFR SERPL CREATININE-BSD FRML MDRD: 149 ML/MIN/1.73
GLUCOSE BLDC GLUCOMTR-MCNC: 101 MG/DL (ref 70–130)
GLUCOSE BLDC GLUCOMTR-MCNC: 102 MG/DL (ref 70–130)
GLUCOSE BLDC GLUCOMTR-MCNC: 81 MG/DL (ref 70–130)
VANCOMYCIN TROUGH SERPL-MCNC: 26.1 MCG/ML (ref 5–20)

## 2020-05-19 PROCEDURE — 99024 POSTOP FOLLOW-UP VISIT: CPT | Performed by: SURGERY

## 2020-05-19 PROCEDURE — 25010000003 LEVETIRACETAM IN NACL 0.54% 1500 MG/100ML SOLUTION: Performed by: INTERNAL MEDICINE

## 2020-05-19 PROCEDURE — 25010000002 CEFTRIAXONE PER 250 MG: Performed by: INTERNAL MEDICINE

## 2020-05-19 PROCEDURE — 82565 ASSAY OF CREATININE: CPT | Performed by: INTERNAL MEDICINE

## 2020-05-19 PROCEDURE — 25010000002 VANCOMYCIN PER 500 MG: Performed by: INTERNAL MEDICINE

## 2020-05-19 PROCEDURE — 82962 GLUCOSE BLOOD TEST: CPT

## 2020-05-19 PROCEDURE — 80202 ASSAY OF VANCOMYCIN: CPT | Performed by: INTERNAL MEDICINE

## 2020-05-19 PROCEDURE — 25010000002 POTASSIUM CHLORIDE PER 2 MEQ OF POTASSIUM: Performed by: HOSPITALIST

## 2020-05-19 RX ORDER — VANCOMYCIN HYDROCHLORIDE 1 G/200ML
1000 INJECTION, SOLUTION INTRAVENOUS EVERY 12 HOURS
Status: DISCONTINUED | OUTPATIENT
Start: 2020-05-19 | End: 2020-05-21

## 2020-05-19 RX ADMIN — POTASSIUM CHLORIDE 75 ML/HR: 2 INJECTION, SOLUTION, CONCENTRATE INTRAVENOUS at 17:21

## 2020-05-19 RX ADMIN — PANTOPRAZOLE SODIUM 40 MG: 40 INJECTION, POWDER, FOR SOLUTION INTRAVENOUS at 09:22

## 2020-05-19 RX ADMIN — CHLORHEXIDINE GLUCONATE 15 ML: 1.2 RINSE ORAL at 09:48

## 2020-05-19 RX ADMIN — PANTOPRAZOLE SODIUM 40 MG: 40 INJECTION, POWDER, FOR SOLUTION INTRAVENOUS at 21:46

## 2020-05-19 RX ADMIN — LEVETIRACETAM 1500 MG: 15 INJECTION INTRAVENOUS at 21:45

## 2020-05-19 RX ADMIN — POLYETHYLENE GLYCOL 3350 17 G: 17 POWDER, FOR SOLUTION ORAL at 09:23

## 2020-05-19 RX ADMIN — POTASSIUM CHLORIDE 75 ML/HR: 2 INJECTION, SOLUTION, CONCENTRATE INTRAVENOUS at 04:04

## 2020-05-19 RX ADMIN — MAGNESIUM HYDROXIDE 10 ML: 2400 SUSPENSION ORAL at 09:22

## 2020-05-19 RX ADMIN — CEFTRIAXONE SODIUM 1 G: 1 INJECTION, SOLUTION INTRAVENOUS at 12:08

## 2020-05-19 RX ADMIN — AMANTADINE HYDROCHLORIDE 100 MG: 50 SOLUTION ORAL at 21:46

## 2020-05-19 RX ADMIN — VANCOMYCIN HYDROCHLORIDE 1000 MG: 1 INJECTION, SOLUTION INTRAVENOUS at 17:11

## 2020-05-19 RX ADMIN — POLYETHYLENE GLYCOL 3350 17 G: 17 POWDER, FOR SOLUTION ORAL at 21:45

## 2020-05-19 RX ADMIN — AMANTADINE HYDROCHLORIDE 100 MG: 50 SOLUTION ORAL at 09:22

## 2020-05-19 RX ADMIN — LEVETIRACETAM 1500 MG: 15 INJECTION INTRAVENOUS at 09:23
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
## 2020-05-19 NOTE — PROGRESS NOTES
"  Infectious Diseases Progress Note    Mayelin Taylor MD     [facility]  [place]: 5 days  Patient Identification:  Name: Adrian Kuo  Age: 40 y.o.  Sex: male  :  1980  MRN: [id number]         Primary Care Physician: Dewayne Rodriguez MD            Subjective: Nonverbal  Interval History: Transferred out of ICU after being hemodynamically stable.    Objective:    Scheduled Meds:    amantadine 100 mg Per G Tube Q12H   cefTRIAXone 1 g Intravenous Q24H   chlorhexidine 15 mL Mouth/Throat Q12H   insulin lispro 0-14 Units Subcutaneous Q6H   levETIRAcetam 1,500 mg Intravenous Q12H   magnesium hydroxide 10 mL Per G Tube Daily   pantoprazole 40 mg Intravenous Q12H   polyethylene glycol 17 g Per G Tube BID   vancomycin 1,000 mg Intravenous Q12H     Continuous Infusions:    custom IV KCl infusion builder 75 mL/hr Last Rate: 75 mL/hr (20 1721)   Pharmacy to dose vancomycin         Vital signs in last 24 hours:  Temp:  [97.5 °F (36.4 °C)-98.7 °F (37.1 °C)] 98.7 °F (37.1 °C)  Heart Rate:  [62-70] 68  Resp:  [16] 16  BP: (116-131)/(82-92) 117/82    Intake/Output:    Intake/Output Summary (Last 24 hours) at 2020 1826  Last data filed at 2020 1721  Gross per 24 hour   Intake 1415 ml   Output 4125 ml   Net -2710 ml       Exam:  /82 (BP Location: Right arm, Patient Position: Lying)   Pulse 68   Temp 98.7 °F (37.1 °C) (Oral)   Resp 16   Ht 180.3 cm (70.98\")   Wt 70.8 kg (156 lb 1.4 oz)   SpO2 97%   BMI 21.78 kg/m²   Patient is examined using the personal protective equipment as per guidelines from infection control for this particular patient as enacted.  Hand washing was performed before and after patient interaction.  General Appearance:   Awake does not engage nonverbal   Head:    Normocephalic, without obvious abnormality, atraumatic   Eyes:    PERRL, conjunctivae/corneas clear, EOM's intact, both eyes   Ears:    Normal external ear canals, both ears   Nose:   Nares normal, septum " midline, mucosa normal, no drainage    or sinus tenderness   Throat:   Lips, tongue, gums normal; oral mucosa pink and moist   Neck:   Supple, symmetrical, trachea midline, no adenopathy;     thyroid:  no enlargement/tenderness/nodules; no carotid    bruit or JVD   Back:     Symmetric, no curvature, ROM normal, right-sided nephrostomy in place   Lungs:     Clear to auscultation bilaterally, respirations unlabored   Chest Wall:    No tenderness or deformity    Heart:    Regular rate and rhythm, S1 and S2 normal, no murmur, rub   or gallop   Abdomen:    Midline incision dressed   Extremities:  Contractures and atrophy the extremities noted   Pulses:   Pulses palpable in all extremities; symmetric all extremities   Skin:   Skin color normal, Skin is warm and dry,  no rashes or palpable lesions   Neurologic:  Contractions nonverbal due to traumatic brain injury            Data Review:    I reviewed the patient's new clinical results.  Results from last 7 days   Lab Units 05/18/20  0635 05/17/20  0559 05/16/20  0745 05/15/20  0423 05/14/20  0815   WBC 10*3/mm3 6.28 6.75 10.26 10.58 21.36*   HEMOGLOBIN g/dL 11.0* 11.3* 13.2 15.0 16.9   PLATELETS 10*3/mm3 194 171 203 232 241     Results from last 7 days   Lab Units 05/19/20  1209 05/18/20  0635 05/17/20  0559 05/16/20  0745 05/15/20  0423 05/14/20  0815   SODIUM mmol/L  --  143 144 146* 145 141   POTASSIUM mmol/L  --  4.8 4.2 3.7 2.7* 3.5   CHLORIDE mmol/L  --  109* 112* 108* 99 93*   CO2 mmol/L  --  20.9* 23.0 23.5 30.8* 32.3*   BUN mg/dL  --  9 10 15 27* 36*   CREATININE mg/dL 0.60* 0.42* 0.41* 0.45* 0.71* 0.74*   CALCIUM mg/dL  --  8.8 8.5* 8.5* 9.4 9.9   GLUCOSE mg/dL  --  80 94 148* 204* 234*     Microbiology Results (last 10 days)     Procedure Component Value - Date/Time    Body Fluid Culture - Body Fluid, Kidney, Right [[id number]] Collected:  05/15/20 2053    Lab Status:  Final result Specimen:  Body Fluid from Kidney, Right Updated:  05/18/20 0938     Body Fluid  Culture No growth at 3 days     Gram Stain No organisms seen      Occasional WBCs per low power field    Body Fluid Culture - Body Fluid, Urine, Clean Catch [[id number]] Collected:  05/15/20 1725    Lab Status:  Final result Specimen:  Body Fluid from Urine, Clean Catch Updated:  05/18/20 0945     Body Fluid Culture No growth at 3 days     Gram Stain No organisms seen      Occasional WBCs per low power field    Blood Culture - Blood, Arm, Left [[id number]] Collected:  05/15/20 1526    Lab Status:  Preliminary result Specimen:  Blood from Arm, Left Updated:  05/19/20 1545     Blood Culture No growth at 4 days    Blood Culture - Blood, Hand, Left [[id number]] Collected:  05/15/20 1526    Lab Status:  Preliminary result Specimen:  Blood from Hand, Left Updated:  05/19/20 1545     Blood Culture No growth at 4 days    Blood Culture - Blood, Arm, Right [[id number]]  (Abnormal) Collected:  05/14/20 1138    Lab Status:  Final result Specimen:  Blood from Arm, Right Updated:  05/17/20 0533     Blood Culture Staphylococcus epidermidis     Comment: Probable contaminant requires clinical correlation, susceptibility not performed unless requested by physician.          Isolated from Aerobic and Anaerobic Bottles     Gram Stain Anaerobic Bottle Gram positive cocci      Aerobic Bottle Gram positive cocci in clusters    Blood Culture ID, PCR - Blood, Arm, Right [[id number]]  (Abnormal) Collected:  05/14/20 1138    Lab Status:  Final result Specimen:  Blood from Arm, Right Updated:  05/15/20 0835     BCID, PCR Staphylococcus spp, not aureus. Identification by BCID PCR.    SARS-CoV-2 PCR ([place] IN-HOUSE PERFORMED), NP SWAB IN TRANSPORT MEDIA - Swab, Nasopharynx [[id number]]  (Normal) Collected:  05/14/20 1024    Lab Status:  Final result Specimen:  Swab from Nasopharynx Updated:  05/14/20 1213     COVID19 Not Detected    Respiratory Panel, PCR - Swab, Nasopharynx [[id number]]  (Normal) Collected:  05/14/20 1024    Lab Status:  Final  result Specimen:  Swab from Nasopharynx Updated:  05/14/20 1150     ADENOVIRUS, PCR Not Detected     Coronavirus 229E Not Detected     Coronavirus HKU1 Not Detected     Coronavirus NL63 Not Detected     Coronavirus OC43 Not Detected     Human Metapneumovirus Not Detected     Human Rhinovirus/Enterovirus Not Detected     Influenza B PCR Not Detected     Parainfluenza Virus 1 Not Detected     Parainfluenza Virus 2 Not Detected     Parainfluenza Virus 3 Not Detected     Parainfluenza Virus 4 Not Detected     Bordetella pertussis pcr Not Detected     Influenza A H1 2009 PCR Not Detected     Chlamydophila pneumoniae PCR Not Detected     Mycoplasma pneumo by PCR Not Detected     Influenza A PCR Not Detected     Influenza A H3 Not Detected     Influenza A H1 Not Detected     RSV, PCR Not Detected     Bordetella parapertussis PCR Not Detected    Narrative:       The coronavirus on the RVP is NOT COVID-19 and is NOT indicative of infection with COVID-19.     Blood Culture - Blood, Arm, Left [[id number]]  (Abnormal) Collected:  05/14/20 1022    Lab Status:  Final result Specimen:  Blood from Arm, Left Updated:  05/17/20 0534     Blood Culture Staphylococcus capitis     Comment: Probable contaminant requires clinical correlation, susceptibility not performed unless requested by physician.          Isolated from Aerobic Bottle     Gram Stain Aerobic Bottle Gram positive cocci in clusters    Urine Culture - Urine, Urine, Catheter [[id number]] Collected:  05/14/20 0852    Lab Status:  Final result Specimen:  Urine, Catheter Updated:  05/15/20 1021     Urine Culture >100,000 CFU/mL Mixed Rosy Isolated    Narrative:       Specimen contains mixed organisms of questionable pathogenicity which indicates contamination with commensal rosy.  Further identification is unlikely to provide clinically useful information.  Suggest recollection.            Assessment:  1-toxic metabolic encephalopathy secondary to  2-obstructed right-sided  calculus pyelonephritis status post attempted cystoscopy and retrograde stenting followed by right-sided percutaneous nephrostomy and drainage.  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    6- other diagnosis per internal medicine service        Recommendations/Discussions:  · He seems to have improved physiologically with stable hemodynamics after percutaneous nephrostomy and drainage of obstructive  tract.    · His blood cultures could very well be contaminant but given his underlying medical condition I think it needs to be approached as this is a manifestation of obstructive pyelonephritis and this pathogen could be the cause of the  tract infection.    · Would recommend 2 weeks of IV vancomycin along with Rocephin to address complicated  infection while aggressively providing him supportive care including prevention of recurrent aspiration.  · Rest of the supportive care per primary team.    Mayelin Taylor MD  5/19/2020  18:26    Much of this encounter note is an electronic transcription/translation of spoken language to printed text. The electronic translation of spoken language may permit erroneous, or at times, nonsensical words or phrases to be inadvertently transcribed; Although I have reviewed the note for such errors, some may still exist

## 2020-05-19 NOTE — PLAN OF CARE
Problem: Patient Care Overview  Goal: Individualization and Mutuality  Outcome: Ongoing (interventions implemented as appropriate)     Problem: Skin Injury Risk (Adult)  Goal: Identify Related Risk Factors and Signs and Symptoms  Outcome: Ongoing (interventions implemented as appropriate)     Problem: Fall Risk (Adult)  Goal: Identify Related Risk Factors and Signs and Symptoms  Outcome: Ongoing (interventions implemented as appropriate)     Problem: Fall Risk (Adult)  Goal: Absence of Fall  Outcome: Ongoing (interventions implemented as appropriate)  Flowsheets (Taken 2020)  Absence of Fall: making progress toward outcome     Problem: Nutrition, Enteral (Adult)  Description  Prevent and manage potential problems includin. adverse events  2. aspiration  3. fluid/electrolyte imbalance  4. gastrointestinal complications  5. malnutrition  6. mechanical complications  7. skin/mucosal integrity impairment  Goal: Signs and Symptoms of Listed Potential Problems Will be Absent, Minimized or Managed (Nutrition, Enteral)  Outcome: Ongoing (interventions implemented as appropriate)  Flowsheets (Taken 2020)  Problems Assessed (Enteral Nutrition): all  Problems Present (Enteral Nutrition): none     Problem: Patient Care Overview  Goal: Plan of Care Review  2020 by Meli Colon RN  Flowsheets (Taken 2020)  Plan of Care Reviewed With: patient  Outcome Summary: Tube feed changed to 30 tolerating well. Had 2 bowei movements today. No coverage for BG needed. No indication of pain. Will continue to monitor.

## 2020-05-19 NOTE — PROGRESS NOTES
"Daily progress note    Chief complaint  Doing same  No respiratory distress  Nonverbal  Tolerating tube feeds at 20cc/h    History of present illness  40-year-old white male who is well-known to our service with history of traumatic brain injury seizure disorder quadriplegic with contracture diabetes and immobilization syndrome who is nonverbal who has multiple episodes of ileus brought to the emergency room with nausea vomiting started yesterday.  Patient evaluated in ER found to have recurrent small bowel obstruction and pneumonia admit for management.  Patient also found to have UTI.  Again patient is nonverbal unable to give detailed history but we know the patient very well from previous admissions.  Patient is DNR per his wishes.  Patient ruled out for COVID-19     REVIEW OF SYSTEMS  Unable to perform      PHYSICAL EXAM  Blood pressure 122/83, pulse 66, temperature 97.7 °F (36.5 °C), temperature source Oral, resp. rate 16, height 180.3 cm (70.98\"), weight 70.8 kg (156 lb 1.4 oz), SpO2 97 %.    GENERAL:  Eyes open  CV: regular rhythm, regular rate  RESPIRATORY: normal effort  ABDOMEN: soft, left upper quadrant G-tube with no signs of infection, distended, absent bowel sounds  : deferred  MUSCULOSKELETAL:  Contracted and chronic deformities  NEURO:  nonverbal at baseline, contractures all 4 extremities with history of quadriplegia.  SKIN: warm, dry     LAB RESULTS  Lab Results (last 24 hours)     Procedure Component Value Units Date/Time    Vancomycin, Trough [[id number]] Collected:  05/19/20 0955    Specimen:  Blood from Hand, Right Updated:  05/19/20 1001    POC Glucose Once [[id number]]  (Normal) Collected:  05/19/20 0601    Specimen:  Blood Updated:  05/19/20 0603     Glucose 81 mg/dL     POC Glucose Once [[id number]]  (Normal) Collected:  05/18/20 2327    Specimen:  Blood Updated:  05/18/20 2336     Glucose 82 mg/dL     POC Glucose Once [[id number]]  (Normal) Collected:  05/18/20 2111    Specimen:  Blood " Updated:  05/18/20 2123     Glucose 70 mg/dL     POC Glucose Once [[id number]]  (Normal) Collected:  05/18/20 1730    Specimen:  Blood Updated:  05/18/20 1731     Glucose 71 mg/dL     Blood Culture - Blood, Arm, Left [[id number]] Collected:  05/15/20 1526    Specimen:  Blood from Arm, Left Updated:  05/18/20 1545     Blood Culture No growth at 3 days    Blood Culture - Blood, Hand, Left [[id number]] Collected:  05/15/20 1526    Specimen:  Blood from Hand, Left Updated:  05/18/20 1545     Blood Culture No growth at 3 days    POC Glucose Once [[id number]]  (Normal) Collected:  05/18/20 1204    Specimen:  Blood Updated:  05/18/20 1205     Glucose 74 mg/dL         Imaging Results (Last 24 Hours)     ** No results found for the last 24 hours. **        ECG 12 Lead        HEART RATE= 93  bpm  RR Interval= 640  ms  CT Interval= 140  ms  P Horizontal Axis= -6  deg  P Front Axis= 65  deg  QRSD Interval= 107  ms  QT Interval= 406  ms  QRS Axis= 43  deg  T Wave Axis= -59  deg  - ABNORMAL ECG -  Sinus rhythm  Repol abnrm suggests ischemia, diffuse leads  Borderline ST elevation, anterolateral leads  Prolonged QT interval  qt length is new             Current Facility-Administered Medications:   •  amantadine (SYMMETREL) solution 100 mg, 100 mg, Per G Tube, Q12H, Americo Mendez MD, 100 mg at 05/19/20 0922  •  cefTRIAXone (ROCEPHIN) IVPB 1 g, 1 g, Intravenous, Q24H, Mayelin Taylor MD, Last Rate: 100 mL/hr at 05/18/20 1343, 1 g at 05/18/20 1343  •  chlorhexidine (PERIDEX) 0.12 % solution 15 mL, 15 mL, Mouth/Throat, Q12H, Americo Mendez MD, 15 mL at 05/19/20 0948  •  dextrose (D50W) 25 g/ 50mL Intravenous Solution 25 g, 25 g, Intravenous, Q15 Min PRN, Americo Mendez MD  •  dextrose (GLUTOSE) oral gel 15 g, 15 g, Oral, Q15 Min PRN, Americo Mendez MD  •  glucagon (human recombinant) (GLUCAGEN DIAGNOSTIC) injection 1 mg, 1 mg, Subcutaneous, Q15 Min PRN, Americo Mendez MD  •  Glycerin-Hypromellose- (ARTIFICIAL TEARS) 0.2-0.2-1 %  ophthalmic solution solution 1 drop, 1 drop, Both Eyes, Q3H PRN, Americo Mendez MD  •  HYDROmorphone (DILAUDID) injection 0.5 mg, 0.5 mg, Intravenous, 4x Daily PRN, Americo Mendez MD, 0.5 mg at 05/18/20 0428  •  insulin lispro (humaLOG) injection 0-14 Units, 0-14 Units, Subcutaneous, TID AC, Americo Mendez MD, Stopped at 05/16/20 0646  •  ipratropium-albuterol (DUO-NEB) nebulizer solution 3 mL, 3 mL, Nebulization, Q4H PRN, Americo Mendez MD  •  lactated ringers 1,000 mL with potassium chloride 20 mEq infusion, 75 mL/hr, Intravenous, Continuous, Marcus Clarke MD, Last Rate: 75 mL/hr at 05/19/20 0404, 75 mL/hr at 05/19/20 0404  •  levETIRAcetam in NaCl 0.54% (KEPPRA) IVPB 1,500 mg, 1,500 mg, Intravenous, Q12H, Americo Mendez MD, 1,500 mg at 05/19/20 0923  •  magnesium hydroxide (MILK OF MAGNESIA) suspension 2400 mg/10mL 10 mL, 10 mL, Per G Tube, Daily, Lula Shah MD, 10 mL at 05/19/20 0922  •  Magnesium Sulfate 2 gram Bolus, followed by 8 gram infusion (total Mg dose 10 grams)- Mg less than or equal to 1mg/dL, 2 g, Intravenous, PRN **OR** Magnesium Sulfate 2 gram / 50mL Infusion (GIVE X 3 BAGS TO EQUAL 6GM TOTAL DOSE) - Mg 1.1 - 1.5 mg/dl, 2 g, Intravenous, PRN **OR** Magnesium Sulfate 4 gram infusion- Mg 1.6-1.9 mg/dL, 4 g, Intravenous, PRN, Americo Mendez MD  •  pantoprazole (PROTONIX) injection 40 mg, 40 mg, Intravenous, Q12H, Americo Mendez MD, 40 mg at 05/19/20 0922  •  Pharmacy to dose vancomycin, , Does not apply, Continuous PRN, Mayelin Taylor MD  •  polyethylene glycol (MIRALAX) packet 17 g, 17 g, Per G Tube, BID, Lula Shah MD, 17 g at 05/19/20 0923  •  potassium chloride (KLOR-CON) packet 40 mEq, 40 mEq, Oral, PRN, Americo Mendez MD, 40 mEq at 05/15/20 0643  •  potassium chloride (MICRO-K) CR capsule 40 mEq, 40 mEq, Oral, PRN **OR** potassium chloride (KLOR-CON) packet 40 mEq, 40 mEq, Oral, PRN **OR** potassium chloride 10 mEq in 100 mL IVPB, 10 mEq, Intravenous, Q1H PRN, Americo Mendez MD,  Last Rate: 100 mL/hr at 05/16/20 0457, 10 mEq at 05/16/20 0457  •  potassium chloride (MICRO-K) CR capsule 40 mEq, 40 mEq, Oral, PRN, Americo Mendez MD  •  potassium phosphate 45 mmol in sodium chloride 0.9 % 500 mL infusion, 45 mmol, Intravenous, PRN **OR** potassium phosphate 30 mmol in sodium chloride 0.9 % 250 mL infusion, 30 mmol, Intravenous, PRN **OR** potassium phosphate 15 mmol in sodium chloride 0.9 % 100 mL infusion, 15 mmol, Intravenous, PRN **OR** sodium phosphates 40 mmol in sodium chloride 0.9 % 500 mL IVPB, 40 mmol, Intravenous, PRN **OR** sodium phosphates 20 mmol in sodium chloride 0.9 % 250 mL IVPB, 20 mmol, Intravenous, PRN, Americo Mendez MD  •  promethazine (PHENERGAN) injection 12.5 mg, 12.5 mg, Intravenous, 5x Daily PRN, Americo Mendez MD  •  [COMPLETED] vancomycin 1500 mg/500 mL 0.9% NS IVPB ([facility]), 1,500 mg, Intravenous, Once, 1,500 mg at 05/17/20 2214 **FOLLOWED BY** vancomycin 1250 mg/250 mL 0.9% NS IVPB ([facility]), 1,250 mg, Intravenous, Q12H, Mayelin Taylor MD, 1,250 mg at 05/18/20 2201     ASSESSMENT  Acute hypoxic respiratory failure  Gram-positive cocci bacteremia with sepsis  Adynamic ileus  Status post exploratory laparotomy  Right upper lobe pneumonia  Acute UTI  Right hydronephrosis status post cystoscopy  Traumatic brain injury  Seizure disorder  Quadriplegic with contractures  Diabetes mellitus  Dysphagia with G-tube in place  Immobilization syndrome    PLAN  CPM  Postop care  IVF  Start tube feeding and advance as tolerated to goal  IV antibiotics per infectious disease  General surgery consult appreciated  Urology to follow patient  Adjust home medications  Stress ulcer DVT prophylaxis  Supportive care  DNR  Discussed with nursing staff  Follow closely further recommendation current hospital course    MICHEL KURTZ MD

## 2020-05-19 NOTE — PLAN OF CARE
No s/s of pain present throughout shift. Medications administered per orders. Q2 turn. Opens eyes when spoken to. Tube feed increased to 20cc/hr at 0400, tolerating well at this time. Monitoring residual. Monitoring blood glucose. Accumax pump and waffle boots in place. VSS. No s/s of distress at this time. Will continue to monitor.         Problem: Patient Care Overview  Goal: Individualization and Mutuality  Outcome: Ongoing (interventions implemented as appropriate)  Goal: Discharge Needs Assessment  Outcome: Ongoing (interventions implemented as appropriate)  Goal: Interprofessional Rounds/Family Conf  Outcome: Ongoing (interventions implemented as appropriate)     Problem: Skin Injury Risk (Adult)  Goal: Identify Related Risk Factors and Signs and Symptoms  Outcome: Ongoing (interventions implemented as appropriate)  Goal: Skin Health and Integrity  Outcome: Ongoing (interventions implemented as appropriate)     Problem: Fall Risk (Adult)  Goal: Identify Related Risk Factors and Signs and Symptoms  Outcome: Ongoing (interventions implemented as appropriate)  Goal: Absence of Fall  Outcome: Ongoing (interventions implemented as appropriate)     Problem: Nutrition, Enteral (Adult)  Goal: Signs and Symptoms of Listed Potential Problems Will be Absent, Minimized or Managed (Nutrition, Enteral)  Outcome: Ongoing (interventions implemented as appropriate)

## 2020-05-19 NOTE — PROGRESS NOTES
"Adult Nutrition  Assessment/PES    Patient Name:  Adrian Kuo  YOB: 1980  MRN: [id number]  Admit Date:  5/14/2020    Assessment Date:  5/19/2020    Comments:  Nutrition follow up.  TFs of Diabetisource now running at 20 ml/hr, tolerating well with no discomfort.  0-5 ml residuals.  2 large BMs today.  Bowel function has improved.    Due to the COVID pandemic, nutrition assessment completed based on review of electronic medical record. This RD currently working remotely and can be reached at [phone], via secure chat or email.     RD will continue to monitor.     Reason for Assessment     Row Name 05/19/20 133          Reason for Assessment    Reason For Assessment  TF/PN;follow-up protocol         Nutrition/Diet History     Row Name 05/19/20 1334          Nutrition/Diet History    Typical Food/Fluid Intake  TFs of Diabetisource at 20 ml/hr, tolerating well         Anthropometrics     Row Name 05/19/20 1336          Anthropometrics    Height  180.3 cm (70.98\")        Admit Weight    Admit Weight  -- 156# 5/15        Ideal Body Weight (IBW)    Ideal Body Weight (IBW) (kg)  79.23        Body Mass Index (BMI)    BMI Assessment  BMI 18.5-24.9: normal 21.72         Labs/Tests/Procedures/Meds     Row Name 05/19/20 1339          Labs/Procedures/Meds    Lab Results Reviewed  reviewed, pertinent     Lab Results Comments  Creat, Hgb, Hct        Diagnostic Tests/Procedures    Diagnostic Test/Procedure Reviewed  reviewed, pertinent        Medications    Pertinent Medications Reviewed  reviewed, pertinent     Pertinent Medications Comments  humalog, keppra, MOM, protonix, miralax, vanc, IVFs         Physical Findings     Row Name 05/19/20 133          Physical Findings    Overall Physical Appearance  hypertonia;tetraplegia (quadriplegia)     Gastrointestinal  feeding tube;other (see comments) 2 large BMs today, bowel function much improved     Tubes  gastrostomy tube     Skin  other (see " "comments);surgical incision B=10, abd inc         Estimated/Assessed Needs     Row Name 05/19/20 1338          Calculation Measurements    Height  180.3 cm (70.98\")         Nutrition Prescription Ordered     Row Name 05/19/20 1340          Nutrition Prescription PO    Current PO Diet  NPO        Nutrition Prescription EN    Enteral Route  Gastrostomy     Product  Diabetisource AC (Glucerna 1.2)     TF Delivery Method  Continuous     Continuous TF Goal Rate (mL/hr)  70 mL/hr     Continuous TF Current Rate (mL/hr)  20 mL/hr     Water flush (mL)   25 mL     Water Flush Frequency  Per hour         Evaluation of Received Nutrient/Fluid Intake     Row Name 05/19/20 1340          Nutrient/Fluid Evaluation    Additional Documentation  Calories Evaluation (Group);Protein Evaluation (Group);Fluid Intake Evaluation (Group)        Calories Evaluation    Enteral Calories (kcal)  576     % of Kcal Needs  33        Protein Evaluation    Enteral Protein (gm)  29     % of Protein Needs  34        Intake Assessment    Energy/Calorie Requirement Assessment  not meeting needs     Protein Requirement Assessment  not meeting needs     Fluid Requirement Assessment  meeting needs        Fluid Intake Evaluation    Enteral (Free Water) Fluid (mL)  394     IV Fluid (mL)  1800        EN Evaluation    TF Changes  Rate increased     HOB  Greater than or equal to 30 degress         Problem/Interventions:    Intervention Goal     Row Name 05/19/20 1344          Intervention Goal    General  Maintain nutrition;Nutrition support treatment;Disease management/therapy;Meet nutritional needs for age/condition;Reduce/improve symptoms     TF/PN  Adjust TF/PN;Tolerate TF at goal;Deliver (%) goal     Deliver % of Goal  100 %     Weight  Maintain weight         Nutrition Intervention     Row Name 05/19/20 1346          Nutrition Intervention    RD/Tech Action  Follow Tx progress;Care plan reviewd     Recommended/Ordered  EN         Nutrition Prescription     " Row Name 05/19/20 1343          Nutrition Prescription EN    Enteral Prescription  Continue same protocol         Education/Evaluation     Row Name 05/19/20 1343          Education    Education  Will Instruct as appropriate        Monitor/Evaluation    Monitor  Per protocol;I&O;Pertinent labs;TF delivery/tolerance;Weight;Skin status;Symptoms     Education Follow-up  Reinforce PRN           Electronically signed by:  Donita Ruiz RD  05/19/20 13:43

## 2020-05-19 NOTE — PROGRESS NOTES
"General Surgery  Progress Note    CC: Follow-up small bowel obstruction    POD#5 exploratory laparotomy        S: He has had 2 large bowel movements today, one this morning and another just now after his fleets enema was administered.  His tube feeds were advanced to 20 cc/h last night and he is tolerating that well with no outward signs of discomfort.    O:/83 (BP Location: Right arm, Patient Position: Lying)   Pulse 66   Temp 97.7 °F (36.5 °C) (Oral)   Resp 16   Ht 180.3 cm (70.98\")   Wt 70.8 kg (156 lb 1.4 oz)   SpO2 97%   BMI 21.78 kg/m²     Intake & Output:  UOP: 5025 cc/24 hrs  Right nephrostomy tube: 190 cc/24 hrs  BM x2 today    GENERAL: Awake with eyes open, unresponsive (chronic due to his vegetative state), resting comfortably, shows no signs of agitation  HEENT: normocephalic, atraumatic, moist mucous membranes, clear sclerae   CHEST: clear to auscultation, no wheezes, rales or rhonchi, symmetric air entry  CARDIAC: regular rate and rhythm    ABDOMEN: Soft, nontender, nondistended, incision clean dry and intact with staples, G-tube in left upper quadrant sutured to the skin, no surrounding erythema  EXTREMITIES: Chronic contractures of the bilateral upper extremities due to quadriplegia  SKIN: Warm and moist, no rashes    LABS  Results from last 7 days   Lab Units 05/18/20  0635 05/17/20  0559 05/16/20  0745   WBC 10*3/mm3 6.28 6.75 10.26   HEMOGLOBIN g/dL 11.0* 11.3* 13.2   HEMATOCRIT % 31.8* 33.8* 39.5   PLATELETS 10*3/mm3 194 171 203     Results from last 7 days   Lab Units 05/19/20  1209 05/18/20  0635 05/17/20  0559 05/16/20  0745 05/15/20  0423 05/14/20  0815   SODIUM mmol/L  --  143 144 146* 145 141   POTASSIUM mmol/L  --  4.8 4.2 3.7 2.7* 3.5   CHLORIDE mmol/L  --  109* 112* 108* 99 93*   CO2 mmol/L  --  20.9* 23.0 23.5 30.8* 32.3*   BUN mg/dL  --  9 10 15 27* 36*   CREATININE mg/dL 0.60* 0.42* 0.41* 0.45* 0.71* 0.74*   CALCIUM mg/dL  --  8.8 8.5* 8.5* 9.4 9.9   BILIRUBIN mg/dL  --   " --   --   --  0.5 0.9   ALK PHOS U/L  --   --   --   --  98 127*   ALT (SGPT) U/L  --   --   --   --  37 40   AST (SGOT) U/L  --   --   --   --  20 17   GLUCOSE mg/dL  --  80 94 148* 204* 234*             A/P: 40 y.o. male with chronic adynamic ileus POD#5 exploratory laparotomy    His bowel function has improved dramatically.  Continue to advance tube feeds to goal.  Once his tube feeds have been advanced to goal through his G-tube, he can be discharged back to his nursing home.    Lula Shah MD  General and Endoscopic Surgery  [facility]    [address], [address]  [place], [place], [address]  P: [phone]  F: [phone]

## 2020-05-20 LAB
ANION GAP SERPL CALCULATED.3IONS-SCNC: 11.6 MMOL/L (ref 5–15)
BACTERIA SPEC AEROBE CULT: ABNORMAL
BACTERIA SPEC AEROBE CULT: ABNORMAL
BACTERIA SPEC AEROBE CULT: NORMAL
BACTERIA SPEC AEROBE CULT: NORMAL
BASOPHILS # BLD AUTO: 0.05 10*3/MM3 (ref 0–0.2)
BASOPHILS NFR BLD AUTO: 0.9 % (ref 0–1.5)
BUN BLD-MCNC: 8 MG/DL (ref 6–20)
BUN/CREAT SERPL: 14.3 (ref 7–25)
CALCIUM SPEC-SCNC: 9.5 MG/DL (ref 8.6–10.5)
CHLORIDE SERPL-SCNC: 103 MMOL/L (ref 98–107)
CO2 SERPL-SCNC: 24.4 MMOL/L (ref 22–29)
CREAT BLD-MCNC: 0.56 MG/DL (ref 0.76–1.27)
DEPRECATED RDW RBC AUTO: 42.6 FL (ref 37–54)
EOSINOPHIL # BLD AUTO: 0.2 10*3/MM3 (ref 0–0.4)
EOSINOPHIL NFR BLD AUTO: 3.4 % (ref 0.3–6.2)
ERYTHROCYTE [DISTWIDTH] IN BLOOD BY AUTOMATED COUNT: 13.1 % (ref 12.3–15.4)
GFR SERPL CREATININE-BSD FRML MDRD: >150 ML/MIN/1.73
GLUCOSE BLD-MCNC: 125 MG/DL (ref 65–99)
GLUCOSE BLDC GLUCOMTR-MCNC: 123 MG/DL (ref 70–130)
GLUCOSE BLDC GLUCOMTR-MCNC: 129 MG/DL (ref 70–130)
GLUCOSE BLDC GLUCOMTR-MCNC: 137 MG/DL (ref 70–130)
GLUCOSE BLDC GLUCOMTR-MCNC: 139 MG/DL (ref 70–130)
GLUCOSE BLDC GLUCOMTR-MCNC: 99 MG/DL (ref 70–130)
GRAM STN SPEC: ABNORMAL
GRAM STN SPEC: ABNORMAL
HCT VFR BLD AUTO: 38.2 % (ref 37.5–51)
HGB BLD-MCNC: 13.1 G/DL (ref 13–17.7)
IMM GRANULOCYTES # BLD AUTO: 0.21 10*3/MM3 (ref 0–0.05)
IMM GRANULOCYTES NFR BLD AUTO: 3.6 % (ref 0–0.5)
ISOLATED FROM: ABNORMAL
LYMPHOCYTES # BLD AUTO: 1.6 10*3/MM3 (ref 0.7–3.1)
LYMPHOCYTES NFR BLD AUTO: 27.5 % (ref 19.6–45.3)
MCH RBC QN AUTO: 30.8 PG (ref 26.6–33)
MCHC RBC AUTO-ENTMCNC: 34.3 G/DL (ref 31.5–35.7)
MCV RBC AUTO: 89.7 FL (ref 79–97)
MONOCYTES # BLD AUTO: 0.58 10*3/MM3 (ref 0.1–0.9)
MONOCYTES NFR BLD AUTO: 10 % (ref 5–12)
NEUTROPHILS # BLD AUTO: 3.17 10*3/MM3 (ref 1.7–7)
NEUTROPHILS NFR BLD AUTO: 54.6 % (ref 42.7–76)
NRBC BLD AUTO-RTO: 0.2 /100 WBC (ref 0–0.2)
PLATELET # BLD AUTO: 246 10*3/MM3 (ref 140–450)
PMV BLD AUTO: 10.9 FL (ref 6–12)
POTASSIUM BLD-SCNC: 4 MMOL/L (ref 3.5–5.2)
RBC # BLD AUTO: 4.26 10*6/MM3 (ref 4.14–5.8)
SODIUM BLD-SCNC: 139 MMOL/L (ref 136–145)
WBC NRBC COR # BLD: 5.81 10*3/MM3 (ref 3.4–10.8)

## 2020-05-20 PROCEDURE — 85025 COMPLETE CBC W/AUTO DIFF WBC: CPT | Performed by: HOSPITALIST

## 2020-05-20 PROCEDURE — 25010000002 VANCOMYCIN PER 500 MG: Performed by: INTERNAL MEDICINE

## 2020-05-20 PROCEDURE — 25010000003 LEVETIRACETAM IN NACL 0.54% 1500 MG/100ML SOLUTION: Performed by: INTERNAL MEDICINE

## 2020-05-20 PROCEDURE — 99024 POSTOP FOLLOW-UP VISIT: CPT | Performed by: SURGERY

## 2020-05-20 PROCEDURE — 25010000002 CEFTRIAXONE PER 250 MG: Performed by: INTERNAL MEDICINE

## 2020-05-20 PROCEDURE — 82962 GLUCOSE BLOOD TEST: CPT

## 2020-05-20 PROCEDURE — 80048 BASIC METABOLIC PNL TOTAL CA: CPT | Performed by: HOSPITALIST

## 2020-05-20 PROCEDURE — 25010000002 POTASSIUM CHLORIDE PER 2 MEQ OF POTASSIUM: Performed by: HOSPITALIST

## 2020-05-20 RX ADMIN — LEVETIRACETAM 1500 MG: 15 INJECTION INTRAVENOUS at 09:21

## 2020-05-20 RX ADMIN — MAGNESIUM HYDROXIDE 10 ML: 2400 SUSPENSION ORAL at 09:21

## 2020-05-20 RX ADMIN — AMANTADINE HYDROCHLORIDE 100 MG: 50 SOLUTION ORAL at 20:25

## 2020-05-20 RX ADMIN — VANCOMYCIN HYDROCHLORIDE 1000 MG: 1 INJECTION, SOLUTION INTRAVENOUS at 17:30

## 2020-05-20 RX ADMIN — POLYETHYLENE GLYCOL 3350 17 G: 17 POWDER, FOR SOLUTION ORAL at 09:21

## 2020-05-20 RX ADMIN — POLYETHYLENE GLYCOL 3350 17 G: 17 POWDER, FOR SOLUTION ORAL at 20:25

## 2020-05-20 RX ADMIN — CHLORHEXIDINE GLUCONATE 15 ML: 1.2 RINSE ORAL at 09:23

## 2020-05-20 RX ADMIN — VANCOMYCIN HYDROCHLORIDE 1000 MG: 1 INJECTION, SOLUTION INTRAVENOUS at 04:56

## 2020-05-20 RX ADMIN — POTASSIUM CHLORIDE 50 ML/HR: 2 INJECTION, SOLUTION, CONCENTRATE INTRAVENOUS at 23:09

## 2020-05-20 RX ADMIN — CEFTRIAXONE SODIUM 1 G: 1 INJECTION, SOLUTION INTRAVENOUS at 11:30

## 2020-05-20 RX ADMIN — CHLORHEXIDINE GLUCONATE 15 ML: 1.2 RINSE ORAL at 20:25

## 2020-05-20 RX ADMIN — LEVETIRACETAM 1500 MG: 15 INJECTION INTRAVENOUS at 20:24

## 2020-05-20 RX ADMIN — PANTOPRAZOLE SODIUM 40 MG: 40 INJECTION, POWDER, FOR SOLUTION INTRAVENOUS at 09:21

## 2020-05-20 RX ADMIN — PANTOPRAZOLE SODIUM 40 MG: 40 INJECTION, POWDER, FOR SOLUTION INTRAVENOUS at 20:25

## 2020-05-20 RX ADMIN — AMANTADINE HYDROCHLORIDE 100 MG: 50 SOLUTION ORAL at 09:21

## 2020-05-20 NOTE — NURSING NOTE
Flori with clinical outcomes asked me to page urology about patients simon catheter. Dr. Howell was on call and said he will message Dr. Jay to see the patient tomorrow on 5-21.

## 2020-05-20 NOTE — PROGRESS NOTES
Continued Stay Note  [facility]     Patient Name: Adrian Kuo  MRN: [id number]  Today's Date: 5/20/2020    Admit Date: 5/14/2020    Discharge Plan     Row Name 05/20/20 1545       Plan    Plan  [facility] term care    Plan Comments  CCP called and spoke to patient's mother/POA Lizzeth Kuo for follow up. Patient's mother confirmed plan for patient to return to long term care at [facility]. Patient will need ambulance transport. Packet is in cubby. CCP left voicemail with [place] to confirm plan for patient to return. CCP will continue to follow for any needs that may arise and to assist with discharge back to [facility]. Paola KENNEDY        Discharge Codes    No documentation.       Expected Discharge Date and Time     Expected Discharge Date Expected Discharge Time    May 21, 2020             APRIL Roman

## 2020-05-20 NOTE — PLAN OF CARE
Problem: Patient Care Overview  Goal: Plan of Care Review  5/20/2020 1551 by Nile Trinidad RN  Outcome: Ongoing (interventions implemented as appropriate)  Flowsheets  Taken 5/20/2020 1551  Plan of Care Reviewed With: patient  Taken 5/20/2020 1527  Outcome Summary: Patient has been nonverbal during shift. No signs of pain, nausea or SOA. IV abx continued. Increase tube feed Q6 now per Dr. Shah. Patient turned Q2. Will continue to monitor and assist patient as needed.  5/20/2020 1527 by Nile Trinidad RN  Outcome: Ongoing (interventions implemented as appropriate)  Flowsheets (Taken 5/20/2020 1527)  Progress: improving  Plan of Care Reviewed With: patient  Outcome Summary: Patient has been nonverbal during shift. No signs of pain, nausea or SOA. IV abx continued. Increase tube feed Q6 now per Dr. Shah. Patient turned Q2. Will continue to monitor and assist patient as needed.  Goal: Individualization and Mutuality  5/20/2020 1551 by Nile Trinidad RN  Outcome: Ongoing (interventions implemented as appropriate)  5/20/2020 1527 by Nile Trinidad RN  Outcome: Ongoing (interventions implemented as appropriate)  Goal: Discharge Needs Assessment  5/20/2020 1551 by Nile Trinidad RN  Outcome: Ongoing (interventions implemented as appropriate)  5/20/2020 1527 by Nile Trinidad RN  Outcome: Ongoing (interventions implemented as appropriate)  Goal: Interprofessional Rounds/Family Conf  5/20/2020 1551 by Nile Trinidad RN  Outcome: Ongoing (interventions implemented as appropriate)  5/20/2020 1527 by Nile Trinidad RN  Outcome: Ongoing (interventions implemented as appropriate)     Problem: Skin Injury Risk (Adult)  Goal: Identify Related Risk Factors and Signs and Symptoms  5/20/2020 1551 by Nile Trinidad RN  Outcome: Ongoing (interventions implemented as appropriate)  5/20/2020 1527 by Nile Trinidad RN  Outcome: Ongoing (interventions implemented as appropriate)  Goal: Skin Health and  Integrity  5/20/2020 1551 by Nile Trinidad RN  Outcome: Ongoing (interventions implemented as appropriate)  5/20/2020 1527 by Nile Trinidad RN  Outcome: Ongoing (interventions implemented as appropriate)     Problem: Fall Risk (Adult)  Goal: Identify Related Risk Factors and Signs and Symptoms  5/20/2020 1551 by Nile Trinidad RN  Outcome: Ongoing (interventions implemented as appropriate)  5/20/2020 1527 by Nile Trinidad RN  Outcome: Ongoing (interventions implemented as appropriate)  Goal: Absence of Fall  5/20/2020 1551 by Nile Trinidad RN  Outcome: Ongoing (interventions implemented as appropriate)  5/20/2020 1527 by Nile Trinidad RN  Outcome: Ongoing (interventions implemented as appropriate)     Problem: Nutrition, Enteral (Adult)  Goal: Signs and Symptoms of Listed Potential Problems Will be Absent, Minimized or Managed (Nutrition, Enteral)  5/20/2020 1551 by Nile Trinidad RN  Outcome: Ongoing (interventions implemented as appropriate)  5/20/2020 1527 by Nile Trinidad RN  Outcome: Ongoing (interventions implemented as appropriate)

## 2020-05-20 NOTE — PROGRESS NOTES
"General Surgery  Progress Note    CC: Follow-up small bowel obstruction    POD#6 exploratory laparotomy        S: His tube feeds are at 40 cc/hr as of this morning with residuals of only 10 cc. He is showing no outward signs of discomfort and has had no nausea. He had no BMs on night shift but had two large BMs yesterday during day shift.    O:/89 (BP Location: Right arm, Patient Position: Lying)   Pulse 72   Temp 97.8 °F (36.6 °C) (Oral)   Resp 16   Ht 180.3 cm (70.98\")   Wt 70.8 kg (156 lb 1.4 oz)   SpO2 98%   BMI 21.78 kg/m²     Intake & Output:  UOP: 4175 cc/24 hrs  Right nephrostomy tube: 215 cc/24 hrs  BM x2 yesterday    GENERAL: Resting comfortably with eyes closed, unresponsive (chronic due to his vegetative state), resting comfortably, shows no signs of agitation  HEENT: normocephalic, atraumatic, moist mucous membranes, clear sclerae   CHEST: clear to auscultation, no wheezes, rales or rhonchi, symmetric air entry  CARDIAC: regular rate and rhythm    ABDOMEN: Soft, nontender, nondistended, incision clean dry and intact with staples, G-tube in left upper quadrant sutured to the skin, no surrounding erythema  EXTREMITIES: Chronic contractures of the bilateral upper extremities due to quadriplegia  SKIN: Warm and moist, no rashes    LABS  Results from last 7 days   Lab Units 05/20/20  0518 05/18/20  0635 05/17/20  0559   WBC 10*3/mm3 5.81 6.28 6.75   HEMOGLOBIN g/dL 13.1 11.0* 11.3*   HEMATOCRIT % 38.2 31.8* 33.8*   PLATELETS 10*3/mm3 246 194 171     Results from last 7 days   Lab Units 05/20/20  0518 05/19/20  1209 05/18/20  0635 05/17/20  0559  05/15/20  0423 05/14/20  0815   SODIUM mmol/L 139  --  143 144   < > 145 141   POTASSIUM mmol/L 4.0  --  4.8 4.2   < > 2.7* 3.5   CHLORIDE mmol/L 103  --  109* 112*   < > 99 93*   CO2 mmol/L 24.4  --  20.9* 23.0   < > 30.8* 32.3*   BUN mg/dL 8  --  9 10   < > 27* 36*   CREATININE mg/dL 0.56* 0.60* 0.42* 0.41*   < > 0.71* 0.74*   CALCIUM mg/dL 9.5  --  " 8.8 8.5*   < > 9.4 9.9   BILIRUBIN mg/dL  --   --   --   --   --  0.5 0.9   ALK PHOS U/L  --   --   --   --   --  98 127*   ALT (SGPT) U/L  --   --   --   --   --  37 40   AST (SGOT) U/L  --   --   --   --   --  20 17   GLUCOSE mg/dL 125*  --  80 94   < > 204* 234*    < > = values in this interval not displayed.             A/P: 40 y.o. male with chronic adynamic ileus POD#6 exploratory laparotomy    Continue to advance tube feeds to goal rate and monitor for elevated residuals.    Lula Shah MD  General and Endoscopic Surgery  [facility]    [address] [place] [address], [address]  [place], [place], [address]  P: [phone]  F: [phone]

## 2020-05-20 NOTE — PLAN OF CARE
His lungs are clear to diminished bilaterally. His midline incision has been cleaned with soap and water and is clean, dry, and without any s/s of drainage. Nephrostomy tube to the right has a moderate amount of serosanquinous fluid in it. Patient is voiding well via chronic simon catheter, with dark yellow urine flowing. Nursing is turning and repositioning him every two hours and pt. Is tolerating without any adverse side effects.
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
## 2020-05-20 NOTE — PROGRESS NOTES
"Daily progress note    Chief complaint  Doing same  No respiratory distress  Nonverbal  Tolerating tube feeds     History of present illness  40-year-old white male who is well-known to our service with history of traumatic brain injury seizure disorder quadriplegic with contracture diabetes and immobilization syndrome who is nonverbal who has multiple episodes of ileus brought to the emergency room with nausea vomiting started yesterday.  Patient evaluated in ER found to have recurrent small bowel obstruction and pneumonia admit for management.  Patient also found to have UTI.  Again patient is nonverbal unable to give detailed history but we know the patient very well from previous admissions.  Patient is DNR per his wishes.  Patient ruled out for COVID-19     REVIEW OF SYSTEMS  Unable to perform      PHYSICAL EXAM  Blood pressure 127/79, pulse 77, temperature 97.9 °F (36.6 °C), temperature source Oral, resp. rate 18, height 180.3 cm (70.98\"), weight 70.8 kg (156 lb 1.4 oz), SpO2 98 %.    GENERAL:  Eyes open  CV: regular rhythm, regular rate  RESPIRATORY: normal effort  ABDOMEN: soft, left upper quadrant G-tube with no signs of infection, distended, absent bowel sounds  : deferred  MUSCULOSKELETAL:  Contracted and chronic deformities  NEURO:  nonverbal at baseline, contractures all 4 extremities with history of quadriplegia.  SKIN: warm, dry     LAB RESULTS  Lab Results (last 24 hours)     Procedure Component Value Units Date/Time    POC Glucose Once [[id number]]  (Abnormal) Collected:  05/20/20 1111    Specimen:  Blood Updated:  05/20/20 1114     Glucose 137 mg/dL     Blood Culture - Blood, Arm, Left [[id number]]  (Abnormal) Collected:  05/14/20 1022    Specimen:  Blood from Arm, Left Updated:  05/20/20 0713     Blood Culture Staphylococcus capitis     Comment: Probable contaminant requires clinical correlation, susceptibility not performed unless requested by physician.          Isolated from Aerobic Bottle     " Blood Culture Propionibacterium species     Comment: Probable contaminant requires clinical correlation, susceptibility not performed unless requested by physician.    Appended report. These results have been appended to a previously final verified report.        Gram Stain Aerobic Bottle Gram positive cocci in clusters      Anaerobic Bottle Gram positive bacilli     Comment: Appended report. These results have been appended to a previously final verified report.       Narrative:       Blood culture does not meet the specified criteria for PCR identification.  If pregnant, immunocompromised, or clinical concern for meningitis, call lab to run BCID for Listeria monocytogenes.    Basic Metabolic Panel [[id number]]  (Abnormal) Collected:  05/20/20 0518    Specimen:  Blood Updated:  05/20/20 0632     Glucose 125 mg/dL      BUN 8 mg/dL      Creatinine 0.56 mg/dL      Sodium 139 mmol/L      Potassium 4.0 mmol/L      Chloride 103 mmol/L      CO2 24.4 mmol/L      Calcium 9.5 mg/dL      eGFR Non African Amer >150 mL/min/1.73      BUN/Creatinine Ratio 14.3     Anion Gap 11.6 mmol/L     Narrative:       GFR Normal >60  Chronic Kidney Disease <60  Kidney Failure <15      CBC & Differential [[id number]] Collected:  05/20/20 0518    Specimen:  Blood Updated:  05/20/20 0630    Narrative:       The following orders were created for panel order CBC & Differential.  Procedure                               Abnormality         Status                     ---------                               -----------         ------                     CBC Auto Differential[[id number]]        Abnormal            Final result                 Please view results for these tests on the individual orders.    CBC Auto Differential [[id number]]  (Abnormal) Collected:  05/20/20 0518    Specimen:  Blood Updated:  05/20/20 0630     WBC 5.81 10*3/mm3      RBC 4.26 10*6/mm3      Hemoglobin 13.1 g/dL      Hematocrit 38.2 %      MCV 89.7 fL      MCH 30.8 pg       MCHC 34.3 g/dL      RDW 13.1 %      RDW-SD 42.6 fl      MPV 10.9 fL      Platelets 246 10*3/mm3      Neutrophil % 54.6 %      Lymphocyte % 27.5 %      Monocyte % 10.0 %      Eosinophil % 3.4 %      Basophil % 0.9 %      Immature Grans % 3.6 %      Neutrophils, Absolute 3.17 10*3/mm3      Lymphocytes, Absolute 1.60 10*3/mm3      Monocytes, Absolute 0.58 10*3/mm3      Eosinophils, Absolute 0.20 10*3/mm3      Basophils, Absolute 0.05 10*3/mm3      Immature Grans, Absolute 0.21 10*3/mm3      nRBC 0.2 /100 WBC     POC Glucose Once [[id number]]  (Normal) Collected:  05/20/20 0619    Specimen:  Blood Updated:  05/20/20 0620     Glucose 129 mg/dL     POC Glucose Once [[id number]]  (Normal) Collected:  05/20/20 0131    Specimen:  Blood Updated:  05/20/20 0133     Glucose 99 mg/dL     POC Glucose Once [[id number]]  (Normal) Collected:  05/19/20 1635    Specimen:  Blood Updated:  05/19/20 1638     Glucose 102 mg/dL     Blood Culture - Blood, Arm, Left [[id number]] Collected:  05/15/20 1526    Specimen:  Blood from Arm, Left Updated:  05/19/20 1545     Blood Culture No growth at 4 days    Blood Culture - Blood, Hand, Left [[id number]] Collected:  05/15/20 1526    Specimen:  Blood from Hand, Left Updated:  05/19/20 1545     Blood Culture No growth at 4 days        Imaging Results (Last 24 Hours)     ** No results found for the last 24 hours. **        ECG 12 Lead        HEART RATE= 93  bpm  RR Interval= 640  ms  DC Interval= 140  ms  P Horizontal Axis= -6  deg  P Front Axis= 65  deg  QRSD Interval= 107  ms  QT Interval= 406  ms  QRS Axis= 43  deg  T Wave Axis= -59  deg  - ABNORMAL ECG -  Sinus rhythm  Repol abnrm suggests ischemia, diffuse leads  Borderline ST elevation, anterolateral leads  Prolonged QT interval  qt length is new             Current Facility-Administered Medications:   •  amantadine (SYMMETREL) solution 100 mg, 100 mg, Per G Tube, Q12H, Americo Mendez MD, 100 mg at 05/20/20 0921  •  cefTRIAXone (ROCEPHIN) IVPB 1 g,  1 g, Intravenous, Q24H, BrandonMayelin zacarias MD, Last Rate: 100 mL/hr at 05/20/20 1130, 1 g at 05/20/20 1130  •  chlorhexidine (PERIDEX) 0.12 % solution 15 mL, 15 mL, Mouth/Throat, Q12H, Americo Mendez MD, 15 mL at 05/20/20 0923  •  dextrose (D50W) 25 g/ 50mL Intravenous Solution 25 g, 25 g, Intravenous, Q15 Min PRN, Americo Mendez MD  •  dextrose (GLUTOSE) oral gel 15 g, 15 g, Oral, Q15 Min PRN, Americo Mendez MD  •  glucagon (human recombinant) (GLUCAGEN DIAGNOSTIC) injection 1 mg, 1 mg, Subcutaneous, Q15 Min PRN, Americo Mendez MD  •  Glycerin-Hypromellose- (ARTIFICIAL TEARS) 0.2-0.2-1 % ophthalmic solution solution 1 drop, 1 drop, Both Eyes, Q3H PRN, Americo Mendez MD  •  HYDROmorphone (DILAUDID) injection 0.5 mg, 0.5 mg, Intravenous, 4x Daily PRN, Americo Mendez MD, 0.5 mg at 05/18/20 0428  •  insulin lispro (humaLOG) injection 0-14 Units, 0-14 Units, Subcutaneous, Q6H, Marcus Clarke MD  •  ipratropium-albuterol (DUO-NEB) nebulizer solution 3 mL, 3 mL, Nebulization, Q4H PRN, Americo Mendez MD  •  lactated ringers 1,000 mL with potassium chloride 20 mEq infusion, 75 mL/hr, Intravenous, Continuous, Marcus Clarke MD, Last Rate: 75 mL/hr at 05/19/20 1721, 75 mL/hr at 05/19/20 1721  •  levETIRAcetam in NaCl 0.54% (KEPPRA) IVPB 1,500 mg, 1,500 mg, Intravenous, Q12H, Americo Mendez MD, 1,500 mg at 05/20/20 0921  •  magnesium hydroxide (MILK OF MAGNESIA) suspension 2400 mg/10mL 10 mL, 10 mL, Per G Tube, Daily, Lula Shah MD, 10 mL at 05/20/20 0921  •  Magnesium Sulfate 2 gram Bolus, followed by 8 gram infusion (total Mg dose 10 grams)- Mg less than or equal to 1mg/dL, 2 g, Intravenous, PRN **OR** Magnesium Sulfate 2 gram / 50mL Infusion (GIVE X 3 BAGS TO EQUAL 6GM TOTAL DOSE) - Mg 1.1 - 1.5 mg/dl, 2 g, Intravenous, PRN **OR** Magnesium Sulfate 4 gram infusion- Mg 1.6-1.9 mg/dL, 4 g, Intravenous, PRN, Americo Mendez MD  •  pantoprazole (PROTONIX) injection 40 mg, 40 mg, Intravenous, Q12H, Americo Mendez MD, 40  mg at 05/20/20 0921  •  Pharmacy to dose vancomycin, , Does not apply, Continuous PRN, Mayelin Taylor MD  •  polyethylene glycol (MIRALAX) packet 17 g, 17 g, Per G Tube, BID, Lula Shah MD, 17 g at 05/20/20 0921  •  potassium chloride (KLOR-CON) packet 40 mEq, 40 mEq, Oral, PRN, Americo Mendez MD, 40 mEq at 05/15/20 0643  •  potassium chloride (MICRO-K) CR capsule 40 mEq, 40 mEq, Oral, PRN **OR** potassium chloride (KLOR-CON) packet 40 mEq, 40 mEq, Oral, PRN **OR** potassium chloride 10 mEq in 100 mL IVPB, 10 mEq, Intravenous, Q1H PRN, Americo Mendez MD, Last Rate: 100 mL/hr at 05/16/20 0457, 10 mEq at 05/16/20 0457  •  potassium chloride (MICRO-K) CR capsule 40 mEq, 40 mEq, Oral, PRN, Americo Mendez MD  •  potassium phosphate 45 mmol in sodium chloride 0.9 % 500 mL infusion, 45 mmol, Intravenous, PRN **OR** potassium phosphate 30 mmol in sodium chloride 0.9 % 250 mL infusion, 30 mmol, Intravenous, PRN **OR** potassium phosphate 15 mmol in sodium chloride 0.9 % 100 mL infusion, 15 mmol, Intravenous, PRN **OR** sodium phosphates 40 mmol in sodium chloride 0.9 % 500 mL IVPB, 40 mmol, Intravenous, PRN **OR** sodium phosphates 20 mmol in sodium chloride 0.9 % 250 mL IVPB, 20 mmol, Intravenous, PRN, Americo Mendez MD  •  promethazine (PHENERGAN) injection 12.5 mg, 12.5 mg, Intravenous, 5x Daily PRN, Americo Mendez MD  •  vancomycin (VANCOCIN) in iso-osmotic dextrose IVPB 1 g (premix) 200 mL, 1,000 mg, Intravenous, Q12H, Mayelin Taylor MD, 1,000 mg at 05/20/20 0456     ASSESSMENT  Acute hypoxic respiratory failure  Gram-positive cocci bacteremia with sepsis  Adynamic ileus  Status post exploratory laparotomy  Right upper lobe pneumonia  Acute UTI  Right hydronephrosis status post cystoscopy  Traumatic brain injury  Seizure disorder  Quadriplegic with contractures  Diabetes mellitus  Dysphagia with G-tube in place  Immobilization syndrome    PLAN  CPM  Postop care  IVF  Start tube feeding and advance as tolerated to  goal  IV antibiotics per infectious disease  General surgery consult appreciated  Urology to follow patient  Adjust home medications  Stress ulcer DVT prophylaxis  Supportive care  DNR  Discussed with nursing staff  Discharge planning    MICHEL KURTZ MD

## 2020-05-21 VITALS
SYSTOLIC BLOOD PRESSURE: 114 MMHG | HEART RATE: 99 BPM | BODY MASS INDEX: 21.85 KG/M2 | TEMPERATURE: 97.9 F | WEIGHT: 156.09 LBS | OXYGEN SATURATION: 97 % | DIASTOLIC BLOOD PRESSURE: 97 MMHG | RESPIRATION RATE: 18 BRPM | HEIGHT: 71 IN

## 2020-05-21 LAB
ANION GAP SERPL CALCULATED.3IONS-SCNC: 12.4 MMOL/L (ref 5–15)
BASOPHILS # BLD AUTO: 0.07 10*3/MM3 (ref 0–0.2)
BASOPHILS NFR BLD AUTO: 0.8 % (ref 0–1.5)
BUN BLD-MCNC: 11 MG/DL (ref 6–20)
BUN/CREAT SERPL: 17.7 (ref 7–25)
CALCIUM SPEC-SCNC: 9.5 MG/DL (ref 8.6–10.5)
CHLORIDE SERPL-SCNC: 104 MMOL/L (ref 98–107)
CO2 SERPL-SCNC: 22.6 MMOL/L (ref 22–29)
CREAT BLD-MCNC: 0.62 MG/DL (ref 0.76–1.27)
DEPRECATED RDW RBC AUTO: 42.7 FL (ref 37–54)
EOSINOPHIL # BLD AUTO: 0.29 10*3/MM3 (ref 0–0.4)
EOSINOPHIL NFR BLD AUTO: 3.5 % (ref 0.3–6.2)
ERYTHROCYTE [DISTWIDTH] IN BLOOD BY AUTOMATED COUNT: 13.2 % (ref 12.3–15.4)
GFR SERPL CREATININE-BSD FRML MDRD: 144 ML/MIN/1.73
GLUCOSE BLD-MCNC: 149 MG/DL (ref 65–99)
GLUCOSE BLDC GLUCOMTR-MCNC: 134 MG/DL (ref 70–130)
GLUCOSE BLDC GLUCOMTR-MCNC: 136 MG/DL (ref 70–130)
GLUCOSE BLDC GLUCOMTR-MCNC: 140 MG/DL (ref 70–130)
HCT VFR BLD AUTO: 41.2 % (ref 37.5–51)
HGB BLD-MCNC: 14.1 G/DL (ref 13–17.7)
IMM GRANULOCYTES # BLD AUTO: 0.3 10*3/MM3 (ref 0–0.05)
IMM GRANULOCYTES NFR BLD AUTO: 3.6 % (ref 0–0.5)
LYMPHOCYTES # BLD AUTO: 1.86 10*3/MM3 (ref 0.7–3.1)
LYMPHOCYTES NFR BLD AUTO: 22.5 % (ref 19.6–45.3)
MCH RBC QN AUTO: 30.7 PG (ref 26.6–33)
MCHC RBC AUTO-ENTMCNC: 34.2 G/DL (ref 31.5–35.7)
MCV RBC AUTO: 89.6 FL (ref 79–97)
MONOCYTES # BLD AUTO: 0.74 10*3/MM3 (ref 0.1–0.9)
MONOCYTES NFR BLD AUTO: 9 % (ref 5–12)
NEUTROPHILS # BLD AUTO: 4.99 10*3/MM3 (ref 1.7–7)
NEUTROPHILS NFR BLD AUTO: 60.6 % (ref 42.7–76)
NRBC BLD AUTO-RTO: 0.1 /100 WBC (ref 0–0.2)
PLATELET # BLD AUTO: 268 10*3/MM3 (ref 140–450)
PMV BLD AUTO: 10.3 FL (ref 6–12)
POTASSIUM BLD-SCNC: 4.1 MMOL/L (ref 3.5–5.2)
RBC # BLD AUTO: 4.6 10*6/MM3 (ref 4.14–5.8)
SODIUM BLD-SCNC: 139 MMOL/L (ref 136–145)
VANCOMYCIN SERPL-MCNC: 13.4 MCG/ML (ref 5–40)
VANCOMYCIN TROUGH SERPL-MCNC: 26.8 MCG/ML (ref 5–20)
WBC NRBC COR # BLD: 8.25 10*3/MM3 (ref 3.4–10.8)

## 2020-05-21 PROCEDURE — 80202 ASSAY OF VANCOMYCIN: CPT | Performed by: INTERNAL MEDICINE

## 2020-05-21 PROCEDURE — 99024 POSTOP FOLLOW-UP VISIT: CPT | Performed by: SURGERY

## 2020-05-21 PROCEDURE — 25010000002 CEFTRIAXONE PER 250 MG: Performed by: INTERNAL MEDICINE

## 2020-05-21 PROCEDURE — C1751 CATH, INF, PER/CENT/MIDLINE: HCPCS

## 2020-05-21 PROCEDURE — 82962 GLUCOSE BLOOD TEST: CPT

## 2020-05-21 PROCEDURE — 80048 BASIC METABOLIC PNL TOTAL CA: CPT | Performed by: HOSPITALIST

## 2020-05-21 PROCEDURE — 25010000003 LEVETIRACETAM IN NACL 0.54% 1500 MG/100ML SOLUTION: Performed by: INTERNAL MEDICINE

## 2020-05-21 PROCEDURE — 85025 COMPLETE CBC W/AUTO DIFF WBC: CPT | Performed by: HOSPITALIST

## 2020-05-21 PROCEDURE — 05HY33Z INSERTION OF INFUSION DEVICE INTO UPPER VEIN, PERCUTANEOUS APPROACH: ICD-10-PCS | Performed by: HOSPITALIST

## 2020-05-21 RX ORDER — CEFTRIAXONE SODIUM 1 G/50ML
1 INJECTION, SOLUTION INTRAVENOUS EVERY 24 HOURS
Qty: 300 ML | Refills: 0 | Status: SHIPPED | OUTPATIENT
Start: 2020-05-22 | End: 2020-05-28

## 2020-05-21 RX ORDER — POLYETHYLENE GLYCOL 3350 17 G/17G
17 POWDER, FOR SOLUTION ORAL 2 TIMES DAILY
Qty: 60 PACKET | Refills: 0 | Status: SHIPPED | OUTPATIENT
Start: 2020-05-21 | End: 2020-06-20

## 2020-05-21 RX ORDER — SODIUM CHLORIDE 0.9 % (FLUSH) 0.9 %
20 SYRINGE (ML) INJECTION AS NEEDED
Status: DISCONTINUED | OUTPATIENT
Start: 2020-05-21 | End: 2020-05-21 | Stop reason: HOSPADM

## 2020-05-21 RX ORDER — VANCOMYCIN HYDROCHLORIDE 1 G/200ML
1000 INJECTION, SOLUTION INTRAVENOUS ONCE
Status: DISCONTINUED | OUTPATIENT
Start: 2020-05-21 | End: 2020-05-21 | Stop reason: HOSPADM

## 2020-05-21 RX ORDER — PANTOPRAZOLE SODIUM 40 MG/1
40 TABLET, DELAYED RELEASE ORAL DAILY
Qty: 30 TABLET | Refills: 0 | Status: SHIPPED | OUTPATIENT
Start: 2020-05-21 | End: 2020-06-02

## 2020-05-21 RX ORDER — SODIUM CHLORIDE 0.9 % (FLUSH) 0.9 %
10 SYRINGE (ML) INJECTION AS NEEDED
Status: DISCONTINUED | OUTPATIENT
Start: 2020-05-21 | End: 2020-05-21 | Stop reason: HOSPADM

## 2020-05-21 RX ORDER — SODIUM CHLORIDE 0.9 % (FLUSH) 0.9 %
10 SYRINGE (ML) INJECTION EVERY 12 HOURS SCHEDULED
Status: DISCONTINUED | OUTPATIENT
Start: 2020-05-21 | End: 2020-05-21 | Stop reason: HOSPADM

## 2020-05-21 RX ORDER — PANTOPRAZOLE SODIUM 40 MG/1
40 TABLET, DELAYED RELEASE ORAL
Status: DISCONTINUED | OUTPATIENT
Start: 2020-05-22 | End: 2020-05-21 | Stop reason: HOSPADM

## 2020-05-21 RX ORDER — LEVETIRACETAM 100 MG/ML
1500 SOLUTION ORAL EVERY 12 HOURS SCHEDULED
Status: DISCONTINUED | OUTPATIENT
Start: 2020-05-21 | End: 2020-05-21 | Stop reason: HOSPADM

## 2020-05-21 RX ADMIN — CEFTRIAXONE SODIUM 1 G: 1 INJECTION, SOLUTION INTRAVENOUS at 12:13

## 2020-05-21 RX ADMIN — MAGNESIUM HYDROXIDE 10 ML: 2400 SUSPENSION ORAL at 08:01

## 2020-05-21 RX ADMIN — LEVETIRACETAM 1500 MG: 15 INJECTION INTRAVENOUS at 08:01

## 2020-05-21 RX ADMIN — AMANTADINE HYDROCHLORIDE 100 MG: 50 SOLUTION ORAL at 08:01

## 2020-05-21 RX ADMIN — POLYETHYLENE GLYCOL 3350 17 G: 17 POWDER, FOR SOLUTION ORAL at 08:01

## 2020-05-21 RX ADMIN — CHLORHEXIDINE GLUCONATE 15 ML: 1.2 RINSE ORAL at 08:02

## 2020-05-21 RX ADMIN — PANTOPRAZOLE SODIUM 40 MG: 40 INJECTION, POWDER, FOR SOLUTION INTRAVENOUS at 08:01
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
## 2020-05-21 NOTE — PROGRESS NOTES
"Pharmacokinetic Evaluation - Vancomycin    Adrian Kuo is a 40 y.o. male on vancomycin pharmacy to dose.  MRN: [id number]  : 1980    Day of vancomycin therapy: 7  Indication: bacteremia/sepsis/UTI  Consulted by: Dr. Taylor  Goal trough: 15-20mcg/mL    Current dose: 1000mg q12h  Other antimicrobials: CTX 1g q24h    Blood pressure 110/77, pulse 85, temperature 98.5 °F (36.9 °C), temperature source Oral, resp. rate 15, height 180.3 cm (70.98\"), weight 70.8 kg (156 lb 1.4 oz), SpO2 97 %.  Results from last 7 days   Lab Units 20  1209   CREATININE mg/dL 0.62* 0.56* 0.60*     Estimated Creatinine Clearance: 158.6 mL/min (A) (by C-G formula based on SCr of 0.62 mg/dL (L)).  Results from last 7 days   Lab Units 20  0428 20  0520  0635   WBC 10*3/mm3 8.25 5.81 6.28   HEMOGLOBIN g/dL 14.1 13.1 11.0*   HEMATOCRIT % 41.2 38.2 31.8*   PLATELETS 10*3/mm3 268 246 194     Baseline culture/source/susceptibility:   UCx (): >100,000 CFU/mL Mixed Becky Isolated   BCx 1st of 2 sets (): Staphylococcus capitis; Propionibacterium spp  BCx 2nd of 2 sets (): Staphylococcus epidermidis   Repeat BCx x2 sets (05/15): NGTD   Urine, Clean Catch; Body Fluid (05/15): NGF  Kidney, Right; Body Fluid (05/15): NGF     Labs:       Results from last 7 days   Lab Units 05/15/20  0423   PROCALCITONIN ng/mL 0.16            Results from last 7 days   Lab Units 05/15/20  0423 20  1022   LACTATE mmol/L 3.1* 3.0*      Vancomycin Dosing History  750 mg IV q8h               0232               1133  1000 mg IV x1 dose               0334  1500 mg IV x1 dose              05/17 2214  1250 mg IV q12h                09                          Trough 955: 26.1 mg/L   1000 mg IV q12h               1711               0456, 1730     @ 0428 trough = 26.8 mcg/mL ( drawn ~ 11 hrs from last dost)    Assessment:  Level " is supra-therapeutic; drawn at steady state.  Renal function appears to be normal, however patient is very slow to eliminate vancomycin. Will plan to hold dosing for now and drawn a random ~ 12 hrs from this am level to determine kinetics on vancomycin.     Plan:  1) Hold vancomycin   2) Next random @ 1600  3) Monitor for UOP and scr .    Sundeep Guevara [facility]

## 2020-05-21 NOTE — PLAN OF CARE
Problem: Patient Care Overview  Goal: Plan of Care Review  Outcome: Ongoing (interventions implemented as appropriate)  Flowsheets (Taken 5/21/2020 1533)  Progress: improving  Plan of Care Reviewed With: patient  Outcome Summary: Patient has been nonverbal during shift. Patient turned Q2. PICC placed for IV antibiotics. Ambulance schduled for 1700. Patient tolerating tube feed. Patient catheter removed and he is urinating on his own. Will continue to monitor and assist patient as needed.  Goal: Individualization and Mutuality  Outcome: Ongoing (interventions implemented as appropriate)  Goal: Discharge Needs Assessment  Outcome: Ongoing (interventions implemented as appropriate)  Goal: Interprofessional Rounds/Family Conf  Outcome: Ongoing (interventions implemented as appropriate)     Problem: Skin Injury Risk (Adult)  Goal: Identify Related Risk Factors and Signs and Symptoms  Outcome: Ongoing (interventions implemented as appropriate)  Goal: Skin Health and Integrity  Outcome: Ongoing (interventions implemented as appropriate)     Problem: Fall Risk (Adult)  Goal: Identify Related Risk Factors and Signs and Symptoms  Outcome: Ongoing (interventions implemented as appropriate)  Goal: Absence of Fall  Outcome: Ongoing (interventions implemented as appropriate)     Problem: Nutrition, Enteral (Adult)  Goal: Signs and Symptoms of Listed Potential Problems Will be Absent, Minimized or Managed (Nutrition, Enteral)  Outcome: Ongoing (interventions implemented as appropriate)

## 2020-05-21 NOTE — PLAN OF CARE
No s/s of pain present. Pt opens his eyes while staff complete nursing tasks. Tube feed now at goal rate of 70cc/hr at 0410, 0cc residual at 0600. Rested well. Turned Q2 hours. Accumax pump and waffle boots in place. VSS. No s/s of distress at this time. Will continue to monitor.         Problem: Patient Care Overview  Goal: Plan of Care Review  Outcome: Ongoing (interventions implemented as appropriate)  Flowsheets (Taken 5/21/2020 0609)  Progress: no change  Plan of Care Reviewed With: patient  Goal: Individualization and Mutuality  Outcome: Ongoing (interventions implemented as appropriate)  Goal: Discharge Needs Assessment  Outcome: Ongoing (interventions implemented as appropriate)  Goal: Interprofessional Rounds/Family Conf  Outcome: Ongoing (interventions implemented as appropriate)     Problem: Skin Injury Risk (Adult)  Goal: Identify Related Risk Factors and Signs and Symptoms  Outcome: Ongoing (interventions implemented as appropriate)  Goal: Skin Health and Integrity  Outcome: Ongoing (interventions implemented as appropriate)     Problem: Fall Risk (Adult)  Goal: Identify Related Risk Factors and Signs and Symptoms  Outcome: Ongoing (interventions implemented as appropriate)  Goal: Absence of Fall  Outcome: Ongoing (interventions implemented as appropriate)     Problem: Nutrition, Enteral (Adult)  Goal: Signs and Symptoms of Listed Potential Problems Will be Absent, Minimized or Managed (Nutrition, Enteral)  Outcome: Ongoing (interventions implemented as appropriate)

## 2020-05-21 NOTE — PROGRESS NOTES
"  Infectious Diseases Progress Note    Mayelin Taylor MD     [facility]  [place]: 7 days  Patient Identification:  Name: Adrian Kuo  Age: 40 y.o.  Sex: male  :  1980  MRN: [id number]         Primary Care Physician: Dewayne Rodriguez MD            Subjective: Nonverbal but comfortable.  Interval History: Transferred out of ICU after being hemodynamically stable.    Objective:    Scheduled Meds:    amantadine 100 mg Per G Tube Q12H   cefTRIAXone 1 g Intravenous Q24H   chlorhexidine 15 mL Mouth/Throat Q12H   insulin lispro 0-14 Units Subcutaneous Q6H   levETIRAcetam 1,500 mg Intravenous Q12H   magnesium hydroxide 10 mL Per G Tube Daily   pantoprazole 40 mg Intravenous Q12H   polyethylene glycol 17 g Per G Tube BID   Vancomycin Pharmacy Intermittent Dosing  Does not apply Daily     Continuous Infusions:    custom IV KCl infusion builder 50 mL/hr Last Rate: 50 mL/hr (20 2309)   Pharmacy to dose vancomycin         Vital signs in last 24 hours:  Temp:  [97.8 °F (36.6 °C)-98.5 °F (36.9 °C)] 98.5 °F (36.9 °C)  Heart Rate:  [72-94] 85  Resp:  [15-18] 15  BP: (110-127)/(76-89) 110/77    Intake/Output:    Intake/Output Summary (Last 24 hours) at 2020 0708  Last data filed at 2020 0603  Gross per 24 hour   Intake 1097 ml   Output 4040 ml   Net -2943 ml       Exam:  /77 (BP Location: Right arm, Patient Position: Lying)   Pulse 85   Temp 98.5 °F (36.9 °C) (Oral)   Resp 15   Ht 180.3 cm (70.98\")   Wt 70.8 kg (156 lb 1.4 oz)   SpO2 97%   BMI 21.78 kg/m²   Patient is examined using the personal protective equipment as per guidelines from infection control for this particular patient as enacted.  Hand washing was performed before and after patient interaction.  General Appearance:   Awake does not engage nonverbal   Head:    Normocephalic, without obvious abnormality, atraumatic   Eyes:    PERRL, conjunctivae/corneas clear, EOM's intact, both eyes   Ears:    Normal external ear " canals, both ears   Nose:   Nares normal, septum midline, mucosa normal, no drainage    or sinus tenderness   Throat:   Lips, tongue, gums normal; oral mucosa pink and moist   Neck:   Supple, symmetrical, trachea midline, no adenopathy;     thyroid:  no enlargement/tenderness/nodules; no carotid    bruit or JVD   Back:     Symmetric, no curvature, ROM normal, right-sided nephrostomy in place   Lungs:     Clear to auscultation bilaterally, respirations unlabored   Chest Wall:    No tenderness or deformity    Heart:    Regular rate and rhythm, S1 and S2 normal, no murmur, rub   or gallop   Abdomen:    Midline incision dressed   Extremities:  Contractures and atrophy the extremities noted   Pulses:   Pulses palpable in all extremities; symmetric all extremities   Skin:   Skin color normal, Skin is warm and dry,  no rashes or palpable lesions   Neurologic:  Contractions nonverbal due to traumatic brain injury            Data Review:    I reviewed the patient's new clinical results.  Results from last 7 days   Lab Units 05/21/20  0428 05/20/20  0518 05/18/20  0635 05/17/20  0559 05/16/20  0745 05/15/20  0423 05/14/20  0815   WBC 10*3/mm3 8.25 5.81 6.28 6.75 10.26 10.58 21.36*   HEMOGLOBIN g/dL 14.1 13.1 11.0* 11.3* 13.2 15.0 16.9   PLATELETS 10*3/mm3 268 246 194 171 203 232 241     Results from last 7 days   Lab Units 05/21/20  0428 05/20/20  0518 05/19/20  1209 05/18/20  0635 05/17/20  0559 05/16/20  0745 05/15/20  0423 05/14/20  0815   SODIUM mmol/L 139 139  --  143 144 146* 145 141   POTASSIUM mmol/L 4.1 4.0  --  4.8 4.2 3.7 2.7* 3.5   CHLORIDE mmol/L 104 103  --  109* 112* 108* 99 93*   CO2 mmol/L 22.6 24.4  --  20.9* 23.0 23.5 30.8* 32.3*   BUN mg/dL 11 8  --  9 10 15 27* 36*   CREATININE mg/dL 0.62* 0.56* 0.60* 0.42* 0.41* 0.45* 0.71* 0.74*   CALCIUM mg/dL 9.5 9.5  --  8.8 8.5* 8.5* 9.4 9.9   GLUCOSE mg/dL 149* 125*  --  80 94 148* 204* 234*     Microbiology Results (last 10 days)     Procedure Component Value -  Date/Time    Body Fluid Culture - Body Fluid, Kidney, Right [[id number]] Collected:  05/15/20 2053    Lab Status:  Final result Specimen:  Body Fluid from Kidney, Right Updated:  05/18/20 0944     Body Fluid Culture No growth at 3 days     Gram Stain No organisms seen      Occasional WBCs per low power field    Fungus Culture - Body Fluid, Kidney, Right [[id number]] Collected:  05/15/20 2053    Lab Status:  Preliminary result Specimen:  Body Fluid from Kidney, Right Updated:  05/20/20 2115     Fungus Culture No fungus isolated at less than 1 week    Body Fluid Culture - Body Fluid, Urine, Clean Catch [[id number]] Collected:  05/15/20 1725    Lab Status:  Final result Specimen:  Body Fluid from Urine, Clean Catch Updated:  05/18/20 0945     Body Fluid Culture No growth at 3 days     Gram Stain No organisms seen      Occasional WBCs per low power field    Blood Culture - Blood, Arm, Left [[id number]] Collected:  05/15/20 1526    Lab Status:  Final result Specimen:  Blood from Arm, Left Updated:  05/20/20 1545     Blood Culture No growth at 5 days    Blood Culture - Blood, Hand, Left [[id number]] Collected:  05/15/20 1526    Lab Status:  Final result Specimen:  Blood from Hand, Left Updated:  05/20/20 1545     Blood Culture No growth at 5 days    Blood Culture - Blood, Arm, Right [[id number]]  (Abnormal) Collected:  05/14/20 1138    Lab Status:  Final result Specimen:  Blood from Arm, Right Updated:  05/17/20 0533     Blood Culture Staphylococcus epidermidis     Comment: Probable contaminant requires clinical correlation, susceptibility not performed unless requested by physician.          Isolated from Aerobic and Anaerobic Bottles     Gram Stain Anaerobic Bottle Gram positive cocci      Aerobic Bottle Gram positive cocci in clusters    Blood Culture ID, PCR - Blood, Arm, Right [[id number]]  (Abnormal) Collected:  05/14/20 1138    Lab Status:  Final result Specimen:  Blood from Arm, Right Updated:  05/15/20 0835     BCID,  PCR Staphylococcus spp, not aureus. Identification by BCID PCR.    SARS-CoV-2 PCR ([place] IN-HOUSE PERFORMED), NP SWAB IN TRANSPORT MEDIA - Swab, Nasopharynx [[id number]]  (Normal) Collected:  05/14/20 1024    Lab Status:  Final result Specimen:  Swab from Nasopharynx Updated:  05/14/20 1213     COVID19 Not Detected    Respiratory Panel, PCR - Swab, Nasopharynx [[id number]]  (Normal) Collected:  05/14/20 1024    Lab Status:  Final result Specimen:  Swab from Nasopharynx Updated:  05/14/20 1150     ADENOVIRUS, PCR Not Detected     Coronavirus 229E Not Detected     Coronavirus HKU1 Not Detected     Coronavirus NL63 Not Detected     Coronavirus OC43 Not Detected     Human Metapneumovirus Not Detected     Human Rhinovirus/Enterovirus Not Detected     Influenza B PCR Not Detected     Parainfluenza Virus 1 Not Detected     Parainfluenza Virus 2 Not Detected     Parainfluenza Virus 3 Not Detected     Parainfluenza Virus 4 Not Detected     Bordetella pertussis pcr Not Detected     Influenza A H1 2009 PCR Not Detected     Chlamydophila pneumoniae PCR Not Detected     Mycoplasma pneumo by PCR Not Detected     Influenza A PCR Not Detected     Influenza A H3 Not Detected     Influenza A H1 Not Detected     RSV, PCR Not Detected     Bordetella parapertussis PCR Not Detected    Narrative:       The coronavirus on the RVP is NOT COVID-19 and is NOT indicative of infection with COVID-19.     Blood Culture - Blood, Arm, Left [[id number]]  (Abnormal) Collected:  05/14/20 1022    Lab Status:  Edited Result - FINAL Specimen:  Blood from Arm, Left Updated:  05/20/20 0713     Blood Culture Staphylococcus capitis     Comment: Probable contaminant requires clinical correlation, susceptibility not performed unless requested by physician.          Isolated from Aerobic Bottle     Blood Culture Propionibacterium species     Comment: Probable contaminant requires clinical correlation, susceptibility not performed unless requested by  physician.    Appended report. These results have been appended to a previously final verified report.        Gram Stain Aerobic Bottle Gram positive cocci in clusters      Anaerobic Bottle Gram positive bacilli     Comment: Appended report. These results have been appended to a previously final verified report.       Narrative:       Blood culture does not meet the specified criteria for PCR identification.  If pregnant, immunocompromised, or clinical concern for meningitis, call lab to run BCID for Listeria monocytogenes.    Urine Culture - Urine, Urine, Catheter [[id number]] Collected:  05/14/20 0852    Lab Status:  Final result Specimen:  Urine, Catheter Updated:  05/15/20 1021     Urine Culture >100,000 CFU/mL Mixed Rosy Isolated    Narrative:       Specimen contains mixed organisms of questionable pathogenicity which indicates contamination with commensal rosy.  Further identification is unlikely to provide clinically useful information.  Suggest recollection.            Assessment:  1-toxic metabolic encephalopathy secondary to  2-obstructed right-sided calculus pyelonephritis status post attempted cystoscopy and retrograde stenting followed by right-sided percutaneous nephrostomy and drainage.  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    6- other diagnosis per internal medicine service        Recommendations/Discussions:  · He seems to have improved physiologically with stable hemodynamics after percutaneous nephrostomy and drainage of obstructive  tract.    · His blood cultures could very  well be contaminant but given his underlying medical condition I think it needs to be approached as this is a manifestation of obstructive pyelonephritis and this pathogen could be the cause of the  tract infection.    · Would recommend 2 weeks of IV vancomycin along with Rocephin to address complicated  infection while aggressively providing him supportive care including prevention of recurrent aspiration.  · Rest of the supportive care per primary team.  · From infectious disease standpoint he could be discharged with above care plan.    Mayelin Taylor MD  5/21/2020  07:08    Much of this encounter note is an electronic transcription/translation of spoken language to printed text. The electronic translation of spoken language may permit erroneous, or at times, nonsensical words or phrases to be inadvertently transcribed; Although I have reviewed the note for such errors, some may still exist

## 2020-05-21 NOTE — PROGRESS NOTES
"Pharmacokinetic Consult - Vancomycin Dosing (Follow-up Note)    Adrian Kuo is on day 7 pharmacy to dose vancomycin for bacteremia secondary to UTI per Dr. Taylor's request. Goal trough: 15-20 mcg/mL.    Duration of Therapy: 2 weeks    Other Antimicrobials: Ceftriaxone 1g IV q24h    Relevant clinical data and objective history reviewed:  40 y.o. male 180.3 cm (70.98\") 70.8 kg (156 lb 1.4 oz)    Vitals:    05/21/20 0418 05/21/20 0753 05/21/20 1204 05/21/20 1403   BP: 110/77 121/78  114/97   BP Location: Right arm Right arm  Right arm   Pulse: 85 89  99   Resp: 15 16  18   Temp: 98.5 °F (36.9 °C) 98.7 °F (37.1 °C)  97.9 °F (36.6 °C)   TempSrc: Oral Oral  Temporal   SpO2: 97% 97%  97%     Creatinine   Date Value Ref Range Status   05/21/2020 0.62 (L) 0.76 - 1.27 mg/dL Final   05/20/2020 0.56 (L) 0.76 - 1.27 mg/dL Final   05/19/2020 0.60 (L) 0.76 - 1.27 mg/dL Final     BUN   Date Value Ref Range Status   05/21/2020 11 6 - 20 mg/dL Final     Estimated Creatinine Clearance: 158.6 mL/min (A) (by C-G formula based on SCr of 0.62 mg/dL (L)).    Lab Results   Component Value Date    WBC 8.25 05/21/2020     Temp Readings from Last 3 Encounters:   05/21/20 97.9 °F (36.6 °C) (Temporal)     Baseline culture/source/susceptibility:  5/14: Ucx- >100,000 CFU/mL Mixed Becky (contamination  5/14: Bcx-Staphylococcus capitis; Propionibacterium spp (1/2 sets)  5/14: Bcx-Staphylococcus epidermidis (1/2 sets)  5/15: Bcx-NGTD  5/15: Urine clean catch cx-negative  5/15: R kidney fluid cx-negative  5/15: Fungal cx-NGTD    Vancomycin Dosing History:   5/16: Vancomycin 1750 mg (20 mg/kg) loading dose IV once (750 mg @ 0232, 1000 mg @ 0334)  5/16: 750 mg q8h @ 1133  5/17: 1500 mg IV once @ 2214  5/18: 1250 mg IV q12h @ 0905, 2201   5/19 @ 0955: Vancomycin trough=26.1 mcg/mL (~12 hr level)  5/19: 1000 mg IV q12h @ 1711  5/20: 1000 mg IV q12h @ 0456, 1730   5/21 @ 0428: Vancomycin trough=26.8 mcg/mL (~11 hr level)   5/21 @ 1613: Random " vancomycin level=13.4 mcg/mL (~22.5 hr level)    Assessment/Plan    1. Pharmacy is currently pulse dosing vancomycin in this patient due to supratherapeutic troughs. Random 24-hr level was 13.4 mcg/mL (~22.5 hr level)-appropriate to re-dose vancomycin at this time.    2. Will give a one-time vancomycin dose of 1000 mg IV once now, then schedule a 24-hr level tomorrow @ 1700. If level comes back within trough goal, will schedule dose of 1000 mg IV q24h.    3. Will monitor serum creatinine at least every 48 hours per dosing recommendations. SCr is stable today at 0.62. Encourage adequate hydration if appropriate to prevent nephrotoxicities. Monitor for decreased UOP, rash or other signs of vancomycin intolerance.    4. Pharmacy will continue to follow daily while on vancomycin and adjust as needed.     Iesha Viveros, Pharm.D., [facility]  Clinical Staff Pharmacist

## 2020-05-21 NOTE — PROGRESS NOTES
"Daily progress note    Chief complaint  Doing same  No respiratory distress  Nonverbal  Tolerating tube feeds     History of present illness  40-year-old white male who is well-known to our service with history of traumatic brain injury seizure disorder quadriplegic with contracture diabetes and immobilization syndrome who is nonverbal who has multiple episodes of ileus brought to the emergency room with nausea vomiting started yesterday.  Patient evaluated in ER found to have recurrent small bowel obstruction and pneumonia admit for management.  Patient also found to have UTI.  Again patient is nonverbal unable to give detailed history but we know the patient very well from previous admissions.  Patient is DNR per his wishes.  Patient ruled out for COVID-19     REVIEW OF SYSTEMS  Unable to perform      PHYSICAL EXAM  Blood pressure 121/78, pulse 89, temperature 98.7 °F (37.1 °C), temperature source Oral, resp. rate 16, height 180.3 cm (70.98\"), weight 70.8 kg (156 lb 1.4 oz), SpO2 97 %.    GENERAL:  Eyes open  CV: regular rhythm, regular rate  RESPIRATORY: normal effort  ABDOMEN: soft, left upper quadrant G-tube with no signs of infection, distended, absent bowel sounds  : deferred  MUSCULOSKELETAL:  Contracted and chronic deformities  NEURO:  nonverbal at baseline, contractures all 4 extremities with history of quadriplegia.  SKIN: warm, dry     LAB RESULTS  Lab Results (last 24 hours)     Procedure Component Value Units Date/Time    POC Glucose Once [[id number]]  (Abnormal) Collected:  05/21/20 1119    Specimen:  Blood Updated:  05/21/20 1122     Glucose 134 mg/dL     POC Glucose Once [[id number]]  (Abnormal) Collected:  05/21/20 0606    Specimen:  Blood Updated:  05/21/20 0607     Glucose 136 mg/dL     Basic Metabolic Panel [[id number]]  (Abnormal) Collected:  05/21/20 0428    Specimen:  Blood Updated:  05/21/20 0515     Glucose 149 mg/dL      BUN 11 mg/dL      Creatinine 0.62 mg/dL      Sodium 139 mmol/L      " Potassium 4.1 mmol/L      Chloride 104 mmol/L      CO2 22.6 mmol/L      Calcium 9.5 mg/dL      eGFR Non African Amer 144 mL/min/1.73      BUN/Creatinine Ratio 17.7     Anion Gap 12.4 mmol/L     Narrative:       GFR Normal >60  Chronic Kidney Disease <60  Kidney Failure <15      Vancomycin, Trough Please obtain vancomycin trough 30 minutes prior to the dose scheduled for 05/21 at 0500. Do not begin infusing this dose of vancomycin until after the trough has been collected. If the trough results >21 mcg/mL, stop the infusi... [[id number]]  (Abnormal) Collected:  05/21/20 0428    Specimen:  Blood Updated:  05/21/20 0515     Vancomycin Trough 26.80 mcg/mL     CBC & Differential [[id number]] Collected:  05/21/20 0428    Specimen:  Blood Updated:  05/21/20 0444    Narrative:       The following orders were created for panel order CBC & Differential.  Procedure                               Abnormality         Status                     ---------                               -----------         ------                     CBC Auto Differential[[id number]]        Abnormal            Final result                 Please view results for these tests on the individual orders.    CBC Auto Differential [[id number]]  (Abnormal) Collected:  05/21/20 0428    Specimen:  Blood Updated:  05/21/20 0444     WBC 8.25 10*3/mm3      RBC 4.60 10*6/mm3      Hemoglobin 14.1 g/dL      Hematocrit 41.2 %      MCV 89.6 fL      MCH 30.7 pg      MCHC 34.2 g/dL      RDW 13.2 %      RDW-SD 42.7 fl      MPV 10.3 fL      Platelets 268 10*3/mm3      Neutrophil % 60.6 %      Lymphocyte % 22.5 %      Monocyte % 9.0 %      Eosinophil % 3.5 %      Basophil % 0.8 %      Immature Grans % 3.6 %      Neutrophils, Absolute 4.99 10*3/mm3      Lymphocytes, Absolute 1.86 10*3/mm3      Monocytes, Absolute 0.74 10*3/mm3      Eosinophils, Absolute 0.29 10*3/mm3      Basophils, Absolute 0.07 10*3/mm3      Immature Grans, Absolute 0.30 10*3/mm3      nRBC 0.1 /100 WBC     POC  Glucose Once [[id number]]  (Abnormal) Collected:  05/20/20 2253    Specimen:  Blood Updated:  05/20/20 2254     Glucose 139 mg/dL     Fungus Culture - Body Fluid, Kidney, Right [[id number]] Collected:  05/15/20 2053    Specimen:  Body Fluid from Kidney, Right Updated:  05/20/20 2115     Fungus Culture No fungus isolated at less than 1 week    POC Glucose Once [[id number]]  (Normal) Collected:  05/20/20 1705    Specimen:  Blood Updated:  05/20/20 1705     Glucose 123 mg/dL     Blood Culture - Blood, Arm, Left [[id number]] Collected:  05/15/20 1526    Specimen:  Blood from Arm, Left Updated:  05/20/20 1545     Blood Culture No growth at 5 days    Blood Culture - Blood, Hand, Left [[id number]] Collected:  05/15/20 1526    Specimen:  Blood from Hand, Left Updated:  05/20/20 1545     Blood Culture No growth at 5 days        Imaging Results (Last 24 Hours)     ** No results found for the last 24 hours. **        ECG 12 Lead        HEART RATE= 93  bpm  RR Interval= 640  ms  CT Interval= 140  ms  P Horizontal Axis= -6  deg  P Front Axis= 65  deg  QRSD Interval= 107  ms  QT Interval= 406  ms  QRS Axis= 43  deg  T Wave Axis= -59  deg  - ABNORMAL ECG -  Sinus rhythm  Repol abnrm suggests ischemia, diffuse leads  Borderline ST elevation, anterolateral leads  Prolonged QT interval  qt length is new             Current Facility-Administered Medications:   •  amantadine (SYMMETREL) solution 100 mg, 100 mg, Per G Tube, Q12H, Americo Mendez MD, 100 mg at 05/21/20 0801  •  cefTRIAXone (ROCEPHIN) IVPB 1 g, 1 g, Intravenous, Q24H, Mayelin Taylor MD, Last Rate: 100 mL/hr at 05/21/20 1213, 1 g at 05/21/20 1213  •  chlorhexidine (PERIDEX) 0.12 % solution 15 mL, 15 mL, Mouth/Throat, Q12H, Americo Mendez MD, 15 mL at 05/21/20 0802  •  dextrose (D50W) 25 g/ 50mL Intravenous Solution 25 g, 25 g, Intravenous, Q15 Min PRN, Americo Mendez MD  •  dextrose (GLUTOSE) oral gel 15 g, 15 g, Oral, Q15 Min PRN, Americo Mendez MD  •  glucagon (human  recombinant) (GLUCAGEN DIAGNOSTIC) injection 1 mg, 1 mg, Subcutaneous, Q15 Min PRN, Americo Mendez MD  •  Glycerin-Hypromellose- (ARTIFICIAL TEARS) 0.2-0.2-1 % ophthalmic solution solution 1 drop, 1 drop, Both Eyes, Q3H PRN, Americo Mendez MD  •  HYDROmorphone (DILAUDID) injection 0.5 mg, 0.5 mg, Intravenous, 4x Daily PRN, Americo Mendez MD, 0.5 mg at 05/18/20 0428  •  insulin lispro (humaLOG) injection 0-14 Units, 0-14 Units, Subcutaneous, Q6H, Marcus Clarke MD  •  ipratropium-albuterol (DUO-NEB) nebulizer solution 3 mL, 3 mL, Nebulization, Q4H PRN, Americo Mendez MD  •  lactated ringers 1,000 mL with potassium chloride 20 mEq infusion, 50 mL/hr, Intravenous, Continuous, Marcus Clarke MD, Last Rate: 50 mL/hr at 05/20/20 2309, 50 mL/hr at 05/20/20 2309  •  levETIRAcetam in NaCl 0.54% (KEPPRA) IVPB 1,500 mg, 1,500 mg, Intravenous, Q12H, Americo Mendez MD, 1,500 mg at 05/21/20 0801  •  magnesium hydroxide (MILK OF MAGNESIA) suspension 2400 mg/10mL 10 mL, 10 mL, Per G Tube, Daily, Lula Shah MD, 10 mL at 05/21/20 0801  •  Magnesium Sulfate 2 gram Bolus, followed by 8 gram infusion (total Mg dose 10 grams)- Mg less than or equal to 1mg/dL, 2 g, Intravenous, PRN **OR** Magnesium Sulfate 2 gram / 50mL Infusion (GIVE X 3 BAGS TO EQUAL 6GM TOTAL DOSE) - Mg 1.1 - 1.5 mg/dl, 2 g, Intravenous, PRN **OR** Magnesium Sulfate 4 gram infusion- Mg 1.6-1.9 mg/dL, 4 g, Intravenous, PRN, Americo Mendez MD  •  pantoprazole (PROTONIX) injection 40 mg, 40 mg, Intravenous, Q12H, Americo Mendez MD, 40 mg at 05/21/20 0801  •  Pharmacy to dose vancomycin, , Does not apply, Continuous PRN, Mayelin Taylor MD  •  polyethylene glycol (MIRALAX) packet 17 g, 17 g, Per G Tube, BID, Lula Shah MD, 17 g at 05/21/20 0801  •  potassium chloride (KLOR-CON) packet 40 mEq, 40 mEq, Oral, PRN, Americo Mendez MD, 40 mEq at 05/15/20 0643  •  potassium chloride (MICRO-K) CR capsule 40 mEq, 40 mEq, Oral, PRN **OR** potassium chloride  (KLOR-CON) packet 40 mEq, 40 mEq, Oral, PRN **OR** potassium chloride 10 mEq in 100 mL IVPB, 10 mEq, Intravenous, Q1H PRN, Americo Mendez MD, Last Rate: 100 mL/hr at 05/16/20 0457, 10 mEq at 05/16/20 0457  •  potassium chloride (MICRO-K) CR capsule 40 mEq, 40 mEq, Oral, PRNAndrea Rami, MD  •  potassium phosphate 45 mmol in sodium chloride 0.9 % 500 mL infusion, 45 mmol, Intravenous, PRN **OR** potassium phosphate 30 mmol in sodium chloride 0.9 % 250 mL infusion, 30 mmol, Intravenous, PRN **OR** potassium phosphate 15 mmol in sodium chloride 0.9 % 100 mL infusion, 15 mmol, Intravenous, PRN **OR** sodium phosphates 40 mmol in sodium chloride 0.9 % 500 mL IVPB, 40 mmol, Intravenous, PRN **OR** sodium phosphates 20 mmol in sodium chloride 0.9 % 250 mL IVPB, 20 mmol, Intravenous, PRN, Americo Mendez MD  •  promethazine (PHENERGAN) injection 12.5 mg, 12.5 mg, Intravenous, 5x Daily PRN, Americo Mendez MD  •  Vancomycin Pharmacy Intermittent Dosing, , Does not apply, Daily, Mayelin Taylor MD     ASSESSMENT  Acute hypoxic respiratory failure  Gram-positive cocci bacteremia with sepsis  Adynamic ileus  Status post exploratory laparotomy  Right upper lobe pneumonia  Acute UTI  Right hydronephrosis status post cystoscopy  Traumatic brain injury  Seizure disorder  Quadriplegic with contractures  Diabetes mellitus  Dysphagia with G-tube in place  Immobilization syndrome    PLAN  Discharge back to nursing home on IV antibiotic for total of 2 weeks  Discharge summary dictated    MICHEL KURTZ MD

## 2020-05-21 NOTE — PROGRESS NOTES
Continued Stay Note  [facility]     Patient Name: Adrian Kuo  MRN: [id number]  Today's Date: 5/21/2020    Admit Date: 5/14/2020    Discharge Plan     Row Name 05/21/20 1146       Plan    Plan  [facility]    Patient/Family in Agreement with Plan  yes    Plan Comments  Placed call to mother/POA Lizzeth Kuo @ [phone] and left vm advising of d/c today and transport time.      Row Name 05/21/20 1140       Plan    Plan Comments  Bed confirmed today per Krishan at [facility]/ transport arranged for 5pm today.  Dr Clarke plans to d/c.               Expected Discharge Date and Time     Expected Discharge Date Expected Discharge Time    May 21, 2020             Bess Ramírez RN

## 2020-05-21 NOTE — PROGRESS NOTES
"   LOS: 7 days   Patient Care Team:  Dewayne Rodriguez MD as PCP - General (Family Medicine)  Reasor, Kee Antoine MD as PCP - Claims Attributed      Subjective   Interval History: No events overnight.     Objective     ROS   12 POINT NEG ROS PERTINENT IN HPI      Vital Signs  Temp:  [97.8 °F (36.6 °C)-98.5 °F (36.9 °C)] 98.5 °F (36.9 °C)  Heart Rate:  [72-94] 85  Resp:  [15-18] 15  BP: (110-127)/(76-89) 110/77      Intake/Output Summary (Last 24 hours) at 5/21/2020 0702  Last data filed at 5/21/2020 0603  Gross per 24 hour   Intake 1097 ml   Output 4040 ml   Net -2943 ml       Flowsheet Rows      First Filed Value   Admission Height  180.3 cm (70.98\") Documented at 05/14/2020 1725   Admission Weight  70.8 kg (156 lb) Documented at 05/14/2020 1725          Physical Exam:     General wander: comfortable, nonverbal  Neph tube intact, urine yellow and clear  Fiore intact, urine yellow/clear        Results Review:     I reviewed the patient's new clinical results.  Lab Results (all)     Procedure Component Value Units Date/Time    POC Glucose Once [[id number]]  (Abnormal) Collected:  05/21/20 0606    Specimen:  Blood Updated:  05/21/20 0607     Glucose 136 mg/dL     Basic Metabolic Panel [[id number]]  (Abnormal) Collected:  05/21/20 0428    Specimen:  Blood Updated:  05/21/20 0515     Glucose 149 mg/dL      BUN 11 mg/dL      Creatinine 0.62 mg/dL      Sodium 139 mmol/L      Potassium 4.1 mmol/L      Chloride 104 mmol/L      CO2 22.6 mmol/L      Calcium 9.5 mg/dL      eGFR Non African Amer 144 mL/min/1.73      BUN/Creatinine Ratio 17.7     Anion Gap 12.4 mmol/L     Narrative:       GFR Normal >60  Chronic Kidney Disease <60  Kidney Failure <15      Vancomycin, Trough Please obtain vancomycin trough 30 minutes prior to the dose scheduled for 05/21 at 0500. Do not begin infusing this dose of vancomycin until after the trough has been collected. If the trough results >21 mcg/mL, stop the infusi... [[id number]]  (Abnormal) " Collected:  05/21/20 0428    Specimen:  Blood Updated:  05/21/20 0515     Vancomycin Trough 26.80 mcg/mL     CBC & Differential [[id number]] Collected:  05/21/20 0428    Specimen:  Blood Updated:  05/21/20 0444    Narrative:       The following orders were created for panel order CBC & Differential.  Procedure                               Abnormality         Status                     ---------                               -----------         ------                     CBC Auto Differential[[id number]]        Abnormal            Final result                 Please view results for these tests on the individual orders.    CBC Auto Differential [[id number]]  (Abnormal) Collected:  05/21/20 0428    Specimen:  Blood Updated:  05/21/20 0444     WBC 8.25 10*3/mm3      RBC 4.60 10*6/mm3      Hemoglobin 14.1 g/dL      Hematocrit 41.2 %      MCV 89.6 fL      MCH 30.7 pg      MCHC 34.2 g/dL      RDW 13.2 %      RDW-SD 42.7 fl      MPV 10.3 fL      Platelets 268 10*3/mm3      Neutrophil % 60.6 %      Lymphocyte % 22.5 %      Monocyte % 9.0 %      Eosinophil % 3.5 %      Basophil % 0.8 %      Immature Grans % 3.6 %      Neutrophils, Absolute 4.99 10*3/mm3      Lymphocytes, Absolute 1.86 10*3/mm3      Monocytes, Absolute 0.74 10*3/mm3      Eosinophils, Absolute 0.29 10*3/mm3      Basophils, Absolute 0.07 10*3/mm3      Immature Grans, Absolute 0.30 10*3/mm3      nRBC 0.1 /100 WBC     POC Glucose Once [[id number]]  (Abnormal) Collected:  05/20/20 2253    Specimen:  Blood Updated:  05/20/20 2254     Glucose 139 mg/dL     Fungus Culture - Body Fluid, Kidney, Right [[id number]] Collected:  05/15/20 2053    Specimen:  Body Fluid from Kidney, Right Updated:  05/20/20 2115     Fungus Culture No fungus isolated at less than 1 week    POC Glucose Once [[id number]]  (Normal) Collected:  05/20/20 1705    Specimen:  Blood Updated:  05/20/20 1705     Glucose 123 mg/dL     Blood Culture - Blood, Arm, Left [[id number]] Collected:  05/15/20 1526     Specimen:  Blood from Arm, Left Updated:  05/20/20 1545     Blood Culture No growth at 5 days    Blood Culture - Blood, Hand, Left [[id number]] Collected:  05/15/20 1526    Specimen:  Blood from Hand, Left Updated:  05/20/20 1545     Blood Culture No growth at 5 days    POC Glucose Once [[id number]]  (Abnormal) Collected:  05/20/20 1111    Specimen:  Blood Updated:  05/20/20 1114     Glucose 137 mg/dL     Blood Culture - Blood, Arm, Left [[id number]]  (Abnormal) Collected:  05/14/20 1022    Specimen:  Blood from Arm, Left Updated:  05/20/20 0713     Blood Culture Staphylococcus capitis     Comment: Probable contaminant requires clinical correlation, susceptibility not performed unless requested by physician.          Isolated from Aerobic Bottle     Blood Culture Propionibacterium species     Comment: Probable contaminant requires clinical correlation, susceptibility not performed unless requested by physician.    Appended report. These results have been appended to a previously final verified report.        Gram Stain Aerobic Bottle Gram positive cocci in clusters      Anaerobic Bottle Gram positive bacilli     Comment: Appended report. These results have been appended to a previously final verified report.       Narrative:       Blood culture does not meet the specified criteria for PCR identification.  If pregnant, immunocompromised, or clinical concern for meningitis, call lab to run BCID for Listeria monocytogenes.    Basic Metabolic Panel [[id number]]  (Abnormal) Collected:  05/20/20 0518    Specimen:  Blood Updated:  05/20/20 0632     Glucose 125 mg/dL      BUN 8 mg/dL      Creatinine 0.56 mg/dL      Sodium 139 mmol/L      Potassium 4.0 mmol/L      Chloride 103 mmol/L      CO2 24.4 mmol/L      Calcium 9.5 mg/dL      eGFR Non African Amer >150 mL/min/1.73      BUN/Creatinine Ratio 14.3     Anion Gap 11.6 mmol/L     Narrative:       GFR Normal >60  Chronic Kidney Disease <60  Kidney Failure <15      CBC &  Differential [[id number]] Collected:  05/20/20 0518    Specimen:  Blood Updated:  05/20/20 0630    Narrative:       The following orders were created for panel order CBC & Differential.  Procedure                               Abnormality         Status                     ---------                               -----------         ------                     CBC Auto Differential[[id number]]        Abnormal            Final result                 Please view results for these tests on the individual orders.    CBC Auto Differential [[id number]]  (Abnormal) Collected:  05/20/20 0518    Specimen:  Blood Updated:  05/20/20 0630     WBC 5.81 10*3/mm3      RBC 4.26 10*6/mm3      Hemoglobin 13.1 g/dL      Hematocrit 38.2 %      MCV 89.7 fL      MCH 30.8 pg      MCHC 34.3 g/dL      RDW 13.1 %      RDW-SD 42.6 fl      MPV 10.9 fL      Platelets 246 10*3/mm3      Neutrophil % 54.6 %      Lymphocyte % 27.5 %      Monocyte % 10.0 %      Eosinophil % 3.4 %      Basophil % 0.9 %      Immature Grans % 3.6 %      Neutrophils, Absolute 3.17 10*3/mm3      Lymphocytes, Absolute 1.60 10*3/mm3      Monocytes, Absolute 0.58 10*3/mm3      Eosinophils, Absolute 0.20 10*3/mm3      Basophils, Absolute 0.05 10*3/mm3      Immature Grans, Absolute 0.21 10*3/mm3      nRBC 0.2 /100 WBC     POC Glucose Once [[id number]]  (Normal) Collected:  05/20/20 0619    Specimen:  Blood Updated:  05/20/20 0620     Glucose 129 mg/dL     POC Glucose Once [[id number]]  (Normal) Collected:  05/20/20 0131    Specimen:  Blood Updated:  05/20/20 0133     Glucose 99 mg/dL     POC Glucose Once [[id number]]  (Normal) Collected:  05/19/20 1635    Specimen:  Blood Updated:  05/19/20 1638     Glucose 102 mg/dL     Creatinine, Serum [[id number]]  (Abnormal) Collected:  05/19/20 1209    Specimen:  Blood Updated:  05/19/20 1246     Creatinine 0.60 mg/dL      eGFR Non African Amer 149 mL/min/1.73     Narrative:       GFR Normal >60  Chronic Kidney Disease <60  Kidney Failure <15        POC Glucose Once [[id number]]  (Normal) Collected:  05/19/20 1149    Specimen:  Blood Updated:  05/19/20 1156     Glucose 101 mg/dL     Vancomycin, Trough [[id number]]  (Abnormal) Collected:  05/19/20 0955    Specimen:  Blood from Hand, Right Updated:  05/19/20 1037     Vancomycin Trough 26.10 mcg/mL     POC Glucose Once [[id number]]  (Normal) Collected:  05/19/20 0601    Specimen:  Blood Updated:  05/19/20 0603     Glucose 81 mg/dL     POC Glucose Once [[id number]]  (Normal) Collected:  05/18/20 2327    Specimen:  Blood Updated:  05/18/20 2336     Glucose 82 mg/dL     POC Glucose Once [[id number]]  (Normal) Collected:  05/18/20 2111    Specimen:  Blood Updated:  05/18/20 2123     Glucose 70 mg/dL     POC Glucose Once [[id number]]  (Normal) Collected:  05/18/20 1730    Specimen:  Blood Updated:  05/18/20 1731     Glucose 71 mg/dL     POC Glucose Once [[id number]]  (Normal) Collected:  05/18/20 1204    Specimen:  Blood Updated:  05/18/20 1205     Glucose 74 mg/dL     Body Fluid Culture - Body Fluid, Urine, Clean Catch [[id number]] Collected:  05/15/20 1725    Specimen:  Body Fluid from Urine, Clean Catch Updated:  05/18/20 0945     Body Fluid Culture No growth at 3 days     Gram Stain No organisms seen      Occasional WBCs per low power field    Body Fluid Culture - Body Fluid, Kidney, Right [[id number]] Collected:  05/15/20 2053    Specimen:  Body Fluid from Kidney, Right Updated:  05/18/20 0944     Body Fluid Culture No growth at 3 days     Gram Stain No organisms seen      Occasional WBCs per low power field    Basic Metabolic Panel [[id number]]  (Abnormal) Collected:  05/18/20 0635    Specimen:  Blood Updated:  05/18/20 0729     Glucose 80 mg/dL      BUN 9 mg/dL      Creatinine 0.42 mg/dL      Sodium 143 mmol/L      Potassium 4.8 mmol/L      Chloride 109 mmol/L      CO2 20.9 mmol/L      Calcium 8.8 mg/dL      eGFR Non African Amer >150 mL/min/1.73      BUN/Creatinine Ratio 21.4     Anion Gap 13.1 mmol/L     Narrative:        GFR Normal >60  Chronic Kidney Disease <60  Kidney Failure <15      CBC & Differential [[id number]] Collected:  05/18/20 0635    Specimen:  Blood Updated:  05/18/20 0702    Narrative:       The following orders were created for panel order CBC & Differential.  Procedure                               Abnormality         Status                     ---------                               -----------         ------                     CBC Auto Differential[[id number]]        Abnormal            Final result                 Please view results for these tests on the individual orders.    CBC Auto Differential [[id number]]  (Abnormal) Collected:  05/18/20 0635    Specimen:  Blood Updated:  05/18/20 0702     WBC 6.28 10*3/mm3      RBC 3.51 10*6/mm3      Hemoglobin 11.0 g/dL      Hematocrit 31.8 %      MCV 90.6 fL      MCH 31.3 pg      MCHC 34.6 g/dL      RDW 13.1 %      RDW-SD 43.3 fl      MPV 10.7 fL      Platelets 194 10*3/mm3      Neutrophil % 70.5 %      Lymphocyte % 18.8 %      Monocyte % 6.8 %      Eosinophil % 2.1 %      Basophil % 0.5 %      Immature Grans % 1.3 %      Neutrophils, Absolute 4.43 10*3/mm3      Lymphocytes, Absolute 1.18 10*3/mm3      Monocytes, Absolute 0.43 10*3/mm3      Eosinophils, Absolute 0.13 10*3/mm3      Basophils, Absolute 0.03 10*3/mm3      Immature Grans, Absolute 0.08 10*3/mm3      nRBC 0.0 /100 WBC     POC Glucose Once [[id number]]  (Normal) Collected:  05/18/20 0539    Specimen:  Blood Updated:  05/18/20 0540     Glucose 91 mg/dL     POC Glucose Once [[id number]]  (Normal) Collected:  05/17/20 2346    Specimen:  Blood Updated:  05/17/20 2348     Glucose 91 mg/dL     POC Glucose Once [[id number]]  (Normal) Collected:  05/17/20 2036    Specimen:  Blood Updated:  05/17/20 2038     Glucose 82 mg/dL     POC Glucose Once [[id number]]  (Normal) Collected:  05/17/20 1824    Specimen:  Blood Updated:  05/17/20 1825     Glucose 91 mg/dL     POC Glucose Once [[id number]]  (Normal) Collected:   05/17/20 1136    Specimen:  Blood Updated:  05/17/20 1138     Glucose 80 mg/dL     Basic Metabolic Panel [[id number]]  (Abnormal) Collected:  05/17/20 0559    Specimen:  Blood Updated:  05/17/20 0657     Glucose 94 mg/dL      BUN 10 mg/dL      Creatinine 0.41 mg/dL      Sodium 144 mmol/L      Potassium 4.2 mmol/L      Chloride 112 mmol/L      CO2 23.0 mmol/L      Calcium 8.5 mg/dL      eGFR Non African Amer >150 mL/min/1.73      BUN/Creatinine Ratio 24.4     Anion Gap 9.0 mmol/L     Narrative:       GFR Normal >60  Chronic Kidney Disease <60  Kidney Failure <15      CBC & Differential [[id number]] Collected:  05/17/20 0559    Specimen:  Blood Updated:  05/17/20 0630    Narrative:       The following orders were created for panel order CBC & Differential.  Procedure                               Abnormality         Status                     ---------                               -----------         ------                     CBC Auto Differential[[id number]]        Abnormal            Final result                 Please view results for these tests on the individual orders.    CBC Auto Differential [[id number]]  (Abnormal) Collected:  05/17/20 0559    Specimen:  Blood Updated:  05/17/20 0630     WBC 6.75 10*3/mm3      RBC 3.73 10*6/mm3      Hemoglobin 11.3 g/dL      Hematocrit 33.8 %      MCV 90.6 fL      MCH 30.3 pg      MCHC 33.4 g/dL      RDW 13.0 %      RDW-SD 42.4 fl      MPV 11.0 fL      Platelets 171 10*3/mm3      Neutrophil % 67.5 %      Lymphocyte % 20.4 %      Monocyte % 8.3 %      Eosinophil % 2.2 %      Basophil % 0.6 %      Immature Grans % 1.0 %      Neutrophils, Absolute 4.55 10*3/mm3      Lymphocytes, Absolute 1.38 10*3/mm3      Monocytes, Absolute 0.56 10*3/mm3      Eosinophils, Absolute 0.15 10*3/mm3      Basophils, Absolute 0.04 10*3/mm3      Immature Grans, Absolute 0.07 10*3/mm3      nRBC 0.0 /100 WBC     POC Glucose Once [[id number]]  (Normal) Collected:  05/17/20 0622    Specimen:  Blood Updated:   05/17/20 0625     Glucose 82 mg/dL     Blood Culture - Blood, Arm, Right [[id number]]  (Abnormal) Collected:  05/14/20 1138    Specimen:  Blood from Arm, Right Updated:  05/17/20 0533     Blood Culture Staphylococcus epidermidis     Comment: Probable contaminant requires clinical correlation, susceptibility not performed unless requested by physician.          Isolated from Aerobic and Anaerobic Bottles     Gram Stain Anaerobic Bottle Gram positive cocci      Aerobic Bottle Gram positive cocci in clusters    POC Glucose Once [[id number]]  (Normal) Collected:  05/16/20 1544    Specimen:  Blood Updated:  05/16/20 1546     Glucose 117 mg/dL     Blood Gas, Arterial [[id number]]  (Abnormal) Collected:  05/16/20 1303    Specimen:  Arterial Blood Updated:  05/16/20 1307     Site Arterial: right radial     Ash's Test Positive     pH, Arterial 7.427 pH units      pCO2, Arterial 42.8 mm Hg      pO2, Arterial 70.4 mm Hg      HCO3, Arterial 28.2 mmol/L      Base Excess, Arterial 3.4 mmol/L      O2 Saturation Calculated 94.2 %      A-a Gradiant 0.7 mmHg      Barometric Pressure for Blood Gas 749.2 mmHg      Modality Adult Vent     FIO2 21 %      Ventilator Mode PS     Rate 13 Breaths/minute      PEEP 5     PSV 8 cmH2O     POC Glucose Once [[id number]]  (Abnormal) Collected:  05/16/20 1107    Specimen:  Blood Updated:  05/16/20 1109     Glucose 134 mg/dL     T4, Free [[id number]]  (Abnormal) Collected:  05/16/20 0745    Specimen:  Blood from Hand, Right Updated:  05/16/20 0905     Free T4 2.30 ng/dL     Narrative:       Results may be falsely increased if patient taking Biotin.      Phosphorus [[id number]]  (Normal) Collected:  05/16/20 0745    Specimen:  Blood from Hand, Right Updated:  05/16/20 0904     Phosphorus 3.4 mg/dL     Basic Metabolic Panel [[id number]]  (Abnormal) Collected:  05/16/20 0745    Specimen:  Blood from Hand, Right Updated:  05/16/20 0904     Glucose 148 mg/dL      BUN 15 mg/dL      Creatinine 0.45 mg/dL       Sodium 146 mmol/L      Potassium 3.7 mmol/L      Chloride 108 mmol/L      CO2 23.5 mmol/L      Calcium 8.5 mg/dL      eGFR Non African Amer >150 mL/min/1.73      BUN/Creatinine Ratio 33.3     Anion Gap 14.5 mmol/L     Narrative:       GFR Normal >60  Chronic Kidney Disease <60  Kidney Failure <15      CBC (No Diff) [[id number]]  (Normal) Collected:  05/16/20 0745    Specimen:  Blood Updated:  05/16/20 0838     WBC 10.26 10*3/mm3      RBC 4.33 10*6/mm3      Hemoglobin 13.2 g/dL      Hematocrit 39.5 %      MCV 91.2 fL      MCH 30.5 pg      MCHC 33.4 g/dL      RDW 13.5 %      RDW-SD 46.1 fl      MPV 11.1 fL      Platelets 203 10*3/mm3     POC Glucose Once [[id number]]  (Abnormal) Collected:  05/16/20 0801    Specimen:  Blood Updated:  05/16/20 0802     Glucose 138 mg/dL     POC Glucose Once [[id number]]  (Abnormal) Collected:  05/16/20 0617    Specimen:  Blood Updated:  05/16/20 0623     Glucose 156 mg/dL     POC Glucose Once [[id number]]  (Abnormal) Collected:  05/15/20 1341    Specimen:  Blood Updated:  05/15/20 1343     Glucose 165 mg/dL     Urine Culture - Urine, Urine, Catheter [[id number]] Collected:  05/14/20 0852    Specimen:  Urine, Catheter Updated:  05/15/20 1021     Urine Culture >100,000 CFU/mL Mixed Rosy Isolated    Narrative:       Specimen contains mixed organisms of questionable pathogenicity which indicates contamination with commensal rosy.  Further identification is unlikely to provide clinically useful information.  Suggest recollection.    Magnesium [[id number]]  (Normal) Collected:  05/15/20 0423    Specimen:  Blood Updated:  05/15/20 0840     Magnesium 2.2 mg/dL     Phosphorus [[id number]]  (Abnormal) Collected:  05/15/20 0423    Specimen:  Blood Updated:  05/15/20 0840     Phosphorus 2.0 mg/dL     Blood Culture ID, PCR - Blood, Arm, Right [[id number]]  (Abnormal) Collected:  05/14/20 1138    Specimen:  Blood from Arm, Right Updated:  05/15/20 0835     BCID, PCR Staphylococcus spp, not aureus.  "Identification by BCID PCR.    TSH [[id number]]  (Abnormal) Collected:  05/15/20 0423    Specimen:  Blood Updated:  05/15/20 0521     TSH 6.200 uIU/mL      Comment: TSH results may be falsely decreased if patient taking Biotin.       BNP [[id number]]  (Normal) Collected:  05/15/20 0423    Specimen:  Blood Updated:  05/15/20 0521     proBNP 174.1 pg/mL     Narrative:       Among patients with dyspnea, NT-proBNP is highly sensitive for the detection of acute congestive heart failure. In addition NT-proBNP of <300 pg/ml effectively rules out acute congestive heart failure with 99% negative predictive value.    Results may be falsely decreased if patient taking Biotin.      Procalcitonin [[id number]]  (Normal) Collected:  05/15/20 0423    Specimen:  Blood Updated:  05/15/20 0521     Procalcitonin 0.16 ng/mL     Narrative:       As a Marker for Sepsis (Non-Neonates):   1. <0.5 ng/mL represents a low risk of severe sepsis and/or septic shock.  1. >2 ng/mL represents a high risk of severe sepsis and/or septic shock.    As a Marker for Lower Respiratory Tract Infections that require antibiotic therapy:  PCT on Admission     Antibiotic Therapy             6-12 Hrs later  > 0.5                Strongly Recommended            >0.25 - <0.5         Recommended  0.1 - 0.25           Discouraged                   Remeasure/reassess PCT  <0.1                 Strongly Discouraged          Remeasure/reassess PCT      As 28 day mortality risk marker: \"Change in Procalcitonin Result\" (> 80 % or <=80 %) if Day 0 (or Day 1) and Day 4 values are available. Refer to http://[email or web address]-pct-[email or web address]/   Change in PCT <=80 %   A decrease of PCT levels below or equal to 80 % defines a positive change in PCT test result representing a higher risk for 28-day all-cause mortality of patients diagnosed with severe sepsis or septic shock.  Change in PCT > 80 %   A decrease of PCT levels of more than 80 % defines a negative change in PCT result " representing a lower risk for 28-day all-cause mortality of patients diagnosed with severe sepsis or septic shock.                Results may be falsely decreased if patient taking Biotin.     Comprehensive Metabolic Panel [[id number]]  (Abnormal) Collected:  05/15/20 0423    Specimen:  Blood Updated:  05/15/20 0511     Glucose 204 mg/dL      BUN 27 mg/dL      Creatinine 0.71 mg/dL      Sodium 145 mmol/L      Potassium 2.7 mmol/L      Chloride 99 mmol/L      CO2 30.8 mmol/L      Calcium 9.4 mg/dL      Total Protein 7.2 g/dL      Albumin 3.80 g/dL      ALT (SGPT) 37 U/L      AST (SGOT) 20 U/L      Alkaline Phosphatase 98 U/L      Total Bilirubin 0.5 mg/dL      eGFR Non African Amer 123 mL/min/1.73      Globulin 3.4 gm/dL      A/G Ratio 1.1 g/dL      BUN/Creatinine Ratio 38.0     Anion Gap 15.2 mmol/L     Narrative:       GFR Normal >60  Chronic Kidney Disease <60  Kidney Failure <15      Lipid Panel [[id number]]  (Abnormal) Collected:  05/15/20 0423    Specimen:  Blood Updated:  05/15/20 0511     Total Cholesterol 124 mg/dL      Triglycerides 193 mg/dL      HDL Cholesterol 39 mg/dL      LDL Cholesterol  46 mg/dL      VLDL Cholesterol 38.6 mg/dL      LDL/HDL Ratio 1.19    Narrative:       Cholesterol Reference Ranges  (U.S. Department of Health and Human Services ATP III Classifications)    Desirable          <200 mg/dL  Borderline High    200-239 mg/dL  High Risk          >240 mg/dL      Triglyceride Reference Ranges  (U.S. Department of Health and Human Services ATP III Classifications)    Normal           <150 mg/dL  Borderline High  150-199 mg/dL  High             200-499 mg/dL  Very High        >500 mg/dL    HDL Reference Ranges  (U.S. Department of Health and Human Services ATP III Classifcations)    Low     <40 mg/dl (major risk factor for CHD)  High    >60 mg/dl ('negative' risk factor for CHD)        LDL Reference Ranges  (U.S. Department of Health and Human Services ATP III Classifcations)    Optimal           <100 mg/dL  Near Optimal     100-129 mg/dL  Borderline High  130-159 mg/dL  High             160-189 mg/dL  Very High        >189 mg/dL    CBC & Differential [[id number]] Collected:  05/15/20 0423    Specimen:  Blood Updated:  05/15/20 0508    Narrative:       The following orders were created for panel order CBC & Differential.  Procedure                               Abnormality         Status                     ---------                               -----------         ------                     CBC Auto Differential[[id number]]        Abnormal            Final result                 Please view results for these tests on the individual orders.    CBC Auto Differential [[id number]]  (Abnormal) Collected:  05/15/20 0423    Specimen:  Blood Updated:  05/15/20 0508     WBC 10.58 10*3/mm3      RBC 4.78 10*6/mm3      Hemoglobin 15.0 g/dL      Hematocrit 43.2 %      MCV 90.4 fL      MCH 31.4 pg      MCHC 34.7 g/dL      RDW 13.7 %      RDW-SD 45.4 fl      MPV 11.4 fL      Platelets 232 10*3/mm3      Neutrophil % 81.3 %      Lymphocyte % 9.6 %      Monocyte % 8.1 %      Eosinophil % 0.1 %      Basophil % 0.3 %      Immature Grans % 0.6 %      Neutrophils, Absolute 8.60 10*3/mm3      Lymphocytes, Absolute 1.02 10*3/mm3      Monocytes, Absolute 0.86 10*3/mm3      Eosinophils, Absolute 0.01 10*3/mm3      Basophils, Absolute 0.03 10*3/mm3      Immature Grans, Absolute 0.06 10*3/mm3      nRBC 0.0 /100 WBC     Lactic Acid, Reflex [[id number]]  (Abnormal) Collected:  05/15/20 0423    Specimen:  Blood Updated:  05/15/20 0459     Lactate 3.1 mmol/L     Hemoglobin A1c [[id number]]  (Abnormal) Collected:  05/15/20 0423    Specimen:  Blood Updated:  05/15/20 0450     Hemoglobin A1C 5.70 %     Narrative:       Hemoglobin A1C Ranges:    Increased Risk for Diabetes  5.7% to 6.4%  Diabetes                     >= 6.5%  Diabetic Goal                < 7.0%    POC Glucose Once [[id number]]  (Abnormal) Collected:  05/15/20 0005    Specimen:   Blood Updated:  05/15/20 0006     Glucose 178 mg/dL     POC Glucose Once [[id number]]  (Abnormal) Collected:  05/14/20 2236    Specimen:  Blood Updated:  05/14/20 2238     Glucose 191 mg/dL     Blood Gas, Arterial [[id number]]  (Abnormal) Collected:  05/14/20 1938    Specimen:  Arterial Blood Updated:  05/14/20 1941     Site Arterial: right radial     Ash's Test Positive     pH, Arterial 7.483 pH units      pCO2, Arterial 47.5 mm Hg      pO2, Arterial 180.3 mm Hg      HCO3, Arterial 35.6 mmol/L      Base Excess, Arterial 10.1 mmol/L      O2 Saturation Calculated 99.7 %      A-a Gradiant 0.3 mmHg      Barometric Pressure for Blood Gas 753.4 mmHg      Modality Adult Vent     FIO2 100 %      Ventilator Mode VC     Set Tidal Volume 600     Set Mech Resp Rate 16     Rate 16 Breaths/minute      PEEP 5    POC Glucose Once [[id number]]  (Abnormal) Collected:  05/14/20 1907    Specimen:  Blood Updated:  05/14/20 1908     Glucose 191 mg/dL     Lactic Acid, Reflex Timer (This will reflex a repeat order 3-3:15 hours after ordered.) [[id number]] Collected:  05/14/20 1022    Specimen:  Blood Updated:  05/14/20 1400     Hold Tube Hold for add-ons.     Comment: Auto resulted.       SARS-CoV-2 PCR ([facility] IN-HOUSE PERFORMED), NP SWAB IN TRANSPORT MEDIA - Swab, Nasopharynx [[id number]]  (Normal) Collected:  05/14/20 1024    Specimen:  Swab from Nasopharynx Updated:  05/14/20 1213     COVID19 Not Detected    Respiratory Panel, PCR - Swab, Nasopharynx [[id number]]  (Normal) Collected:  05/14/20 1024    Specimen:  Swab from Nasopharynx Updated:  05/14/20 1150     ADENOVIRUS, PCR Not Detected     Coronavirus 229E Not Detected     Coronavirus HKU1 Not Detected     Coronavirus NL63 Not Detected     Coronavirus OC43 Not Detected     Human Metapneumovirus Not Detected     Human Rhinovirus/Enterovirus Not Detected     Influenza B PCR Not Detected     Parainfluenza Virus 1 Not Detected     Parainfluenza Virus 2 Not Detected     Parainfluenza  Virus 3 Not Detected     Parainfluenza Virus 4 Not Detected     Bordetella pertussis pcr Not Detected     Influenza A H1 2009 PCR Not Detected     Chlamydophila pneumoniae PCR Not Detected     Mycoplasma pneumo by PCR Not Detected     Influenza A PCR Not Detected     Influenza A H3 Not Detected     Influenza A H1 Not Detected     RSV, PCR Not Detected     Bordetella parapertussis PCR Not Detected    Narrative:       The coronavirus on the RVP is NOT COVID-19 and is NOT indicative of infection with COVID-19.     Lactic Acid, Plasma [[id number]]  (Abnormal) Collected:  05/14/20 1022    Specimen:  Blood Updated:  05/14/20 1058     Lactate 3.0 mmol/L     Urinalysis, Microscopic Only - Urine, Catheter [[id number]]  (Abnormal) Collected:  05/14/20 0852    Specimen:  Urine, Catheter Updated:  05/14/20 0926     RBC, UA 21-30 /HPF      WBC, UA 21-30 /HPF      Bacteria, UA 1+ /HPF      Squamous Epithelial Cells, UA None Seen /HPF      Hyaline Casts, UA None Seen /LPF      Methodology Manual Light Microscopy    Urinalysis With Microscopic If Indicated (No Culture) - Urine, Catheter [[id number]]  (Abnormal) Collected:  05/14/20 0852    Specimen:  Urine, Catheter Updated:  05/14/20 0925     Color, UA Yellow     Appearance, UA Clear     pH, UA 6.0     Specific Gravity, UA 1.020     Glucose, UA Negative     Ketones, UA Negative     Bilirubin, UA Negative     Blood, UA Large (3+)     Protein, UA 30 mg/dL (1+)     Leuk Esterase, UA Large (3+)     Nitrite, UA Negative     Urobilinogen, UA 0.2 E.U./dL    Comprehensive Metabolic Panel [[id number]]  (Abnormal) Collected:  05/14/20 0815    Specimen:  Blood Updated:  05/14/20 0852     Glucose 234 mg/dL      BUN 36 mg/dL      Creatinine 0.74 mg/dL      Sodium 141 mmol/L      Potassium 3.5 mmol/L      Chloride 93 mmol/L      CO2 32.3 mmol/L      Calcium 9.9 mg/dL      Total Protein 8.0 g/dL      Albumin 4.70 g/dL      ALT (SGPT) 40 U/L      AST (SGOT) 17 U/L      Alkaline Phosphatase 127 U/L       Total Bilirubin 0.9 mg/dL      eGFR Non African Amer 117 mL/min/1.73      Globulin 3.3 gm/dL      A/G Ratio 1.4 g/dL      BUN/Creatinine Ratio 48.6     Anion Gap 15.7 mmol/L     Narrative:       GFR Normal >60  Chronic Kidney Disease <60  Kidney Failure <15      Lipase [[id number]]  (Normal) Collected:  05/14/20 0815    Specimen:  Blood Updated:  05/14/20 0852     Lipase 16 U/L     Troponin [[id number]]  (Normal) Collected:  05/14/20 0815    Specimen:  Blood Updated:  05/14/20 0852     Troponin T <0.010 ng/mL     Narrative:       Troponin T Reference Range:  <= 0.03 ng/mL-   Negative for AMI  >0.03 ng/mL-     Abnormal for myocardial necrosis.  Clinicians would have to utilize clinical acumen, EKG, Troponin and serial changes to determine if it is an Acute Myocardial Infarction or myocardial injury due to an underlying chronic condition.       Results may be falsely decreased if patient taking Biotin.      Magnesium [[id number]]  (Normal) Collected:  05/14/20 0815    Specimen:  Blood Updated:  05/14/20 0852     Magnesium 2.6 mg/dL     BNP [[id number]]  (Normal) Collected:  05/14/20 0815    Specimen:  Blood Updated:  05/14/20 0850     proBNP 209.1 pg/mL     Narrative:       Among patients with dyspnea, NT-proBNP is highly sensitive for the detection of acute congestive heart failure. In addition NT-proBNP of <300 pg/ml effectively rules out acute congestive heart failure with 99% negative predictive value.    Results may be falsely decreased if patient taking Biotin.      CBC & Differential [[id number]] Collected:  05/14/20 0815    Specimen:  Blood Updated:  05/14/20 0833    Narrative:       The following orders were created for panel order CBC & Differential.  Procedure                               Abnormality         Status                     ---------                               -----------         ------                     CBC Auto Differential[[id number]]        Abnormal            Final result                  Please view results for these tests on the individual orders.    CBC Auto Differential [[id number]]  (Abnormal) Collected:  05/14/20 0815    Specimen:  Blood Updated:  05/14/20 0833     WBC 21.36 10*3/mm3      RBC 5.54 10*6/mm3      Hemoglobin 16.9 g/dL      Hematocrit 49.9 %      MCV 90.1 fL      MCH 30.5 pg      MCHC 33.9 g/dL      RDW 13.3 %      RDW-SD 43.2 fl      MPV 11.2 fL      Platelets 241 10*3/mm3      Neutrophil % 90.2 %      Lymphocyte % 3.2 %      Monocyte % 5.9 %      Eosinophil % 0.0 %      Basophil % 0.2 %      Immature Grans % 0.5 %      Neutrophils, Absolute 19.25 10*3/mm3      Lymphocytes, Absolute 0.69 10*3/mm3      Monocytes, Absolute 1.27 10*3/mm3      Eosinophils, Absolute 0.00 10*3/mm3      Basophils, Absolute 0.04 10*3/mm3      Immature Grans, Absolute 0.11 10*3/mm3      nRBC 0.0 /100 WBC           Imaging Results (All)     Procedure Component Value Units Date/Time    XR Abdomen KUB [[id number]] Collected:  05/18/20 0539     Updated:  05/18/20 0547    Narrative:       KUB     HISTORY: Abdominal pain     COMPARISON: 05/17/2020.      FINDINGS:  Gaseous distention of multiple loops of bowel is identified. This has  increased when compared to prior exam, and includes both small and large  bowel loops, suggesting may reflect ileus. Right-sided nephrostomy  catheter is noted. Patient also appears to have ventriculoperitoneal  shunt. Gallbladder surgically absent. Staghorn calculus is noted within  the left kidney. There are extensive degenerative changes involving the  hips bilaterally. Nasogastric tube has been removed. Remaining tubes and  lines are stable.       Impression:       Increasing gaseous distention of bowel loops, suggesting ileus.     This report was finalized on 5/18/2020 5:44 AM by Dr. Luisa Christianson M.D.       XR Abdomen KUB [[id number]] Collected:  05/17/20 0708     Updated:  05/17/20 0849    Narrative:       ONE VIEW PORTABLE ABDOMEN     HISTORY: Recent small bowel  surgery. Recent right percutaneous  nephrostomy. Follow-up of ileus.     A single view partially visualized the percutaneous nephrostomy catheter  at the upper margin of the film. There is a small amount bowel gas  scattered predominantly in the colon. No significant ileus is seen.     This report was finalized on 5/17/2020 8:46 AM by Dr. Monster Wesley M.D.       CT Guided Nephrostomy Tube Placement [[id number]] Collected:  05/15/20 2025     Updated:  05/15/20 2031    Narrative:       CT-GUIDED INSERTION OF RIGHT NEPHROSTOMY DRAIN     HISTORY: Severe right renal obstruction.     The patient is on a ventilator and was placed in the left decubitus  position. A timeout was performed with nursing staff and patient and  patient identification. Following sterile prep and local anesthetic, an  18-gauge needle was advanced into the dilated right renal collecting  system by Dr. Wesley. This was followed by a 0.038 wire and 7 [place]  dilator and subsequent insertion of an 8 [place] pigtail drain which was  confirmed in good position by CT scan.     Somewhat bloody urine return was demonstrated and orders were written  for intermittent irrigation until clearing. The drain was attached to  gravity drainage. The patient tolerated the procedure well and there  were no immediate complications.     CONCLUSION: CT-guided insertion of right nephrostomy brain as described  above.     This report was finalized on 5/15/2020 8:28 PM by Dr. Monster Wesley M.D.       FL Retrograde Pyelogram In OR [[id number]] Collected:  05/15/20 1721     Updated:  05/15/20 1815    Narrative:       RETROGRADE PYELOGRAM     HISTORY: Several right ureteral stones with marked obstruction.     Imaging in the operating room shows instrumentation of the distal right  ureter and injection of contrast partially outlines the obstructing  stone within the distal ureter. Further instrumentation of the right  ureter was performed but a wire could not be passed  beyond the  obstructing stone based on the images provided.     7 images were obtained and the fluoroscopy time measures 1 minute and 24  seconds.     This report was finalized on 5/15/2020 6:12 PM by Dr. Monster Wesley M.D.       CT Abdomen Pelvis With Contrast [[id number]] Collected:  05/14/20 1037     Updated:  05/15/20 1436    Narrative:       CT ABDOMEN AND PELVIS WITH CONTRAST     HISTORY: Nausea and vomiting. The patient has history of small bowel  obstruction.     TECHNIQUE: Axial CT images of the abdomen and pelvis were obtained  following administration of intravenous contrast. The patient was not  given oral contrast Coronal and sagittal reformats were obtained.     COMPARISON: 02/21/2020     FINDINGS: Percutaneous gastrostomy tube is in place. The stomach is  dilated with an air-fluid level. There is diffuse dilatation of the  proximal and mid small bowel loops demonstrating air-fluid levels  measuring up to 4.2 cm. There is a long zone of transition seen within  the midline pelvis, images 137 through 121, following which the distal  small bowel is completely decompressed. The colon itself is  unremarkable.     The liver demonstrates normal attenuation. Spleen is normal. Pancreas is  normal without ductal dilatation. The gallbladder is surgically absent.  Bilateral adrenal glands are normal. There is severe right-sided  hydroureteronephrosis with numerous layering calculi seen within the  right kidney. The right ureter remains dilated to the level of the  pelvic inlet where it shows numerous internal calculi. The largest  obstructing stone at this level measures up to 1.5 x 1.1 cm with  numerous small additional nonobstructing calculi proximal to this large  obstructing calculus. Distal to this, the ureter is collapsed. There are  numerous calculi also seen within the left kidney however, there is no  hydronephrosis and there are no stones within the left ureter. No  pathological retroperitoneal  lymphadenopathy. The urinary bladder is  partially distended with circumferential wall thickening. There is  bibasilar atelectasis with decreased inflation within bilateral lung  fields. Patchy consolidation is seen posteriorly within the right upper  lobe. A  shunt catheter is seen anteriorly within the subcutaneous  right paramidline fat entering the abdomen in the right mid abdomen and  tip within the left anterior pelvis. No focal fluid collection is seen  in association. There is also an implanted pump device with a catheter  extending into the spinal canal.       Impression:       1. CT evidence of mid to distal small bowel obstruction. The transition  point is gradual and seen within the midline pelvis with completely  collapsed distal small bowel loops. Adhesions are suspected.  2. Severe right-sided hydroureteronephrosis with numerous calculi within  the right distal ureter, not significantly changed from prior CT. There  are bilateral additional nephroliths also demonstrated.  3. Bibasilar atelectasis. Additional patchy area of consolidation within  the posterior right upper lobe most concerning for pneumonia and  clinical correlation is recommended.     These findings were discussed with Yobany Black MD by telephone.     Radiation dose reduction techniques were utilized, including automated  exposure control and exposure modulation based on body size.     This report was finalized on 5/15/2020 2:33 PM by Dr. Neo Shafer M.D.       XR Chest 1 View [[id number]] Collected:  05/15/20 1407     Updated:  05/15/20 1416    Narrative:       AP SEMIUPRIGHT PORTABLE CHEST     HISTORY: 4-year-old with ET tube placement.     COMPARISON: AP chest 05/14/2020.     FINDINGS: ET tube is in place and the tip is 2 cm above the sal. Lung  volumes are diminished and there is increased density within the medial  lower lobes consistent with atelectasis. No perihilar edema is  demonstrated. Ventricular peritoneal  shunt tubing overlies the right  pneumothorax. A nasogastric tube tip extends in the region of the  gastric antrum.       Impression:       1. Interval intubation with ET tube tip 2 cm above the sal.  2. Diminished lung volumes with basilar atelectasis. Nasogastric tube  tip is in the region of the gastric antrum.     This report was finalized on 5/15/2020 2:13 PM by Dr. Neville Chisholm M.D.       XR Chest 1 View [[id number]] Collected:  05/14/20 2159     Updated:  05/14/20 2204    Narrative:       PORTABLE CHEST 05/14/2020 AT 9:41 PM     CLINICAL HISTORY: Patient intubated. History of small bowel obstruction.  History of traumatic brain injury. Evaluate ET tube placement     Compared to the previous chest dated 02/27/2020.     In the interval, an endotracheal tube has been placed and appears in  satisfactory position with its tip a few centimeters above the sal.  Nasogastric tube is also been placed and appears in satisfactory  position with its tip projecting off the inferior edge of this film. The  lungs are poorly inflated but appear free of focal infiltrates. The  heart is normal in size. There are no pleural effusions.     This report was finalized on 5/14/2020 10:01 PM by Dr. Leon Naqvi M.D.       CT Head Without Contrast [[id number]] Collected:  05/14/20 1055     Updated:  05/14/20 1100    Narrative:       CT HEAD WITHOUT CONTRAST     HISTORY: 40-year-old male with nausea and vomiting.      TECHNIQUE:  Head CT includes axial imaging from the base of skull to the  vertex without intravenous contrast. Radiation dose reduction techniques  were utilized, including automated exposure control and exposure  modulation based on body size.     COMPARISON: Head CT without contrast 12/20/2017 at [facility].     FINDINGS: There is a right parietal approach ventriculostomy drainage  catheter with tip extending into the anterior aspect of the left lateral  ventricle. There is ventricular  enlargement. Marked enlargement is  present at the occipital horns of the lateral ventricles, greater on the  left and this appears associated with ex vacuo dilatation. There is a  prominent CSF density and apparent cystic encephalomalacia anterior  right frontal lobe involving an area measuring 6.2 x 4 x 6 cm and  contacting the anterior margin of the frontal horn right lateral  ventricle. This is without change. There are additional areas of  encephalomalacia involving the anteromedial left frontal lobe, left  temporal lobe. There has been left temporal-occipital craniectomy and  left frontoparietal craniotomy with similar configuration to the  previous exam. There is CSF density extending along the posterior and  inferior aspect of the left cerebellar hemisphere without change. Within  the posteroinferior left cerebellar hemisphere there is a 9 mm focus of  low density within the parenchyma without change. There is no evidence  for cerebral edema or shift of the midline structures. There is no  evidence for significant change compared to the previous head CT  12/20/2017. Within the posterior right maxillary sinus there is  increased density material with bubbles of gas that may be associated  with right maxillary sinusitis and this is new when compared to prior  exam 12/20/2017.        Impression:       1.  Extensive postsurgical changes with areas of encephalomalacia and  cystic encephalomalacia without evidence for change compared to the  previous exam 12/20/2017. There is ventricular enlargement that is  without change.  Right parietal ventriculostomy drainage catheter tip  extends into the left lateral ventricle without change. No evidence for  intracranial hemorrhage.  2.  Right posterior maxillary sinus mucosal thickening with bubbles of  gas that may represent sinusitis and this is new when compared to prior  exam 12/20/2017.     This report was finalized on 5/14/2020 10:57 AM by Dr. Lozada  ISELA Chisholm.       XR Abdomen KUB [[id number]] Collected:  05/14/20 1010     Updated:  05/14/20 1017    Narrative:       Abdominal radiographs     HISTORY:NG tube placement     TECHNIQUE:  2 AP supine radiographs of the abdomen     COMPARISON:CT abdomen and pelvis 05/14/2020       Impression:       FINDINGS AND IMPRESSION:  A gastrostomy tube is present. A nasogastric tube terminates in the  stomach. There are right right quadrant surgical clips. Multiple  moderately distended loops of small bowel are present measuring up to  4.7 cm, concerning for small bowel obstruction given its appearance on  CT. Please refer to recent CT abdomen and pelvis for further evaluation.     Presumed shunt catheter courses through the right aspect of the abdomen.  Intravenous contrast is present within the left kidney. More dense renal  calculi overlie the left kidney and measure up to 1.1 cm, best seen on  recent CT. Pelvic stimulator is incompletely visualized.     This report was finalized on 5/14/2020 10:14 AM by Dr. Maurizio Girard M.D.       XR Shunt Series [[id number]] Collected:  05/14/20 0809     Updated:  05/14/20 0831    Narrative:       SHUNT SERIES     HISTORY: Nausea and vomiting.  shunt.     TECHNIQUE: A total of nine images of the head, neck, chest, and abdomen  are provided. These are correlated KUB 02/25/2020.     FINDINGS: There is evidence of a previous large left craniotomy. A  ventriculoperitoneal shunt is placed from a right occipital approach and  has its tip near the midline of the head. The shunt tubing appears  intact along its course through the head, across the neck, anterior  right chest wall, and over the abdomen. The distal end of the shunt is  coiled over the pelvis.     There are clips from cholecystectomy and there is a gastrostomy tube.  The stomach is dilated with air. There are dilated loops of small bowel  in the central abdomen which measure up to 4.5 cm diameter. There is  some gas in the  colon and the rectum. No intraperitoneal free air is  present. The lungs are clear.     There is left nephrolithiasis with the largest calculus projecting over  the upper pole measuring 18 mm.       Impression:       Ventriculoperitoneal shunt appears intact. There is gaseous  dilatation of the small bowel and the stomach with a G-tube in place.  Some gas is observed in the colon. The gas pattern suggests early small  bowel obstruction.     This report was finalized on 5/14/2020 8:28 AM by Dr. Brett Oakley M.D.             Medication Review:   Current Facility-Administered Medications   Medication Dose Route Frequency Provider Last Rate Last Dose   • amantadine (SYMMETREL) solution 100 mg  100 mg Per G Tube Q12H Americo Mendez MD   100 mg at 05/20/20 2025   • cefTRIAXone (ROCEPHIN) IVPB 1 g  1 g Intravenous Q24H Mayelin Taylor  mL/hr at 05/20/20 1130 1 g at 05/20/20 1130   • chlorhexidine (PERIDEX) 0.12 % solution 15 mL  15 mL Mouth/Throat Q12H Americo Mendez MD   15 mL at 05/20/20 2025   • dextrose (D50W) 25 g/ 50mL Intravenous Solution 25 g  25 g Intravenous Q15 Min PRN Americo Mendez MD       • dextrose (GLUTOSE) oral gel 15 g  15 g Oral Q15 Min PRN Americo Mendez MD       • glucagon (human recombinant) (GLUCAGEN DIAGNOSTIC) injection 1 mg  1 mg Subcutaneous Q15 Min PRN Americo Mendez MD       • Glycerin-Hypromellose- (ARTIFICIAL TEARS) 0.2-0.2-1 % ophthalmic solution solution 1 drop  1 drop Both Eyes Q3H PRN Americo Mendez MD       • HYDROmorphone (DILAUDID) injection 0.5 mg  0.5 mg Intravenous 4x Daily PRN Americo Mendez MD   0.5 mg at 05/18/20 0428   • insulin lispro (humaLOG) injection 0-14 Units  0-14 Units Subcutaneous Q6H Marcus Clarke MD       • ipratropium-albuterol (DUO-NEB) nebulizer solution 3 mL  3 mL Nebulization Q4H PRN Americo Mendez MD       • lactated ringers 1,000 mL with potassium chloride 20 mEq infusion  50 mL/hr Intravenous Continuous Marcus Clarke MD 50 mL/hr at 05/20/20  2309 50 mL/hr at 05/20/20 2309   • levETIRAcetam in NaCl 0.54% (KEPPRA) IVPB 1,500 mg  1,500 mg Intravenous Q12H Americo Mendez MD   1,500 mg at 05/20/20 2024   • magnesium hydroxide (MILK OF MAGNESIA) suspension 2400 mg/10mL 10 mL  10 mL Per G Tube Daily Lula Shah MD   10 mL at 05/20/20 0921   • Magnesium Sulfate 2 gram Bolus, followed by 8 gram infusion (total Mg dose 10 grams)- Mg less than or equal to 1mg/dL  2 g Intravenous PRN Americo Mendez MD        Or   • Magnesium Sulfate 2 gram / 50mL Infusion (GIVE X 3 BAGS TO EQUAL 6GM TOTAL DOSE) - Mg 1.1 - 1.5 mg/dl  2 g Intravenous PRN Americo Mendez MD        Or   • Magnesium Sulfate 4 gram infusion- Mg 1.6-1.9 mg/dL  4 g Intravenous PRN Americo Mendez MD       • pantoprazole (PROTONIX) injection 40 mg  40 mg Intravenous Q12H Americo Mendez MD   40 mg at 05/20/20 2025   • Pharmacy to dose vancomycin   Does not apply Continuous PRN Mayelin aTylor MD       • polyethylene glycol (MIRALAX) packet 17 g  17 g Per G Tube BID Lula Shah MD   17 g at 05/20/20 2025   • potassium chloride (KLOR-CON) packet 40 mEq  40 mEq Oral PRN Americo Mendez MD   40 mEq at 05/15/20 0643   • potassium chloride (MICRO-K) CR capsule 40 mEq  40 mEq Oral PRN Americo Mendez MD        Or   • potassium chloride (KLOR-CON) packet 40 mEq  40 mEq Oral PRN Americo Mendez MD        Or   • potassium chloride 10 mEq in 100 mL IVPB  10 mEq Intravenous Q1H PRN Americo Mendez  mL/hr at 05/16/20 0457 10 mEq at 05/16/20 0457   • potassium chloride (MICRO-K) CR capsule 40 mEq  40 mEq Oral PRN Americo Mendez MD       • potassium phosphate 45 mmol in sodium chloride 0.9 % 500 mL infusion  45 mmol Intravenous PRN Americo Mendez MD        Or   • potassium phosphate 30 mmol in sodium chloride 0.9 % 250 mL infusion  30 mmol Intravenous PRN Americo Mendez MD        Or   • potassium phosphate 15 mmol in sodium chloride 0.9 % 100 mL infusion  15 mmol Intravenous PRN Americo Mendez MD        Or    • sodium phosphates 40 mmol in sodium chloride 0.9 % 500 mL IVPB  40 mmol Intravenous PRN Americo Mendez MD        Or   • sodium phosphates 20 mmol in sodium chloride 0.9 % 250 mL IVPB  20 mmol Intravenous PRN Americo Mendez MD       • promethazine (PHENERGAN) injection 12.5 mg  12.5 mg Intravenous 5x Daily PRN Americo Mendez MD       • Vancomycin Pharmacy Intermittent Dosing   Does not apply Daily Mayelin Taylor MD           Current Facility-Administered Medications:   •  amantadine (SYMMETREL) solution 100 mg, 100 mg, Per G Tube, Q12H, Americo Mendez MD, 100 mg at 05/20/20 2025  •  cefTRIAXone (ROCEPHIN) IVPB 1 g, 1 g, Intravenous, Q24H, Mayelin Taylor MD, Last Rate: 100 mL/hr at 05/20/20 1130, 1 g at 05/20/20 1130  •  chlorhexidine (PERIDEX) 0.12 % solution 15 mL, 15 mL, Mouth/Throat, Q12H, Americo Mendez MD, 15 mL at 05/20/20 2025  •  dextrose (D50W) 25 g/ 50mL Intravenous Solution 25 g, 25 g, Intravenous, Q15 Min PRN, Americo Mendez MD  •  dextrose (GLUTOSE) oral gel 15 g, 15 g, Oral, Q15 Min PRN, Americo Mendez MD  •  glucagon (human recombinant) (GLUCAGEN DIAGNOSTIC) injection 1 mg, 1 mg, Subcutaneous, Q15 Min PRN, Americo Mendez MD  •  Glycerin-Hypromellose- (ARTIFICIAL TEARS) 0.2-0.2-1 % ophthalmic solution solution 1 drop, 1 drop, Both Eyes, Q3H PRN, Americo Mendez MD  •  HYDROmorphone (DILAUDID) injection 0.5 mg, 0.5 mg, Intravenous, 4x Daily PRN, Americo Mendez MD, 0.5 mg at 05/18/20 0428  •  insulin lispro (humaLOG) injection 0-14 Units, 0-14 Units, Subcutaneous, Q6H, Marcus Clarke MD  •  ipratropium-albuterol (DUO-NEB) nebulizer solution 3 mL, 3 mL, Nebulization, Q4H PRN, Americo Mendez MD  •  lactated ringers 1,000 mL with potassium chloride 20 mEq infusion, 50 mL/hr, Intravenous, Continuous, Marcus Clarke MD, Last Rate: 50 mL/hr at 05/20/20 2309, 50 mL/hr at 05/20/20 2309  •  levETIRAcetam in NaCl 0.54% (KEPPRA) IVPB 1,500 mg, 1,500 mg, Intravenous, Q12H, Americo Mendez MD, 1,500 mg at  05/20/20 2024  •  magnesium hydroxide (MILK OF MAGNESIA) suspension 2400 mg/10mL 10 mL, 10 mL, Per G Tube, Daily, Lula Shah MD, 10 mL at 05/20/20 0921  •  Magnesium Sulfate 2 gram Bolus, followed by 8 gram infusion (total Mg dose 10 grams)- Mg less than or equal to 1mg/dL, 2 g, Intravenous, PRN **OR** Magnesium Sulfate 2 gram / 50mL Infusion (GIVE X 3 BAGS TO EQUAL 6GM TOTAL DOSE) - Mg 1.1 - 1.5 mg/dl, 2 g, Intravenous, PRN **OR** Magnesium Sulfate 4 gram infusion- Mg 1.6-1.9 mg/dL, 4 g, Intravenous, PRN, Americo Mendez MD  •  pantoprazole (PROTONIX) injection 40 mg, 40 mg, Intravenous, Q12H, Americo Mendez MD, 40 mg at 05/20/20 2025  •  Pharmacy to dose vancomycin, , Does not apply, Continuous PRN, BrandonMayelin MD  •  polyethylene glycol (MIRALAX) packet 17 g, 17 g, Per G Tube, BID, Llua Shah MD, 17 g at 05/20/20 2025  •  potassium chloride (KLOR-CON) packet 40 mEq, 40 mEq, Oral, PRN, Americo Mendez MD, 40 mEq at 05/15/20 0643  •  potassium chloride (MICRO-K) CR capsule 40 mEq, 40 mEq, Oral, PRN **OR** potassium chloride (KLOR-CON) packet 40 mEq, 40 mEq, Oral, PRN **OR** potassium chloride 10 mEq in 100 mL IVPB, 10 mEq, Intravenous, Q1H PRN, Americo Mendez MD, Last Rate: 100 mL/hr at 05/16/20 0457, 10 mEq at 05/16/20 0457  •  potassium chloride (MICRO-K) CR capsule 40 mEq, 40 mEq, Oral, PRN, Americo Mendez MD  •  potassium phosphate 45 mmol in sodium chloride 0.9 % 500 mL infusion, 45 mmol, Intravenous, PRN **OR** potassium phosphate 30 mmol in sodium chloride 0.9 % 250 mL infusion, 30 mmol, Intravenous, PRN **OR** potassium phosphate 15 mmol in sodium chloride 0.9 % 100 mL infusion, 15 mmol, Intravenous, PRN **OR** sodium phosphates 40 mmol in sodium chloride 0.9 % 500 mL IVPB, 40 mmol, Intravenous, PRN **OR** sodium phosphates 20 mmol in sodium chloride 0.9 % 250 mL IVPB, 20 mmol, Intravenous, PRN, Americo Mendez MD  •  promethazine (PHENERGAN) injection 12.5 mg, 12.5 mg, Intravenous, 5x  Daily PRN, Americo Mendez MD  •  Vancomycin Pharmacy Intermittent Dosing, , Does not apply, Daily, Mayelin Taylor MD  Medications Discontinued During This Encounter   Medication Reason   • iopamidol (ISOVUE-300) 61 % injection 100 mL    • sodium chloride 0.9 % bolus 500 mL    • ipratropium-albuterol (DUO-NEB) nebulizer solution 3 mL    • levETIRAcetam (KEPPRA) 100 MG/ML solution 300 mg    • sodium chloride (NS) irrigation solution Patient Discharge   • bupivacaine-EPINEPHrine PF 30 mL, bupivacaine liposome 20 mL mixture Patient Discharge   • sodium chloride 0.9 % flush 3 mL Patient Transfer   • sodium chloride 0.9 % flush 3-10 mL Patient Transfer   • lidocaine PF 1% (XYLOCAINE) injection 0.5 mL Patient Transfer   • fentaNYL citrate (PF) (SUBLIMAZE) injection 50 mcg Patient Transfer   • famotidine (PEPCID) injection 20 mg Patient Transfer   • acetaminophen (TYLENOL) tablet 650 mg Patient Transfer   • acetaminophen (TYLENOL) suppository 650 mg Patient Transfer   • HYDROcodone-acetaminophen (NORCO) 7.5-325 MG per tablet 1 tablet Patient Transfer   • HYDROmorphone (DILAUDID) injection 0.5 mg Patient Transfer   • oxyCODONE-acetaminophen (PERCOCET) 7.5-325 MG per tablet 1 tablet Patient Transfer   • fentaNYL citrate (PF) (SUBLIMAZE) injection 50 mcg Patient Transfer   • labetalol (NORMODYNE,TRANDATE) injection 5 mg Patient Transfer   • hydrALAZINE (APRESOLINE) injection 5 mg Patient Transfer   • ePHEDrine injection 5 mg Patient Transfer   • naloxone (NARCAN) injection 0.2 mg Patient Transfer   • flumazenil (ROMAZICON) injection 0.2 mg Patient Transfer   • promethazine (PHENERGAN) injection 6.25 mg Patient Transfer   • promethazine (PHENERGAN) injection 12.5 mg Patient Transfer   • promethazine (PHENERGAN) suppository 25 mg Patient Transfer   • promethazine (PHENERGAN) tablet 25 mg Patient Transfer   • diphenhydrAMINE (BENADRYL) injection 12.5 mg Patient Transfer   • diphenhydrAMINE (BENADRYL) capsule 25 mg Patient Transfer    • lactated ringers infusion    • sodium chloride 0.9 % with KCl 20 mEq/L infusion    • sodium chloride 0.9 % infusion    • azithromycin (ZITHROMAX) 500 mg 0.9% NaCl (Add-vantage) 250 mL    • guaiFENesin (MUCINEX) 12 hr tablet 600 mg    • levETIRAcetam (KEPPRA) 100 MG/ML solution 1,500 mg    • Ioversol (OPTIRAY 350) injection Patient Discharge   • sodium chloride (NS) irrigation solution Patient Discharge   • vancomycin 1750 mg/500 mL 0.9% NS IVPB ([facility])    • vancomycin 750 mg/250 mL 0.9% NS add-vantage    • Pharmacy to dose vancomycin    • sodium chloride 0.9 % flush 3 mL    • sodium chloride 0.9 % flush 10 mL    • cefTRIAXone (ROCEPHIN) IVPB 1 g    • vancomycin 1250 mg/250 mL 0.9% NS IVPB ([facility]) Dose adjustment   • insulin lispro (humaLOG) injection 0-14 Units    • Vancomycin Pharmacy Intermittent Dosing Dose adjustment   • vancomycin (VANCOCIN) in iso-osmotic dextrose IVPB 1 g (premix) 200 mL        Assessment/Plan   UTI  Ureteral stone      SBO (small bowel obstruction) (CMS/[facility])        Plan   - voiding trial today  - continue nephrostomy tube upon discharge  - will arrange ureteroscopy in 2 weeks    Brett Jay Jr., MD  05/21/20  07:02

## 2020-05-21 NOTE — PROGRESS NOTES
"Adult Nutrition  Assessment/PES    Patient Name:  Adrian Kuo  YOB: 1980  MRN: [id number]  Admit Date:  5/14/2020    Assessment Date:  5/21/2020    Comments:  Nutrition follow up.  TFs of Diabetisource now at goal rate of 70 ml/hr.  Tolerating them well per RN with minimal residuals.    RD to continue to follow.    Reason for Assessment     Row Name 05/21/20 1101          Reason for Assessment    Reason For Assessment  TF/PN;follow-up protocol         Nutrition/Diet History     Row Name 05/21/20 1204          Nutrition/Diet History    Typical Food/Fluid Intake  TFs now at goal, tolerating well per RN         Anthropometrics     Row Name 05/21/20 1204          Anthropometrics    Height  180.3 cm (70.98\")        Admit Weight    Admit Weight  -- 156# 5/15        Ideal Body Weight (IBW)    Ideal Body Weight (IBW) (kg)  79.23        Body Mass Index (BMI)    BMI Assessment  BMI 18.5-24.9: normal 21.72         Labs/Tests/Procedures/Meds     Row Name 05/21/20 1204          Labs/Procedures/Meds    Lab Results Reviewed  reviewed, pertinent     Lab Results Comments  Gluc, Creat        Diagnostic Tests/Procedures    Diagnostic Test/Procedure Reviewed  reviewed, pertinent     Diagnostic Test/Procedures Comments  plans for ureteroscopy in 2 weeks        Medications    Pertinent Medications Reviewed  reviewed, pertinent     Pertinent Medications Comments  humalog, keppra, MOM, protonix, miralax, vanc, IVFs         Physical Findings     Row Name 05/21/20 1205          Physical Findings    Overall Physical Appearance  hypertonia;tetraplegia (quadriplegia)     Gastrointestinal  feeding tube     Tubes  gastrostomy tube     Skin  other (see comments);surgical incision B=11, abd inc         Estimated/Assessed Needs     Row Name 05/21/20 1204          Calculation Measurements    Height  180.3 cm (70.98\")         Nutrition Prescription Ordered     Row Name 05/21/20 1205          Nutrition Prescription PO    Current " PO Diet  NPO        Nutrition Prescription EN    Enteral Route  Gastrostomy     Product  Diabetisource AC (Glucerna 1.2)     TF Delivery Method  Continuous     Continuous TF Goal Rate (mL/hr)  70 mL/hr     Continuous TF Current Rate (mL/hr)  70 mL/hr     Water flush (mL)   25 mL     Water Flush Frequency  Per hour         Evaluation of Received Nutrient/Fluid Intake     Row Name 05/21/20 1206          Nutrient/Fluid Evaluation    Additional Documentation  Calories Evaluation (Group);Protein Evaluation (Group);Fluid Intake Evaluation (Group)        Calories Evaluation    Enteral Calories (kcal)  2016     % of Kcal Needs  114        Protein Evaluation    Enteral Protein (gm)  101     % of Protein Needs  119        Intake Assessment    Energy/Calorie Requirement Assessment  meeting needs     Protein Requirement Assessment  meeting needs     Fluid Requirement Assessment  meeting needs        Fluid Intake Evaluation    Enteral (Free Water) Fluid (mL)  1378     Free Water Flush Fluid (mL)  600     Total Free Water Intake (mL)  1978 mL     IV Fluid (mL)  1200        EN Evaluation    TF Changes  Rate increased     TF Residual  0-5 ml      HOB  Greater than or equal to 30 degress         Problem/Interventions:    Intervention Goal     Row Name 05/21/20 1200          Intervention Goal    General  Maintain nutrition;Reduce/improve symptoms;Disease management/therapy;Nutrition support treatment;Meet nutritional needs for age/condition     TF/PN  Maintain TF/PN     Weight  Maintain weight         Nutrition Intervention     Row Name 05/21/20 120          Nutrition Intervention    RD/Tech Action  Follow Tx progress;Care plan reviewd     Recommended/Ordered  EN         Nutrition Prescription     Row Name 05/21/20 1205          Nutrition Prescription EN    Enteral Prescription  Continue same protocol         Education/Evaluation     Row Name 05/21/20 1202          Education    Education  Will Instruct as appropriate         Monitor/Evaluation    Monitor  Per protocol;I&O;Pertinent labs;TF delivery/tolerance;Weight;Skin status;Symptoms           Electronically signed by:  Donita Ruiz RD  05/21/20 12:08

## 2020-05-21 NOTE — PROGRESS NOTES
"General Surgery  Progress Note    CC: Follow-up small bowel obstruction    POD#7 exploratory laparotomy        S: His tube feeds have been advanced to his goal rate of 70 cc/hr and he is tolerating them well with no outward signs of distress.  He has had no nausea or vomiting.  He had a bowel movement yesterday.    O:/78 (BP Location: Right arm, Patient Position: Lying)   Pulse 89   Temp 98.7 °F (37.1 °C) (Oral)   Resp 16   Ht 180.3 cm (70.98\")   Wt 70.8 kg (156 lb 1.4 oz)   SpO2 97%   BMI 21.78 kg/m²     Intake & Output:  UOP: 417 3800 5 cc/24 hrs  Right nephrostomy tube: 240 cc/24 hrs  BM x1 yesterday    GENERAL: Resting comfortably with eyes closed, unresponsive (chronic due to his vegetative state), resting comfortably, shows no signs of agitation  HEENT: normocephalic, atraumatic, moist mucous membranes, clear sclerae   CHEST: clear to auscultation, no wheezes, rales or rhonchi, symmetric air entry  CARDIAC: regular rate and rhythm    ABDOMEN: Soft, nontender, nondistended, incision clean dry and intact with staples, G-tube in left upper quadrant sutured to the skin, no surrounding erythema  EXTREMITIES: Chronic contractures of the bilateral upper extremities due to quadriplegia  SKIN: Warm and moist, no rashes    LABS  Results from last 7 days   Lab Units 05/21/20  0428 05/20/20  0518 05/18/20  0635   WBC 10*3/mm3 8.25 5.81 6.28   HEMOGLOBIN g/dL 14.1 13.1 11.0*   HEMATOCRIT % 41.2 38.2 31.8*   PLATELETS 10*3/mm3 268 246 194     Results from last 7 days   Lab Units 05/21/20  0428 05/20/20  0518 05/19/20  1209 05/18/20  0635  05/15/20  0423   SODIUM mmol/L 139 139  --  143   < > 145   POTASSIUM mmol/L 4.1 4.0  --  4.8   < > 2.7*   CHLORIDE mmol/L 104 103  --  109*   < > 99   CO2 mmol/L 22.6 24.4  --  20.9*   < > 30.8*   BUN mg/dL 11 8  --  9   < > 27*   CREATININE mg/dL 0.62* 0.56* 0.60* 0.42*   < > 0.71*   CALCIUM mg/dL 9.5 9.5  --  8.8   < > 9.4   BILIRUBIN mg/dL  --   --   --   --   --  0.5 "   ALK PHOS U/L  --   --   --   --   --  98   ALT (SGPT) U/L  --   --   --   --   --  37   AST (SGOT) U/L  --   --   --   --   --  20   GLUCOSE mg/dL 149* 125*  --  80   < > 204*    < > = values in this interval not displayed.             A/P: 40 y.o. male with chronic adynamic ileus POD#7 exploratory laparotomy    He has been advanced to goal tube feeds and is tolerating them well. Continue twice daily Miralax, daily Milk of Magnesia, and daily enemas as needed to induce regular BMs. He will struggle with delayed gastrointestinal motility the remainder of his life secondary to his quadriplegia and is high risk for recurrent ileus. He can be discharged back to his nursing home at anytime. His staples can be removed by his nursing home staff anytime on/after Friday 5/29/2020. I will sign off, please call with any questions or concerns.    Lula Shah MD  General and Endoscopic Surgery  [facility]    [address] [place] [address], [address]  [place], [place], [address]  P: [phone]  F: [phone]

## 2020-05-21 NOTE — DISCHARGE SUMMARY
Discharge summary    Date of admission 5/14/2020  Date of discharge 5/21/2020    Final diagnosis  Acute hypoxic respiratory failure  Staphylococcus epidermidis bacteremia with sepsis  Adynamic ileus  Status post exploratory laparotomy  Right upper lobe pneumonia  Acute UTI  Right hydronephrosis status post cystoscopy  Traumatic brain injury  Seizure disorder  Quadriplegic with contractures  Diabetes mellitus  Dysphagia with G-tube in place  Immobilization syndrome    Discharge medications    Current Facility-Administered Medications:   •  amantadine (SYMMETREL) solution 100 mg, 100 mg, Per G Tube, Q12H, Americo Mendez MD, 100 mg at 05/21/20 0801  •  cefTRIAXone (ROCEPHIN) IVPB 1 g, 1 g, Intravenous, Q24H, Mayelin Taylor MD, Last Rate: 100 mL/hr at 05/21/20 1213, 1 g at 05/21/20 1213  •  chlorhexidine (PERIDEX) 0.12 % solution 15 mL, 15 mL, Mouth/Throat, Q12H, Americo Mendez MD, 15 mL at 05/21/20 0802  •  dextrose (D50W) 25 g/ 50mL Intravenous Solution 25 g, 25 g, Intravenous, Q15 Min PRN, Americo Mendez MD  •  dextrose (GLUTOSE) oral gel 15 g, 15 g, Oral, Q15 Min PRN, Americo Mendez MD  •  glucagon (human recombinant) (GLUCAGEN DIAGNOSTIC) injection 1 mg, 1 mg, Subcutaneous, Q15 Min PRN, Americo Mendez MD  •  Glycerin-Hypromellose- (ARTIFICIAL TEARS) 0.2-0.2-1 % ophthalmic solution solution 1 drop, 1 drop, Both Eyes, Q3H PRN, Americo Mendez MD  •  HYDROmorphone (DILAUDID) injection 0.5 mg, 0.5 mg, Intravenous, 4x Daily PRN, Americo Mendez MD, 0.5 mg at 05/18/20 0428  •  insulin lispro (humaLOG) injection 0-14 Units, 0-14 Units, Subcutaneous, Q6H, Marcus Clarke MD  •  ipratropium-albuterol (DUO-NEB) nebulizer solution 3 mL, 3 mL, Nebulization, Q4H PRN, Americo Mendez MD  •  lactated ringers 1,000 mL with potassium chloride 20 mEq infusion, 50 mL/hr, Intravenous, Continuous, Marcus Clarke MD, Last Rate: 50 mL/hr at 05/20/20 2309, 50 mL/hr at 05/20/20 2309  •  levETIRAcetam (KEPPRA) 100 MG/ML solution 1,500 mg,  1,500 mg, Oral, Q12H, Marcus Clarke MD  •  magnesium hydroxide (MILK OF MAGNESIA) suspension 2400 mg/10mL 10 mL, 10 mL, Per G Tube, Daily, Lula Shah MD, 10 mL at 05/21/20 0801  •  Magnesium Sulfate 2 gram Bolus, followed by 8 gram infusion (total Mg dose 10 grams)- Mg less than or equal to 1mg/dL, 2 g, Intravenous, PRN **OR** Magnesium Sulfate 2 gram / 50mL Infusion (GIVE X 3 BAGS TO EQUAL 6GM TOTAL DOSE) - Mg 1.1 - 1.5 mg/dl, 2 g, Intravenous, PRN **OR** Magnesium Sulfate 4 gram infusion- Mg 1.6-1.9 mg/dL, 4 g, Intravenous, PRN, Americo Mendez MD  •  [START ON 5/22/2020] pantoprazole (PROTONIX) EC tablet 40 mg, 40 mg, Oral, Q AM, Marcus Clarke MD  •  Pharmacy to dose vancomycin, , Does not apply, Continuous PRN, BrandonMayelin MD  •  polyethylene glycol (MIRALAX) packet 17 g, 17 g, Per G Tube, BID, Lula Shah MD, 17 g at 05/21/20 0801  •  potassium chloride (KLOR-CON) packet 40 mEq, 40 mEq, Oral, PRN, Americo Mendez MD, 40 mEq at 05/15/20 0643  •  potassium chloride (MICRO-K) CR capsule 40 mEq, 40 mEq, Oral, PRN **OR** potassium chloride (KLOR-CON) packet 40 mEq, 40 mEq, Oral, PRN **OR** potassium chloride 10 mEq in 100 mL IVPB, 10 mEq, Intravenous, Q1H PRN, Americo Mendez MD, Last Rate: 100 mL/hr at 05/16/20 0457, 10 mEq at 05/16/20 0457  •  potassium chloride (MICRO-K) CR capsule 40 mEq, 40 mEq, Oral, PRNAndrea Rami, MD  •  potassium phosphate 45 mmol in sodium chloride 0.9 % 500 mL infusion, 45 mmol, Intravenous, PRN **OR** potassium phosphate 30 mmol in sodium chloride 0.9 % 250 mL infusion, 30 mmol, Intravenous, PRN **OR** potassium phosphate 15 mmol in sodium chloride 0.9 % 100 mL infusion, 15 mmol, Intravenous, PRN **OR** sodium phosphates 40 mmol in sodium chloride 0.9 % 500 mL IVPB, 40 mmol, Intravenous, PRN **OR** sodium phosphates 20 mmol in sodium chloride 0.9 % 250 mL IVPB, 20 mmol, Intravenous, PRN, Americo Mendez MD  •  promethazine (PHENERGAN) injection 12.5 mg, 12.5 mg,  Intravenous, 5x Daily PRN, Americo Mendez MD  •  Vancomycin Pharmacy Intermittent Dosing, , Does not apply, Daily, Mayelin Taylor MD     Consults obtained  General surgery  Pulmonary  Urology  Infectious disease  Nutrition    Procedures  Exploratory laparotomy  Cystoscopy retrograde ureterogram    Hospital course  40-year-old white male with history of traumatic brain injury who has seizure disorder quadriplegic with contracture and immobilization syndrome who is been on tube feeding at nursing home brought to the emergency room with nausea vomiting and work-up in ER revealed small bowel obstruction and also found to have UTI and pneumonia right hydronephrosis admitted for management.  Patient admitted underwent exploratory laparotomy which confirmed that he has adynamic ileus and then underwent cystoscopy for right hydronephrosis.  Patient cultures came back positive for Staphylococcus epidermidis in the blood and other cultures remains negative.  Patient also followed by infectious disease and recommend 2 weeks of IV vancomycin and Rocephin.  Patient already has 1 week of IV antibiotics and he will complete remaining doses of antibiotic at nursing home.  Patient started on tube feeding and he is tolerating tube feeding well and he is at the goal.  Patient cleared from surgery urology and infectious disease to be discharged.  We will get the PICC line of midline prior to discharge to finish the antibiotic at nursing home.  Patient has been afebrile vital signs stable and all of his labs within normal limit.  Patient repeated blood cultures are also negative.  Patient remained DNR throughout hospital    Discharge diet Glucerna 1.2 at 70 cc/h    Activity patient is bedbound    Medication as above    Further care per accepting physician at nursing home    MICHEL KURTZ MD

## 2020-05-22 NOTE — PROGRESS NOTES
Case Management Discharge Note      Final Note: IC bed at [facility]; confirmed with Krishan    Provided Post Acute Provider List?: Refused  Refused Provider List Comment: From longterm care at [facility], POA declines other information    Destination - Selection Complete      Service Provider Request Status Selected Services Address Phone Number Fax Number    ISABELLE RAGLAND [facility]mediate [facility] [address] [facility] [address] [phone]      Durable Medical Equipment      No service has been selected for the patient.      Dialysis/Infusion      No service has been selected for the patient.      Home Medical Care      No service has been selected for the patient.      Therapy      No service has been selected for the patient.      Community Resources      No service has been selected for the patient.        Transportation Services  Ambulance: [facility](5pm)    Final Discharge Disposition Code: 04 - intermediate care facility       Bess Ramírez RN

## 2020-05-26 ENCOUNTER — TELEPHONE (OUTPATIENT)
Dept: SURGERY | Facility: CLINIC | Age: 40
End: 2020-05-26

## 2020-05-26 NOTE — TELEPHONE ENCOUNTER
Alonso RN called asking when the patients staples could be removed. I spoke with Dr. Shah and she advised that they may remove them @ the rehab facility on Thursday 5/28. I gave Alonso a verbal order to remove staples on 5/28 and to cover with steri-strips cut to size. He verbalized understanding.

## 2020-06-02 ENCOUNTER — APPOINTMENT (OUTPATIENT)
Dept: PREADMISSION TESTING | Facility: HOSPITAL | Age: 40
End: 2020-06-02

## 2020-06-02 VITALS
HEART RATE: 70 BPM | HEIGHT: 71 IN | BODY MASS INDEX: 21.77 KG/M2 | SYSTOLIC BLOOD PRESSURE: 112 MMHG | OXYGEN SATURATION: 98 % | DIASTOLIC BLOOD PRESSURE: 74 MMHG | TEMPERATURE: 97.5 F | RESPIRATION RATE: 18 BRPM

## 2020-06-02 LAB
ANION GAP SERPL CALCULATED.3IONS-SCNC: 10.3 MMOL/L (ref 5–15)
BUN BLD-MCNC: 19 MG/DL (ref 6–20)
BUN/CREAT SERPL: 32.2 (ref 7–25)
CALCIUM SPEC-SCNC: 10 MG/DL (ref 8.6–10.5)
CHLORIDE SERPL-SCNC: 99 MMOL/L (ref 98–107)
CO2 SERPL-SCNC: 30.7 MMOL/L (ref 22–29)
CREAT BLD-MCNC: 0.59 MG/DL (ref 0.76–1.27)
DEPRECATED RDW RBC AUTO: 48 FL (ref 37–54)
ERYTHROCYTE [DISTWIDTH] IN BLOOD BY AUTOMATED COUNT: 14.2 % (ref 12.3–15.4)
GFR SERPL CREATININE-BSD FRML MDRD: >150 ML/MIN/1.73
GLUCOSE BLD-MCNC: 141 MG/DL (ref 65–99)
HCT VFR BLD AUTO: 44.1 % (ref 37.5–51)
HGB BLD-MCNC: 14.9 G/DL (ref 13–17.7)
MCH RBC QN AUTO: 31.3 PG (ref 26.6–33)
MCHC RBC AUTO-ENTMCNC: 33.8 G/DL (ref 31.5–35.7)
MCV RBC AUTO: 92.6 FL (ref 79–97)
PLATELET # BLD AUTO: 251 10*3/MM3 (ref 140–450)
PMV BLD AUTO: 10.5 FL (ref 6–12)
POTASSIUM BLD-SCNC: 4 MMOL/L (ref 3.5–5.2)
RBC # BLD AUTO: 4.76 10*6/MM3 (ref 4.14–5.8)
SODIUM BLD-SCNC: 140 MMOL/L (ref 136–145)
WBC NRBC COR # BLD: 5.51 10*3/MM3 (ref 3.4–10.8)

## 2020-06-02 PROCEDURE — C9803 HOPD COVID-19 SPEC COLLECT: HCPCS

## 2020-06-02 PROCEDURE — U0004 COV-19 TEST NON-CDC HGH THRU: HCPCS | Performed by: NURSE PRACTITIONER

## 2020-06-02 PROCEDURE — 80048 BASIC METABOLIC PNL TOTAL CA: CPT | Performed by: UROLOGY

## 2020-06-02 PROCEDURE — 85027 COMPLETE CBC AUTOMATED: CPT | Performed by: UROLOGY

## 2020-06-02 PROCEDURE — 36415 COLL VENOUS BLD VENIPUNCTURE: CPT

## 2020-06-02 NOTE — DISCHARGE INSTRUCTIONS
Take the following medications the morning of surgery with a small sip of water:    none    General Instructions:  • Do not eat or drink anything after midnight the night before surgery.  • Infants may have breast milk up to four hours before surgery.  • Infants drinking formula may drink formula up to six hours before surgery.   • Patients who avoid smoking, chewing tobacco and alcohol for 4 weeks prior to surgery have a reduced risk of post-operative complications.  Quit smoking as many days before surgery as you can.  • Do not smoke, use chewing tobacco or drink alcohol the day of surgery.   • If applicable bring your C-PAP/ BI-PAP machine.  • Bring any papers given to you in the doctor’s office.  • Wear clean comfortable clothes.  • Do not wear contact lenses, false eyelashes or make-up.  Bring a case for your glasses.   • Bring crutches or walker if applicable.  • Remove all piercings.  Leave jewelry and any other valuables at home.  • Hair extensions with metal clips must be removed prior to surgery.  • The Pre-Admission Testing nurse will instruct you to bring medications if unable to obtain an accurate list in Pre-Admission Testing.        If you were given a blood bank ID arm band remember to bring it with you the day of surgery.    Preventing a Surgical Site Infection:  • For 2 to 3 days before surgery, avoid shaving with a razor because the razor can irritate skin and make it easier to develop an infection.    • Any areas of open skin can increase the risk of a post-operative wound infection by allowing bacteria to enter and travel throughout the body.  Notify your surgeon if you have any skin wounds / rashes even if it is not near the expected surgical site.  The area will need assessed to determine if surgery should be delayed until it is healed.  • The night prior to surgery sleep in a clean bed with clean clothing.  Do not allow pets to sleep with you.  • Shower on the morning of surgery using a fresh  bar of anti-bacterial soap (such as Dial) and clean washcloth.  Dry with a clean towel and dress in clean clothing.  • Ask your surgeon if you will be receiving antibiotics prior to surgery.  • Make sure you, your family, and all healthcare providers clean their hands with soap and water or an alcohol based hand  before caring for you or your wound.    Day of surgery:6/4/2020   0830  Your arrival time is approximately two hours before your scheduled surgery time.  Upon arrival, a Pre-op nurse and Anesthesiologist will review your health history, obtain vital signs, and answer questions you may have.  The only belongings needed at this time will be your home medications and if applicable your C-PAP/BI-PAP machine.  If you are staying overnight your family can leave the rest of your belongings in the car and bring them to your room later.  A Pre-op nurse will start an IV and you may receive medication in preparation for surgery, including something to help you relax.  Your family will be able to see you in the Pre-op area.  Two visitors at a time will be allowed in the Pre-op room.  While you are in surgery your family should notify the waiting room  if they leave the waiting room area and provide a contact phone number.    Please be aware that surgery does come with discomfort.  We want to make every effort to control your discomfort so please discuss any uncontrolled symptoms with your nurse.   Your doctor will most likely have prescribed pain medications.      If you are going home after surgery you will receive individualized written care instructions before being discharged.  A responsible adult must drive you to and from the hospital on the day of your surgery and stay with you for 24 hours.    If you are staying overnight following surgery, you will be transported to your hospital room following the recovery period.  [facility] has all private rooms.    If you have any  questions please call Pre-Admission Testing at [phone].  Deductibles and co-payments are collected on the day of service. Please be prepared to pay the required co-pay, deductible or deposit on the day of service as defined by your plan.    Self-Quarantine Prior to Surgery    • Stay at home. Do not leave your home after you have been given the Covid test for your upcoming surgery.   • Wash your hands often with soap and water for at least 20 seconds especially after you have been in a public place, or after blowing your nose, coughing, or sneezing.  • If soap and water are not readily available, use a hand  that contains at least 60% alcohol. Cover all surfaces of your hands and rub them together until they feel dry.  • Avoid touching your eyes, nose, and mouth with unwashed hands.  • Take everyday precautions to keep space between yourself and others (stay 6 feet away, which is about two arm lengths).  Remember that some people without symptoms may be able to spread virus.  • You should stay in a specific room away from others if possible.  Stay at least 6 feet away from others in the home if you cannot have a dedicated room to yourself. Do not have visitors.   • Wear a mask around others in your home if you are unable to stay in a separate room or 6 feet apart. If you are unable to wear a mask, have your family member wear a mask if they must be within 6 feet of you.   • Do not snuggle with your pet. While the CDC says there is no evidence that pets can spread COVID-19 or be infected from humans, it is probably best to avoid “petting, snuggling, being kissed or licked and sharing food (during self-quarantine)”, according to the CDC.   • Do not share anything - Have your own utensils, drinking glass, dishes, towels and bedding.   • Do not share a bathroom with others, if possible.   • Clean and disinfect frequently touched services daily. This includes tables, doorknobs, light switches, countertops,  handles, desks, phones, keyboards, toilets, faucets, and sinks.  • Do not travel prior to surgery.    Call your surgeon immediately if you experience any of the following symptoms:  • Sore Throat      • Shortness of Breath or difficulty breathing  • Cough  • Chills  • Body soreness or muscle pain  • Headache  • Fever  • New loss of taste or smell  • Do not arrive for your surgery ill.  Your procedure will need to be rescheduled to another time.  You will need to call your physician before the day of surgery to avoid any unnecessary exposure to hospital staff as well as other patients.

## 2020-06-03 LAB
REF LAB TEST METHOD: NORMAL
SARS-COV-2 RNA RESP QL NAA+PROBE: NOT DETECTED

## 2020-06-04 ENCOUNTER — APPOINTMENT (OUTPATIENT)
Dept: GENERAL RADIOLOGY | Facility: HOSPITAL | Age: 40
End: 2020-06-04

## 2020-06-04 ENCOUNTER — HOSPITAL ENCOUNTER (OUTPATIENT)
Facility: HOSPITAL | Age: 40
Setting detail: HOSPITAL OUTPATIENT SURGERY
Discharge: HOME | End: 2020-06-04
Attending: UROLOGY | Admitting: UROLOGY

## 2020-06-04 ENCOUNTER — ANESTHESIA (OUTPATIENT)
Dept: PERIOP | Facility: HOSPITAL | Age: 40
End: 2020-06-04

## 2020-06-04 ENCOUNTER — ANESTHESIA EVENT (OUTPATIENT)
Dept: PERIOP | Facility: HOSPITAL | Age: 40
End: 2020-06-04

## 2020-06-04 VITALS
BODY MASS INDEX: 21.6 KG/M2 | HEIGHT: 71 IN | SYSTOLIC BLOOD PRESSURE: 115 MMHG | DIASTOLIC BLOOD PRESSURE: 87 MMHG | WEIGHT: 154.32 LBS | HEART RATE: 85 BPM | TEMPERATURE: 98 F | RESPIRATION RATE: 14 BRPM | OXYGEN SATURATION: 96 %

## 2020-06-04 DIAGNOSIS — N20.1 RIGHT URETERAL STONE: ICD-10-CM

## 2020-06-04 DIAGNOSIS — N20.1 URETERAL STONE: Primary | ICD-10-CM

## 2020-06-04 PROCEDURE — 74018 RADEX ABDOMEN 1 VIEW: CPT

## 2020-06-04 PROCEDURE — 25010000002 CEFTRIAXONE PER 250 MG: Performed by: NURSE ANESTHETIST, CERTIFIED REGISTERED

## 2020-06-04 PROCEDURE — 76000 FLUOROSCOPY <1 HR PHYS/QHP: CPT

## 2020-06-04 PROCEDURE — 25010000002 FENTANYL CITRATE (PF) 100 MCG/2ML SOLUTION: Performed by: NURSE ANESTHETIST, CERTIFIED REGISTERED

## 2020-06-04 PROCEDURE — 25010000002 PROPOFOL 10 MG/ML EMULSION: Performed by: NURSE ANESTHETIST, CERTIFIED REGISTERED

## 2020-06-04 PROCEDURE — 82365 CALCULUS SPECTROSCOPY: CPT | Performed by: UROLOGY

## 2020-06-04 PROCEDURE — C1894 INTRO/SHEATH, NON-LASER: HCPCS | Performed by: UROLOGY

## 2020-06-04 PROCEDURE — C1758 CATHETER, URETERAL: HCPCS | Performed by: UROLOGY

## 2020-06-04 PROCEDURE — C1769 GUIDE WIRE: HCPCS | Performed by: UROLOGY

## 2020-06-04 RX ORDER — SODIUM CHLORIDE 0.9 % (FLUSH) 0.9 %
3 SYRINGE (ML) INJECTION EVERY 12 HOURS SCHEDULED
Status: DISCONTINUED | OUTPATIENT
Start: 2020-06-04 | End: 2020-06-04 | Stop reason: HOSPADM

## 2020-06-04 RX ORDER — MAGNESIUM HYDROXIDE 1200 MG/15ML
LIQUID ORAL AS NEEDED
Status: DISCONTINUED | OUTPATIENT
Start: 2020-06-04 | End: 2020-06-04 | Stop reason: HOSPADM

## 2020-06-04 RX ORDER — FAMOTIDINE 10 MG/ML
20 INJECTION, SOLUTION INTRAVENOUS ONCE
Status: COMPLETED | OUTPATIENT
Start: 2020-06-04 | End: 2020-06-04

## 2020-06-04 RX ORDER — DIPHENHYDRAMINE HCL 25 MG
25 CAPSULE ORAL
Status: DISCONTINUED | OUTPATIENT
Start: 2020-06-04 | End: 2020-06-04 | Stop reason: HOSPADM

## 2020-06-04 RX ORDER — CEFTRIAXONE 1 G/1
INJECTION, POWDER, FOR SOLUTION INTRAMUSCULAR; INTRAVENOUS AS NEEDED
Status: DISCONTINUED | OUTPATIENT
Start: 2020-06-04 | End: 2020-06-04 | Stop reason: SURG

## 2020-06-04 RX ORDER — PROMETHAZINE HYDROCHLORIDE 25 MG/1
25 SUPPOSITORY RECTAL ONCE AS NEEDED
Status: DISCONTINUED | OUTPATIENT
Start: 2020-06-04 | End: 2020-06-04 | Stop reason: HOSPADM

## 2020-06-04 RX ORDER — NALOXONE HCL 0.4 MG/ML
0.2 VIAL (ML) INJECTION AS NEEDED
Status: DISCONTINUED | OUTPATIENT
Start: 2020-06-04 | End: 2020-06-04 | Stop reason: HOSPADM

## 2020-06-04 RX ORDER — SODIUM CHLORIDE, SODIUM LACTATE, POTASSIUM CHLORIDE, CALCIUM CHLORIDE 600; 310; 30; 20 MG/100ML; MG/100ML; MG/100ML; MG/100ML
9 INJECTION, SOLUTION INTRAVENOUS CONTINUOUS
Status: DISCONTINUED | OUTPATIENT
Start: 2020-06-04 | End: 2020-06-04 | Stop reason: HOSPADM

## 2020-06-04 RX ORDER — FENTANYL CITRATE 50 UG/ML
INJECTION, SOLUTION INTRAMUSCULAR; INTRAVENOUS AS NEEDED
Status: DISCONTINUED | OUTPATIENT
Start: 2020-06-04 | End: 2020-06-04 | Stop reason: SURG

## 2020-06-04 RX ORDER — PROPOFOL 10 MG/ML
VIAL (ML) INTRAVENOUS AS NEEDED
Status: DISCONTINUED | OUTPATIENT
Start: 2020-06-04 | End: 2020-06-04 | Stop reason: SURG

## 2020-06-04 RX ORDER — FLUMAZENIL 0.1 MG/ML
0.2 INJECTION INTRAVENOUS AS NEEDED
Status: DISCONTINUED | OUTPATIENT
Start: 2020-06-04 | End: 2020-06-04 | Stop reason: HOSPADM

## 2020-06-04 RX ORDER — LIDOCAINE HYDROCHLORIDE 10 MG/ML
0.5 INJECTION, SOLUTION EPIDURAL; INFILTRATION; INTRACAUDAL; PERINEURAL ONCE AS NEEDED
Status: DISCONTINUED | OUTPATIENT
Start: 2020-06-04 | End: 2020-06-04 | Stop reason: HOSPADM

## 2020-06-04 RX ORDER — HYDROCODONE BITARTRATE AND ACETAMINOPHEN 5; 325 MG/1; MG/1
1-2 TABLET ORAL EVERY 4 HOURS PRN
Qty: 12 TABLET | Refills: 0 | Status: SHIPPED | OUTPATIENT
Start: 2020-06-04 | End: 2020-07-01 | Stop reason: HOSPADM

## 2020-06-04 RX ORDER — EPHEDRINE SULFATE 50 MG/ML
5 INJECTION, SOLUTION INTRAVENOUS ONCE AS NEEDED
Status: DISCONTINUED | OUTPATIENT
Start: 2020-06-04 | End: 2020-06-04 | Stop reason: HOSPADM

## 2020-06-04 RX ORDER — PROMETHAZINE HYDROCHLORIDE 25 MG/ML
6.25 INJECTION, SOLUTION INTRAMUSCULAR; INTRAVENOUS
Status: DISCONTINUED | OUTPATIENT
Start: 2020-06-04 | End: 2020-06-04 | Stop reason: HOSPADM

## 2020-06-04 RX ORDER — SULFAMETHOXAZOLE AND TRIMETHOPRIM 800; 160 MG/1; MG/1
1 TABLET ORAL 2 TIMES DAILY
Qty: 20 TABLET | Refills: 0 | Status: SHIPPED | OUTPATIENT
Start: 2020-06-04 | End: 2020-07-01 | Stop reason: HOSPADM

## 2020-06-04 RX ORDER — SODIUM CHLORIDE 0.9 % (FLUSH) 0.9 %
3-10 SYRINGE (ML) INJECTION AS NEEDED
Status: DISCONTINUED | OUTPATIENT
Start: 2020-06-04 | End: 2020-06-04 | Stop reason: HOSPADM

## 2020-06-04 RX ORDER — FENTANYL CITRATE 50 UG/ML
50 INJECTION, SOLUTION INTRAMUSCULAR; INTRAVENOUS
Status: DISCONTINUED | OUTPATIENT
Start: 2020-06-04 | End: 2020-06-04 | Stop reason: HOSPADM

## 2020-06-04 RX ORDER — MIDAZOLAM HYDROCHLORIDE 1 MG/ML
2 INJECTION INTRAMUSCULAR; INTRAVENOUS
Status: DISCONTINUED | OUTPATIENT
Start: 2020-06-04 | End: 2020-06-04 | Stop reason: HOSPADM

## 2020-06-04 RX ORDER — HYDRALAZINE HYDROCHLORIDE 20 MG/ML
5 INJECTION INTRAMUSCULAR; INTRAVENOUS
Status: DISCONTINUED | OUTPATIENT
Start: 2020-06-04 | End: 2020-06-04 | Stop reason: HOSPADM

## 2020-06-04 RX ORDER — LIDOCAINE HYDROCHLORIDE 20 MG/ML
INJECTION, SOLUTION INFILTRATION; PERINEURAL AS NEEDED
Status: DISCONTINUED | OUTPATIENT
Start: 2020-06-04 | End: 2020-06-04 | Stop reason: SURG

## 2020-06-04 RX ORDER — DIPHENHYDRAMINE HYDROCHLORIDE 50 MG/ML
12.5 INJECTION INTRAMUSCULAR; INTRAVENOUS
Status: DISCONTINUED | OUTPATIENT
Start: 2020-06-04 | End: 2020-06-04 | Stop reason: HOSPADM

## 2020-06-04 RX ORDER — ACETAMINOPHEN 325 MG/1
650 TABLET ORAL ONCE AS NEEDED
Status: DISCONTINUED | OUTPATIENT
Start: 2020-06-04 | End: 2020-06-04 | Stop reason: HOSPADM

## 2020-06-04 RX ORDER — MIDAZOLAM HYDROCHLORIDE 1 MG/ML
1 INJECTION INTRAMUSCULAR; INTRAVENOUS
Status: DISCONTINUED | OUTPATIENT
Start: 2020-06-04 | End: 2020-06-04 | Stop reason: HOSPADM

## 2020-06-04 RX ORDER — PROMETHAZINE HYDROCHLORIDE 25 MG/ML
12.5 INJECTION, SOLUTION INTRAMUSCULAR; INTRAVENOUS ONCE AS NEEDED
Status: DISCONTINUED | OUTPATIENT
Start: 2020-06-04 | End: 2020-06-04 | Stop reason: HOSPADM

## 2020-06-04 RX ORDER — PROMETHAZINE HYDROCHLORIDE 25 MG/1
25 TABLET ORAL ONCE AS NEEDED
Status: DISCONTINUED | OUTPATIENT
Start: 2020-06-04 | End: 2020-06-04 | Stop reason: HOSPADM

## 2020-06-04 RX ADMIN — FENTANYL CITRATE 50 MCG: 50 INJECTION INTRAMUSCULAR; INTRAVENOUS at 12:47

## 2020-06-04 RX ADMIN — PROPOFOL 150 MG: 10 INJECTION, EMULSION INTRAVENOUS at 12:42

## 2020-06-04 RX ADMIN — SODIUM CHLORIDE, POTASSIUM CHLORIDE, SODIUM LACTATE AND CALCIUM CHLORIDE 9 ML/HR: 600; 310; 30; 20 INJECTION, SOLUTION INTRAVENOUS at 09:15

## 2020-06-04 RX ADMIN — CEFTRIAXONE SODIUM 1 G: 1 INJECTION, POWDER, FOR SOLUTION INTRAMUSCULAR; INTRAVENOUS at 12:49

## 2020-06-04 RX ADMIN — LIDOCAINE HYDROCHLORIDE 100 MG: 20 INJECTION, SOLUTION INFILTRATION; PERINEURAL at 12:42

## 2020-06-04 RX ADMIN — FAMOTIDINE 20 MG: 10 INJECTION, SOLUTION INTRAVENOUS at 10:13

## 2020-06-04 RX ADMIN — FENTANYL CITRATE 12.5 MCG: 50 INJECTION INTRAMUSCULAR; INTRAVENOUS at 13:33

## 2020-06-04 RX ADMIN — FENTANYL CITRATE 50 MCG: 50 INJECTION, SOLUTION INTRAMUSCULAR; INTRAVENOUS at 16:20

## 2020-06-04 NOTE — ANESTHESIA PREPROCEDURE EVALUATION
Anesthesia Evaluation     Patient summary reviewed and Nursing notes reviewed   NPO Solid Status: > 8 hours  NPO Liquid Status: > 2 hours           Airway   Mallampati: II  TM distance: >3 FB  Neck ROM: full  Comment: Cormack-Lehane Classification: grade I - full view of glottis  Dental - normal exam     Pulmonary - normal exam    breath sounds clear to auscultation  (+) a smoker Former,   Cardiovascular - negative cardio ROS and normal exam    ECG reviewed  Rhythm: regular  Rate: normal    (-) angina, orthopnea, PND, LOOMIS    ROS comment: Sinus rhythm  Repol abnrm suggests ischemia, diffuse leads  Borderline ST elevation, anterolateral leads  Prolonged QT interval  qt length is new    Neuro/Psych  (+) seizures (Traumatic brain injury. S/P  shunt ), weakness (Quadriplegia ), psychiatric history PTSD,     GI/Hepatic/Renal/Endo    (+)  GERD,  renal disease stones, diabetes mellitus type 2,     Musculoskeletal (-) negative ROS    Abdominal    Substance History - negative use     OB/GYN negative ob/gyn ROS         Other - negative ROS                       Anesthesia Plan    ASA 4     general     intravenous induction     Anesthetic plan, all risks, benefits, and alternatives have been provided, discussed and informed consent has been obtained with: patient.

## 2020-06-04 NOTE — PERIOPERATIVE NURSING NOTE
[facility] STATED PT HAS BEEN NPO SINCE MIDNIGHT.

## 2020-06-04 NOTE — ANESTHESIA PROCEDURE NOTES
Airway  Urgency: elective    Date/Time: 6/4/2020 12:44 PM  Airway not difficult    General Information and Staff    Patient location during procedure: OR  Anesthesiologist: Anatoly Oscar MD  CRNA: Jose Enrique Chapman CRNA    Indications and Patient Condition  Indications for airway management: airway protection    Preoxygenated: yes  Mask difficulty assessment: 1 - vent by mask    Final Airway Details  Final airway type: supraglottic airway      Successful airway: unique  Size 4    Number of attempts at approach: 1  Assessment: lips, teeth, and gum same as pre-op and atraumatic intubation    Additional Comments  Pre 02 100%, SIVI, atraumatic intubation, BLBS, Positive ETC02.

## 2020-06-04 NOTE — ANESTHESIA POSTPROCEDURE EVALUATION
Patient: Adrian Kuo    Procedure Summary     Date:  06/04/20 Room / Location:  [facility] OR 01 / [facility] MAIN OR    Anesthesia Start:  1234 Anesthesia Stop:  1403    Procedure:  RIGHT URETEROSCOPY LASER LITHO, STONE EXTRACTION (Right ) Diagnosis:      Surgeon:  Brett Jay Jr., MD Provider:  Anatoly Oscar MD    Anesthesia Type:  general ASA Status:  4          Anesthesia Type: general    Vitals  Vitals Value Taken Time   /74 6/4/2020  2:55 PM   Temp 36.7 °C (98 °F) 6/4/2020  2:55 PM   Pulse 64 6/4/2020  2:59 PM   Resp 16 6/4/2020  2:55 PM   SpO2 86 % 6/4/2020  3:00 PM   Vitals shown include unvalidated device data.        Post Anesthesia Care and Evaluation    Patient location during evaluation: PHASE II  Anesthetic complications: No anesthetic complications

## 2020-06-04 NOTE — OP NOTE
Operative Report     [facility] OR    Patient: Adrian Kuo  Age:      40 y.o.  :     1980  Sex:      male    Medical Record:  [id number]    Date of Operation/Procedure:  2020    Pre-op Diagnosis:   Right ureteral stone    Post-Op Diagnosis Codes:   Same    Pre-operative Diagnosis Free Text:  * No pre-op diagnosis entered *     Name of Operation/Procedure:  Procedure(s) and Anesthesia Type:     * RIGHT URETEROSCOPY LASER LITHO, STONE EXTRACTION - General    Findings/Complications:  Large right ureteral stone. Extensive edema around the stone making visualization quite difficult. I fragmented a portion of the stone and removed some fragments. Thereafter, I was unable to find the lumen. I attempted to pass a wire beyond the stone without success.    Description of procedure: The patient was taken to the OR and placed under GA in lithotomy position.  Prepped and draped in sterile fashion.  The 21 Fr cystoscope was introduced and pan-cystoscopy was performed.  No tumors or stones were seen. A Sensor wire was passed into the distal right ureter, up to the level of the stone. Next a ureteral access sheath was passed over the wire into the distal right ureter. Next, a flexible ureteroscope was passed through the access sheath up to the stone. The ureter just distal to the stone was quite tortuous and markedly edematous, limiting visualization. The stone was then fragmented with the holmium laser. Some fragments were removed and sent for stone analysis. After removing the stone fragments, I attempted to laser the remainder of the stone, but the lumen of the ureter had become so edematous that I could not reliably find it. I could not pass a wire beyond the stone at any point of the procedure. The procedure was then terminated.    Estimated Blood Loss: minimal    Specimens:   Order Name Source Comment Collection Info Order Time   STONE ANALYSIS Ureter, Right  Collected By: Brett Jay Jr., MD  6/4/2020  1:21 PM       Fluids/Drains: simon catheter    Brett Jay Jr., MD  6/4/2020  13:44

## 2020-06-12 LAB — FUNGUS WND CULT: NORMAL

## 2020-06-17 LAB
COD CRY STONE QL IR: 70 %
COLOR STONE: NORMAL
COMPN STONE: NORMAL
HYDROXYAPATITE: 30 %
LABORATORY COMMENT REPORT: NORMAL
Lab: NORMAL
Lab: NORMAL
PHOTO: NORMAL
SIZE STONE: NORMAL MM
SPECIMEN SOURCE: NORMAL
WT STONE: 9 MG

## 2020-06-26 ENCOUNTER — HOSPITAL ENCOUNTER (INPATIENT)
Facility: HOSPITAL | Age: 40
LOS: 2 days | Discharge: NURSING, REHAB OR LONG-TERM CARE | End: 2020-07-01
Attending: EMERGENCY MEDICINE | Admitting: HOSPITALIST

## 2020-06-26 ENCOUNTER — TRANSCRIBE ORDERS (OUTPATIENT)
Dept: ADMINISTRATIVE | Facility: HOSPITAL | Age: 40
End: 2020-06-26

## 2020-06-26 ENCOUNTER — APPOINTMENT (OUTPATIENT)
Dept: GENERAL RADIOLOGY | Facility: HOSPITAL | Age: 40
End: 2020-06-26

## 2020-06-26 DIAGNOSIS — N20.1 URETERAL STONE: ICD-10-CM

## 2020-06-26 DIAGNOSIS — N13.30 HYDRONEPHROSIS OF RIGHT KIDNEY: ICD-10-CM

## 2020-06-26 DIAGNOSIS — N13.39 OTHER HYDRONEPHROSIS: Primary | ICD-10-CM

## 2020-06-26 DIAGNOSIS — Z87.820 H/O TRAUMATIC BRAIN INJURY: ICD-10-CM

## 2020-06-26 DIAGNOSIS — N11.1 CHRONIC OBSTRUCTIVE PYELONEPHRITIS: Primary | ICD-10-CM

## 2020-06-26 DIAGNOSIS — T83.022A NEPHROSTOMY TUBE DISPLACED (HCC): ICD-10-CM

## 2020-06-26 LAB
ABO GROUP BLD: NORMAL
ALBUMIN SERPL-MCNC: 4.6 G/DL (ref 3.5–5.2)
ALBUMIN/GLOB SERPL: 1.5 G/DL
ALP SERPL-CCNC: 110 U/L (ref 39–117)
ALT SERPL W P-5'-P-CCNC: 58 U/L (ref 1–41)
ANION GAP SERPL CALCULATED.3IONS-SCNC: 7.6 MMOL/L (ref 5–15)
AST SERPL-CCNC: 21 U/L (ref 1–40)
BASOPHILS # BLD AUTO: 0.06 10*3/MM3 (ref 0–0.2)
BASOPHILS NFR BLD AUTO: 0.9 % (ref 0–1.5)
BILIRUB SERPL-MCNC: 0.5 MG/DL (ref 0.2–1.2)
BLD GP AB SCN SERPL QL: NEGATIVE
BUN BLD-MCNC: 25 MG/DL (ref 6–20)
BUN/CREAT SERPL: 31.6 (ref 7–25)
CALCIUM SPEC-SCNC: 9.7 MG/DL (ref 8.6–10.5)
CHLORIDE SERPL-SCNC: 99 MMOL/L (ref 98–107)
CO2 SERPL-SCNC: 28.4 MMOL/L (ref 22–29)
CREAT BLD-MCNC: 0.79 MG/DL (ref 0.76–1.27)
DEPRECATED RDW RBC AUTO: 45.5 FL (ref 37–54)
EOSINOPHIL # BLD AUTO: 0.17 10*3/MM3 (ref 0–0.4)
EOSINOPHIL NFR BLD AUTO: 2.7 % (ref 0.3–6.2)
ERYTHROCYTE [DISTWIDTH] IN BLOOD BY AUTOMATED COUNT: 13.6 % (ref 12.3–15.4)
GFR SERPL CREATININE-BSD FRML MDRD: 109 ML/MIN/1.73
GLOBULIN UR ELPH-MCNC: 3.1 GM/DL
GLUCOSE BLD-MCNC: 103 MG/DL (ref 65–99)
HCT VFR BLD AUTO: 43.8 % (ref 37.5–51)
HGB BLD-MCNC: 14.7 G/DL (ref 13–17.7)
IMM GRANULOCYTES # BLD AUTO: 0.02 10*3/MM3 (ref 0–0.05)
IMM GRANULOCYTES NFR BLD AUTO: 0.3 % (ref 0–0.5)
LYMPHOCYTES # BLD AUTO: 1.56 10*3/MM3 (ref 0.7–3.1)
LYMPHOCYTES NFR BLD AUTO: 24.3 % (ref 19.6–45.3)
MCH RBC QN AUTO: 30.6 PG (ref 26.6–33)
MCHC RBC AUTO-ENTMCNC: 33.6 G/DL (ref 31.5–35.7)
MCV RBC AUTO: 91.3 FL (ref 79–97)
MONOCYTES # BLD AUTO: 0.62 10*3/MM3 (ref 0.1–0.9)
MONOCYTES NFR BLD AUTO: 9.7 % (ref 5–12)
NEUTROPHILS # BLD AUTO: 3.98 10*3/MM3 (ref 1.7–7)
NEUTROPHILS NFR BLD AUTO: 62.1 % (ref 42.7–76)
NRBC BLD AUTO-RTO: 0 /100 WBC (ref 0–0.2)
PLATELET # BLD AUTO: 172 10*3/MM3 (ref 140–450)
PMV BLD AUTO: 10.4 FL (ref 6–12)
POTASSIUM BLD-SCNC: 4.2 MMOL/L (ref 3.5–5.2)
PROT SERPL-MCNC: 7.7 G/DL (ref 6–8.5)
RBC # BLD AUTO: 4.8 10*6/MM3 (ref 4.14–5.8)
RH BLD: NEGATIVE
SARS-COV-2 N GENE NPH QL NAA+PROBE: NOT DETECTED
SODIUM BLD-SCNC: 135 MMOL/L (ref 136–145)
T&S EXPIRATION DATE: NORMAL
WBC NRBC COR # BLD: 6.41 10*3/MM3 (ref 3.4–10.8)

## 2020-06-26 PROCEDURE — 86901 BLOOD TYPING SEROLOGIC RH(D): CPT | Performed by: EMERGENCY MEDICINE

## 2020-06-26 PROCEDURE — 86850 RBC ANTIBODY SCREEN: CPT | Performed by: EMERGENCY MEDICINE

## 2020-06-26 PROCEDURE — 99284 EMERGENCY DEPT VISIT MOD MDM: CPT

## 2020-06-26 PROCEDURE — 80053 COMPREHEN METABOLIC PANEL: CPT | Performed by: EMERGENCY MEDICINE

## 2020-06-26 PROCEDURE — 87635 SARS-COV-2 COVID-19 AMP PRB: CPT | Performed by: EMERGENCY MEDICINE

## 2020-06-26 PROCEDURE — 85025 COMPLETE CBC W/AUTO DIFF WBC: CPT | Performed by: EMERGENCY MEDICINE

## 2020-06-26 PROCEDURE — 71045 X-RAY EXAM CHEST 1 VIEW: CPT

## 2020-06-26 PROCEDURE — G0378 HOSPITAL OBSERVATION PER HR: HCPCS

## 2020-06-26 PROCEDURE — C9803 HOPD COVID-19 SPEC COLLECT: HCPCS | Performed by: EMERGENCY MEDICINE

## 2020-06-26 PROCEDURE — 36415 COLL VENOUS BLD VENIPUNCTURE: CPT

## 2020-06-26 PROCEDURE — 86900 BLOOD TYPING SEROLOGIC ABO: CPT | Performed by: EMERGENCY MEDICINE

## 2020-06-26 RX ORDER — SODIUM CHLORIDE 0.9 % (FLUSH) 0.9 %
10 SYRINGE (ML) INJECTION AS NEEDED
Status: DISCONTINUED | OUTPATIENT
Start: 2020-06-26 | End: 2020-07-01 | Stop reason: HOSPADM

## 2020-06-27 ENCOUNTER — APPOINTMENT (OUTPATIENT)
Dept: CT IMAGING | Facility: HOSPITAL | Age: 40
End: 2020-06-27

## 2020-06-27 LAB
INR PPP: 0.95 (ref 0.9–1.1)
PROTHROMBIN TIME: 12.4 SECONDS (ref 11.7–14.2)

## 2020-06-27 PROCEDURE — 87205 SMEAR GRAM STAIN: CPT | Performed by: UROLOGY

## 2020-06-27 PROCEDURE — 0T25X0Z CHANGE DRAINAGE DEVICE IN KIDNEY, EXTERNAL APPROACH: ICD-10-PCS | Performed by: RADIOLOGY

## 2020-06-27 PROCEDURE — 85610 PROTHROMBIN TIME: CPT | Performed by: HOSPITALIST

## 2020-06-27 PROCEDURE — 25010000002 MORPHINE PER 10 MG: Performed by: HOSPITALIST

## 2020-06-27 PROCEDURE — 25010000002 MORPHINE PER 10 MG: Performed by: RADIOLOGY

## 2020-06-27 PROCEDURE — 25010000003 LIDOCAINE 1 % SOLUTION: Performed by: RADIOLOGY

## 2020-06-27 PROCEDURE — 87015 SPECIMEN INFECT AGNT CONCNTJ: CPT | Performed by: UROLOGY

## 2020-06-27 PROCEDURE — G0378 HOSPITAL OBSERVATION PER HR: HCPCS

## 2020-06-27 PROCEDURE — 87070 CULTURE OTHR SPECIMN AEROBIC: CPT | Performed by: UROLOGY

## 2020-06-27 RX ORDER — LIDOCAINE HYDROCHLORIDE 10 MG/ML
20 INJECTION, SOLUTION INFILTRATION; PERINEURAL ONCE
Status: COMPLETED | OUTPATIENT
Start: 2020-06-27 | End: 2020-06-27

## 2020-06-27 RX ORDER — MORPHINE SULFATE 2 MG/ML
2 INJECTION, SOLUTION INTRAMUSCULAR; INTRAVENOUS EVERY 4 HOURS PRN
Status: DISCONTINUED | OUTPATIENT
Start: 2020-06-27 | End: 2020-07-01 | Stop reason: HOSPADM

## 2020-06-27 RX ORDER — SODIUM CHLORIDE 0.9 % (FLUSH) 0.9 %
3 SYRINGE (ML) INJECTION EVERY 12 HOURS SCHEDULED
Status: DISCONTINUED | OUTPATIENT
Start: 2020-06-27 | End: 2020-06-30 | Stop reason: HOSPADM

## 2020-06-27 RX ORDER — LEVETIRACETAM 100 MG/ML
1500 SOLUTION ORAL EVERY 12 HOURS SCHEDULED
Status: DISCONTINUED | OUTPATIENT
Start: 2020-06-27 | End: 2020-06-30

## 2020-06-27 RX ORDER — MORPHINE SULFATE 2 MG/ML
2 INJECTION, SOLUTION INTRAMUSCULAR; INTRAVENOUS ONCE
Status: COMPLETED | OUTPATIENT
Start: 2020-06-27 | End: 2020-06-27

## 2020-06-27 RX ORDER — ACETAMINOPHEN 325 MG/1
650 TABLET ORAL EVERY 6 HOURS PRN
COMMUNITY

## 2020-06-27 RX ORDER — PANTOPRAZOLE SODIUM 40 MG/1
40 TABLET, DELAYED RELEASE ORAL EVERY MORNING
Status: DISCONTINUED | OUTPATIENT
Start: 2020-06-28 | End: 2020-06-27

## 2020-06-27 RX ORDER — SODIUM CHLORIDE 9 MG/ML
75 INJECTION, SOLUTION INTRAVENOUS CONTINUOUS
Status: DISCONTINUED | OUTPATIENT
Start: 2020-06-27 | End: 2020-06-28

## 2020-06-27 RX ORDER — LANSOPRAZOLE
30 KIT EVERY MORNING
Status: DISCONTINUED | OUTPATIENT
Start: 2020-06-28 | End: 2020-07-01 | Stop reason: HOSPADM

## 2020-06-27 RX ORDER — SODIUM CHLORIDE 0.9 % (FLUSH) 0.9 %
10 SYRINGE (ML) INJECTION AS NEEDED
Status: DISCONTINUED | OUTPATIENT
Start: 2020-06-27 | End: 2020-06-30 | Stop reason: HOSPADM

## 2020-06-27 RX ADMIN — SODIUM CHLORIDE, PRESERVATIVE FREE 3 ML: 5 INJECTION INTRAVENOUS at 21:06

## 2020-06-27 RX ADMIN — SODIUM CHLORIDE 75 ML/HR: 9 INJECTION, SOLUTION INTRAVENOUS at 17:26

## 2020-06-27 RX ADMIN — MORPHINE SULFATE 2 MG: 2 INJECTION, SOLUTION INTRAMUSCULAR; INTRAVENOUS at 15:01

## 2020-06-27 RX ADMIN — Medication 10 ML: at 13:39

## 2020-06-27 RX ADMIN — AMANTADINE HYDROCHLORIDE 100 MG: 50 SOLUTION ORAL at 17:52

## 2020-06-27 RX ADMIN — MORPHINE SULFATE 2 MG: 2 INJECTION, SOLUTION INTRAMUSCULAR; INTRAVENOUS at 13:39

## 2020-06-27 RX ADMIN — LIDOCAINE HYDROCHLORIDE 20 ML: 10 INJECTION, SOLUTION INFILTRATION; PERINEURAL at 13:56

## 2020-06-27 RX ADMIN — LEVETIRACETAM 1500 MG: 100 SOLUTION ORAL at 17:52

## 2020-06-28 LAB
ANION GAP SERPL CALCULATED.3IONS-SCNC: 11.6 MMOL/L (ref 5–15)
BASOPHILS # BLD AUTO: 0.05 10*3/MM3 (ref 0–0.2)
BASOPHILS NFR BLD AUTO: 0.8 % (ref 0–1.5)
BUN BLD-MCNC: 25 MG/DL (ref 6–20)
BUN/CREAT SERPL: 30.5 (ref 7–25)
CALCIUM SPEC-SCNC: 9.4 MG/DL (ref 8.6–10.5)
CHLORIDE SERPL-SCNC: 103 MMOL/L (ref 98–107)
CO2 SERPL-SCNC: 26.4 MMOL/L (ref 22–29)
CREAT BLD-MCNC: 0.82 MG/DL (ref 0.76–1.27)
DEPRECATED RDW RBC AUTO: 46.3 FL (ref 37–54)
EOSINOPHIL # BLD AUTO: 0.09 10*3/MM3 (ref 0–0.4)
EOSINOPHIL NFR BLD AUTO: 1.5 % (ref 0.3–6.2)
ERYTHROCYTE [DISTWIDTH] IN BLOOD BY AUTOMATED COUNT: 13.6 % (ref 12.3–15.4)
GFR SERPL CREATININE-BSD FRML MDRD: 104 ML/MIN/1.73
GLUCOSE BLD-MCNC: 109 MG/DL (ref 65–99)
HCT VFR BLD AUTO: 42.8 % (ref 37.5–51)
HGB BLD-MCNC: 14.3 G/DL (ref 13–17.7)
IMM GRANULOCYTES # BLD AUTO: 0.02 10*3/MM3 (ref 0–0.05)
IMM GRANULOCYTES NFR BLD AUTO: 0.3 % (ref 0–0.5)
LYMPHOCYTES # BLD AUTO: 1.22 10*3/MM3 (ref 0.7–3.1)
LYMPHOCYTES NFR BLD AUTO: 20.3 % (ref 19.6–45.3)
MCH RBC QN AUTO: 30.7 PG (ref 26.6–33)
MCHC RBC AUTO-ENTMCNC: 33.4 G/DL (ref 31.5–35.7)
MCV RBC AUTO: 91.8 FL (ref 79–97)
MONOCYTES # BLD AUTO: 0.75 10*3/MM3 (ref 0.1–0.9)
MONOCYTES NFR BLD AUTO: 12.5 % (ref 5–12)
NEUTROPHILS # BLD AUTO: 3.88 10*3/MM3 (ref 1.7–7)
NEUTROPHILS NFR BLD AUTO: 64.6 % (ref 42.7–76)
NRBC BLD AUTO-RTO: 0 /100 WBC (ref 0–0.2)
PLATELET # BLD AUTO: 175 10*3/MM3 (ref 140–450)
PMV BLD AUTO: 10.7 FL (ref 6–12)
POTASSIUM BLD-SCNC: 4 MMOL/L (ref 3.5–5.2)
RBC # BLD AUTO: 4.66 10*6/MM3 (ref 4.14–5.8)
SODIUM BLD-SCNC: 141 MMOL/L (ref 136–145)
WBC NRBC COR # BLD: 6.01 10*3/MM3 (ref 3.4–10.8)

## 2020-06-28 PROCEDURE — 85025 COMPLETE CBC W/AUTO DIFF WBC: CPT | Performed by: HOSPITALIST

## 2020-06-28 PROCEDURE — G0378 HOSPITAL OBSERVATION PER HR: HCPCS

## 2020-06-28 PROCEDURE — 80048 BASIC METABOLIC PNL TOTAL CA: CPT | Performed by: HOSPITALIST

## 2020-06-28 RX ADMIN — SODIUM CHLORIDE 75 ML/HR: 9 INJECTION, SOLUTION INTRAVENOUS at 04:03

## 2020-06-28 RX ADMIN — AMANTADINE HYDROCHLORIDE 100 MG: 50 SOLUTION ORAL at 06:30

## 2020-06-28 RX ADMIN — LEVETIRACETAM 1500 MG: 100 SOLUTION ORAL at 18:27

## 2020-06-28 RX ADMIN — LANSOPRAZOLE 30 MG: KIT at 10:24

## 2020-06-28 RX ADMIN — LEVETIRACETAM 1500 MG: 100 SOLUTION ORAL at 06:30

## 2020-06-28 RX ADMIN — SODIUM CHLORIDE, PRESERVATIVE FREE 3 ML: 5 INJECTION INTRAVENOUS at 08:08

## 2020-06-28 RX ADMIN — AMANTADINE HYDROCHLORIDE 100 MG: 50 SOLUTION ORAL at 18:27

## 2020-06-28 RX ADMIN — GLYCERIN 1 DROP: .002; .002; .01 SOLUTION/ DROPS OPHTHALMIC at 04:05

## 2020-06-29 ENCOUNTER — HOSPITAL ENCOUNTER (OUTPATIENT)
Dept: CT IMAGING | Facility: HOSPITAL | Age: 40
End: 2020-06-29

## 2020-06-29 LAB — SARS-COV-2 N GENE NPH QL NAA+PROBE: NOT DETECTED

## 2020-06-29 PROCEDURE — 87635 SARS-COV-2 COVID-19 AMP PRB: CPT | Performed by: UROLOGY

## 2020-06-29 PROCEDURE — C9803 HOPD COVID-19 SPEC COLLECT: HCPCS | Performed by: UROLOGY

## 2020-06-29 PROCEDURE — 25010000002 MORPHINE PER 10 MG: Performed by: HOSPITALIST

## 2020-06-29 RX ADMIN — LEVETIRACETAM 1500 MG: 100 SOLUTION ORAL at 18:02

## 2020-06-29 RX ADMIN — LEVETIRACETAM 1500 MG: 100 SOLUTION ORAL at 06:25

## 2020-06-29 RX ADMIN — GLYCERIN 1 DROP: .002; .002; .01 SOLUTION/ DROPS OPHTHALMIC at 18:03

## 2020-06-29 RX ADMIN — MORPHINE SULFATE 2 MG: 2 INJECTION, SOLUTION INTRAMUSCULAR; INTRAVENOUS at 23:49

## 2020-06-29 RX ADMIN — MORPHINE SULFATE 2 MG: 2 INJECTION, SOLUTION INTRAMUSCULAR; INTRAVENOUS at 02:38

## 2020-06-29 RX ADMIN — MORPHINE SULFATE 2 MG: 2 INJECTION, SOLUTION INTRAMUSCULAR; INTRAVENOUS at 17:01

## 2020-06-29 RX ADMIN — AMANTADINE HYDROCHLORIDE 100 MG: 50 SOLUTION ORAL at 06:25

## 2020-06-29 RX ADMIN — AMANTADINE HYDROCHLORIDE 100 MG: 50 SOLUTION ORAL at 18:02

## 2020-06-29 RX ADMIN — SODIUM CHLORIDE, PRESERVATIVE FREE 3 ML: 5 INJECTION INTRAVENOUS at 23:50

## 2020-06-29 RX ADMIN — LANSOPRAZOLE 30 MG: KIT at 06:26

## 2020-06-30 ENCOUNTER — ANESTHESIA EVENT (OUTPATIENT)
Dept: PERIOP | Facility: HOSPITAL | Age: 40
End: 2020-06-30

## 2020-06-30 ENCOUNTER — ANESTHESIA (OUTPATIENT)
Dept: PERIOP | Facility: HOSPITAL | Age: 40
End: 2020-06-30

## 2020-06-30 PROBLEM — N11.1: Status: ACTIVE | Noted: 2020-06-30

## 2020-06-30 LAB
ANION GAP SERPL CALCULATED.3IONS-SCNC: 9 MMOL/L (ref 5–15)
BACTERIA FLD CULT: NORMAL
BASOPHILS # BLD AUTO: 0.05 10*3/MM3 (ref 0–0.2)
BASOPHILS NFR BLD AUTO: 0.7 % (ref 0–1.5)
BUN SERPL-MCNC: 22 MG/DL (ref 6–20)
BUN/CREAT SERPL: 29.3 (ref 7–25)
CALCIUM SPEC-SCNC: 9.8 MG/DL (ref 8.6–10.5)
CHLORIDE SERPL-SCNC: 102 MMOL/L (ref 98–107)
CO2 SERPL-SCNC: 29 MMOL/L (ref 22–29)
CREAT SERPL-MCNC: 0.75 MG/DL (ref 0.76–1.27)
DEPRECATED RDW RBC AUTO: 44.9 FL (ref 37–54)
EOSINOPHIL # BLD AUTO: 0.2 10*3/MM3 (ref 0–0.4)
EOSINOPHIL NFR BLD AUTO: 3 % (ref 0.3–6.2)
ERYTHROCYTE [DISTWIDTH] IN BLOOD BY AUTOMATED COUNT: 13.5 % (ref 12.3–15.4)
GFR SERPL CREATININE-BSD FRML MDRD: 115 ML/MIN/1.73
GLUCOSE BLDC GLUCOMTR-MCNC: 176 MG/DL (ref 70–130)
GLUCOSE SERPL-MCNC: 97 MG/DL (ref 65–99)
GRAM STN SPEC: NORMAL
GRAM STN SPEC: NORMAL
HCT VFR BLD AUTO: 39.6 % (ref 37.5–51)
HGB BLD-MCNC: 13.4 G/DL (ref 13–17.7)
IMM GRANULOCYTES # BLD AUTO: 0.03 10*3/MM3 (ref 0–0.05)
IMM GRANULOCYTES NFR BLD AUTO: 0.4 % (ref 0–0.5)
LYMPHOCYTES # BLD AUTO: 1.56 10*3/MM3 (ref 0.7–3.1)
LYMPHOCYTES NFR BLD AUTO: 23 % (ref 19.6–45.3)
MCH RBC QN AUTO: 30.7 PG (ref 26.6–33)
MCHC RBC AUTO-ENTMCNC: 33.8 G/DL (ref 31.5–35.7)
MCV RBC AUTO: 90.8 FL (ref 79–97)
MONOCYTES # BLD AUTO: 0.71 10*3/MM3 (ref 0.1–0.9)
MONOCYTES NFR BLD AUTO: 10.5 % (ref 5–12)
NEUTROPHILS NFR BLD AUTO: 4.22 10*3/MM3 (ref 1.7–7)
NEUTROPHILS NFR BLD AUTO: 62.4 % (ref 42.7–76)
NRBC BLD AUTO-RTO: 0 /100 WBC (ref 0–0.2)
PLATELET # BLD AUTO: 176 10*3/MM3 (ref 140–450)
PMV BLD AUTO: 10.8 FL (ref 6–12)
POTASSIUM SERPL-SCNC: 4.3 MMOL/L (ref 3.5–5.2)
RBC # BLD AUTO: 4.36 10*6/MM3 (ref 4.14–5.8)
SODIUM SERPL-SCNC: 140 MMOL/L (ref 136–145)
WBC # BLD AUTO: 6.77 10*3/MM3 (ref 3.4–10.8)

## 2020-06-30 PROCEDURE — 25010000002 PHENYLEPHRINE PER 1 ML: Performed by: NURSE ANESTHETIST, CERTIFIED REGISTERED

## 2020-06-30 PROCEDURE — 88307 TISSUE EXAM BY PATHOLOGIST: CPT | Performed by: UROLOGY

## 2020-06-30 PROCEDURE — 80048 BASIC METABOLIC PNL TOTAL CA: CPT | Performed by: HOSPITALIST

## 2020-06-30 PROCEDURE — 25010000002 PROPOFOL 10 MG/ML EMULSION: Performed by: NURSE ANESTHETIST, CERTIFIED REGISTERED

## 2020-06-30 PROCEDURE — 25010000003 CEFAZOLIN IN DEXTROSE 2-4 GM/100ML-% SOLUTION: Performed by: NURSE ANESTHETIST, CERTIFIED REGISTERED

## 2020-06-30 PROCEDURE — 0TT04ZZ RESECTION OF RIGHT KIDNEY, PERCUTANEOUS ENDOSCOPIC APPROACH: ICD-10-PCS | Performed by: UROLOGY

## 2020-06-30 PROCEDURE — 25010000002 MORPHINE PER 10 MG: Performed by: HOSPITALIST

## 2020-06-30 PROCEDURE — 25010000002 HYDROMORPHONE PER 4 MG: Performed by: STUDENT IN AN ORGANIZED HEALTH CARE EDUCATION/TRAINING PROGRAM

## 2020-06-30 PROCEDURE — 25010000002 PROMETHAZINE PER 50 MG: Performed by: NURSE ANESTHETIST, CERTIFIED REGISTERED

## 2020-06-30 PROCEDURE — 25010000002 ONDANSETRON PER 1 MG: Performed by: STUDENT IN AN ORGANIZED HEALTH CARE EDUCATION/TRAINING PROGRAM

## 2020-06-30 PROCEDURE — 82962 GLUCOSE BLOOD TEST: CPT

## 2020-06-30 PROCEDURE — 85025 COMPLETE CBC W/AUTO DIFF WBC: CPT | Performed by: HOSPITALIST

## 2020-06-30 DEVICE — ENDOPATH ETS45 2.5MM RELOADS (VASCULAR/THIN)
Type: IMPLANTABLE DEVICE | Site: ABDOMEN | Status: FUNCTIONAL
Brand: ENDOPATH

## 2020-06-30 DEVICE — LIGACLIP 10-M/L, 10MM ENDOSCOPIC ROTATING MULTIPLE CLIP APPLIERS
Type: IMPLANTABLE DEVICE | Site: ABDOMEN | Status: FUNCTIONAL
Brand: LIGACLIP

## 2020-06-30 RX ORDER — HYDROMORPHONE HCL 110MG/55ML
PATIENT CONTROLLED ANALGESIA SYRINGE INTRAVENOUS AS NEEDED
Status: DISCONTINUED | OUTPATIENT
Start: 2020-06-30 | End: 2020-06-30 | Stop reason: SURG

## 2020-06-30 RX ORDER — PROMETHAZINE HYDROCHLORIDE 25 MG/1
25 TABLET ORAL ONCE AS NEEDED
Status: DISCONTINUED | OUTPATIENT
Start: 2020-06-30 | End: 2020-06-30 | Stop reason: HOSPADM

## 2020-06-30 RX ORDER — SODIUM CHLORIDE 0.9 % (FLUSH) 0.9 %
3 SYRINGE (ML) INJECTION EVERY 12 HOURS SCHEDULED
Status: DISCONTINUED | OUTPATIENT
Start: 2020-06-30 | End: 2020-06-30 | Stop reason: HOSPADM

## 2020-06-30 RX ORDER — DIPHENHYDRAMINE HCL 25 MG
25 CAPSULE ORAL
Status: DISCONTINUED | OUTPATIENT
Start: 2020-06-30 | End: 2020-06-30 | Stop reason: HOSPADM

## 2020-06-30 RX ORDER — PROMETHAZINE HYDROCHLORIDE 25 MG/ML
6.25 INJECTION, SOLUTION INTRAMUSCULAR; INTRAVENOUS
Status: DISCONTINUED | OUTPATIENT
Start: 2020-06-30 | End: 2020-06-30 | Stop reason: HOSPADM

## 2020-06-30 RX ORDER — LIDOCAINE HYDROCHLORIDE 20 MG/ML
INJECTION, SOLUTION INFILTRATION; PERINEURAL AS NEEDED
Status: DISCONTINUED | OUTPATIENT
Start: 2020-06-30 | End: 2020-06-30 | Stop reason: SURG

## 2020-06-30 RX ORDER — SUCCINYLCHOLINE CHLORIDE 20 MG/ML
INJECTION INTRAMUSCULAR; INTRAVENOUS AS NEEDED
Status: DISCONTINUED | OUTPATIENT
Start: 2020-06-30 | End: 2020-06-30

## 2020-06-30 RX ORDER — DIPHENHYDRAMINE HYDROCHLORIDE 50 MG/ML
12.5 INJECTION INTRAMUSCULAR; INTRAVENOUS
Status: DISCONTINUED | OUTPATIENT
Start: 2020-06-30 | End: 2020-06-30 | Stop reason: HOSPADM

## 2020-06-30 RX ORDER — PROMETHAZINE HYDROCHLORIDE 25 MG/1
25 SUPPOSITORY RECTAL ONCE AS NEEDED
Status: DISCONTINUED | OUTPATIENT
Start: 2020-06-30 | End: 2020-06-30 | Stop reason: HOSPADM

## 2020-06-30 RX ORDER — HYDRALAZINE HYDROCHLORIDE 20 MG/ML
5 INJECTION INTRAMUSCULAR; INTRAVENOUS
Status: DISCONTINUED | OUTPATIENT
Start: 2020-06-30 | End: 2020-06-30 | Stop reason: HOSPADM

## 2020-06-30 RX ORDER — FAMOTIDINE 10 MG/ML
20 INJECTION, SOLUTION INTRAVENOUS ONCE
Status: COMPLETED | OUTPATIENT
Start: 2020-06-30 | End: 2020-06-30

## 2020-06-30 RX ORDER — MAGNESIUM HYDROXIDE 1200 MG/15ML
LIQUID ORAL AS NEEDED
Status: DISCONTINUED | OUTPATIENT
Start: 2020-06-30 | End: 2020-06-30 | Stop reason: HOSPADM

## 2020-06-30 RX ORDER — ONDANSETRON 2 MG/ML
INJECTION INTRAMUSCULAR; INTRAVENOUS AS NEEDED
Status: DISCONTINUED | OUTPATIENT
Start: 2020-06-30 | End: 2020-06-30 | Stop reason: SURG

## 2020-06-30 RX ORDER — PROPOFOL 10 MG/ML
VIAL (ML) INTRAVENOUS AS NEEDED
Status: DISCONTINUED | OUTPATIENT
Start: 2020-06-30 | End: 2020-06-30 | Stop reason: SURG

## 2020-06-30 RX ORDER — LABETALOL HYDROCHLORIDE 5 MG/ML
5 INJECTION, SOLUTION INTRAVENOUS
Status: DISCONTINUED | OUTPATIENT
Start: 2020-06-30 | End: 2020-06-30 | Stop reason: HOSPADM

## 2020-06-30 RX ORDER — SODIUM CHLORIDE, SODIUM LACTATE, POTASSIUM CHLORIDE, CALCIUM CHLORIDE 600; 310; 30; 20 MG/100ML; MG/100ML; MG/100ML; MG/100ML
INJECTION, SOLUTION INTRAVENOUS CONTINUOUS PRN
Status: DISCONTINUED | OUTPATIENT
Start: 2020-06-30 | End: 2020-06-30 | Stop reason: SURG

## 2020-06-30 RX ORDER — LIDOCAINE HYDROCHLORIDE 10 MG/ML
0.5 INJECTION, SOLUTION EPIDURAL; INFILTRATION; INTRACAUDAL; PERINEURAL ONCE AS NEEDED
Status: DISCONTINUED | OUTPATIENT
Start: 2020-06-30 | End: 2020-06-30 | Stop reason: HOSPADM

## 2020-06-30 RX ORDER — BUPIVACAINE HYDROCHLORIDE 2.5 MG/ML
INJECTION, SOLUTION INFILTRATION; PERINEURAL AS NEEDED
Status: DISCONTINUED | OUTPATIENT
Start: 2020-06-30 | End: 2020-07-01 | Stop reason: HOSPADM

## 2020-06-30 RX ORDER — PROMETHAZINE HYDROCHLORIDE 25 MG/ML
INJECTION, SOLUTION INTRAMUSCULAR; INTRAVENOUS AS NEEDED
Status: DISCONTINUED | OUTPATIENT
Start: 2020-06-30 | End: 2020-06-30 | Stop reason: SURG

## 2020-06-30 RX ORDER — HYDROMORPHONE HYDROCHLORIDE 1 MG/ML
0.5 INJECTION, SOLUTION INTRAMUSCULAR; INTRAVENOUS; SUBCUTANEOUS
Status: DISCONTINUED | OUTPATIENT
Start: 2020-06-30 | End: 2020-07-01 | Stop reason: HOSPADM

## 2020-06-30 RX ORDER — ACETAMINOPHEN 325 MG/1
650 TABLET ORAL ONCE AS NEEDED
Status: DISCONTINUED | OUTPATIENT
Start: 2020-06-30 | End: 2020-06-30 | Stop reason: HOSPADM

## 2020-06-30 RX ORDER — FENTANYL CITRATE 50 UG/ML
50 INJECTION, SOLUTION INTRAMUSCULAR; INTRAVENOUS
Status: DISCONTINUED | OUTPATIENT
Start: 2020-06-30 | End: 2020-06-30 | Stop reason: HOSPADM

## 2020-06-30 RX ORDER — SODIUM CHLORIDE, SODIUM LACTATE, POTASSIUM CHLORIDE, CALCIUM CHLORIDE 600; 310; 30; 20 MG/100ML; MG/100ML; MG/100ML; MG/100ML
9 INJECTION, SOLUTION INTRAVENOUS CONTINUOUS
Status: DISCONTINUED | OUTPATIENT
Start: 2020-06-30 | End: 2020-06-30

## 2020-06-30 RX ORDER — LEVETIRACETAM 500 MG/1
1500 TABLET ORAL EVERY 12 HOURS SCHEDULED
Status: DISCONTINUED | OUTPATIENT
Start: 2020-06-30 | End: 2020-07-01

## 2020-06-30 RX ORDER — FLUMAZENIL 0.1 MG/ML
0.2 INJECTION INTRAVENOUS AS NEEDED
Status: DISCONTINUED | OUTPATIENT
Start: 2020-06-30 | End: 2020-06-30 | Stop reason: HOSPADM

## 2020-06-30 RX ORDER — NALOXONE HCL 0.4 MG/ML
0.2 VIAL (ML) INJECTION AS NEEDED
Status: DISCONTINUED | OUTPATIENT
Start: 2020-06-30 | End: 2020-06-30 | Stop reason: HOSPADM

## 2020-06-30 RX ORDER — SODIUM CHLORIDE 0.9 % (FLUSH) 0.9 %
3-10 SYRINGE (ML) INJECTION AS NEEDED
Status: DISCONTINUED | OUTPATIENT
Start: 2020-06-30 | End: 2020-06-30 | Stop reason: HOSPADM

## 2020-06-30 RX ORDER — CEFAZOLIN SODIUM 2 G/100ML
2 INJECTION, SOLUTION INTRAVENOUS EVERY 8 HOURS
Status: DISCONTINUED | OUTPATIENT
Start: 2020-07-01 | End: 2020-07-01 | Stop reason: HOSPADM

## 2020-06-30 RX ORDER — SODIUM CHLORIDE 9 MG/ML
75 INJECTION, SOLUTION INTRAVENOUS CONTINUOUS
Status: DISCONTINUED | OUTPATIENT
Start: 2020-06-30 | End: 2020-07-01

## 2020-06-30 RX ORDER — EPHEDRINE SULFATE 50 MG/ML
5 INJECTION, SOLUTION INTRAVENOUS ONCE AS NEEDED
Status: DISCONTINUED | OUTPATIENT
Start: 2020-06-30 | End: 2020-06-30 | Stop reason: HOSPADM

## 2020-06-30 RX ORDER — CEFAZOLIN SODIUM 2 G/100ML
INJECTION, SOLUTION INTRAVENOUS AS NEEDED
Status: DISCONTINUED | OUTPATIENT
Start: 2020-06-30 | End: 2020-06-30 | Stop reason: SURG

## 2020-06-30 RX ORDER — PROMETHAZINE HYDROCHLORIDE 25 MG/ML
12.5 INJECTION, SOLUTION INTRAMUSCULAR; INTRAVENOUS ONCE AS NEEDED
Status: DISCONTINUED | OUTPATIENT
Start: 2020-06-30 | End: 2020-06-30 | Stop reason: HOSPADM

## 2020-06-30 RX ORDER — HYDROMORPHONE HYDROCHLORIDE 1 MG/ML
0.5 INJECTION, SOLUTION INTRAMUSCULAR; INTRAVENOUS; SUBCUTANEOUS
Status: DISCONTINUED | OUTPATIENT
Start: 2020-06-30 | End: 2020-06-30 | Stop reason: HOSPADM

## 2020-06-30 RX ORDER — OXYCODONE AND ACETAMINOPHEN 7.5; 325 MG/1; MG/1
1 TABLET ORAL ONCE AS NEEDED
Status: DISCONTINUED | OUTPATIENT
Start: 2020-06-30 | End: 2020-06-30 | Stop reason: HOSPADM

## 2020-06-30 RX ORDER — LIDOCAINE HYDROCHLORIDE 40 MG/ML
SOLUTION TOPICAL AS NEEDED
Status: DISCONTINUED | OUTPATIENT
Start: 2020-06-30 | End: 2020-06-30 | Stop reason: SURG

## 2020-06-30 RX ORDER — SODIUM CHLORIDE 9 MG/ML
INJECTION, SOLUTION INTRAVENOUS AS NEEDED
Status: DISCONTINUED | OUTPATIENT
Start: 2020-06-30 | End: 2020-06-30 | Stop reason: HOSPADM

## 2020-06-30 RX ORDER — ROCURONIUM BROMIDE 10 MG/ML
INJECTION, SOLUTION INTRAVENOUS AS NEEDED
Status: DISCONTINUED | OUTPATIENT
Start: 2020-06-30 | End: 2020-06-30 | Stop reason: SURG

## 2020-06-30 RX ORDER — HYDROCODONE BITARTRATE AND ACETAMINOPHEN 7.5; 325 MG/1; MG/1
1 TABLET ORAL ONCE AS NEEDED
Status: DISCONTINUED | OUTPATIENT
Start: 2020-06-30 | End: 2020-06-30 | Stop reason: HOSPADM

## 2020-06-30 RX ADMIN — HYDROMORPHONE HYDROCHLORIDE 0.5 MG: 2 INJECTION, SOLUTION INTRAMUSCULAR; INTRAVENOUS; SUBCUTANEOUS at 19:33

## 2020-06-30 RX ADMIN — CEFAZOLIN SODIUM 2 G: 2 INJECTION, SOLUTION INTRAVENOUS at 17:29

## 2020-06-30 RX ADMIN — LIDOCAINE HYDROCHLORIDE 80 MG: 20 INJECTION, SOLUTION INFILTRATION; PERINEURAL at 17:18

## 2020-06-30 RX ADMIN — PHENYLEPHRINE HYDROCHLORIDE 100 MCG: 10 INJECTION INTRAVENOUS at 19:12

## 2020-06-30 RX ADMIN — SUGAMMADEX 400 MG: 100 INJECTION, SOLUTION INTRAVENOUS at 19:33

## 2020-06-30 RX ADMIN — AMANTADINE HYDROCHLORIDE 100 MG: 50 SOLUTION ORAL at 06:23

## 2020-06-30 RX ADMIN — MORPHINE SULFATE 2 MG: 2 INJECTION, SOLUTION INTRAMUSCULAR; INTRAVENOUS at 10:43

## 2020-06-30 RX ADMIN — SODIUM CHLORIDE, PRESERVATIVE FREE 3 ML: 5 INJECTION INTRAVENOUS at 09:12

## 2020-06-30 RX ADMIN — PROPOFOL 150 MG: 10 INJECTION, EMULSION INTRAVENOUS at 17:18

## 2020-06-30 RX ADMIN — ROCURONIUM BROMIDE 40 MG: 10 INJECTION, SOLUTION INTRAVENOUS at 17:18

## 2020-06-30 RX ADMIN — LEVETIRACETAM 1500 MG: 500 TABLET, FILM COATED ORAL at 07:48

## 2020-06-30 RX ADMIN — LANSOPRAZOLE 30 MG: KIT at 06:23

## 2020-06-30 RX ADMIN — PHENYLEPHRINE HYDROCHLORIDE 100 MCG: 10 INJECTION INTRAVENOUS at 18:35

## 2020-06-30 RX ADMIN — ONDANSETRON HYDROCHLORIDE 4 MG: 2 SOLUTION INTRAMUSCULAR; INTRAVENOUS at 19:33

## 2020-06-30 RX ADMIN — PROMETHAZINE HYDROCHLORIDE 10 MG: 25 INJECTION INTRAMUSCULAR; INTRAVENOUS at 17:53

## 2020-06-30 RX ADMIN — LEVETIRACETAM 1500 MG: 500 TABLET, FILM COATED ORAL at 23:01

## 2020-06-30 RX ADMIN — ROCURONIUM BROMIDE 10 MG: 10 INJECTION, SOLUTION INTRAVENOUS at 18:35

## 2020-06-30 RX ADMIN — SODIUM CHLORIDE, POTASSIUM CHLORIDE, SODIUM LACTATE AND CALCIUM CHLORIDE: 600; 310; 30; 20 INJECTION, SOLUTION INTRAVENOUS at 18:36

## 2020-06-30 RX ADMIN — LIDOCAINE HYDROCHLORIDE 1 EACH: 40 SOLUTION TOPICAL at 17:18

## 2020-06-30 RX ADMIN — SODIUM CHLORIDE, POTASSIUM CHLORIDE, SODIUM LACTATE AND CALCIUM CHLORIDE: 600; 310; 30; 20 INJECTION, SOLUTION INTRAVENOUS at 16:09

## 2020-06-30 RX ADMIN — SODIUM CHLORIDE 75 ML/HR: 9 INJECTION, SOLUTION INTRAVENOUS at 22:46

## 2020-06-30 RX ADMIN — PHENYLEPHRINE HYDROCHLORIDE 100 MCG: 10 INJECTION INTRAVENOUS at 18:39

## 2020-06-30 RX ADMIN — FAMOTIDINE 20 MG: 10 INJECTION INTRAVENOUS at 16:05

## 2020-06-30 RX ADMIN — SODIUM CHLORIDE, POTASSIUM CHLORIDE, SODIUM LACTATE AND CALCIUM CHLORIDE 9 ML/HR: 600; 310; 30; 20 INJECTION, SOLUTION INTRAVENOUS at 16:06

## 2020-06-30 RX ADMIN — ROCURONIUM BROMIDE 15 MG: 10 INJECTION, SOLUTION INTRAVENOUS at 19:00
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
## 2020-06-30 NOTE — ANESTHESIA POSTPROCEDURE EVALUATION
Patient: Adrian Kuo    Procedure Summary     Date:  06/30/20 Room / Location:  [facility] OR [address] [place] [address] MAIN OR    Anesthesia Start:  1706 Anesthesia Stop:  1956    Procedure:  NEPHRECTOMY LAPAROSCOPIC (Right Abdomen) Diagnosis:       Nephrostomy tube displaced (CMS/HCC)      (Nephrostomy tube displaced (CMS/HCC) [T83.022A])    Surgeon:  Mario Nagel MD Provider:  Yonas Mckeon MD    Anesthesia Type:  general ASA Status:  4          Anesthesia Type: general    Vitals  Vitals Value Taken Time   BP 90/65 6/30/2020  7:55 PM   Temp 36.4 °C (97.6 °F) 6/30/2020  7:53 PM   Pulse 82 6/30/2020  7:55 PM   Resp 16 6/30/2020  7:55 PM   SpO2 98 % 6/30/2020  7:55 PM           Post Anesthesia Care and Evaluation    Patient location during evaluation: bedside  Patient participation: complete - patient participated  Level of consciousness: awake and alert  Pain management: adequate  Airway patency: patent  Anesthetic complications: No anesthetic complications  PONV Status: controlled  Cardiovascular status: blood pressure returned to baseline and acceptable  Respiratory status: acceptable  Hydration status: acceptable

## 2020-06-30 NOTE — ANESTHESIA PROCEDURE NOTES
Airway  Urgency: elective    Date/Time: 6/30/2020 5:18 PM  Airway not difficult    General Information and Staff    Patient location during procedure: OR  Anesthesiologist: Yara Huffman MD  CRNA: Delonte Huber CRNA    Indications and Patient Condition  Indications for airway management: airway protection    Preoxygenated: yes  MILS not maintained throughout  Mask difficulty assessment: 1 - vent by mask    Final Airway Details  Final airway type: endotracheal airway      Successful airway: ETT  Cuffed: yes   Successful intubation technique: direct laryngoscopy and RSI  Facilitating devices/methods: cricoid pressure and intubating stylet  Endotracheal tube insertion site: oral  Blade: CMAC  Blade size: D  ETT size (mm): 7.0  Cormack-Lehane Classification: grade I - full view of glottis  Placement verified by: chest auscultation   Cuff volume (mL): 7  Measured from: lips  ETT/EBT  to lips (cm): 21  Number of attempts at approach: 1  Assessment: lips, teeth, and gum same as pre-op and atraumatic intubation    Additional Comments  Pre O2, SIAI

## 2020-07-01 VITALS
BODY MASS INDEX: 21.53 KG/M2 | SYSTOLIC BLOOD PRESSURE: 91 MMHG | OXYGEN SATURATION: 93 % | HEIGHT: 71 IN | DIASTOLIC BLOOD PRESSURE: 57 MMHG | TEMPERATURE: 98.4 F | RESPIRATION RATE: 18 BRPM | HEART RATE: 99 BPM

## 2020-07-01 LAB
ANION GAP SERPL CALCULATED.3IONS-SCNC: 10.4 MMOL/L (ref 5–15)
BASOPHILS # BLD AUTO: 0.05 10*3/MM3 (ref 0–0.2)
BASOPHILS NFR BLD AUTO: 0.6 % (ref 0–1.5)
BUN SERPL-MCNC: 18 MG/DL (ref 6–20)
BUN/CREAT SERPL: 25 (ref 7–25)
CALCIUM SPEC-SCNC: 9 MG/DL (ref 8.6–10.5)
CHLORIDE SERPL-SCNC: 104 MMOL/L (ref 98–107)
CO2 SERPL-SCNC: 20.6 MMOL/L (ref 22–29)
CREAT SERPL-MCNC: 0.72 MG/DL (ref 0.76–1.27)
DEPRECATED RDW RBC AUTO: 46.5 FL (ref 37–54)
EOSINOPHIL # BLD AUTO: 0.03 10*3/MM3 (ref 0–0.4)
EOSINOPHIL NFR BLD AUTO: 0.4 % (ref 0.3–6.2)
ERYTHROCYTE [DISTWIDTH] IN BLOOD BY AUTOMATED COUNT: 13.5 % (ref 12.3–15.4)
GFR SERPL CREATININE-BSD FRML MDRD: 121 ML/MIN/1.73
GLUCOSE SERPL-MCNC: 123 MG/DL (ref 65–99)
HCT VFR BLD AUTO: 35.6 % (ref 37.5–51)
HGB BLD-MCNC: 11.9 G/DL (ref 13–17.7)
IMM GRANULOCYTES # BLD AUTO: 0.05 10*3/MM3 (ref 0–0.05)
IMM GRANULOCYTES NFR BLD AUTO: 0.6 % (ref 0–0.5)
LYMPHOCYTES # BLD AUTO: 1.05 10*3/MM3 (ref 0.7–3.1)
LYMPHOCYTES NFR BLD AUTO: 13.3 % (ref 19.6–45.3)
MCH RBC QN AUTO: 31.1 PG (ref 26.6–33)
MCHC RBC AUTO-ENTMCNC: 33.4 G/DL (ref 31.5–35.7)
MCV RBC AUTO: 93 FL (ref 79–97)
MONOCYTES # BLD AUTO: 0.84 10*3/MM3 (ref 0.1–0.9)
MONOCYTES NFR BLD AUTO: 10.7 % (ref 5–12)
NEUTROPHILS NFR BLD AUTO: 5.86 10*3/MM3 (ref 1.7–7)
NEUTROPHILS NFR BLD AUTO: 74.4 % (ref 42.7–76)
NRBC BLD AUTO-RTO: 0 /100 WBC (ref 0–0.2)
PLATELET # BLD AUTO: 192 10*3/MM3 (ref 140–450)
PMV BLD AUTO: 10.8 FL (ref 6–12)
POTASSIUM SERPL-SCNC: 4.7 MMOL/L (ref 3.5–5.2)
RBC # BLD AUTO: 3.83 10*6/MM3 (ref 4.14–5.8)
SODIUM SERPL-SCNC: 135 MMOL/L (ref 136–145)
WBC # BLD AUTO: 7.88 10*3/MM3 (ref 3.4–10.8)

## 2020-07-01 PROCEDURE — 80048 BASIC METABOLIC PNL TOTAL CA: CPT | Performed by: UROLOGY

## 2020-07-01 PROCEDURE — 85025 COMPLETE CBC W/AUTO DIFF WBC: CPT | Performed by: UROLOGY

## 2020-07-01 PROCEDURE — 25010000002 MORPHINE PER 10 MG: Performed by: UROLOGY

## 2020-07-01 PROCEDURE — 25010000003 CEFAZOLIN IN DEXTROSE 2-4 GM/100ML-% SOLUTION: Performed by: UROLOGY

## 2020-07-01 RX ORDER — LEVETIRACETAM 100 MG/ML
1500 SOLUTION ORAL EVERY 12 HOURS SCHEDULED
Status: DISCONTINUED | OUTPATIENT
Start: 2020-07-01 | End: 2020-07-01 | Stop reason: HOSPADM

## 2020-07-01 RX ORDER — CEPHALEXIN 250 MG/5ML
250 POWDER, FOR SUSPENSION ORAL EVERY 8 HOURS SCHEDULED
Qty: 120 ML | Refills: 0 | Status: SHIPPED | OUTPATIENT
Start: 2020-07-01 | End: 2020-07-09

## 2020-07-01 RX ORDER — CEPHALEXIN 250 MG/5ML
250 POWDER, FOR SUSPENSION ORAL EVERY 8 HOURS SCHEDULED
Status: DISCONTINUED | OUTPATIENT
Start: 2020-07-01 | End: 2020-07-01 | Stop reason: HOSPADM

## 2020-07-01 RX ORDER — CEFAZOLIN SODIUM 2 G/100ML
2 INJECTION, SOLUTION INTRAVENOUS EVERY 8 HOURS
Qty: 1600 ML | Refills: 0 | Status: SHIPPED | OUTPATIENT
Start: 2020-07-01 | End: 2020-07-01 | Stop reason: HOSPADM

## 2020-07-01 RX ADMIN — LEVETIRACETAM 1500 MG: 100 SOLUTION ORAL at 12:22

## 2020-07-01 RX ADMIN — MORPHINE SULFATE 2 MG: 2 INJECTION, SOLUTION INTRAMUSCULAR; INTRAVENOUS at 05:14

## 2020-07-01 RX ADMIN — CEFAZOLIN SODIUM 2 G: 2 INJECTION, SOLUTION INTRAVENOUS at 01:05

## 2020-07-01 RX ADMIN — AMANTADINE HYDROCHLORIDE 100 MG: 50 SOLUTION ORAL at 06:38

## 2020-07-01 RX ADMIN — MORPHINE SULFATE 2 MG: 2 INJECTION, SOLUTION INTRAMUSCULAR; INTRAVENOUS at 14:59

## 2020-07-01 RX ADMIN — CEFAZOLIN SODIUM 2 G: 2 INJECTION, SOLUTION INTRAVENOUS at 09:25

## 2020-07-01 RX ADMIN — MORPHINE SULFATE 2 MG: 2 INJECTION, SOLUTION INTRAMUSCULAR; INTRAVENOUS at 09:46

## 2020-07-01 RX ADMIN — LANSOPRAZOLE 30 MG: KIT at 06:37

## 2020-07-01 NOTE — PROGRESS NOTES
"Daily progress note    Chief complaint  Status post laparoscopic right nephrectomy  Doing okay  No new problems  Family at bedside    History of present illness  40-year-old white male with history of traumatic brain injury seizure disorder quadriplegic with contracture and immobilization syndrome who has a feeding tube and recent right nephrostomy tube secondary to hydronephrosis well-known to our service brought to the emergency room with right nephrostomy tube dislodgment.  Patient evaluated in ER and admitted to our service for nephrostomy tube replacement with urology.  Patient is nonverbal unable to give history and most of the history obtained from the chart nursing staff and we know the patient from previous admissions.  Patient is DNR per his wishes.    REVIEW OF SYSTEMS  Unable to obtain      PHYSICAL EXAM   Blood pressure 100/68, pulse 87, temperature 98.2 °F (36.8 °C), temperature source Temporal, resp. rate 16, height 180.3 cm (70.98\"), SpO2 98 %.    GENERAL: not distressed  HENT: nares patent  EYES: no scleral icterus  CV: regular rhythm, regular rate  RESPIRATORY: normal effort, clear to auscultation bilaterally  ABDOMEN: soft, nontender  MUSCULOSKELETAL: Contracted for extremities  NEURO: Awake, moans incomprehensibly, contracted 4 extremities  SKIN: warm, dry, PICC line to the right upper extremity shows no evidence of surrounding infection, site of right nephrostomy tube is without any evidence of surrounding infection, nephrostomy tube is now absent     LAB RESULTS  Lab Results (last 24 hours)     Procedure Component Value Units Date/Time    Basic Metabolic Panel [[id number]]  (Abnormal) Collected:  07/01/20 0543    Specimen:  Blood Updated:  07/01/20 0803     Glucose 123 mg/dL      BUN 18 mg/dL      Creatinine 0.72 mg/dL      Sodium 135 mmol/L      Potassium 4.7 mmol/L      Chloride 104 mmol/L      CO2 20.6 mmol/L      Calcium 9.0 mg/dL      eGFR Non African Amer 121 mL/min/1.73      " BUN/Creatinine Ratio 25.0     Anion Gap 10.4 mmol/L     Narrative:       GFR Normal >60  Chronic Kidney Disease <60  Kidney Failure <15      CBC & Differential [[id number]] Collected:  07/01/20 0543    Specimen:  Blood Updated:  07/01/20 0652    Narrative:       The following orders were created for panel order CBC & Differential.  Procedure                               Abnormality         Status                     ---------                               -----------         ------                     CBC Auto Differential[[id number]]        Abnormal            Final result                 Please view results for these tests on the individual orders.    CBC Auto Differential [[id number]]  (Abnormal) Collected:  07/01/20 0543    Specimen:  Blood Updated:  07/01/20 0652     WBC 7.88 10*3/mm3      RBC 3.83 10*6/mm3      Hemoglobin 11.9 g/dL      Hematocrit 35.6 %      MCV 93.0 fL      MCH 31.1 pg      MCHC 33.4 g/dL      RDW 13.5 %      RDW-SD 46.5 fl      MPV 10.8 fL      Platelets 192 10*3/mm3      Neutrophil % 74.4 %      Lymphocyte % 13.3 %      Monocyte % 10.7 %      Eosinophil % 0.4 %      Basophil % 0.6 %      Immature Grans % 0.6 %      Neutrophils, Absolute 5.86 10*3/mm3      Lymphocytes, Absolute 1.05 10*3/mm3      Monocytes, Absolute 0.84 10*3/mm3      Eosinophils, Absolute 0.03 10*3/mm3      Basophils, Absolute 0.05 10*3/mm3      Immature Grans, Absolute 0.05 10*3/mm3      nRBC 0.0 /100 WBC     POC Glucose Once [[id number]]  (Abnormal) Collected:  06/30/20 2039    Specimen:  Blood Updated:  06/30/20 2041     Glucose 176 mg/dL     Tissue Pathology Exam [[id number]] Collected:  06/30/20 1556    Specimen:  Tissue from Kidney, Right Updated:  06/30/20 2026        Imaging Results (Last 24 Hours)     ** No results found for the last 24 hours. **          Current Facility-Administered Medications:   •  amantadine (SYMMETREL) solution 100 mg, 100 mg, Per G Tube, Q12H, Mario Nagel MD, 100 mg at 07/01/20  0638  •  bupivacaine (MARCAINE) 0.25 % injection, , , PRN, Mario Nagel MD, 20 mL at 06/30/20 1741  •  ceFAZolin in dextrose (ANCEF) IVPB solution 2 g, 2 g, Intravenous, Q8H, Mario Nagel MD, 2 g at 07/01/20 0925  •  Glycerin-Hypromellose- (ARTIFICIAL TEARS) 0.2-0.2-1 % ophthalmic solution solution 1 drop, 1 drop, Both Eyes, Q3H PRN, Mario Nagel MD, 1 drop at 06/29/20 1803  •  HYDROmorphone (DILAUDID) injection 0.5 mg, 0.5 mg, Intravenous, Q2H PRN, Mario Nagel MD  •  lansoprazole (FIRST) oral suspension 30 mg, 30 mg, Per G Tube, QAM, Mario Nagel MD, 30 mg at 07/01/20 0637  •  levETIRAcetam (KEPPRA) 100 MG/ML solution 1,500 mg, 1,500 mg, Per G Tube, Q12H, Mario Nagel MD, 1,500 mg at 07/01/20 1222  •  morphine injection 2 mg, 2 mg, Intravenous, Q4H PRN, Mario Nagel MD, 2 mg at 07/01/20 0946  •  [COMPLETED] Insert peripheral IV, , , Once **AND** sodium chloride 0.9 % flush 10 mL, 10 mL, Intravenous, PRN, Mario Nagel MD, 10 mL at 06/27/20 1339  •  sodium chloride 0.9 % infusion, 75 mL/hr, Intravenous, Continuous, Mario Nagel MD, Last Rate: 75 mL/hr at 06/30/20 2246, 75 mL/hr at 06/30/20 2246    ASSESSMENT  Right nephrostomy tube dislodgment status post replacement under radiology  Nonfunctioning right kidney with hydronephrosis status post laparoscopic nephrectomy  Traumatic brain injury  Seizure disorder  Quadriplegia with contractures  Dysphagia status post G-tube  Immobilization syndrome    PLAN  Discharge back to nursing home if okay with urology  Discharge summary dictated    MICHEL KURTZ MD

## 2020-07-01 NOTE — PROGRESS NOTES
"  First Urology Progress Note    Chief Complaint: Mild discomfort    Patient looks very well post complicated right laparoscopic nephrectomy.  No fevers through the night.  White count is stable.  No signs of any ongoing blood loss and his MAYE output has been about 40 cc through the night.  Considering how complicated was he is done very well.  He has some discomfort he is getting IV morphine but will transition over to oral medications per his G-tube.  Looks like he can go back to the nursing facility at any point now keep him on oral antibiotics for short period of time.    Review of Systems:    Review of systems could not be obtained due to  patient nonverbal.          Vital Signs  /68 (BP Location: Left arm, Patient Position: Lying)   Pulse 87   Temp 98.2 °F (36.8 °C) (Temporal)   Resp 16   Ht 180.3 cm (70.98\")   SpO2 98%   BMI 21.53 kg/m²     Physical Exam:     General Appearance:    Alert NAD   HEENT:    No trauma, pupils reactive, hearing intact   Back:     No CVA tenderness   Lungs:     Respirations unlabored, no wheezing    Heart:    RRR, intact peripheral pulses   Abdomen:     Soft, NDNT, no masses, no guarding   :   Phallus normal testes normal   Extremities:   No edema, contractures   Lymphatic:   No neck or groin LAD   Skin:   No bleeding, bruising or rashes   Neuro/Psych:  TBI        Results Review:     I reviewed the patient's new clinical results.  Lab Results (last 24 hours)     Procedure Component Value Units Date/Time    Basic Metabolic Panel [[id number]]  (Abnormal) Collected:  07/01/20 0543    Specimen:  Blood Updated:  07/01/20 0803     Glucose 123 mg/dL      BUN 18 mg/dL      Creatinine 0.72 mg/dL      Sodium 135 mmol/L      Potassium 4.7 mmol/L      Chloride 104 mmol/L      CO2 20.6 mmol/L      Calcium 9.0 mg/dL      eGFR Non African Amer 121 mL/min/1.73      BUN/Creatinine Ratio 25.0     Anion Gap 10.4 mmol/L     Narrative:       GFR Normal >60  Chronic Kidney Disease <60  Kidney " Failure <15      CBC & Differential [[id number]] Collected:  07/01/20 0543    Specimen:  Blood Updated:  07/01/20 0652    Narrative:       The following orders were created for panel order CBC & Differential.  Procedure                               Abnormality         Status                     ---------                               -----------         ------                     CBC Auto Differential[[id number]]        Abnormal            Final result                 Please view results for these tests on the individual orders.    CBC Auto Differential [[id number]]  (Abnormal) Collected:  07/01/20 0543    Specimen:  Blood Updated:  07/01/20 0652     WBC 7.88 10*3/mm3      RBC 3.83 10*6/mm3      Hemoglobin 11.9 g/dL      Hematocrit 35.6 %      MCV 93.0 fL      MCH 31.1 pg      MCHC 33.4 g/dL      RDW 13.5 %      RDW-SD 46.5 fl      MPV 10.8 fL      Platelets 192 10*3/mm3      Neutrophil % 74.4 %      Lymphocyte % 13.3 %      Monocyte % 10.7 %      Eosinophil % 0.4 %      Basophil % 0.6 %      Immature Grans % 0.6 %      Neutrophils, Absolute 5.86 10*3/mm3      Lymphocytes, Absolute 1.05 10*3/mm3      Monocytes, Absolute 0.84 10*3/mm3      Eosinophils, Absolute 0.03 10*3/mm3      Basophils, Absolute 0.05 10*3/mm3      Immature Grans, Absolute 0.05 10*3/mm3      nRBC 0.0 /100 WBC     POC Glucose Once [[id number]]  (Abnormal) Collected:  06/30/20 2039    Specimen:  Blood Updated:  06/30/20 2041     Glucose 176 mg/dL     Tissue Pathology Exam [[id number]] Collected:  06/30/20 1556    Specimen:  Tissue from Kidney, Right Updated:  06/30/20 2026        Imaging Results (Last 24 Hours)     ** No results found for the last 24 hours. **          Medication Review:   I have personally reviewed    Current Facility-Administered Medications:   •  amantadine (SYMMETREL) solution 100 mg, 100 mg, Per G Tube, Q12H, Mario Nagel MD, 100 mg at 07/01/20 0638  •  bupivacaine (MARCAINE) 0.25 % injection, , , PRN, Mario Nagel  MD EMILY, 20 mL at 06/30/20 1741  •  ceFAZolin in dextrose (ANCEF) IVPB solution 2 g, 2 g, Intravenous, Q8H, Mario Nagel MD, 2 g at 07/01/20 0925  •  cephALEXin (KEFLEX) 250 MG/5ML suspension 250 mg, 250 mg, Oral, Q8H, Mario Nagel MD  •  Glycerin-Hypromellose- (ARTIFICIAL TEARS) 0.2-0.2-1 % ophthalmic solution solution 1 drop, 1 drop, Both Eyes, Q3H PRN, Mario Nagel MD, 1 drop at 06/29/20 1803  •  HYDROcodone-homatropine (HYCODAN) 5-1.5 MG/5ML syrup 5 mL, 5 mL, Oral, Q4H PRN, Mario Nagel MD  •  HYDROmorphone (DILAUDID) injection 0.5 mg, 0.5 mg, Intravenous, Q2H PRN, Mario Nagel MD  •  lansoprazole (FIRST) oral suspension 30 mg, 30 mg, Per G Tube, QAM, Mario Nagel MD, 30 mg at 07/01/20 0637  •  levETIRAcetam (KEPPRA) 100 MG/ML solution 1,500 mg, 1,500 mg, Per G Tube, Q12H, Mario Nagel MD, 1,500 mg at 07/01/20 1222  •  morphine injection 2 mg, 2 mg, Intravenous, Q4H PRN, Mario Nagel MD, 2 mg at 07/01/20 1459  •  [COMPLETED] Insert peripheral IV, , , Once **AND** sodium chloride 0.9 % flush 10 mL, 10 mL, Intravenous, PRN, Mario Nagel MD, 10 mL at 06/27/20 1339    Allergies:    Zofran [ondansetron hcl]    Assessment:    Active Problems:  Patient Active Problem List   Diagnosis   • Sepsis (CMS/HCC)   • End of battery life of intrathecal infusion pump   • Small bowel obstruction (CMS/HCC)   • Generalized abdominal pain   • SBO (small bowel obstruction) (CMS/HCC)   • Ureteral stone   • Nephrostomy tube displaced (CMS/HCC)   • Chronic obstructive pyelonephritis       Postop day #1 laparoscopic right nephrectomy for chronic obstructing pyelonephritis    Plan:    Okay to discharge to nursing facility on oral Keflex and oral pain medication for his G-tube.  I told his mother we should probably follow up with him in about 6 months with a renal ultrasound and she is to give us a call if there is any concerns about his postoperative  care.      Mario Nagel MD    7/1/2020  15:43

## 2020-07-01 NOTE — PROGRESS NOTES
Continued Stay Note  [facility]     Patient Name: Adrian Kuo  MRN: [id number]  Today's Date: 7/1/2020    Admit Date: 6/26/2020    Discharge Plan     Row Name 07/01/20 1301       Plan    Plan Comments  Awaiting Dr. Nagel to see and confirm discharge. Discussed case with Dr. Clarke. Plans dc today after Dr. Nagel sees. Call placed to [facility] to inform. States able to accept back today. [facility] arranged for 1600. Packet given to RN to complete. Continue to follow.        Discharge Codes    No documentation.       Expected Discharge Date and Time     Expected Discharge Date Expected Discharge Time    Jun 28, 2020             Flakita Prabhakar RN

## 2020-07-01 NOTE — OP NOTE
NEPHRECTOMY LAPAROSCOPIC  Procedure Note    Adrian Kuo  6/30/2020    Pre-op Diagnosis:   Chronic obstructing pyelonephritis with renal atrophy    Post-op Diagnosis:     Post-Op Diagnosis Codes:     * Chronic obstructive pyelonephritis [N11.1]     * Renal atrophy, right [N26.1]    Procedure(s):  NEPHRECTOMY LAPAROSCOPIC    Surgeon(s):  Mario Nagel MD    Anesthesia: General    Staff:   Circulator: Anuradha Berman RN; Alessia Miles RN; Sobeida Luna RN  Scrub Person: Rosendo Posadas  Assistant: Yanira Sampson RNFA    Estimated Blood Loss: 750 mL    Specimens:                Order Name Source Comment Collection Info Order Time   TISSUE PATHOLOGY EXAM Kidney, Right  Collected By: Mario Nagel MD 6/30/2020  7:21 PM         Drains:   Closed/Suction Drain 1 Lateral;Right RUQ Bulb 15 Fr. (Active)   Site Description Unable to view 6/30/2020  7:53 PM   Dressing Status Clean;Dry;Intact 6/30/2020  7:53 PM   Drainage Appearance Serous 6/30/2020  7:53 PM   Status To bulb suction 6/30/2020  7:53 PM   Output (mL) 40 mL 6/30/2020  8:35 PM       Gastrostomy/Enterostomy LLQ (Active)   Surrounding Skin Reddened 6/30/2020  8:40 AM   Securement taped to abdomen 6/30/2020  7:53 PM   Drain Status Clamped 6/30/2020  7:53 PM   Drainage Appearance None 6/30/2020  8:40 AM   Site Description Scabbed 6/30/2020  8:40 AM   Dressing Status Clean;Dry;Intact 6/30/2020  8:40 AM   Dressing Intervention New dressing 6/27/2020 11:55 AM   Dressing Type Split gauze 6/29/2020  8:12 PM   Tube Feeding Frequency Continuous 6/29/2020  8:12 PM   Tube Feeding Product Diabetisource 6/29/2020  8:12 PM   Tube Feeding Strength Full strength 6/29/2020  8:12 PM   Tube Feeding Method Continuous per pump 6/29/2020  8:12 PM   Tube Feeding Rate (mL) 70 mL 6/29/2020  8:12 PM   Tube Feeding Bag Changed Yes 6/29/2020  7:58 AM   Tube Feeding Residual (mL) 0 mL 6/29/2020  8:12 PM   Tube Feeding Residual Returned  (mL) 0 mL 6/29/2020  8:12 PM   Feeding Tube Flushed With Tap water 6/29/2020  8:12 PM   Flush/ Irrigation Intake (mL) 687 6/29/2020  7:58 AM   Tube Feeding Intake (mL) 30 6/29/2020  8:12 PM   Intake (mL) 60 mL 6/29/2020  6:40 AM   Output (mL) 361 mL 6/28/2020  6:14 AM       Urethral Catheter Silicone 16 Fr. (Active)   Site Assessment Clean;Skin intact 6/30/2020  7:53 PM   Collection Container Standard drainage bag 6/30/2020  7:53 PM   Securement Method Securing device 6/30/2020  7:53 PM   Output (mL) 500 mL 6/30/2020  8:20 PM       [REMOVED] Closed/Suction Drain 1 Right Back Pigtail 8 Fr. (Removed)   Site Description Unable to view 6/4/2020  3:06 PM   Dressing Status Clean;Dry;Intact 6/4/2020  3:06 PM   Drainage Appearance Clear;Pink tinged 6/4/2020  3:06 PM   Status To bulb suction 6/4/2020  3:06 PM       [REMOVED] Nephrostomy Right 8 Fr. (Removed)   Site Assessment MILLIE 6/29/2020  8:12 PM   Dressing Status Clean;Dry;Intact 6/29/2020  8:12 PM   Dressing Type Other (Comment) 6/27/2020 10:00 PM   Collection Container Standard drainage bag 6/29/2020  8:12 PM   Output (mL) 50 mL 6/30/2020  6:24 AM       [REMOVED] Urethral Catheter Straight-tip;Double-lumen;Silicone 16 Fr. (Removed)       [REMOVED] Urethral Catheter (Removed)       [REMOVED] Urethral Catheter Silicone 16 Fr. (Removed)   Daily Indications Selected surgeries ( tract, abdomen) 6/4/2020  3:25 PM   Site Assessment Bleeding 6/4/2020  3:25 PM   Collection Container Standard drainage bag 6/4/2020  3:06 PM   Securement Method Securing device 6/4/2020  3:06 PM   Output (mL) 300 mL 6/4/2020  3:40 PM       Findings: Atrophic right kidney with chronic thickened dilated urothelium and extensive fat necrosis and severe inflammation of the retroperitoneum.    Complications: Higher than normal blood loss due to multiple vessels and difficult section of this severely inflamed kidney.  No immediate complications as a result    Indications: 40-year-old gentleman  paraplegic history of stone disease has a impacted right ureteral stone with a nephrostomy tube that has fallen out.  He came in over the weekend his nephrostomy tube was replaced Saturday.  After discussion with the patient's mother who is his POA we have elected to had proceed with nephrectomy this admission.  Risks and complications were discussed.    Procedure: Patient was taken off suite given general anesthesia.  Placed in comfortable supine position where Fiore catheter was placed.  Placed in the right flank position.  All areas were properly padded.  Table slightly flexed.  Secured to the table prepped and draped in a sterile fashion.  Timeout was performed.  Incision was made off the tip of the 12th rib and the retroperitoneal space was entered.  Balloon dissection was performed.  Our dissection was not too difficult we used a 3 trocar technique with a 15 mm trocar at the anterior axillary line and a 5 mm tip of the 11th rib.  A 12 mm balloon trocar was placed the tip of the 12th rib.  Pneumoretroperitoneum was achieved.  After dissection the retroperitoneum Gerota's fascia was incised and I dissected this out we identified that the kidney was actually posterior and we had done an anterior dissection.  Encountered some vessels that were bleeding these were probably in the mesentery of the bowel and one arterial branch had to be clipped was fairly small it certainly was not a major branch.  As we dissected out the retroperitoneal space began dissecting kidney we really got into some significant inflammatory changes.  The kidney was densely adherent to the perirenal fat and there is extensive fat necrosis.  There is no stefanie abscess.  Is a tedious dissection total procedure time was long due to the dissection of the retroperitoneal space and carefully identified the vena cava.  I was able identify the ureter.  Was largely dilated.  The stone was deep in the pelvis so I did not go this low but I dissected the  ureter down to just above the pelvic brim along the psoas muscle.  We had extensive amount of oozing and bleeding and we are had to irrigate frequently and this was just due to the multiple small vessels.  We end up getting into the subcapsular space of the kidney on the upper pole just because I could not get around the upper pole for fear that I would get into the liver.  Eventually I was able to come underneath the ureter in the pelvis and I felt comfortable this was was that that I was able identify the vena cava.  It was very deep and I finally identified the renal artery and I got into 2 branches which I clipped and cut.  I then found the renal vein.  It was not too large but I came across this with the endovascular stapler and then I completed the medial dissection.  I believe the adrenal gland was left and I never clearly identified it and I ended up leaving just the capsule of the kidney superiorly and this was largely because this is where previous nephrostomy tubes had been performed but I did take out the entire kidney with most of the capsule aside from the upper pole.  As I completed the nephrectomy I then took down the ureter as close as I felt comfortable with and actually incised it so that it would drain.  I did not clip across it.  Hemostasis was excellent.  Specimen was placed in a retrieval bag.  I irrigated it thoroughly.  I elongated our lower incision and pulled out the specimen.  A 15 Icelandic Shahab drain was placed through our 11th rib incision and secured with 2-0 silk.  Carefully dried out the wound and then I closed the 12th rib incision with 2 layers of 0 Vicryl and the elongated anterior flank incision was closed with 2 layers with 0 Vicryl.  Skin was closed with subcuticular fashion.  Glue was applied.  Drain was put to bulb suction.  Only concerning thing about this case other than some of the blood loss in the 2-hour operative time because of the extensive inflammation was the lack of  the ability to identify his duodenum and the small vessel that I clipped I feel pretty comfortable was a small branch of the mesentery and not any critical artery.  He behaved well he did not have any significant blood pressure changes and was stable throughout the case.  I discussed the findings with his mother.      Mario Nagel MD     Date: 6/30/2020  Time: 22:00
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
## 2020-07-02 LAB
CYTO UR: NORMAL
LAB AP CASE REPORT: NORMAL
PATH REPORT.FINAL DX SPEC: NORMAL
PATH REPORT.GROSS SPEC: NORMAL

## 2020-07-02 NOTE — PROGRESS NOTES
Case Management Discharge Note      Final Note: Confirmed with Silvana Samuels patient returned to facility IC level of care. Transported per [facility].         Destination - Selection Complete      Service Provider Request Status Selected Services Address Phone Number Fax Number    [facility] [address], [facility] [address] [phone]       Tierra Garcia RN 6/28/2020 0749    Per Alonso HELLER, pt will return to [facility] rehab for 2 wks post hospital stay per their Covid-19 guidelines and then return to his LTC bed on [facility].                 Durable Medical Equipment      No service has been selected for the patient.      Dialysis/Infusion      No service has been selected for the patient.      Home Medical Care      No service has been selected for the patient.      Therapy      No service has been selected for the patient.      Community Resources      No service has been selected for the patient.        Transportation Services  Ambulance: [facility]    Final Discharge Disposition Code: 04 - intermediate care facility

## 2020-07-27 ENCOUNTER — APPOINTMENT (OUTPATIENT)
Dept: GENERAL RADIOLOGY | Facility: HOSPITAL | Age: 40
End: 2020-07-27

## 2020-07-27 ENCOUNTER — HOSPITAL ENCOUNTER (EMERGENCY)
Facility: HOSPITAL | Age: 40
Discharge: NURSING, REHAB OR LONG-TERM CARE | End: 2020-07-27
Attending: EMERGENCY MEDICINE | Admitting: EMERGENCY MEDICINE

## 2020-07-27 VITALS
TEMPERATURE: 96.3 F | HEART RATE: 69 BPM | OXYGEN SATURATION: 96 % | HEIGHT: 69 IN | SYSTOLIC BLOOD PRESSURE: 99 MMHG | DIASTOLIC BLOOD PRESSURE: 68 MMHG | WEIGHT: 150 LBS | BODY MASS INDEX: 22.22 KG/M2 | RESPIRATION RATE: 16 BRPM

## 2020-07-27 DIAGNOSIS — T85.528A DISLODGED GASTROSTOMY TUBE: Primary | ICD-10-CM

## 2020-07-27 PROCEDURE — 74018 RADEX ABDOMEN 1 VIEW: CPT

## 2020-07-27 PROCEDURE — 99283 EMERGENCY DEPT VISIT LOW MDM: CPT

## 2020-07-27 RX ORDER — SENNA PLUS 8.6 MG/1
1 TABLET ORAL DAILY
COMMUNITY

## 2020-07-27 RX ORDER — HYDROCODONE BITARTRATE AND ACETAMINOPHEN 5; 325 MG/1; MG/1
1 TABLET ORAL EVERY 6 HOURS PRN
COMMUNITY

## 2020-07-27 NOTE — ED NOTES
Report called back to [facility] and rehab     Iesha Oquendo, RN  07/27/20 2796

## 2020-07-27 NOTE — ED NOTES
Pt arrives from [facility]. Staff reports that some time during the night the pt's G-tube became dislodged. Unknown how long the tube has been out. Pt does have a stoma present in his left abdomen. Size of original tube unknown. Placed multiple sizes of replacement tube at bedside.      Hyun Morgan, RN  07/27/20 0802

## 2020-07-27 NOTE — ED TRIAGE NOTES
Gtube dislodged - during night.  Negative for covid at NH.  Vegetative state at baseline r/t tbi    Patient was placed in face mask during first look triage.  Patient was wearing a face mask throughout encounter.  I wore personal protective equipment throughout the encounter.  Hand hygiene was performed before and after patient encounter.

## 2020-07-27 NOTE — PROGRESS NOTES
Call placed to [facility] to schedule transport back to [facility] and rehab. Spoke w/Marisol who arranged for transport. Primary RN Arleen rushing- medical necessity form complete. Becki Mendez RN
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
## 2021-07-19 NOTE — ED NOTES
Assumed care of pt. Pt resting in bed. resp even and unlabored. Ng tube to intermittent suction with brown/Iesha Tijerina, RN  05/14/20 4954     Detail Level: Zone Photo Preface (Leave Blank If You Do Not Want): Photographs were obtained today

## 2021-12-20 NOTE — ANESTHESIA PROCEDURE NOTES
Airway  Urgency: elective    Date/Time: 5/14/2020 6:03 PM  Airway not difficult    General Information and Staff    Patient location during procedure: OR  Anesthesiologist: Gaston Henry MD  CRNA: Traci Major CRNA    Indications and Patient Condition  Indications for airway management: airway protection    Preoxygenated: yes  MILS maintained throughout  Mask difficulty assessment: 0 - not attempted    Final Airway Details  Final airway type: endotracheal airway      Successful airway: ETT  Cuffed: yes   Successful intubation technique: RSI and video laryngoscopy  Facilitating devices/methods: intubating stylet  Endotracheal tube insertion site: oral  Blade: CMAC  Blade size: D  ETT size (mm): 7.5  Cormack-Lehane Classification: grade I - full view of glottis  Placement verified by: chest auscultation, bronchoscopy and capnometry   Measured from: teeth  ETT/EBT  to teeth (cm): 22  Number of attempts at approach: 1  Assessment: lips, teeth, and gum same as pre-op and atraumatic intubation              
PAIN/TENDERNESS
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
## (undated) DEVICE — TIDISHIELD UROLOGY DRAIN BAGS FROSTY VINYL STERILE FITS SIEMENS UROSKOP ACCESS 20 PER CASE: Brand: TIDISHIELD

## (undated) DEVICE — GLV SURG BIOGEL LTX PF 6

## (undated) DEVICE — TOWEL,OR,DSP,ST,BLUE,STD,4/PK,20PK/CS: Brand: MEDLINE

## (undated) DEVICE — ANTIBACTERIAL UNDYED BRAIDED (POLYGLACTIN 910), SYNTHETIC ABSORBABLE SUTURE: Brand: COATED VICRYL

## (undated) DEVICE — ENDOPATH ETS-FLEX45 ARTICULATING ENDOSCOPIC LINEAR CUTTER, NO RELOAD: Brand: ENDOPATH

## (undated) DEVICE — TRAP FLD MINIVAC MEGADYNE 100ML

## (undated) DEVICE — SYR LUERLOK 20CC

## (undated) DEVICE — PK PROC MAJ 40

## (undated) DEVICE — STPLR SKIN VISISTAT WD 35CT

## (undated) DEVICE — PK CHST BRST 40

## (undated) DEVICE — DRSNG WND GZ PAD BORDERED 4X8IN STRL

## (undated) DEVICE — LOU CYSTO: Brand: MEDLINE INDUSTRIES, INC.

## (undated) DEVICE — LOU LAP CHOLE: Brand: MEDLINE INDUSTRIES, INC.

## (undated) DEVICE — 3M™ IOBAN™ 2 ANTIMICROBIAL INCISE DRAPE 6650EZ: Brand: IOBAN™ 2

## (undated) DEVICE — SUT VIC 0 CT1 36IN J946H

## (undated) DEVICE — ELECTRD BLD EXT EDGE 1P COAT 6.5IN STRL

## (undated) DEVICE — DRP SLUSH WARMR MACH CIR 44X44IN

## (undated) DEVICE — GLV SURG TRIUMPH CLASSIC PF LTX 8 STRL

## (undated) DEVICE — FIBR LASR HOLMIUM ACCUMAX 365 1PT USE

## (undated) DEVICE — SUT SILK 0 TIES 18IN SA66G

## (undated) DEVICE — SUT SILK 3/0 SH CR5 18IN C0135

## (undated) DEVICE — EXTRCT STN NCIRCLE TIPLSS 2.2F1X115CM

## (undated) DEVICE — INTENDED FOR TISSUE SEPARATION, AND OTHER PROCEDURES THAT REQUIRE A SHARP SURGICAL BLADE TO PUNCTURE OR CUT.: Brand: BARD-PARKER ® CARBON RIB-BACK BLADES

## (undated) DEVICE — GOWN,NON-REINFORCED,SIRUS,SET IN SLV,XXL: Brand: MEDLINE

## (undated) DEVICE — CATH URETRL 2 LUM 10FR

## (undated) DEVICE — ENDOPATH XCEL BLADELESS TROCARS WITH STABILITY SLEEVES: Brand: ENDOPATH XCEL

## (undated) DEVICE — GLV SURG BIOGEL LTX PF 7

## (undated) DEVICE — GLV SURG SENSICARE POLYISPRN W/ALOE PF LF 6.5 GRN STRL

## (undated) DEVICE — SOL NACL 0.9PCT 1000ML

## (undated) DEVICE — CATH URETRL FLXITP POLLACK STD 5F 70CM

## (undated) DEVICE — SUT SILK 2/0 TIES 18IN A185H

## (undated) DEVICE — PRT BIOP SEALS

## (undated) DEVICE — SYR LL 3CC

## (undated) DEVICE — ENDOCUT SCISSOR TIP, DISPOSABLE: Brand: RENEW

## (undated) DEVICE — DRP C/ARM 41X74IN

## (undated) DEVICE — TOTAL TRAY, 16FR 10ML SIL FOLEY, URN: Brand: MEDLINE

## (undated) DEVICE — NITINOL WIRE WITH HYDROPHILIC TIP: Brand: SENSOR

## (undated) DEVICE — SUT SILK 3/0 TIES 18IN A184H

## (undated) DEVICE — ADHS SKIN DERMABOND TOP ADVANCED

## (undated) DEVICE — SUT ETHIB 0 CT2 CR8 18IN CX27D

## (undated) DEVICE — SPNG LAP 18X18IN LF STRL PK/5

## (undated) DEVICE — SYR LUERLOK 20CC BX/50

## (undated) DEVICE — VISUALIZATION SYSTEM: Brand: CLEARIFY

## (undated) DEVICE — PENCL E/S ULTRAVAC TELESCP NOSE HOLSTR 10FT

## (undated) DEVICE — APPL CHLORAPREP HI/LITE 26ML ORNG

## (undated) DEVICE — SUT PDS 1 XLH LP 99IN Z881G

## (undated) DEVICE — GOWN ,SIRUS,NONREINFORCED SMALL: Brand: MEDLINE

## (undated) DEVICE — GLV SURG TRIUMPH CLASSIC PF LTX 8.5 STRL

## (undated) DEVICE — SPECIMEN RETRIEVAL POUCH: Brand: ENDO CATCH II

## (undated) DEVICE — HARMONIC ACE +7 LAPAROSCOPIC SHEARS ADVANCED HEMOSTASIS 5MM DIAMETER 36CM SHAFT LENGTH  FOR USE WITH GRAY HAND PIECE ONLY: Brand: HARMONIC ACE

## (undated) DEVICE — SHEATH URETRL ACC NAVIGATOR 13/15F 36CM 5PK

## (undated) DEVICE — SKIN AFFIX SURG ADHESIVE 72/CS 0.55ML: Brand: MEDLINE

## (undated) DEVICE — GW AMPLTZ SUPERSTIFF STR .035IN 180CM

## (undated) DEVICE — PENCL E/S HNDSWCH ROCKR CB

## (undated) DEVICE — SUT ETHLN 2/0 PS 18IN 585H

## (undated) DEVICE — CAP CONN RED

## (undated) DEVICE — DRSNG TELFA PAD NONADH STR 1S 3X4IN

## (undated) DEVICE — Device

## (undated) DEVICE — RADIFOCUS GLIDEWIRE: Brand: GLIDEWIRE

## (undated) DEVICE — ENSEAL TEMPERATURE CONTROLLED TISSUE SEALING TECHNOLOGY DISPOSABLE TISSUE SEALING DEVICE TAPTRONIC TRIGGER ACTIVATED POWER 5MM JAW STYLE: Brand: ENSEAL

## (undated) DEVICE — GW SUREGLIDE FLXTIP ANGL .035 3X150CM

## (undated) DEVICE — NDL SPINE 22G 31/2IN BLK

## (undated) DEVICE — 3M™ STERI-STRIP™ COMPOUND BENZOIN TINCTURE 40 BAGS/CARTON 4 CARTONS/CASE C1544: Brand: 3M™ STERI-STRIP™

## (undated) DEVICE — POOLE SUCTION HANDLE: Brand: CARDINAL HEALTH

## (undated) DEVICE — SYS BALN KII DISSCT RND C0K12

## (undated) DEVICE — LAPAROSCOPIC SMOKE FILTRATION SYSTEM: Brand: PALL LAPAROSHIELD® PLUS LAPAROSCOPIC SMOKE FILTRATION SYSTEM

## (undated) DEVICE — DEV COND GAS LAP INSUFLOW W/LUER CONN

## (undated) DEVICE — APPL CHLORAPREP W/TINT 26ML ORNG

## (undated) DEVICE — NDL HYPO PRECISIONGLIDE REG 25G 1 1/2

## (undated) DEVICE — SYR LUERLOK 50ML

## (undated) DEVICE — PK URETSCP 40

## (undated) DEVICE — JACKSON-PRATT 100CC BULB RESERVOIR: Brand: CARDINAL HEALTH

## (undated) DEVICE — SUT PROLN 1 CT1 30IN 8425H